# Patient Record
Sex: MALE | Race: BLACK OR AFRICAN AMERICAN
[De-identification: names, ages, dates, MRNs, and addresses within clinical notes are randomized per-mention and may not be internally consistent; named-entity substitution may affect disease eponyms.]

---

## 2017-01-13 NOTE — ER DOCUMENT REPORT
ED General Pain





- General


Chief Complaint: Leg Pain


Stated Complaint: RIGHT LEG PAIN


Time seen by provider: 18:10


Mode of Arrival: Ambulatory


Information source: Patient


TRAVEL OUTSIDE OF THE [place] IN LAST 30 DAYS: No





- HPI


Patient complains to provider of: sickle cell pain, right leg pain


Onset: Yesterday


Onset/Duration: Gradual, Persistent


Quality of pain: Achy


Severity: Moderate


Pain Level: 4


Context: Chronic problem


Exacerbated by: Denies


Relieved by: Denies


Similar symptoms previously: Yes


Recently seen / treated by doctor: Yes


Notes: 


Patient is a 21-year-old male with a history of sickle cell disease who 

presents to emergency room complaining of right leg pain that's been going on 

since yesterday and consistent with his previous sickle cell crisis, patient 

denies any injury or trauma, no cough, cold or congestion, no nausea, vomiting 

or diarrhea, no fever or chills, states yesterday he had some low back pain 

associated with his sickle cell disease as well but that has since resolved, 

states he attempted to see his hematologist today but she was not in office so 

he came to the emergency room instead





- Related Data


Allergies/Adverse Reactions: 


 





Coconut * [Coconut] Allergy (Mild, Verified 01/13/17 17:25)


 


transpore tape Allergy (Mild, Uncoded 01/13/17 17:25)


 Hives











Past Medical History





- General


Information source: Patient





- Social History


Smoking Status: Never Smoker


Chew tobacco use (# tins/day): No


Frequency of alcohol use: Occasional


Drug Abuse: None


Family History: Arthritis, DM, Hypertension, Other - Sickle cell trait both 

parents and asthma


Patient has suicidal ideation: No


Patient has homicidal ideation: No


Pulmonary Medical History: Reports: Hx Asthma, Hx Pneumonia


Renal/ Medical History: Denies: Hx Peritoneal Dialysis


Past Surgical History: Reports: Hx Abdominal Surgery - Gallstones removal.  

Denies: Hx Cholecystectomy.  Comment Only: Hx Orthopedic Surgery - Fluid 

drained from right foot





- Immunizations


Immunizations up to date: Yes


Hx Diphtheria, Pertussis, Tetanus Vaccination: No


Hx Pneumococcal Vaccination: 05/16/13





Review of Systems





- Review of Systems


Constitutional: No symptoms reported


EENT: No symptoms reported


Cardiovascular: No symptoms reported


Respiratory: No symptoms reported


Gastrointestinal: No symptoms reported


Genitourinary: No symptoms reported


Male Genitourinary: No symptoms reported


Musculoskeletal: See HPI


Skin: No symptoms reported


Hematologic/Lymphatic: See HPI


Neurological/Psychological: No symptoms reported


-: Yes All other systems reviewed and negative





Physical Exam





- Vital signs


Vitals: 


 











Temp Pulse Resp BP Pulse Ox


 


 98.0 F   99   17   135/72 H  99 


 


 01/13/17 17:21  01/13/17 17:21  01/13/17 17:21  01/13/17 17:21  01/13/17 17:21











Interpretation: Normal





- General


General appearance: Appears well, Alert





- HEENT


Head: Normocephalic, Atraumatic


Eyes: Normal


Pupils: PERRL





- Respiratory


Respiratory status: No respiratory distress


Chest status: Nontender


Breath sounds: Normal


Chest palpation: Normal





- Cardiovascular


Rhythm: Regular


Heart sounds: Normal auscultation


Murmur: No





- Abdominal


Inspection: Normal


Distension: No distension


Bowel sounds: Normal


Tenderness: Nontender


Organomegaly: No organomegaly





- Back


Back: Normal, Nontender





- Extremities


General upper extremity: Normal inspection, Nontender, Normal color, Normal ROM

, Normal temperature


General lower extremity: Normal inspection, Nontender, Normal color, Normal ROM

, Normal temperature, Normal weight bearing.  No: Shira's sign





- Neurological


Neuro grossly intact: Yes


Cognition: Normal


Orientation: AAOx4


Dhaval Coma Scale Eye Opening: Spontaneous


Dhaval Coma Scale Verbal: Oriented


Adona Coma Scale Motor: Obeys Commands


Dhaval Coma Scale Total: 15


Speech: Normal


Motor strength normal: LUE, RUE, LLE, RLE


Sensory: Normal





- Psychological


Associated symptoms: Normal affect, Normal mood





- Skin


Skin Temperature: Warm


Skin Moisture: Dry


Skin Color: Normal





Course





- Re-evaluation


Re-evalutation: 





01/13/17 21:53


Patient resting comfortably, reports feeling much better and ready to go home, 

labs were discussed with him at bedside, patient will be discharged with pain 

medication and information to follow up with his hematologist, advised to 

return if symptoms worsen, patient acknowledges understanding and agreement 

with this plan





- Vital Signs


Vital signs: 


 











Temp Pulse Resp BP Pulse Ox


 


 98.0 F   99   17   135/72 H  99 


 


 01/13/17 17:21  01/13/17 17:21  01/13/17 17:21  01/13/17 17:21  01/13/17 17:21














- Laboratory


Result Diagrams: 


 01/13/17 20:55





 01/13/17 18:35


Laboratory results interpreted by me: 


 











  01/13/17 01/13/17 01/13/17





  17:40 18:35 20:55


 


RBC    3.07 L


 


Hgb    12.2 L


 


Hct    36.4 L


 


MCV    119 H


 


MCH    39.8 H


 


RDW    23.8 H


 


Retic Count (auto)    5.15 H


 


Absolute Retic    0.158 H


 


Total Bilirubin   1.8 H 


 


Alkaline Phosphatase   144 H 


 


Urine Blood  SMALL H  














Discharge





- Discharge


Clinical Impression: 


 Sickle cell crisis





Condition: Stable


Disposition: HOME, SELF-CARE


Instructions:  Sickle Cell Crisis ([facility])


Additional Instructions: 


Drink plenty of fluids.  Follow up with your hematologist in one to 2 days.  

Return to the emergency room immediately if symptoms worsen or any additional 

concerns.


Prescriptions: 


Oxycodone HCl/Acetaminophen [Percocet  Mg Tablet] 1 each PO Q6 #30 tablet

## 2017-01-13 NOTE — ER DOCUMENT REPORT
ED Medical Screen (RME)





- General


Stated Complaint: RIGHT LEG PAIN


Mode of Arrival: Ambulatory


Information source: Patient


Notes: 


Patient presents to the emergency department with right leg pain started 2 days 

ago. Patient has history of sickle cell. Reports crisis started 2 days ago with 

nausea.





I greeted and performed a rapid initial assessment of this patient. 

Comprehensive ED assessment and evaluation of the patient, analysis of test 

results and completion of the medical decision making process will be conducted 

by additional ED providers.


TRAVEL OUTSIDE OF THE [place] IN LAST 30 DAYS: No





- Related Data


Allergies/Adverse Reactions: 


 





Coconut * [Coconut] Allergy (Mild, Verified 01/13/17 17:25)


 


transpore tape Allergy (Mild, Uncoded 01/13/17 17:25)


 Hives











Past Medical History


Pulmonary Medical History: Reports: Hx Asthma, Hx Pneumonia


Past Surgical History: Reports: Hx Abdominal Surgery - Gallstones removal.  

Denies: Hx Cholecystectomy.  Comment Only: Hx Orthopedic Surgery - Fluid 

drained from right foot





- Immunizations


Immunizations up to date: Yes


Hx Diphtheria, Pertussis, Tetanus Vaccination: No





Physical Exam





- Vital signs


Vitals: 





 











Temp Pulse Resp BP Pulse Ox


 


 98.0 F   99   17   135/72 H  99 


 


 01/13/17 17:21  01/13/17 17:21  01/13/17 17:21  01/13/17 17:21  01/13/17 17:21














Course





- Vital Signs


Vital signs: 





 











Temp Pulse Resp BP Pulse Ox


 


 98.0 F   99   17   135/72 H  99 


 


 01/13/17 17:21  01/13/17 17:21  01/13/17 17:21  01/13/17 17:21  01/13/17 17:21

## 2017-03-01 NOTE — ER DOCUMENT REPORT
ED Medical Screen (RME)





- General


Stated Complaint: CHEST AND BACK PAIN


Notes: 


22 yo AA male c/o intermittant anterior chest pains and low back pain x 2 days.

  + pain with deep inspiration.  + cough.  + shortness of breath.  no 

radiculopathy, no paresthesias, no bowel/bladder change, no fever.  


+ hx/o sickle cell


TRAVEL OUTSIDE OF THE [place] IN LAST 30 DAYS: No





- Related Data


Allergies/Adverse Reactions: 


 





Coconut * [Coconut] Allergy (Mild, Verified 01/13/17 17:25)


 


transpore tape Allergy (Mild, Uncoded 01/13/17 17:25)


 Hives











Past Medical History


Pulmonary Medical History: Reports: Hx Asthma, Hx Pneumonia


Renal/ Medical History: Denies: Hx Peritoneal Dialysis


Past Surgical History: Reports: Hx Abdominal Surgery - Gallstones removal, Hx 

Vascular Surgery - Port placement.  Denies: Hx Cholecystectomy.  Comment Only: 

Hx Orthopedic Surgery - Fluid drained from right foot





- Immunizations


Immunizations up to date: Yes


Hx Diphtheria, Pertussis, Tetanus Vaccination: No





Physical Exam





- Vital signs


Vitals: 





 











Temp Pulse Resp BP Pulse Ox


 


 98.4 F   99   18   134/80 H  96 


 


 03/01/17 11:52  03/01/17 11:52  03/01/17 11:52  03/01/17 11:52  03/01/17 11:52














Course





- Vital Signs


Vital signs: 





 











Temp Pulse Resp BP Pulse Ox


 


 98.4 F   99   18   134/80 H  96 


 


 03/01/17 11:52  03/01/17 11:52  03/01/17 11:52  03/01/17 11:52  03/01/17 11:52

## 2017-03-01 NOTE — ER DOCUMENT REPORT
ED General Pain





- General


Chief Complaint: Chest Pain


Stated Complaint: CHEST AND BACK PAIN


Notes: 


Patient is a 21-year-old male complaining of chest and significant for sickle 

cell anemia.  Patient states that 3 days ago he had back pain that is now 

affecting his chest, he states that this is consistent with previous flareups 

of sickle cell crisis.  Chest pain described as a constant sharp stabbing pain 

that he has had for the past 2 days that has not gotten worse in severity and 

is reproducible to palpation.  Patient states that he takes 30 mg oxycodone 

every 4 hours PRN prescribed by his hematologist Dr. Canas.  States that he's 

been taking his medication as prescribed but still has pain.  Denies any 

shortness of breath, productive cough, fever, chills, extremity pain.





Past medical history also significant for history of asthma


Past surgical history significant for PowerPort placement in the right upper 

extremity


TRAVEL OUTSIDE OF THE [place] IN LAST 30 DAYS: No





- Related Data


Allergies/Adverse Reactions: 


 





Coconut * [Coconut] Allergy (Mild, Verified 03/01/17 12:12)


 


transpore tape Allergy (Mild, Uncoded 03/01/17 12:12)


 Hives











Past Medical History





- Social History


Smoking Status: Never Smoker


Chew tobacco use (# tins/day): No


Frequency of alcohol use: None


Drug Abuse: None


Family History: Arthritis, DM, Hypertension, Other - Sickle cell trait both 

parents and asthma


Patient has suicidal ideation: No


Patient has homicidal ideation: No


Pulmonary Medical History: Reports: Hx Asthma, Hx Pneumonia


Renal/ Medical History: Denies: Hx Peritoneal Dialysis


Past Surgical History: Reports: Hx Abdominal Surgery - Gallstones removal, Hx 

Vascular Surgery - Port placement.  Denies: Hx Cholecystectomy.  Comment Only: 

Hx Orthopedic Surgery - Fluid drained from right foot





- Immunizations


Immunizations up to date: Yes


Hx Diphtheria, Pertussis, Tetanus Vaccination: No


Hx Pneumococcal Vaccination: 05/16/13





Review of Systems





- Review of Systems


Constitutional: No symptoms reported


EENT: No symptoms reported


Cardiovascular: See HPI


Respiratory: No symptoms reported


Gastrointestinal: No symptoms reported





Physical Exam





- Vital signs


Vitals: 


 











Temp Pulse Resp BP Pulse Ox


 


 98.4 F   99   18   134/80 H  96 


 


 03/01/17 11:52  03/01/17 11:52  03/01/17 11:52  03/01/17 11:52  03/01/17 11:52














- Notes


Notes: 


PHYSICAL EXAM


GENERAL: Alert, interacts well. Able to sit up and cooperate with exam


HEAD: Normocephalic, atraumatic.


EYES: Pupils equal, round, and reactive to light. Extraocular movements intact.


ENT: Oral mucosa moist, tongue midline. 


NECK: Full range of motion. Supple. Trachea midline.


LUNGS: Clear to auscultation bilaterally, no wheezes, rales, or rhonchi. No 

respiratory distress.


HEART: Regular rate and rhythm. No murmurs, gallops, or rubs. Chest tender to 

palpation


ABDOMEN: Soft,  nondistended, nontender. No guarding, rebound, or rigidity.. 

Bowel sounds present in all 4 quadrants.


BACK: (-) CVA tenderness but thoracic and lumbar paraspinous muscles are tender 


EXTREMITIES: Moves all 4 extremities spontaneously. No edema, radial and 

dorsalis pedis pulses 2/4 bilaterally. No cyanosis. 


NEUROLOGICAL: Alert and oriented x3. Normal speech.


PSYCH: Normal affect, normal mood.


SKIN: Warm, dry, normal turgor. No rashes or lesions noted.





Course





- Re-evaluation


Re-evalutation: 





03/01/17 17:37


Presentation is most consistent with an uncomplicated sickle cell pain crisis.  

Patient has no evidence of an aplastic crisis on labs.  Chest x-ray and vitals 

are not consistent with acute chest syndrome.  Vitals have remained within 

normal limits here in the emergency department.  Patient's pain has been able 

to be controlled using IV analgesia.  The patient is agreeable to discharge 

home at this time.  I recommended that they follow closely with their primary 

hematologist.  Return precautions have been reviewed and discussed and patient 

has verbalized indications to return to the emergency department.





- Vital Signs


Vital signs: 


 











Temp Pulse Resp BP Pulse Ox


 


 98.4 F   99   18   134/80 H  96 


 


 03/01/17 11:52  03/01/17 11:52  03/01/17 11:52  03/01/17 11:52  03/01/17 11:52














- Laboratory


Result Diagrams: 


 03/01/17 12:20





 03/01/17 12:20


Laboratory results interpreted by me: 


 











  03/01/17 03/01/17 03/01/17





  12:20 12:20 12:20


 


RBC  3.29 L  


 


Hgb  12.6 L  


 


Hct  37.6 L  


 


MCV  114 H  


 


MCH  38.3 H  


 


RDW  19.7 H  


 


Retic Count (auto)  8.67 H  


 


Absolute Retic  0.286 H  


 


Sodium   145.8 H 


 


Total Bilirubin   1.9 H 


 


Alkaline Phosphatase   143 H 


 


Total Protein   9.6 H 


 


Albumin   5.1 H 


 


Urine Urobilinogen    2.0 H














- Diagnostic Test


Radiology reviewed: Image reviewed, Reports reviewed





Discharge





- Discharge


Clinical Impression: 


 Sickle cell crisis





Condition: Good


Disposition: HOME, SELF-CARE


Instructions:  Intravenous (IV) Fluids ([facility])


Additional Instructions: 


Sickle Cell Crisis





     You have "sickle cell crisis."  Sickle cell disease is caused by abnormal 

hemoglobin.  This hemoglobin can deform red blood cells into a sickle shape.  

These abnormal blood cells can block blood vessels. This causes the pain of 

sickle cell crisis.


     Sickle cell crisis can occur any time.  But attacks are more likely with 

acute infection, dehydration, or altitude change.  A crisis usually causes pain 

in the legs, back, abdomen, and chest.  Sometimes the pain may ease and return 

later.


     The usual treatment is oxygen, pain medication, IV fluids, and treatment 

of infection.  Attacks may take a couple of days to resolve.


     Return if the pain becomes more severe, or if there are new symptoms.





Referrals: 


ERIC AUGUSTIN MD [ACTIVE STAFF] - Follow up in 1 week

## 2017-03-31 NOTE — ER DOCUMENT REPORT
ED General





- General


Chief Complaint: Sickle Cell Crisis


Stated Complaint: POSSIBLE SICKLE CELL CRISIS


Notes: 


Patient is a 21-year-old male presents with complaint of fever, cough, and 

sickle cell pain.  He says his have cough and fever for 3 days.  MAXIMUM 

TEMPERATURE at home was 102.1.  He has had acute chest syndrome in the past.  

This feels different.  He still has a spleen.  He stills has a gallbladder.  He 

does have sickle cell disease.  He is followed by Dr. Ricardo.  His primary 

care doctor is Dr. Funez.  No vomiting.  No diarrhea.  No abdominal pain.  

No chest pain.  Patient says he has pain in his back and legs which is very 

typical of his sickle cell pain.


TRAVEL OUTSIDE OF THE [place] IN LAST 30 DAYS: No





- Related Data


Allergies/Adverse Reactions: 


 





Coconut * [Coconut] Allergy (Mild, Verified 03/31/17 20:55)


 


transpore tape Allergy (Mild, Uncoded 03/01/17 12:12)


 Hives











Past Medical History





- Social History


Smoking Status: Never Smoker


Chew tobacco use (# tins/day): No


Frequency of alcohol use: Rare


Drug Abuse: None


Family History: Arthritis, DM, Hypertension, Other - Sickle cell trait both 

parents and asthma


Pulmonary Medical History: Reports: Hx Asthma, Hx Pneumonia


Renal/ Medical History: Denies: Hx Peritoneal Dialysis


Past Surgical History: Reports: Hx Abdominal Surgery - Gallstones removal, Hx 

Vascular Surgery - Port placement.  Denies: Hx Cholecystectomy.  Comment Only: 

Hx Orthopedic Surgery - Fluid drained from right foot





- Immunizations


Immunizations up to date: Yes


Hx Diphtheria, Pertussis, Tetanus Vaccination: No


Hx Pneumococcal Vaccination: 05/16/13





Review of Systems





- Review of Systems


Notes: 


My Normal Review Basic





REVIEW OF SYSTEMS:


CONSTITUTIONAL : Fevers


EENT:   Denies eye, ear, throat, or mouth pain or symptoms.  Denies nasal or 

sinus congestion.


CARDIOVASCULAR:  Denies chest pain.


RESPIRATORY: Cough


GASTROINTESTINAL:  Denies abdominal pain.  Denies nausea, vomiting, or 

diarrhea.  Denies constipation.  Last BM: 


GENITOURINARY:  Denies difficulty urinating, painful urination, burning, 

frequency, or blood in urine.


MUSCULOSKELETAL:  Denies neck or back pain or joint pain or swelling.


SKIN:   Denies rash or skin lesions.


HEMATOLOGIC : History of sickle cell


NEUROLOGICAL:  Denies altered mental status or loss of consciousness.  Denies 

headache.  Denies weakness or paralysis or loss of use of either side.  Denies 

problems with gait or speech.  Denies sensory or motor loss.


ALL OTHER SYSTEMS REVIEWED AND NEGATIVE.





Physical Exam





- Vital signs


Vitals: 


 











Resp Pulse Ox


 


 19   95 


 


 03/31/17 22:34  03/31/17 22:34














- Notes


Notes: 


General Appearance: Well nourished, alert, cooperative, no acute distress, no 

obvious discomfort.  Well-appearing


Vitals: reviewed, See vital signs table.


Head: no swelling or tenderness to the head


Eyes: PERRL, EOMI, Conjuctiva clear


Mouth: No decreasd moisture


Neck: Supple, no neck tenderness, No thyromegaly


Lungs: No wheezing, No rales, No rhonci, No accessory muscle use, good air 

exchange bilaterally.


Heart: Tachycardic rate, Regular rythm, No murmur, no rub


Abdomen: Normal BS, soft, No rigidity, No abdominal tenderness, No guarding, no 

rebound, no abdominal masses, no organomegaly


Extremities: strength 5/5 in all extremities, good pulses in all extremities, 

no swelling or tenderness in the extremities, no edema.


Skin: warm, dry, appropriate color, no rash


Neuro: speech clear, oriented x 3, normal affect, responds appropriately to 

questions.





Course





- Vital Signs


Vital signs: 


 











Temp Pulse Resp BP Pulse Ox


 


       19   123/80   100 


 


       04/01/17 03:46  04/01/17 03:46  04/01/17 03:46














- Laboratory


Result Diagrams: 


 03/31/17 22:40





 03/31/17 22:40


Laboratory results interpreted by me: 


 











  03/31/17 03/31/17 04/01/17





  22:40 22:40 00:20


 


RBC  2.88 L  


 


Hgb  11.2 L  


 


Hct  31.4 L  


 


MCV  109 H  


 


MCH  38.7 H  


 


RDW  21.6 H  


 


Retic Count (auto)  12.04 H  


 


Absolute Retic  0.347 H  


 


Total Bilirubin   2.8 H 


 


AST   104 H 


 


Total Protein   8.5 H 


 


Urine Blood    SMALL H














- Transfer of Care


Notes: 





04/01/17 04:09


I did try begins since the patient to stay in hospital being that she is still 

tachycardic says he tries to move around, he's had fevers, and he does have 

sickle cell disease.  His oxygen level is also somewhat low at 92-93% on room 

air.  Patient does not want stay in hospital.  He says he is feeling improved 

and wants to go home.  He did agree to allow me to speak with his otologist, 

Dr. Gill.  I did discuss the case with her.  She says that he does not was in 

the hospital he must recalls liquids, place him on antibiotic, and have him 

follow-up in her office on Monday.  I did explain to the patient he is 

agreeable to it.  I strongly encouraged return to ER medially feels she is 

feeling worse, difficulty breathing, any chest pain, fevers, or feels unwell.  

Patient agrees with plan will be discharged home.





Dictation of this chart was performed using voice recognition software; 

therefore, there may be some unintended grammatical errors.





Discharge





- Discharge


Clinical Impression: 


 Sickle cell crisis, Cough





Fever


Qualifiers:


 Fever type: unspecified Qualified Code(s): R50.9 - Fever, unspecified





Condition: Stable


Disposition: HOME, SELF-CARE


Additional Instructions: 


As discussed with you I think it would be better for you to be admitted to the 

hospital being that your heart rate is still a little elevated and you have 

been having fevers as these are signs that you could potentially become more 

ill over the next 24 to 48 hours. We respect your decision to go home. Please 

have a very low threshold to return to the ER if you start having difficulty 

breathing, worsening pain, recurrent fevers, or feel that you are worsening in 

any way. I did speak with Dr. Cleaning who recommends that you drink lots of 

liquids, take the antibiotics, and see her in her office on Monday.


Prescriptions: 


Levofloxacin [Levaquin 750 mg Tablet] 750 mg PO DAILY #7 tablet

## 2017-03-31 NOTE — ER DOCUMENT REPORT
ED Medical Screen (RME)





- General


Stated Complaint: POSSIBLE SICKLE CELL CRISIS


Notes: 


Patient complains of back pain and leg pain for 3 days.  Same symptoms whenever 

he begins to have sickle cell pain.  Patient states he had a fever today, and 

does have cough cold symptoms that started yesterday.  Patient denies nausea, 

vomiting or diarrhea.





I have greeted and performed a rapid initial assessment of this patient.  A 

comprehensive ED assessment and evaluation of the patient, analysis of test 

results and completion of the medical decision making process will be conducted 

by additional ED providers.


TRAVEL OUTSIDE OF THE [place] IN LAST 30 DAYS: No





- Related Data


Allergies/Adverse Reactions: 


 





Coconut * [Coconut] Allergy (Mild, Verified 03/31/17 20:55)


 


transpore tape Allergy (Mild, Uncoded 03/01/17 12:12)


 Hives











Past Medical History


Pulmonary Medical History: Reports: Hx Asthma, Hx Pneumonia


Renal/ Medical History: Denies: Hx Peritoneal Dialysis


Past Surgical History: Reports: Hx Abdominal Surgery - Gallstones removal, Hx 

Vascular Surgery - Port placement.  Denies: Hx Cholecystectomy.  Comment Only: 

Hx Orthopedic Surgery - Fluid drained from right foot





- Immunizations


Immunizations up to date: Yes


Hx Diphtheria, Pertussis, Tetanus Vaccination: No

## 2017-04-01 NOTE — ER DOCUMENT REPORT
ED General





- General


Chief Complaint: Sickle Cell Crisis


Stated Complaint: SICKLE CELL CRISIS


Notes: 


Patient is a 21-year-old male presents with complaints of left knee pain and 

difficulty breathing.  He was seen here last night by me and I encouraged him 

to stay in hospital the patient was feeling improved and wanted to leave.  

Patient says that he was at work today.  He was checked his O2 saturation was 

around 92%.  EKGs have some pain in his left knee.  He says that his fever has 

improved and his temp is on been around 99.  He has been taking the 

antibiotics.  He has no other complaints at this time.


TRAVEL OUTSIDE OF THE [place] IN LAST 30 DAYS: No





- Related Data


Allergies/Adverse Reactions: 


 





Coconut * [Coconut] Allergy (Mild, Verified 04/01/17 21:02)


 


transpore tape Allergy (Mild, Uncoded 04/01/17 21:02)


 Hives











Past Medical History





- Social History


Smoking Status: Never Smoker


Frequency of alcohol use: None


Drug Abuse: None


Family History: Arthritis, DM, Hypertension, Other - Sickle cell trait both 

parents and asthma


Pulmonary Medical History: Reports: Hx Asthma, Hx Pneumonia


Renal/ Medical History: Denies: Hx Peritoneal Dialysis


Past Surgical History: Reports: Hx Abdominal Surgery - Gallstones removal, Hx 

Vascular Surgery - Port placement.  Denies: Hx Cholecystectomy.  Comment Only: 

Hx Orthopedic Surgery - Fluid drained from right foot





- Immunizations


Immunizations up to date: Yes


Hx Diphtheria, Pertussis, Tetanus Vaccination: No


Hx Pneumococcal Vaccination: 05/16/13





Review of Systems





- Review of Systems


Notes: 


My Normal Review Basic





REVIEW OF SYSTEMS:


CONSTITUTIONAL : Recent fever


EENT:   Denies eye, ear, throat, or mouth pain or symptoms.  Denies nasal or 

sinus congestion.


CARDIOVASCULAR:  Denies chest pain.


RESPIRATORY: Some cough.  Mild shortness of breath


GASTROINTESTINAL:  Denies abdominal pain.  Denies nausea, vomiting, or 

diarrhea.  Denies constipation.  Last BM: 


MUSCULOSKELETAL: Some left knee pain


SKIN:   Denies rash or skin lesions.


HEMATOLOGIC : Sickle cell disease


NEUROLOGICAL:  Denies altered mental status or loss of consciousness.  Denies 

headache.  Denies weakness or paralysis or loss of use of either side.  Denies 

problems with gait or speech.  Denies sensory or motor loss.


ALL OTHER SYSTEMS REVIEWED AND NEGATIVE.





Physical Exam





- Vital signs


Vitals: 


 











Temp Pulse Resp BP Pulse Ox


 


 99.1 F   102 H  18   131/78 H  94 


 


 04/01/17 21:02  04/01/17 21:02  04/01/17 21:02  04/01/17 21:02  04/01/17 21:02














- Notes


Notes: 


General Appearance: Well nourished, alert, cooperative, no acute distress, mild 

obvious discomfort.


Vitals: reviewed, See vital signs table.


Head: no swelling or tenderness to the head


Eyes: PERRL, EOMI, Conjuctiva clear


Mouth: No decreasd moisture


Neck: Supple, no neck tenderness, No thyromegaly


Lungs: No wheezing, No rales, No rhonci, No accessory muscle use, good air 

exchange bilaterally.


Heart: Normal rate, Regular rythm, No murmur, no rub


Abdomen: Normal BS, soft, No rigidity, No abdominal tenderness, No guarding, no 

rebound, no abdominal masses, no organomegaly


Extremities: strength 5/5 in all extremities, good pulses in all extremities, 

there may be very slight swelling just superior to the patella.  This no 

redness or warmth to the left knee.  Some mild pain to palpation just superior 

to the left patella., no edema.


Skin: warm, dry, appropriate color, no rash


Neuro: speech clear, oriented x 3, normal affect, responds appropriately to 

questions.





Course





- Vital Signs


Vital signs: 


 











Temp Pulse Resp BP Pulse Ox


 


 99.1 F   102 H  16   121/73   92 


 


 04/01/17 21:02  04/01/17 21:02  04/02/17 04:00  04/02/17 03:01  04/02/17 04:00














- Laboratory


Result Diagrams: 


 04/02/17 00:17





 04/02/17 00:17


Laboratory results interpreted by me: 


 











  04/02/17 04/02/17





  00:17 00:17


 


RBC  2.66 L 


 


Hgb  10.3 L 


 


Hct  29.2 L 


 


MCV  110 H 


 


MCH  38.6 H 


 


RDW  22.5 H 


 


Monocytes %  15.6 H 


 


Retic Count (auto)  10.60 H 


 


Absolute Retic  0.282 H 


 


Total Bilirubin   2.7 H


 


AST   61 H














- EKG Interpretation by Me


Additional EKG results interpreted by me: 





04/01/17 23:30


EKG is reviewed and interpreted by me.  EKG shows normal sinus rhythm with rate 

of 84 bpm.  No ST segment elevation or depression.  No ischemic T wave 

inversions.  NH interval, QRS duration, congestive intervals are within normal 

range.  Old EKG for comparison is from 08/29/2016.





- Transfer of Care


Notes: 





04/02/17 06:03


Patient's does have some improvement in his pain with medication given here.  

We have placed him on oxygen which is helped significantly.  Due to his oxygen 

demand and continued pain if felt it is appropriate to admit him for sickle 

cell crisis.  I did give him a dose of Levaquin here.  I did prescribe Levaquin 

to him yesterday being that he did have some cough and congestion and fevers.  

Chest x-ray continues to not show any signs of acute chest syndrome.  His lung 

fields are clear.  He has an associate chest pain.  I did speak with Dr. Gonzalez, physician covering for Dr. Funez, who agrees to accept the 

patient for admission.





Dictation of this chart was performed using voice recognition software; 

therefore, there may be some unintended grammatical errors.





Discharge





- Discharge


Clinical Impression: 


 Sickle cell crisis





Disposition: ADMITTED AS OBSERVATION


Admitting Provider: Armin


Unit Admitted: Telemetry

## 2017-04-02 NOTE — PDOC H&P
History of Present Illness


Admission Date/PCP: 


  04/02/17 04:06





  DIYA PHILIPPE





History of Present Illness: 


CARMEN BEGMU is a 21 year old male with history of sickle cell disease SS, he 

came to the emergency room because of bone pains, he stated that he normally 

would have about 6-9 bone pain crisis in a year.  There is no fever or chills 

to suggest infection








Past Medical History


Pulmonary Medical History: Reports: Asthma, Pneumonia


Hematology: Reports: Anemia, Sickle Cell Disease, Bleeding Tendencies





Past Surgical History


Past Surgical History: Reports: Vascular Surgery - Port placement


   Comment Only: Orthopedic Surgery - Fluid drained from right foot





Social History


Smoking Status: Never Smoker


Frequency of Alcohol Use: None


Hx Recreational Drug Use: No


Drugs: None


Hx Prescription Drug Abuse: No





Family History


Family History: Arthritis, DM, Hypertension, Other - Sickle cell trait both 

parents and asthma


Parental Family History Reviewed: Yes


Children Family History Reviewed: Yes


Sibling(s) Family History Reviewed.: Yes





Medication/Allergy


Home Medications: 








Albuterol Sulfate [Proair HFA Inhalation Aerosol 8.5 gm MDI] 2 puff IH Q4HP PRN 

04/02/17 


Fentanyl [Duragesic 100 Mcg/Hr Transdermal Patch] 1 patch TOP Q3D 04/02/17 


Folic Acid [Folvite 1 mg Tablet] 1 mg PO DAILY 04/02/17 


Hydroxyurea [Hydrea 500 mg Capsule] 1,000 mg PO DAILY 04/02/17 


Ibuprofen [Motrin 800 mg Tablet] 800 mg PO Q6HP PRN 04/02/17 


Oxycodone HCl 30 mg PO Q4HP PRN 04/02/17 


Oxycodone HCl [Oxy-Ir 5 mg Tablet] 5 mg PO Q4H 04/02/17 








Allergies/Adverse Reactions: 


 





Coconut * [Coconut] Allergy (Mild, Verified 04/01/17 21:02)


 


transpore tape Allergy (Mild, Uncoded 04/01/17 21:02)


 Hives











Review of Systems


Constitutional: ABSENT: chills, fever(s), headache(s), weight gain, weight loss


Eyes: ABSENT: visual disturbances


Ears: ABSENT: hearing changes


Cardiovascular: ABSENT: chest pain, dyspnea on exertion, edema, orthropnea, 

palpitations


Respiratory: ABSENT: cough, hemoptysis


Gastrointestinal: ABSENT: abdominal pain, constipation, diarrhea, hematemesis, 

hematochezia, nausea, vomiting


Genitourinary: ABSENT: dysuria, hematuria


Musculoskeletal: PRESENT: back pain


Integumentary: ABSENT: rash, wounds


Neurological: ABSENT: abnormal gait, abnormal speech, confusion, dizziness, 

focal weakness, syncope


Psychiatric: ABSENT: anxiety, depression, homidical ideation, suicidal ideation


Endocrine: ABSENT: cold intolerance, heat intolerance, menstrual abnormalities, 

polydipsia, polyuria


Hematologic/Lymphatic: ABSENT: easy bleeding, easy bruising, lymphadenopathy





Physical Exam


Vital Signs: 


 











Temp Pulse Resp BP Pulse Ox


 


 97.8 F   89   18   115/71   97 


 


 04/02/17 16:00  04/02/17 16:00  04/02/17 16:00  04/02/17 16:00  04/02/17 16:00








 Intake & Output











 04/01/17 04/02/17 04/03/17





 06:59 06:59 06:59


 


Intake Total   557


 


Balance   557


 


Weight  61.8 kg 











General appearance: PRESENT: no acute distress, well-developed, well-nourished


Head exam: PRESENT: atraumatic, normocephalic


Eye exam: PRESENT: conjunctiva pink, EOMI, PERRLA


Ear exam: PRESENT: normal external ear exam


Mouth exam: PRESENT: moist, tongue midline


Neck exam: PRESENT: full ROM


Respiratory exam: PRESENT: clear to auscultation brenda


Cardiovascular exam: PRESENT: RRR, +S1, +S2


Pulses: PRESENT: normal dorsalis pedis pul, +2 pedal pulses bilateral


Vascular exam: PRESENT: normal capillary refill


GI/Abdominal exam: PRESENT: normal bowel sounds, soft


Rectal exam: PRESENT: deferred


Neurological exam: PRESENT: alert, awake, oriented to person, oriented to place

, oriented to time, oriented to situation, CN II-XII grossly intact


Psychiatric exam: PRESENT: appropriate affect, normal mood


Skin exam: PRESENT: dry, intact, warm





Results


Laboratory Results: 


 





 04/02/17 08:01 





 04/02/17 08:01 





 











  04/02/17 04/02/17





  08:01 08:01


 


WBC  6.1 


 


RBC  2.70 L 


 


Hgb  10.5 L 


 


Hct  29.2 L 


 


MCV  108 H 


 


MCH  38.8 H 


 


MCHC  35.9 


 


RDW  22.9 H 


 


Plt Count  187 


 


Creatinine   0.66


 


Est GFR ( Amer)   > 60


 


Est GFR (Non-Af Amer)   > 60











Impressions: 


 





Chest X-Ray  04/01/17 22:01


IMPRESSION:  NO ACUTE RADIOGRAPHIC FINDING IN THE CHEST.


 














Assessment & Plan





- Diagnosis


(1) Sickle cell disease with crisis


Is this a current diagnosis for this admission?: YesPlan: 


Patient admitted to the hospital for treatment of bone pain crisis

## 2017-04-03 NOTE — PDOC PROGRESS REPORT
Subjective


Progress Note for:: 04/03/17


Subjective:: 


Patient continue to express pain in his left knee joint. Remain on IV Dilaudid 

therapy for sickle cell disease pain crisis management. He denied any chest 

pain. remain on supplemental oxygen via nasal canula for associated hypoxemia 

upon presentation. No nausea or vomiting. No abdominal pain.





Physical Exam


Vital Signs: 


 











Temp Pulse Resp BP Pulse Ox


 


 97.8 F   83   16   123/66   97 


 


 04/03/17 15:05  04/03/17 15:05  04/03/17 15:05  04/03/17 15:05  04/03/17 15:05








 Intake & Output











 04/02/17 04/03/17 04/04/17





 06:59 06:59 06:59


 


Intake Total  2557 240


 


Balance  2557 240


 


Weight 61.8 kg  











General appearance: PRESENT: no acute distress, mild distress - pain distress 

localized to left knee joint


Head exam: PRESENT: atraumatic, normocephalic


Eye exam: PRESENT: conjunctiva pink, EOMI, PERRLA.  ABSENT: scleral icterus


Neck exam: PRESENT: full ROM.  ABSENT: carotid bruit, JVD, lymphadenopathy, 

thyromegaly


Respiratory exam: PRESENT: clear to auscultation brenda


Cardiovascular exam: PRESENT: RRR.  ABSENT: diastolic murmur, rubs, systolic 

murmur


Vascular exam: PRESENT: normal capillary refill


GI/Abdominal exam: PRESENT: normal bowel sounds, soft.  ABSENT: distended, 

guarding, mass, organolmegaly, rebound, tenderness


Extremities exam: PRESENT: full ROM


Musculoskeletal exam: PRESENT: tenderness - left knee joint with some evidence 

of small effusion


Neurological exam: PRESENT: alert, awake, oriented to person, oriented to place

, oriented to time, oriented to situation, CN II-XII grossly intact.  ABSENT: 

motor sensory deficit


Psychiatric exam: PRESENT: appropriate affect, normal mood.  ABSENT: homicidal 

ideation, suicidal ideation


Skin exam: PRESENT: dry, intact, warm.  ABSENT: cyanosis, rash





Results


Laboratory Results: 


 





 04/02/17 08:01 





 04/02/17 08:01 








Impressions: 


 





Chest X-Ray  04/01/17 22:01


IMPRESSION:  NO ACUTE RADIOGRAPHIC FINDING IN THE CHEST.


 














Assessment & Plan





- Diagnosis


(1) Knee pain, left


Qualifiers: 


     Chronicity: acute        Qualified Code(s): M25.562 - Pain in left knee  


Is this a current diagnosis for this admission?: YesPlan: 


I will request for orthopedic evaluation in view of his continued pain, 

effusion and history of sickle cell disease. Continue current pain management 

regimen.








(2) Sickle cell disease with crisis


Is this a current diagnosis for this admission?: YesPlan: 





See attending physician orders. Maintain on supplemental oxygen at 2L/min via 

nasal canula to keep oxygen saturation at or above 94 %.











- Time


Time Spent with patient: 25-34 minutes


Medications reviewed and adjusted accordingly: Yes


Anticipated discharge: Home


Within: Other





- Inpatient Certification


Based on my medical assessment, after consideration of the patient's 

comorbidities, presenting symptoms, or acuity I expect that the services needed 

warrant INPATIENT care.: Yes


I certify that my determination is in accordance with my understanding of 

Medicare's requirements for reasonable and necessary INPATIENT services [42 CFR 

412.3e].: Yes


Medical Necessity: Need Close Monitoring Due to Risk of Patient Decompensation, 

Need For IV Fluids, Need for Pain Control, Risk of Complication if Not Cared 

For in Hospital


Post Hospital Care: D/C Planner Documentation





- Plan Summary


Plan Summary: 


See attending physician orders.

## 2017-04-03 NOTE — PHYSICIAN ADVISORY NOTE
Physician Advisor ProgressNote


.: 


[facility] I have reviewed the medical record 

for this patient.  





Physician Advisor Statement: 


Possible documentation opportunities if attending agrees:


1.  "chronic opioid dependence due to sickle cell dz"  [Duragesic ]


2.  ? - "possible [place]"  [lowest sats seem to be around 4AM....]


3.  "hypoxemia as low as 88% on RA 4/2/17, & 93% on 2L 4/3/17, due to ____"   [P

/F ratios 257 & 243 respectively]


        [?any increased work of breathing to support consideration of "Ac Resp 

Failure" dx?]


    - Does attending want to order O2 for this?  (Nursing has been giving it, 

but without an order, payers will say it's not needed per attending.)


4.  "HbSS disease w/crisis"  [always have to specify if HbSS, HbSC, sickle-thal

, ... now]





As always, if concerned about any unstable VS or abnormal labs,  please comment 

on them & note what doing about them, &


  please document each day the potential clinical problems you are concerned 

could occur if pt not kept in hospital for tx at this time.








Discussion:  


22yo male w/ chronic co-morbidities including sickle cell dz w/frequent crises 6

-9x/yr, asthma, chronic opioid dependence on Duragesic 100mcg q3d


 - presented 4/1 PM to ED w/bone pain Lt knee, difficulty breathing. 


(+) , RR16, 121/73, 94% RA but then 92% RA at 04:00, WBC 7.3, Hgb 10.3 w/

high MCV & RDW & retic ct, TB 2.7, AST 61


Attending ordered IVF @100, Dilaudid 1mg q4h prn, prn albuterol.





Status:  


Pt with persistent tachycardia through this AM so far, with recurrent hypoxemia 

far out of usual range for a young person with no chronic lung dz, continuing 

to need IV Dilaudid 6x in 24hrs & again at 09:41 since then.  Not sufficiently 

stable for outpt care or d/c.  


Tx in inpatient hospital setting medically reasonable & necessary to protect pt'

s health, safety, & medical condition.  Appropriate for change to Inpt status.








Thanks for your help with documentation accuracy/specificity improvement!





Jolene Larsen MD


Count includes the [facility] Physician Advisor, Fellow of [facility]

## 2017-04-04 NOTE — PDOC CONSULTATION
History of Present Illness


Admission Date/PCP: 


  04/03/17 17:20





  DIYA PHILIPPE





Patient complains of: Left knee pain


History of Present Illness: 


CARMEN BEGUM is a 21 year old male with history of sickle cell disease SS, he 

came to the emergency room because of bone pain.  Patient has history of 

chronic sickle cell with crises a proximally 9 times per year.  He states the 

most recent crisis started 48 hours ago has somewhat improved since his 

admission yesterday.  His major complaint today for me is left knee pain.  He 

states the left knee was causing discomfort prior to his most recent crisis.  

Has difficulty ambulating secondary to the pain.  Pain is improved with heat.  

Denies numbness or tingling.  Denies fever chills or sweats.  Patient denies 

specific injury.





Past Medical History


Pulmonary Medical History: Reports: Asthma, Pneumonia


Psychiatric Medical History: 


   Denies: Depression


Hematology: Reports: Anemia, Sickle Cell Disease, Bleeding Tendencies





Past Surgical History


Past Surgical History: Reports: Vascular Surgery - Port placement


   Denies: Cholecystectomy


   Comment Only: Orthopedic Surgery - Fluid drained from right foot





Social History


Smoking Status: Never Smoker


Frequency of Alcohol Use: None


Hx Recreational Drug Use: No


Drugs: None


Hx Prescription Drug Abuse: No





Family History


Family History: Arthritis, DM, Hypertension, Other - Sickle cell trait both 

parents and asthma


Parental Family History Reviewed: Yes


Children Family History Reviewed: No


Sibling(s) Family History Reviewed.: No





Medication/Allergy


Home Medications: 








Albuterol Sulfate [Proair HFA Inhalation Aerosol 8.5 gm MDI] 2 puff IH Q4HP PRN 

04/02/17 


Fentanyl [Duragesic 100 Mcg/Hr Transdermal Patch] 1 patch TOP Q3D 04/02/17 


Folic Acid [Folvite 1 mg Tablet] 1 mg PO DAILY 04/02/17 


Hydroxyurea [Hydrea 500 mg Capsule] 1,000 mg PO DAILY 04/02/17 


Ibuprofen [Motrin 800 mg Tablet] 800 mg PO Q6HP PRN 04/02/17 


Oxycodone HCl 30 mg PO Q4HP PRN 04/02/17 


Oxycodone HCl [Oxy-Ir 5 mg Tablet] 5 mg PO Q4H 04/02/17 








Allergies/Adverse Reactions: 


 





Coconut * [Coconut] Allergy (Mild, Verified 04/01/17 21:02)


 


transpore tape Allergy (Mild, Uncoded 04/01/17 21:02)


 Hives











Review of Systems


Constitutional: ABSENT: chills, fever(s), headache(s), weight gain, weight loss


Eyes: ABSENT: visual disturbances


Ears: ABSENT: hearing changes


Cardiovascular: ABSENT: chest pain, dyspnea on exertion, edema, orthropnea, 

palpitations


Respiratory: ABSENT: cough, hemoptysis


Gastrointestinal: ABSENT: abdominal pain, constipation, diarrhea, hematemesis, 

hematochezia, nausea, vomiting


Genitourinary: ABSENT: dysuria, hematuria


Musculoskeletal: PRESENT: as per HPI


Integumentary: ABSENT: rash, wounds


Neurological: ABSENT: abnormal gait, abnormal speech, confusion, dizziness, 

focal weakness, syncope


Psychiatric: ABSENT: anxiety, depression, homidical ideation, suicidal ideation


Endocrine: ABSENT: cold intolerance, heat intolerance, menstrual abnormalities, 

polydipsia, polyuria


Hematologic/Lymphatic: ABSENT: easy bleeding, easy bruising, lymphadenopathy





Physical Exam


Vital Signs: 


 











Temp Pulse Resp BP Pulse Ox


 


 99.1 F   100   16   129/65 H  98 


 


 04/04/17 15:51  04/04/17 15:51  04/04/17 15:51  04/04/17 15:51  04/04/17 15:51








 Intake & Output











 04/03/17 04/04/17 04/05/17





 06:59 06:59 06:59


 


Intake Total  1960 980


 


Balance  1960 980














Results


Laboratory Results: 


 





 04/04/17 05:50 





 04/04/17 05:50 





 











  04/04/17 04/04/17





  05:50 05:50


 


WBC  6.4 


 


RBC  2.76 L 


 


Hgb  10.5 L 


 


Hct  30.3 L 


 


MCV  110 H 


 


MCH  37.9 H 


 


MCHC  34.6 


 


RDW  20.9 H 


 


Plt Count  185 


 


Seg Neutrophils %  Not Reportable 


 


Lymphocytes %  Not Reportable 


 


Monocytes %  Not Reportable 


 


Eosinophils %  Not Reportable 


 


Basophils %  Not Reportable 


 


Absolute Neutrophils  Not Reportable 


 


Absolute Lymphocytes  Not Reportable 


 


Absolute Monocytes  Not Reportable 


 


Absolute Eosinophils  Not Reportable 


 


Absolute Basophils  Not Reportable 


 


Sodium   143.8


 


Potassium   4.5


 


Chloride   107


 


Carbon Dioxide   27


 


Anion Gap   10


 


BUN   10


 


Creatinine   0.57


 


Est GFR ( Amer)   > 60


 


Est GFR (Non-Af Amer)   > 60


 


Glucose   94


 


Calcium   9.4


 


Total Bilirubin   2.1 H


 


AST   43


 


ALT   28


 


Alkaline Phosphatase   98


 


Total Protein   7.2


 


Albumin   4.1











Impressions: 


 





Chest X-Ray  04/01/17 22:01


IMPRESSION:  NO ACUTE RADIOGRAPHIC FINDING IN THE CHEST.


 














Assessment & Plan





- Diagnosis


(1) Knee pain, left


Qualifiers: 


     Chronicity: acute        Qualified Code(s): M25.562 - Pain in left knee  


Is this a current diagnosis for this admission?: YesPlan: 


On examination patient has tenderness along the superior and inferior pole of 

the patella which may indicate underlying osteochondrosis or may be indicative 

of patient's acute episode of sickle cell crisis.  He does see improvement with 

heat thus we will order him a K pad.  I will also obtain an x-ray of his left 

knee for further evaluation.  Patient fail to see improvement of his symptoms 

we will consider MRI.








(2) Sickle cell disease with crisis


Is this a current diagnosis for this admission?: Yes

## 2017-04-04 NOTE — PDOC PROGRESS REPORT
Subjective


Progress Note for:: 04/04/17


Subjective:: 


Patient reported persistent of generalized pain, particularly involving knee 

joints, left > right. He was evaluated by orthopod earlier today with 

recommendation for K-pad and initial left knee X ray evaluation. Pain is fairly 

controlled on current regimen. No nausea or vomiting. No chest pain or 

difficulty with breathing. No reported fever or chills.





Physical Exam


Vital Signs: 


 











Temp Pulse Resp BP Pulse Ox


 


 97.8 F   72   16   129/72 H  100 


 


 04/03/17 23:35  04/04/17 07:00  04/03/17 23:35  04/03/17 23:35  04/03/17 23:35








 Intake & Output











 04/03/17 04/04/17 04/05/17





 06:59 06:59 06:59


 


Intake Total  1960 


 


Balance  1960 











Physical Exam: 


General appearance: PRESENT: no acute distress, mild distress - pain distress 

localized to left knee joint


Head exam: PRESENT: atraumatic, normocephalic


Eye exam: PRESENT: conjunctiva pink, EOMI, PERRLA.  ABSENT: scleral icterus


Neck exam: PRESENT: full ROM.  ABSENT: carotid bruit, JVD, lymphadenopathy, 

thyromegaly


Respiratory exam: PRESENT: clear to auscultation brenda


Cardiovascular exam: PRESENT: RRR.  ABSENT: diastolic murmur, rubs, systolic 

murmur


Vascular exam: PRESENT: normal capillary refill


GI/Abdominal exam: PRESENT: normal bowel sounds, soft.  ABSENT: distended, 

guarding, mass, organomegaly, rebound, tenderness


Extremities exam: PRESENT: full ROM


Musculoskeletal exam: PRESENT: tenderness - left knee joint with some evidence 

of small effusion


Neurological exam: PRESENT: alert, awake, oriented to person, oriented to place

, oriented to time, oriented to situation, CN II-XII grossly intact.  ABSENT: 

motor sensory deficit


Psychiatric exam: PRESENT: appropriate affect, normal mood.  ABSENT: homicidal 

ideation, suicidal ideation


Skin exam: PRESENT: dry, intact, warm.  ABSENT: cyanosis, rash








Results


Laboratory Results: 


 





 04/04/17 05:50 





 04/04/17 05:50 





 











  04/04/17 04/04/17





  05:50 05:50


 


WBC  6.4 


 


RBC  2.76 L 


 


Hgb  10.5 L 


 


Hct  30.3 L 


 


MCV  110 H 


 


MCH  37.9 H 


 


MCHC  34.6 


 


RDW  20.9 H 


 


Plt Count  185 


 


Seg Neutrophils %  Not Reportable 


 


Lymphocytes %  Not Reportable 


 


Monocytes %  Not Reportable 


 


Eosinophils %  Not Reportable 


 


Basophils %  Not Reportable 


 


Absolute Neutrophils  Not Reportable 


 


Absolute Lymphocytes  Not Reportable 


 


Absolute Monocytes  Not Reportable 


 


Absolute Eosinophils  Not Reportable 


 


Absolute Basophils  Not Reportable 


 


Sodium   143.8


 


Potassium   4.5


 


Chloride   107


 


Carbon Dioxide   27


 


Anion Gap   10


 


BUN   10


 


Creatinine   0.57


 


Est GFR ( Amer)   > 60


 


Est GFR (Non-Af Amer)   > 60


 


Glucose   94


 


Calcium   9.4


 


Total Bilirubin   2.1 H


 


AST   43


 


ALT   28


 


Alkaline Phosphatase   98


 


Total Protein   7.2


 


Albumin   4.1











Impressions: 


 





Chest X-Ray  04/01/17 22:01


IMPRESSION:  NO ACUTE RADIOGRAPHIC FINDING IN THE CHEST.


 














Assessment & Plan





- Diagnosis


(1) Knee pain, left


Qualifiers: 


     Chronicity: acute        Qualified Code(s): M25.562 - Pain in left knee  


Is this a current diagnosis for this admission?: YesPlan: 





 See attending physician orders. Continue current pain management regimen.








(2) Sickle cell disease with crisis


Is this a current diagnosis for this admission?: YesPlan: 








See attending physician orders.











- Time


Time Spent with patient: 25-34 minutes


Medications reviewed and adjusted accordingly: Yes


Anticipated discharge: Home


Within: Other





- Inpatient Certification


Medical Necessity: Need For IV Fluids, Need For Continuous Telemetry Monitoring

, Need for Pain Control, Risk of Complication if Not Cared For in Hospital


Post Hospital Care: D/C Planner Documentation





- Plan Summary


Plan Summary: 


Continue all current medication management and K-pad usage. Follow up on left 

knee X ray findings.

## 2017-04-05 NOTE — PDOC PROGRESS REPORT
Subjective


Progress Note for:: 04/05/17


Subjective:: 


Patient reported pain is fairly controlled on current regimen and some benfit 

with use of K-pad thermal therapy. X ray left knee suggested stable 

osteonecrosis. No fever or chills. No chest pain or difficulty with breathing. 

No abdominal pain, nausea or vomiting. Appetite and p.o intake satisfactory. No 

significant constipation.   





Physical Exam


Vital Signs: 


 











Temp Pulse Resp BP Pulse Ox


 


 98.4 F   103 H  16   116/58 L  97 


 


 04/04/17 23:18  04/05/17 02:00  04/04/17 23:18  04/04/17 23:18  04/05/17 01:24








 Intake & Output











 04/04/17 04/05/17 04/06/17





 06:59 06:59 06:59


 


Intake Total 1960 3380 


 


Balance 1960 3380 











Physical Exam: 


General appearance: PRESENT: no acute distress, mild distress - pain distress 

localized to left knee joint


Head exam: PRESENT: atraumatic, normocephalic


Eye exam: PRESENT: conjunctiva pink, EOMI, PERRLA.  ABSENT: scleral icterus


Neck exam: PRESENT: full ROM.  ABSENT: carotid bruit, JVD, lymphadenopathy, 

thyromegaly


Respiratory exam: PRESENT: clear to auscultation brenda


Cardiovascular exam: PRESENT: RRR.  ABSENT: diastolic murmur, rubs, systolic 

murmur


Vascular exam: PRESENT: normal capillary refill


GI/Abdominal exam: PRESENT: normal bowel sounds, soft.  ABSENT: distended, 

guarding, mass, organomegaly, rebound, tenderness


Extremities exam: PRESENT: full ROM


Musculoskeletal exam: PRESENT: tenderness - left knee joint with some evidence 

of small effusion


Neurological exam: PRESENT: alert, awake, oriented to person, oriented to place

, oriented to time, oriented to situation, CN II-XII grossly intact.  ABSENT: 

motor sensory deficit


Psychiatric exam: PRESENT: appropriate affect, normal mood.  ABSENT: homicidal 

ideation, suicidal ideation


Skin exam: PRESENT: dry, intact, warm.  ABSENT: cyanosis, rash





Results


Laboratory Results: 


 





 04/04/17 05:50 





 04/04/17 05:50 





 











  04/04/17





  05:50


 


WBC  6.4


 


RBC  2.76 L


 


Hgb  10.5 L


 


Hct  30.3 L


 


MCV  110 H


 


MCH  37.9 H


 


MCHC  34.6


 


RDW  20.9 H


 


Plt Count  185











Impressions: 


 





Chest X-Ray  04/01/17 22:01


IMPRESSION:  NO ACUTE RADIOGRAPHIC FINDING IN THE CHEST.


 








Knee X-Ray  04/04/17 00:00


IMPRESSION:  STABLE APPEARANCE OF OSTEONECROSIS WITHIN THE MEDIAL FEMORAL 

CONDYLE. NO RADIOGRAPHIC EVIDENCE OF ACUTE INJURY.


 














Assessment & Plan





- Diagnosis


(1) Knee pain, left


Qualifiers: 


     Chronicity: acute        Qualified Code(s): M25.562 - Pain in left knee  


Is this a current diagnosis for this admission?: YesPlan: 





 See attending physician orders. Continue current pain management regimen.








(2) Sickle cell disease with crisis


Is this a current diagnosis for this admission?: YesPlan: 


See attending physician orders. Continue current pain management regimen.











- Time


Time Spent with patient: 25-34 minutes


Medications reviewed and adjusted accordingly: Yes


Anticipated discharge: Home


Within: Other





- Inpatient Certification


Based on my medical assessment, after consideration of the patient's 

comorbidities, presenting symptoms, or acuity I expect that the services needed 

warrant INPATIENT care.: Yes


I certify that my determination is in accordance with my understanding of 

Medicare's requirements for reasonable and necessary INPATIENT services [42 CFR 

412.3e].: Yes


Medical Necessity: Need Close Monitoring Due to Risk of Patient Decompensation, 

Need For IV Fluids, Need For Continuous Telemetry Monitoring, Need for Pain 

Control, Risk of Complication if Not Cared For in Hospital


Post Hospital Care: D/C Planner Documentation





- Plan Summary


Plan Summary: 


See attending physician orders. Continue current pain management regimen.

## 2017-04-06 NOTE — PDOC PROGRESS REPORT
Subjective


Progress Note for:: 04/06/17


Subjective:: 


Patient reported improvement in his pain management but experience loss of 

balance with attempt to ambulate. No fever or chills. No chest pain or 

difficulty with breathing. No abdominal pain, nausea or vomiting. Appetite and 

p.o intake satisfactory. No significant constipation.   





Physical Exam


Vital Signs: 


 











Temp Pulse Resp BP Pulse Ox


 


 97.9 F   72   17   118/55 L  97 


 


 04/06/17 04:11  04/06/17 07:00  04/06/17 04:11  04/06/17 04:11  04/06/17 04:11








 Intake & Output











 04/05/17 04/06/17 04/07/17





 06:59 06:59 06:59


 


Intake Total 3380 3700 


 


Balance 3380 3700 











Physical Exam: 


General appearance: PRESENT: no acute distress, mild distress - pain distress 

localized to left knee joint


Head exam: PRESENT: atraumatic, normocephalic


Eye exam: PRESENT: conjunctiva pink, EOMI, PERRLA.  ABSENT: scleral icterus


Respiratory exam: PRESENT: clear to auscultation brenda


Cardiovascular exam: PRESENT: RRR.  ABSENT: diastolic murmur, rubs, systolic 

murmur


Vascular exam: PRESENT: normal capillary refill


GI/Abdominal exam: PRESENT: normal bowel sounds, soft.  ABSENT: distended, 

guarding, mass, organomegaly, rebound, tenderness


Extremities exam: PRESENT: full ROM


Musculoskeletal exam: PRESENT: tenderness - left knee joint with some evidence 

of small effusion


Neurological exam: PRESENT: alert, awake, oriented to person, oriented to place

, oriented to time, oriented to situation, CN II-XII grossly intact.  ABSENT: 

motor sensory deficit


Psychiatric exam: PRESENT: appropriate affect, normal mood.  ABSENT: homicidal 

ideation, suicidal ideation


Skin exam: PRESENT: dry, intact, warm.  ABSENT: cyanosis, rash





Results


Laboratory Results: 


 





 04/04/17 05:50 





 04/04/17 05:50 








Impressions: 


 





Chest X-Ray  04/01/17 22:01


IMPRESSION:  NO ACUTE RADIOGRAPHIC FINDING IN THE CHEST.


 








Knee X-Ray  04/04/17 00:00


IMPRESSION:  STABLE APPEARANCE OF OSTEONECROSIS WITHIN THE MEDIAL FEMORAL 

CONDYLE. NO RADIOGRAPHIC EVIDENCE OF ACUTE INJURY.


 














Assessment & Plan





- Diagnosis


(1) Knee pain, left


Qualifiers: 


     Chronicity: acute        Qualified Code(s): M25.562 - Pain in left knee  


Is this a current diagnosis for this admission?: YesPlan: 








 See attending physician orders. Continue current pain management regimen. I 

will request PT intervention.








(2) Sickle cell disease with crisis


Is this a current diagnosis for this admission?: YesPlan: 


See attending physician orders. Continue current pain management regimen.











- Time


Time Spent with patient: 25-34 minutes


Medications reviewed and adjusted accordingly: Yes


Anticipated discharge: Home


Within: within 24 hours





- Inpatient Certification


Based on my medical assessment, after consideration of the patient's 

comorbidities, presenting symptoms, or acuity I expect that the services needed 

warrant INPATIENT care.: Yes


I certify that my determination is in accordance with my understanding of 

Medicare's requirements for reasonable and necessary INPATIENT services [42 CFR 

412.3e].: Yes


Medical Necessity: Need Close Monitoring Due to Risk of Patient Decompensation, 

Need For IV Fluids, Need for Pain Control, Risk of Complication if Not Cared 

For in Hospital


Post Hospital Care: D/C Planner Documentation





- Plan Summary


Plan Summary: 


See attending physician orders.

## 2017-04-07 NOTE — PDOC PROGRESS REPORT
Subjective


Progress Note for:: 04/07/17


Subjective:: 


Patient seen and evaluated this morning.  States pain is left knee is 

improving.  Notices improvement.  Denies fever chills or sweats.  Has been 

undergoing physical therapy.





Physical Exam


Vital Signs: 


 











Temp Pulse Resp BP Pulse Ox


 


 98.2 F   92   15   114/47 L  94 


 


 04/07/17 04:38  04/07/17 04:38  04/07/17 04:38  04/07/17 04:38  04/07/17 04:38








 Intake & Output











 04/06/17 04/07/17 04/08/17





 06:59 06:59 06:59


 


Intake Total 3700 5194 


 


Balance 3700 5194 











Musculoskeletal exam: PRESENT: other - Left knee: No evidence of effusion.  

Tenderness along the medial and lateral joint line.  Knee range of motion 0/110

.  No crepitation.  No instability.





Results


Laboratory Results: 


 





 04/04/17 05:50 





 04/04/17 05:50 








Impressions: 


 





Chest X-Ray  04/01/17 22:01


IMPRESSION:  NO ACUTE RADIOGRAPHIC FINDING IN THE CHEST.


 








Knee X-Ray  04/04/17 00:00


IMPRESSION:  STABLE APPEARANCE OF OSTEONECROSIS WITHIN THE MEDIAL FEMORAL 

CONDYLE. NO RADIOGRAPHIC EVIDENCE OF ACUTE INJURY.


 














Assessment & Plan





- Diagnosis


(1) Knee pain, left


Qualifiers: 


     Chronicity: acute        Qualified Code(s): M25.562 - Pain in left knee  


Is this a current diagnosis for this admission?: Yes





(2) Sickle cell disease with crisis


Is this a current diagnosis for this admission?: Yes





(3) Knee osteonecrosis, left


Is this a current diagnosis for this admission?: YesPlan: 


I have reviewed patient's radiographs demonstrate knee osteonecrosis secondary 

to patient's sickle cell disease.  At this point patient will continue physical 

therapy with protected weightbearing.  I have recommended he follow up with Dr. Berkowitz in the upcoming week for further evaluation and possible treatment 

options.

## 2017-04-11 NOTE — PDOC DISCHARGE SUMMARY
General





- Admit/Disc Date/PCP


Admission Date/Primary Care Provider: 


  04/03/17 17:20





  DIYA PHILIPPE





Discharge Date: 04/07/17





- Discharge Diagnosis


(1) Knee pain, left


Is this a current diagnosis for this admission?: Yes





(2) Sickle cell disease with crisis


Is this a current diagnosis for this admission?: Yes








- Additional Information


Home Medications: 








Albuterol Sulfate [Proair HFA Inhalation Aerosol 8.5 gm MDI] 2 puff IH Q4HP PRN 

04/02/17 


Fentanyl [Duragesic 100 Mcg/Hr Transdermal Patch] 1 patch TOP Q3D 04/02/17 


Folic Acid [Folvite 1 mg Tablet] 1 mg PO DAILY 04/02/17 


Hydroxyurea [Hydrea 500 mg Capsule] 1,000 mg PO DAILY 04/02/17 


Ibuprofen [Motrin 800 mg Tablet] 800 mg PO Q6HP PRN 04/02/17 


Oxycodone HCl 30 mg PO Q4HP PRN 04/02/17 


Folic Acid [Folvite 1 mg Tablet] 1 mg PO DAILY #0 tablet 04/07/17 


Hydroxyurea [Hydrea 500 mg Capsule] 1,000 mg PO DAILY #0 capsule 04/07/17 


Oxycodone HCl [Oxy-Ir 5 mg Tablet] 15 mg PO Q4HP PRN #0 tablet 04/07/17 











History of Present Illness


History of Present Illness: 


CARMEN BEGUM is a 21 year old male with history of sickle cell disease SS, he 

came to the emergency room because of bone pains, he stated that he normally 

would have about 6-9 bone pain crisis in a year.  There is no fever or chills 

to suggest infection








Hospital Course


Hospital Course: 


Patient pain was adequately controlled on IV hydromorphone and eventually 

changed over to his home pore admission regimen. He was seen in consultation by 

Dr Rivera, orthopedic surgeon, due to persistent left knee joint pain and 

associated effusion. His X ray revealed stable medial aspect osteonecrosis 

lesion related to his sickle cell disease process. He will be discharge home 

with outpatient rehabilitation eferral and follow up with orthopedic service as 

instructed. he will follow with me in office as instructed upon discharge.





Physical Exam


Vital Signs: 


 











Temp Pulse Resp BP Pulse Ox


 


 98.0 F   98   18   136/85 H  95 


 


 04/07/17 11:50  04/07/17 11:50  04/07/17 11:50  04/07/17 11:50  04/07/17 11:50








 Intake & Output











 04/06/17 04/07/17 04/08/17





 06:59 06:59 06:59


 


Intake Total 3700 5194 


 


Balance 3700 5194 











Physical Exam: 


General appearance: PRESENT: no acute distress, mild distress - pain distress 

localized to left knee joint


Head exam: PRESENT: atraumatic, normocephalic


Eye exam: PRESENT: conjunctiva pink, EOMI, PERRLA.  ABSENT: scleral icterus


Respiratory exam: PRESENT: clear to auscultation brenda


Cardiovascular exam: PRESENT: RRR.  ABSENT: diastolic murmur, rubs, systolic 

murmur


Vascular exam: PRESENT: normal capillary refill


GI/Abdominal exam: PRESENT: normal bowel sounds, soft.  ABSENT: distended, 

guarding, mass, organomegaly, rebound, tenderness


Extremities exam: PRESENT: full ROM


Musculoskeletal exam: PRESENT: tenderness - left knee joint with some evidence 

of minimal effusion


Neurological exam: PRESENT: alert, awake, oriented to person, oriented to place

, oriented to time, oriented to situation, CN II-XII grossly intact.  ABSENT: 

motor sensory deficit


Psychiatric exam: PRESENT: appropriate affect, normal mood.  ABSENT: homicidal 

ideation, suicidal ideation


Skin exam: PRESENT: dry, intact, warm.  ABSENT: cyanosis, rash








Results


Laboratory Results: 


 





 04/04/17 05:50 





 04/04/17 05:50 








Impressions: 


 





Chest X-Ray  04/01/17 22:01


IMPRESSION:  NO ACUTE RADIOGRAPHIC FINDING IN THE CHEST.


 








Knee X-Ray  04/04/17 00:00


IMPRESSION:  STABLE APPEARANCE OF OSTEONECROSIS WITHIN THE MEDIAL FEMORAL 

CONDYLE. NO RADIOGRAPHIC EVIDENCE OF ACUTE INJURY.


 














Qualifiers


**PATEINT BEING DISCHARGED WITH ANY OF THE FOLLOWING DIAGNOSIS?: No





Plan


Discharge Plan: 


D/C home today with outpatient physical therapy service and referral to Dr Berkowitz

, orthopedic surgeon. Follow up with me in office as instructed upon discharge.

## 2017-04-16 NOTE — ER DOCUMENT REPORT
ED General Pain





- General


Chief Complaint: Sickle Cell Crisis


Stated Complaint: SICKLE CELL CRISIS


Mode of Arrival: Ambulatory


Information source: Patient


TRAVEL OUTSIDE OF THE [place] IN LAST 30 DAYS: No





- HPI


Patient complains to provider of: chest pain


Notes: 


Patient arrives with complaints of chest pain.  He states the pain has been 

present for the last several days.  The patient has a history of sickle cell 

disease.  He was recently admitted to the hospital for sickle cell disease with 

pain crisis in the left knee.  States that the knee is feeling much better.  

The chest pain started approximately 3-4 days ago.  No shortness of breath.  No 

fever.  No nausea vomiting diarrhea.  No injury.  Patient has a history of 

sickle cell disease and is typically on a fentanyl patch as well as 15 mg 

oxycodone.  His pain medication at home does not seem to be improving his 

symptoms.





- Related Data


Allergies/Adverse Reactions: 


 





Coconut * [Coconut] Allergy (Mild, Verified 04/01/17 21:02)


 


transpore tape Allergy (Mild, Uncoded 04/01/17 21:02)


 Hives











Past Medical History





- Social History


Smoking Status: Never Smoker


Frequency of alcohol use: Occasional


Drug Abuse: None


Family History: Arthritis, DM, Hypertension, Other - Sickle cell trait both 

parents and asthma


Patient has suicidal ideation: No


Patient has homicidal ideation: No


Pulmonary Medical History: Reports: Hx Asthma, Hx Pneumonia


Renal/ Medical History: Denies: Hx Peritoneal Dialysis


Psychiatric Medical History: 


   Denies: Hx Depression


Past Surgical History: Reports: Hx Abdominal Surgery - Gallstones removal, Hx 

Vascular Surgery - Port placement.  Denies: Hx Cholecystectomy.  Comment Only: 

Hx Orthopedic Surgery - Fluid drained from right foot





- Immunizations


Immunizations up to date: Yes


Hx Diphtheria, Pertussis, Tetanus Vaccination: Yes


Hx Pneumococcal Vaccination: 05/16/13





Review of Systems





- Review of Systems


-: Yes All other systems reviewed and negative





Physical Exam





- Vital signs


Vitals: 


 











Temp Pulse Resp BP Pulse Ox


 


 98.9 F   91   20   148/88 H  95 


 


 04/15/17 22:34  04/15/17 22:34  04/15/17 22:34  04/15/17 22:34  04/15/17 22:34














- Notes


Notes: 


GENERAL: alert, cooperative, nontoxic, no distress.


HEAD: normocephalic, atraumatic


EYES: conjunctiva pink without discharge, no external redness or swelling.


EARS: no external swelling, no external redness


NOSE: atraumatic, no external swelling


MOUTH/THROAT: mucous membranes moist and pink, posterior pharynx without 

erythema, swelling, exudate. No trismus or drooling.


NECK: soft, supple, full range of motion, no meningismus.


CHEST: no distress, lungs clear and equal throughout.  No wheezing, rales, 

rhonchi.


CARDIAC: regular rate and rhythm, normal capillary refill, normal pulses.  No 

peripheral edema noted.  Systolic murmur noted.


ABDOMEN: Soft, nontender.


BACK: full range of motion, no CVA tenderness.


EXTREMITIES: full range of motion of all extremities.  No redness, no swelling.


NEURO: alert and oriented 3, no focal deficits, full range of motion of all 

extremities.


PYSCH: appropriate mood, affect.  Patient is cooperative.


SKIN: pink, warm, dry, no rash.





Course





- Re-evaluation


Re-evalutation: 


04/16/17 02:59


Patient resting comfortably at this time.  He states that he is feeling 

somewhat better but is requesting another dose of pain medication.  Chest x-ray 

shows no acute findings.  His chemistries are unremarkable and EKG is negative.

  Currently I am awaiting his hematology results.  I will order him another 

dose of pain medication if his hematology results are unremarkable the patient 

can be discharged home.





04/16/17 03:40


Patient is nontoxic.  Stable vitals.  Patient has no signs of obvious acute 

chest syndrome at this time.  His vitals are stable.  He is feeling better at 

this time.  EKG and chest x-ray are unremarkable.  His lab work is all stable 

in comparison to prior labs.  Patient is feeling better at this time.  He can 

be discharged home with his normal pain medication.  Follow up with his 

hematologist at the next available appointment.  Follow-up sooner for worsening 

pain, difficulty breathing, high fever, or any further concerns.





The patient is noted to have elevated blood pressure during today's emergency 

department visit.  The patient was informed of this finding.  The patient was 

instructed that this may be related to pre-hypertension and requires further 

evaluation with a primary care provider.  The patient has no hypertensive 

symptoms at this time.





The patient's emergency department workup and current diagnosis were explained 

to the patient and or family.  Follow-up instructions were provided.  

Medications if prescribed were discussed. Instructions for when to return to 

the emergency department including specific  worrisome symptoms were discussed 

with the patient and/or family.





- Vital Signs


Vital signs: 


 











Temp Pulse Resp BP Pulse Ox


 


 98.9 F   91   20   148/88 H  95 


 


 04/15/17 22:34  04/15/17 22:34  04/15/17 22:34  04/15/17 22:34  04/15/17 22:34














- Laboratory


Result Diagrams: 


 04/16/17 01:45





 04/16/17 01:45


Laboratory results interpreted by me: 


 











  04/16/17 04/16/17





  01:45 01:45


 


RBC  2.45 L 


 


Hgb  9.7 L 


 


Hct  27.6 L 


 


MCV  113 H 


 


MCH  39.5 H 


 


RDW  25.7 H 


 


Retic Count (auto)  12.64 H 


 


Absolute Retic  0.310 H 


 


Sodium   145.1 H


 


Total Bilirubin   2.4 H


 


Direct Bilirubin   0.5 H














- EKG Interpretation by Me


EKG shows normal: Sinus rhythm, Axis, Intervals, QRS Complexes, ST-T Waves


Rate: Normal


When compared to previous EKG there are: No significant change





Discharge





- Discharge


Clinical Impression: 


Chest pain


Qualifiers:


 Chest pain type: unspecified Qualified Code(s): R07.9 - Chest pain, unspecified





Sickle cell disease


Qualifiers:


 Sickle-cell associated disorders: without crisis Qualified Code(s): D57.1 - 

Sickle-cell disease without crisis





Condition: Stable


Disposition: HOME, SELF-CARE


Instructions:  Sickle Cell Crisis ([facility])


Additional Instructions: 


Continue taking her normal pain medication.  Follow up with her hematologist at 

the next available appointment.  Follow-up sooner for increased pain, fever, 

difficulty breathing, or any further concerns.





Your blood pressure was elevated during today's visit.  Have this rechecked 

with your doctor.


Forms:  Elevated Blood Pressure

## 2017-06-01 NOTE — ER DOCUMENT REPORT
ED General Pain





- General


Chief Complaint: Sickle Cell Crisis


Stated Complaint: BACK PAIN


Time Seen by Provider: 06/01/17 16:23


Mode of Arrival: Ambulatory


Information source: Patient


TRAVEL OUTSIDE OF THE [place] IN LAST 30 DAYS: No





- HPI


Patient complains to provider of: Back pain 


Onset: Other - 3 days


Onset/Duration: Gradual, Persistent


Quality of pain: Achy


Severity: Moderate


Pain Level: 4


Context: Chronic problem, New onset


Genotype: SC


Exacerbated by: Denies


Relieved by: Denies


Similar symptoms previously: Yes


Recently seen / treated by doctor: Yes


Notes: 


Patient is a 21-year-old male who presents to the emergency room complaining of 

mid to low back pain that has been going on for the past 3 days, he reports a 

nonproductive cough that presented this morning, denies any fever, pain is 

typical of his usual sickle cell crisis, he takes oxycodone at home which is 

not helping today, he saw his hematologist approximately 1 week ago, and spoke 

with both his hematologist and his primary care provider who recommended he 

come to the emergency room today for evaluation





- Related Data


Allergies/Adverse Reactions: 


 





Coconut * [Coconut] Allergy (Mild, Verified 06/01/17 15:00)


 


transpore tape Allergy (Mild, Uncoded 06/01/17 15:00)


 Hives








Home Medications: 


 Current Home Medications





Oxycodone HCl [Oxy-Ir 5 mg Tablet] 1 tab PO Q4HP PRN 06/01/17 [History]











Past Medical History





- General


Information source: Patient





- Social History


Smoking Status: Never Smoker


Chew tobacco use (# tins/day): No


Frequency of alcohol use: Rare


Drug Abuse: None


Family History: Arthritis, DM, Hypertension, Other - Sickle cell trait both 

parents and asthma


Patient has suicidal ideation: No


Patient has homicidal ideation: No


Pulmonary Medical History: Reports: Hx Asthma, Hx Pneumonia


Renal/ Medical History: Denies: Hx Peritoneal Dialysis


Psychiatric Medical History: 


   Denies: Hx Depression


Past Surgical History: Reports: Hx Abdominal Surgery - Gallstones removal, Hx 

Vascular Surgery - Port placement.  Denies: Hx Cholecystectomy.  Comment Only: 

Hx Orthopedic Surgery - Fluid drained from right foot





- Immunizations


Immunizations up to date: Yes


Hx Diphtheria, Pertussis, Tetanus Vaccination: Yes


Hx Pneumococcal Vaccination: 05/16/13





Review of Systems





- Review of Systems


Constitutional: No symptoms reported


EENT: No symptoms reported


Cardiovascular: No symptoms reported


Respiratory: Cough


Gastrointestinal: No symptoms reported


Genitourinary: No symptoms reported


Male Genitourinary: No symptoms reported


Musculoskeletal: Back pain


Skin: No symptoms reported


Hematologic/Lymphatic: No symptoms reported


Neurological/Psychological: No symptoms reported


-: Yes All other systems reviewed and negative





Physical Exam





- Vital signs


Interpretation: Normal





- General


General appearance: Appears well, Alert





- HEENT


Head: Normocephalic, Atraumatic


Eyes: Normal


Pupils: PERRL





- Respiratory


Respiratory status: No respiratory distress


Chest status: Nontender


Breath sounds: Normal


Chest palpation: Normal





- Cardiovascular


Rhythm: Regular


Heart sounds: Normal auscultation


Murmur: No





- Abdominal


Inspection: Normal


Distension: No distension


Bowel sounds: Normal


Tenderness: Nontender


Organomegaly: No organomegaly





- Back


Back: Normal, Nontender





- Extremities


General upper extremity: Normal inspection, Nontender, Normal color, Normal ROM

, Normal temperature


General lower extremity: Normal inspection, Nontender, Normal color, Normal ROM

, Normal temperature, Normal weight bearing.  No: Shira's sign





- Neurological


Neuro grossly intact: Yes


Cognition: Normal


Orientation: AAOx4


[place] Coma Scale Eye Opening: Spontaneous


[place] Coma Scale Verbal: Oriented


[place] Coma Scale Motor: Obeys Commands


[place] Coma Scale Total: 15


Speech: Normal


Motor strength normal: LUE, RUE, LLE, RLE


Sensory: Normal





- Psychological


Associated symptoms: Normal affect, Normal mood





- Skin


Skin Temperature: Warm


Skin Moisture: Dry


Skin Color: Normal





Course





- Re-evaluation


Re-evalutation: 





06/01/17 22:29


Patient continues to have pain related to sickle cell crisis, was discussed 

with primary care provider who agrees to admit for further evaluation and 

treatment





- Laboratory


Result Diagrams: 


 06/01/17 17:30





 06/01/17 17:30


Laboratory results interpreted by me: 


 











  06/01/17 06/01/17 06/01/17





  16:55 17:30 17:30


 


RBC   2.89 L 


 


Hgb   11.7 L 


 


Hct   34.8 L 


 


MCV   121 H 


 


MCH   40.6 H 


 


RDW   20.6 H 


 


Retic Count (auto)   5.91 H 


 


Absolute Retic   0.171 H 


 


Sodium    145.7 H


 


Calcium    10.3 H


 


Total Bilirubin    2.0 H


 


AST    61 H


 


Alkaline Phosphatase    149 H


 


Total Protein    10.2 H


 


Albumin    5.2 H


 


Urine Protein  30 H  


 


Urine Blood  SMALL H  














- Diagnostic Test


Radiology reviewed: Image reviewed, Reports reviewed





Discharge





- Discharge


Clinical Impression: 


 Hb-SS disease with crisis





Condition: Stable


Disposition: ADMITTED AS INPATIENT


Admitting Provider: Armin


Unit Admitted: Medical Floor

## 2017-06-01 NOTE — RADIOLOGY REPORT (SQ)
EXAM DESCRIPTION:  CHEST PA/LAT



COMPLETED DATE/TIME:  6/1/2017 5:15 pm



REASON FOR STUDY:  back pain



COMPARISON:  4/16/2017



EXAM PARAMETERS:  NUMBER OF VIEWS: two views

TECHNIQUE: Digital Frontal and Lateral radiographic views of the chest acquired.

RADIATION DOSE: NA

LIMITATIONS: none



FINDINGS:  LUNGS AND PLEURA: No opacities, masses or pneumothorax. No pleural effusion.

MEDIASTINUM AND HILAR STRUCTURES: No masses or contour abnormalities.

HEART AND VASCULAR STRUCTURES: Heart normal size.  No evidence for failure.

BONES: No acute findings.

HARDWARE: A catheter remains in place on the right.

OTHER: No other significant finding.



IMPRESSION:  NO SIGNIFICANT RADIOGRAPHIC FINDING IN THE CHEST.



TECHNICAL DOCUMENTATION:  JOB ID:  [id number]

 2011 [employer]- All Rights Reserved

## 2017-06-02 NOTE — PDOC H&P
History of Present Illness


Admission Date/PCP: 


  06/01/17 23:45





  DIYA JOSE MARTIN





Patient complains of: Back pain


History of Present Illness: 


CARMEN BEGUM is a 21 year old male, known to my practice who presented to ED 

with 3 days history of worsening mid to lower back pain. Patient denied any 

recent fall, trauma or instrumentation. He has history of sickle cell diseased 

with frequent pain and hemolytic crises. He denied any fever or chills. 

Admitted to new onset nonproductive coughing on the day of his presentation. 

Patient reported minimal benefit from his current home medication for pain that 

include Fentanyl patch, Ibuprofen and Oxycodone. Despite administration of 

several doses of IV Morphine patient continue to express significant pain 

necessitating his admission as case of SCD with pain crisis. His initioal 

laboratory assessment revealed fairly stable hemoglobin level but indices 

suggestive of hemolytic process.





Past Medical History


Pulmonary Medical History: Reports: Asthma, Pneumonia


Psychiatric Medical History: 


   Denies: Depression


Hematology: Reports: Anemia, Sickle Cell Disease, Bleeding Tendencies





Past Surgical History


Past Surgical History: Reports: Vascular Surgery - Port placement


   Denies: Cholecystectomy


   Comment Only: Orthopedic Surgery - Fluid drained from right foot





Social History


Smoking Status: Never Smoker


Frequency of Alcohol Use: None


Hx Recreational Drug Use: No


Drugs: None


Hx Prescription Drug Abuse: No





- Advance Directive


Resuscitation Status: Full Code





Family History


Family History: Arthritis, DM, Hypertension, Other - Sickle cell trait both 

parents and asthma


Parental Family History Reviewed: Yes


Children Family History Reviewed: Yes


Sibling(s) Family History Reviewed.: Yes





Medication/Allergy


Home Medications: 








Albuterol Sulfate [Proair HFA] 2 puff IH Q4HP PRN 06/02/17 


Fentanyl [Duragesic 100 Mcg/Hr Transdermal Patch] 1 patch TD Q3D 06/02/17 


Fluticasone/Salmeterol [Advair -21 mcg Inhaler] 1 puff IH DAILY 06/02/17 


Folic Acid [Folvite 1 mg Tablet] 1 mg PO DAILY 06/02/17 


Hydroxyurea [Hydrea 500 mg Capsule] 1,000 mg PO SUTUTHSA 06/02/17 


Hydroxyurea [Hydrea 500 mg Capsule] 1,500 mg PO MOWEFR 06/02/17 


Ibuprofen [Motrin 800 mg Tablet] 800 mg PO Q6HP PRN 06/02/17 


Naproxen 500 mg PO Q12 06/02/17 


Ondansetron [Zofran Odt 4 mg Tablet] 4 mg PO Q6HP PRN 06/02/17 


Oxycodone HCl [Oxy-Ir 5 mg Tablet] 5 mg PO Q4 06/02/17 








Allergies/Adverse Reactions: 


 





Coconut * [Coconut] Allergy (Mild, Verified 06/01/17 15:00)


 


transpore tape Allergy (Mild, Uncoded 06/01/17 15:00)


 Hives











Review of Systems


Constitutional: ABSENT: chills, fever(s), headache(s), weight gain, weight loss


Eyes: ABSENT: visual disturbances


Ears: ABSENT: hearing changes


Nose, Mouth, and Throat: ABSENT: as per HPI, headache(s), mouth pain, sore 

throat, vertigo, other


Cardiovascular: ABSENT: chest pain, dyspnea on exertion, edema, orthropnea, 

palpitations


Respiratory: ABSENT: cough, hemoptysis


Gastrointestinal: ABSENT: abdominal pain, constipation, diarrhea, hematemesis, 

hematochezia, nausea, vomiting


Genitourinary: ABSENT: dysuria, hematuria


Musculoskeletal: PRESENT: back pain


Integumentary: ABSENT: rash, wounds


Neurological: ABSENT: abnormal gait, abnormal speech, confusion, dizziness, 

focal weakness, syncope


Psychiatric: ABSENT: anxiety, depression, homidical ideation, suicidal ideation


Hematologic/Lymphatic: ABSENT: easy bleeding, easy bruising, lymphadenopathy


Allergic/Immunologic: ABSENT: as per HPI, seasonal rhinorrhea, other





Physical Exam


Vital Signs: 


 











Temp Pulse Resp BP Pulse Ox


 


 98.0 F   91   15   115/75   95 


 


 06/02/17 15:22  06/02/17 15:22  06/02/17 15:22  06/02/17 15:22  06/02/17 15:22








 Intake & Output











 06/01/17 06/02/17 06/03/17





 06:59 06:59 06:59


 


Intake Total  720 300


 


Output Total  700 


 


Balance  20 300











General appearance: PRESENT: severe distress - from pain


Head exam: PRESENT: atraumatic, normocephalic


Eye exam: PRESENT: conjunctiva pink, EOMI, PERRLA.  ABSENT: scleral icterus


Ear exam: PRESENT: normal external ear exam


Mouth exam: PRESENT: moist, tongue midline


Teeth exam: ABSENT: dental caries, dental tenderness, edentulous, poor dentation

, other


Throat exam: ABSENT: post pharyngeal erythema, tonsillar erythema, tonsillar 

exudate, tonsillogmegaly, other


Neck exam: PRESENT: full ROM.  ABSENT: carotid bruit, JVD, lymphadenopathy, 

thyromegaly


Respiratory exam: PRESENT: clear to auscultation brenda


Cardiovascular exam: PRESENT: RRR.  ABSENT: diastolic murmur, rubs, systolic 

murmur


Pulses: PRESENT: normal dorsalis pedis pul, +2 pedal pulses bilateral


Vascular exam: PRESENT: normal capillary refill


GI/Abdominal exam: PRESENT: normal bowel sounds, soft.  ABSENT: distended, 

guarding, mass, organolmegaly, rebound, tenderness


Rectal exam: PRESENT: deferred


Extremities exam: PRESENT: tenderness - mid asndf lumbosacral vertebral region 

tenderness to palpation.  ABSENT: pedal edema


Musculoskeletal exam: PRESENT: tenderness - mid and lumbosacra;l vertebral 

spine regions tenderness to palpation and motion


Neurological exam: PRESENT: alert, awake, oriented to person, oriented to place

, oriented to time, oriented to situation, CN II-XII grossly intact.  ABSENT: 

motor sensory deficit


Psychiatric exam: PRESENT: appropriate affect, normal mood.  ABSENT: homicidal 

ideation, suicidal ideation


Skin exam: PRESENT: dry, intact, warm.  ABSENT: cyanosis, rash





Results


Impressions: 


 





Chest X-Ray  06/01/17 16:24


IMPRESSION:  NO SIGNIFICANT RADIOGRAPHIC FINDING IN THE CHEST.


 














Assessment & Plan





- Diagnosis


(1) Sickle cell disease with crisis


Is this a current diagnosis for this admission?: YesPlan: 


See admitting physician orders.








(2) Asthma


Qualifiers: 


     Asthma severity: mild intermittent     Asthma complication type: 

uncomplicated        Qualified Code(s): J45.20 - Mild intermittent asthma, 

uncomplicated  


Is this a current diagnosis for this admission?: YesPlan: 


See admitting physician orders











- Time


Time Spent: 50 to 70 Minutes


Medications reviewed and adjusted accordingly: Yes


Anticipated discharge: Home


Within: Other





- Inpatient Certification


Medical Necessity: Failure to Improve With Outpatient Therapy, Need Close 

Monitoring Due to Risk of Patient Decompensation, Need For IV Fluids, Need for 

Pain Control, Risk of Complication if Not Cared For in Hospital


Post Hospital Care: D/C Planner Documentation





- Plan Summary


Plan Summary: 


See admitting physician orders

## 2017-06-03 NOTE — PDOC PROGRESS REPORT
Subjective


Progress Note for:: 06/03/17


Subjective:: 


Patient reported some improvement in his pain level. He will attempt walking on 

the floor today. He denied any chest pain, difficulty with breathing, fever or 

chills.





Physical Exam


Vital Signs: 


 











Temp Pulse Resp BP Pulse Ox


 


 98.7 F   101 H  16   127/73 H  95 


 


 06/03/17 07:31  06/03/17 07:31  06/03/17 07:31  06/03/17 07:31  06/03/17 07:31








 Intake & Output











 06/02/17 06/03/17 06/04/17





 06:59 06:59 06:59


 


Intake Total 720 4301 480


 


Output Total 700 2775 


 


Balance 20 1526 480


 


Weight  66.1 kg 











Physical Exam: 


General appearance: PRESENT: severe distress - from pain


Head exam: PRESENT: atraumatic, normocephalic


Eye exam: PRESENT: conjunctiva pink, EOMI, PERRLA.  ABSENT: scleral icterus


Mouth exam: PRESENT: moist, tongue midline


Respiratory exam: PRESENT: clear to auscultation brenda


Cardiovascular exam: PRESENT: RRR.  ABSENT: diastolic murmur, rubs, systolic 

murmur


GI/Abdominal exam: PRESENT: normal bowel sounds, soft.  ABSENT: distended, 

guarding, mass, organomegaly, rebound, tendernes


Extremities exam: PRESENT: moderated expressed tenderness - mid and lumbosacral 

vertebral region tenderness to palpation.  ABSENT: pedal edema


Musculoskeletal exam: PRESENT: tenderness - mid and lumbosacra;l vertebral 

spine regions tenderness to palpation and motion


Neurological exam: PRESENT: alert, awake, oriented to person, oriented to place

, oriented to time, oriented to situation, CN II-XII grossly intact.  ABSENT: 

motor sensory deficit


Psychiatric exam: PRESENT: appropriate affect, normal mood.  ABSENT: homicidal 

ideation, suicidal ideation


Skin exam: PRESENT: dry, intact, warm.  ABSENT: cyanosis, rash





Results


Laboratory Results: 


 





 06/03/17 06:40 





 06/03/17 06:40 





 











  06/03/17 06/03/17





  06:40 06:40


 


WBC  4.2 


 


RBC  2.25 L 


 


Hgb  9.4 L D 


 


Hct  27.3 L 


 


MCV  121 H 


 


MCH  41.8 H 


 


MCHC  34.4 


 


RDW  20.2 H 


 


Plt Count  181 


 


Seg Neutrophils %  Not Reportable 


 


Lymphocytes %  Not Reportable 


 


Monocytes %  Not Reportable 


 


Eosinophils %  Not Reportable 


 


Basophils %  Not Reportable 


 


Absolute Neutrophils  Not Reportable 


 


Absolute Lymphocytes  Not Reportable 


 


Absolute Monocytes  Not Reportable 


 


Absolute Eosinophils  Not Reportable 


 


Absolute Basophils  Not Reportable 


 


Sodium   142.7


 


Potassium   4.0


 


Chloride   105


 


Carbon Dioxide   28


 


Anion Gap   10


 


BUN   9


 


Creatinine   0.69


 


Est GFR ( Amer)   > 60


 


Est GFR (Non-Af Amer)   > 60


 


Glucose   91


 


Calcium   8.8


 


Total Bilirubin   1.4 H


 


AST   42


 


ALT   27


 


Alkaline Phosphatase   107


 


Total Protein   7.0


 


Albumin   3.8











Impressions: 


 





Chest X-Ray  06/01/17 16:24


IMPRESSION:  NO SIGNIFICANT RADIOGRAPHIC FINDING IN THE CHEST.


 














Assessment & Plan





- Diagnosis


(1) Sickle cell disease with crisis


Is this a current diagnosis for this admission?: YesPlan: 





See attending physician orders.








(2) Asthma


Qualifiers: 


     Asthma severity: mild intermittent     Asthma complication type: 

uncomplicated        Qualified Code(s): J45.20 - Mild intermittent asthma, 

uncomplicated  


Is this a current diagnosis for this admission?: YesPlan: 





See attending physician orders








(3) Sickle cell anemia


Qualifiers: 


     Sickle-cell associated disorders: with unspecified crisis        Qualified 

Code(s): D57.00 - Hb-SS disease with crisis, unspecified; D57.0 - Hb-SS disease 

with crisis  


Is this a current diagnosis for this admission?: YesPlan: 


Continue to monitor CBC indices and transfuse PRBC if indicated with hemoglobin 

less than 7gm/dL.











- Time


Time Spent with patient: 25-34 minutes


Medications reviewed and adjusted accordingly: Yes


Anticipated discharge: Home


Within: Other





- Inpatient Certification


Post Hospital Care: D/C Planner Documentation





- Plan Summary


Plan Summary: 


Continue current medication management. Monitor CBC indices.

## 2017-06-04 NOTE — PDOC PROGRESS REPORT
Subjective


Progress Note for:: 06/04/17


Subjective:: 


Patient reported episodes of nasal bleeding since last clinical evaluation. 

Improving pain control. He denied any chest pain, difficulty with breathing, 

fever or chills. He did ambulate on the floor since last evaluation.





Physical Exam


Vital Signs: 


 











Temp Pulse Resp BP Pulse Ox


 


 98.6 F   93   18   130/74 H  96 


 


 06/04/17 07:52  06/04/17 07:52  06/04/17 07:52  06/04/17 07:52  06/04/17 07:52








 Intake & Output











 06/03/17 06/04/17 06/05/17





 06:59 06:59 06:59


 


Intake Total 4301 4536 


 


Output Total 2775 450 


 


Balance 1526 4086 


 


Weight 66.1 kg 65.2 kg 











Physical Exam: 


General appearance: PRESENT: severe distress - from pain


Head exam: PRESENT: atraumatic, normocephalic


Eye exam: PRESENT: conjunctiva pink, EOMI, PERRLA.  ABSENT: scleral icterus


Mouth exam: PRESENT: moist, tongue midline


Respiratory exam: PRESENT: clear to auscultation brenda


Cardiovascular exam: PRESENT: RRR.  ABSENT: diastolic murmur, rubs, systolic 

murmur


GI/Abdominal exam: PRESENT: normal bowel sounds, soft.  ABSENT: distended, 

guarding, mass, organomegaly, rebound, tenderness


Extremities exam: PRESENT: mild expressed tenderness - mid and lumbosacral 

vertebral region tenderness to palpation.  ABSENT: pedal edema


Musculoskeletal exam: PRESENT: tenderness - mid and lumbosacral vertebral spine 

regions tenderness to palpation and motion


Neurological exam: PRESENT: alert, awake, oriented to person, oriented to place

, oriented to time, oriented to situation, CN II-XII grossly intact.  ABSENT: 

motor sensory deficit


Psychiatric exam: PRESENT: appropriate affect, normal mood.  ABSENT: homicidal 

ideation, suicidal ideation


Skin exam: PRESENT: dry, intact, warm.  ABSENT: cyanosis, rash








Results


Laboratory Results: 


 





 06/03/17 06:40 





 06/03/17 06:40 








Impressions: 


 





Chest X-Ray  06/01/17 16:24


IMPRESSION:  NO SIGNIFICANT RADIOGRAPHIC FINDING IN THE CHEST.


 














Assessment & Plan





- Diagnosis


(1) Sickle cell disease with crisis


Is this a current diagnosis for this admission?: Yes





(2) Asthma


Qualifiers: 


     Asthma severity: mild intermittent     Asthma complication type: 

uncomplicated        Qualified Code(s): J45.20 - Mild intermittent asthma, 

uncomplicated  


Is this a current diagnosis for this admission?: Yes





(3) Sickle cell anemia


Qualifiers: 


     Sickle-cell associated disorders: with unspecified crisis        Qualified 

Code(s): D57.00 - Hb-SS disease with crisis, unspecified; D57.0 - Hb-SS disease 

with crisis  


Is this a current diagnosis for this admission?: Yes





(4) Anterior epistaxis


Is this a current diagnosis for this admission?: NoPlan: 


No active bleeding presently. Patient did agree to use of saline mist to 

maintain nasal moisture.











- Time


Time Spent with patient: 25-34 minutes


Medications reviewed and adjusted accordingly: Yes


Anticipated discharge: Home


Within: Other





- Inpatient Certification


Based on my medical assessment, after consideration of the patient's 

comorbidities, presenting symptoms, or acuity I expect that the services needed 

warrant INPATIENT care.: Yes


I certify that my determination is in accordance with my understanding of 

Medicare's requirements for reasonable and necessary INPATIENT services [42 CFR 

412.3e].: Yes


Medical Necessity: Failure to Improve With Outpatient Therapy, Need Close 

Monitoring Due to Risk of Patient Decompensation, Need For IV Fluids, Need for 

Pain Control, Risk of Complication if Not Cared For in Hospital


Post Hospital Care: D/C Planner Documentation





- Plan Summary


Plan Summary: 


See attending physician orders.

## 2017-06-05 NOTE — PDOC DISCHARGE SUMMARY
General





- Admit/Disc Date/PCP


Admission Date/Primary Care Provider: 


  06/01/17 23:45





  DIYAVIKI PHILIPPE





Discharge Date: 06/05/17





- Discharge Diagnosis


(1) Sickle cell disease with crisis


Is this a current diagnosis for this admission?: Yes





(2) Asthma


Is this a current diagnosis for this admission?: Yes





(3) Sickle cell anemia


Is this a current diagnosis for this admission?: Yes





(4) Anterior epistaxis


Is this a current diagnosis for this admission?: No








- Additional Information


Resuscitation Status: Full Code


Discharge Diet: Regular


Discharge Activity: Activity As Tolerated


Home Medications: 








Albuterol Sulfate [Proair HFA] 2 puff IH Q4HP PRN 06/02/17 


Fentanyl [Duragesic 100 Mcg/Hr Transdermal Patch] 1 patch TD Q3D 06/02/17 


Fluticasone/Salmeterol [Advair -21 mcg Inhaler] 1 puff IH DAILY 06/02/17 


Folic Acid [Folvite 1 mg Tablet] 1 mg PO DAILY 06/02/17 


Hydroxyurea [Hydrea 500 mg Capsule] 1,000 mg PO SUTUTHSA 06/02/17 


Hydroxyurea [Hydrea 500 mg Capsule] 1,500 mg PO MOWEFR 06/02/17 


Ibuprofen [Motrin 800 mg Tablet] 800 mg PO Q6HP PRN 06/02/17 


Ondansetron [Zofran Odt 4 mg Tablet] 4 mg PO Q6HP PRN 06/02/17 


Oxycodone HCl [Oxy-Ir 5 mg Tablet] 5 mg PO Q4 #20 tablet 06/05/17 


Sodium Chloride [Ocean Nasal Spray 44 ml Bottle] 1 spray NASL ACHS #1 bottle 06/ 05/17 











History of Present Illness


History of Present Illness: 


CARMEN BEGUM is a 21 year old male, known to my practice who presented to ED 

with 3 days history of worsening mid to lower back pain. Patient denied any 

recent fall, trauma or instrumentation. He has history of sickle cell diseased 

with frequent pain and hemolytic crises. He denied any fever or chills. 

Admitted to new onset nonproductive coughing on the day of his presentation. 

Patient reported minimal benefit from his current home medication for pain that 

include Fentanyl patch, Ibuprofen and Oxycodone. Despite administration of 

several doses of IV Morphine patient continue to express significant pain 

necessitating his admission as case of SCD with pain crisis. His initioal 

laboratory assessment revealed fairly stable hemoglobin level but indices 

suggestive of hemolytic process.





Hospital Course


Hospital Course: 


Patient was admitted for SCK in pain crisis and managed with IV Morphine along 

with all other readmission pain management medication except the Naproxen and 

Oxy IR. Patient remain adequately hydrated with IV fluid infusion. The drop in 

his hemoglobin is probably a combination of hemodilution and low grade 

associated hemolytic process in view of his elevated total bilirubin. His mid 

and lower back pain did improve and patient have been ambulatory on the floor. 

He is agreeable with discharge home today and follow up with his hematologist/

pain management team. He will follow up with me in the office for post 

hospitalization care for continue management of his asthma. He will follow up 

in the office as instructed upon discharge.





Physical Exam


Vital Signs: 


 











Temp Pulse Resp BP Pulse Ox


 


 98.3 F   70   16   115/71   97 


 


 06/05/17 07:18  06/05/17 07:18  06/05/17 07:18  06/05/17 07:18  06/05/17 07:18








 Intake & Output











 06/04/17 06/05/17 06/06/17





 06:59 06:59 06:59


 


Intake Total 4536 4121 


 


Output Total 450 2800 


 


Balance 4086 1321 


 


Weight 65.2 kg 65.9 kg 











Physical Exam: 


General appearance: PRESENT: severe distress - from pain


Head exam: PRESENT: atraumatic, normocephalic


Eye exam: PRESENT: conjunctiva pink, EOMI, PERRLA.  ABSENT: scleral icterus


Mouth exam: PRESENT: moist, tongue midline


Respiratory exam: PRESENT: clear to auscultation brenda


Cardiovascular exam: PRESENT: RRR.  ABSENT: diastolic murmur, rubs, systolic 

murmur


GI/Abdominal exam: PRESENT: normal bowel sounds, soft.  ABSENT: distended, 

guarding, mass, organomegaly, rebound, tenderness


Extremities exam: PRESENT: Resolved mid and lumbosacral vertebral region 

tenderness to palpation.  ABSENT: pedal edema


Musculoskeletal exam: PRESENT: tenderness - mid and lumbosacral vertebral spine 

regions tenderness to palpation and motion


Neurological exam: PRESENT: alert, awake, oriented to person, oriented to place

, oriented to time, oriented to situation, CN II-XII grossly intact.  ABSENT: 

motor sensory deficit


Psychiatric exam: PRESENT: appropriate affect, normal mood.  ABSENT: homicidal 

ideation, suicidal ideation


Skin exam: PRESENT: dry, intact, warm.  ABSENT: cyanosis, rash











Results


Laboratory Results: 


 





 06/03/17 06:40 





 06/03/17 06:40 








Impressions: 


 





Chest X-Ray  06/01/17 16:24


IMPRESSION:  NO SIGNIFICANT RADIOGRAPHIC FINDING IN THE CHEST.


 














Qualifiers


**PATEINT BEING DISCHARGED WITH ANY OF THE FOLLOWING DIAGNOSIS?: No





Plan


Discharge Plan: 


D/C home today. Emphasized follow up with hematologist and pain management 

team. I will dispense 20 tablets of Oxy IR to use q4 hours prn until seen by 

the hematologist/pain management team. Follow up in the office for post 

discharge care and Asthma management as instructed upon discharge.

## 2017-07-26 NOTE — PDOC H&P
History of Present Illness


Admission Date/PCP: 


  07/26/17 19:14





  DIYA PHILIPPE





Patient complains of: Back pain, Fever


History of Present Illness: 


CARMEN BEGUM is a 21 year old male known to my practice who presented to the 

ED with complaints including progressively worse back pain for couple of days, 

episode of fever with temperature of 100.3F today. Patient reported that his 

back pain has been ongoing for about 5 days and he has been in contact with his 

hematologist Dr Cleaning for management of his sickle cell disease with frequent 

hemolytic and pain crises. Due to failure of outpatient management of his pain 

he was instructed to present to the Ed for further evaluation. Patient reported 

associated nausea but no vomiting. No chest pain or difficulty with breathing. 

He denied any headache or dizziness. No dysuria or hematuria. His initial 

evaluation n the ED was remarkable for indices suggestive of hemolytic and pain 

pain crises and abnormal urinalysis suggestive of possible ongoing infection.. 





Past Medical History


Pulmonary Medical History: Reports: Asthma, Pneumonia


Psychiatric Medical History: 


   Denies: Depression


Hematology: Reports: Anemia, Sickle Cell Disease, Bleeding Tendencies





Past Surgical History


Past Surgical History: Reports: Orthopedic Surgery - Fluid drained from right 

foot, Vascular Surgery - Port placement


   Denies: Cholecystectomy





Social History


Smoking Status: Never Smoker


Frequency of Alcohol Use: None


Hx Recreational Drug Use: No


Drugs: None


Hx Prescription Drug Abuse: No





Family History


Family History: Arthritis, DM, Hypertension, Other - Sickle cell trait both 

parents and asthma


Parental Family History Reviewed: Yes


Children Family History Reviewed: Yes


Sibling(s) Family History Reviewed.: Yes





Medication/Allergy


Home Medications: 








Albuterol Sulfate [Proair HFA] 2 puff IH Q4HP PRN 06/02/17 


Fentanyl [Duragesic 100 Mcg/Hr Transdermal Patch] 1 patch TD Q3D 06/02/17 


Fluticasone/Salmeterol [Advair -21 mcg Inhaler] 1 puff IH DAILY 06/02/17 


Folic Acid [Folvite 1 mg Tablet] 1 mg PO DAILY 06/02/17 


Hydroxyurea [Hydrea 500 mg Capsule] 1,000 mg PO SUTUTHSA 06/02/17 


Hydroxyurea [Hydrea 500 mg Capsule] 1,500 mg PO MOWEFR 06/02/17 


Ibuprofen [Motrin 800 mg Tablet] 800 mg PO Q6HP PRN 06/02/17 


Ondansetron [Zofran Odt 4 mg Tablet] 4 mg PO Q6HP PRN 06/02/17 


Oxycodone HCl [Oxy-Ir 5 mg Tablet] 15 mg PO Q4 07/26/17 








Allergies/Adverse Reactions: 


 





Coconut * [Coconut] Allergy (Mild, Verified 07/26/17 12:23)


 


transpore tape Allergy (Mild, Uncoded 07/26/17 12:23)


 Hives











Review of Systems


Constitutional: PRESENT: fever(s).  ABSENT: as per HPI, anorexia, chills, 

fatigue, headache(s), night sweats, weakness, weight gain, weight loss, other


Eyes: ABSENT: visual disturbances


Ears: ABSENT: hearing changes


Nose, Mouth, and Throat: ABSENT: as per HPI, headache(s), mouth pain, sore 

throat, vertigo, other


Cardiovascular: ABSENT: chest pain, dyspnea on exertion, edema, orthropnea, 

palpitations


Respiratory: ABSENT: cough, hemoptysis


Gastrointestinal: PRESENT: nausea.  ABSENT: as per HPI, abdominal pain, bloating

, coffee ground emesis, constipation, diarrhea, dysphagia, heartburn, 

hematemesis, hematochezia, melena, vomiting, other


Genitourinary: ABSENT: dysuria, hematuria


Musculoskeletal: PRESENT: back pain.  ABSENT: as per HPI, deformity, joint 

swelling, muscle weakness, other


Integumentary: ABSENT: rash, wounds


Neurological: ABSENT: abnormal gait, abnormal speech, confusion, dizziness, 

focal weakness, syncope


Psychiatric: ABSENT: anxiety, depression, homidical ideation, suicidal ideation


Endocrine: ABSENT: cold intolerance, heat intolerance, menstrual abnormalities, 

polydipsia, polyuria


Hematologic/Lymphatic: ABSENT: easy bleeding, easy bruising, lymphadenopathy


Allergic/Immunologic: ABSENT: seasonal rhinorrhea





Physical Exam


Vital Signs: 


 











Temp Pulse Resp BP Pulse Ox


 


 98.2 F   87   16   122/74   94 


 


 07/26/17 20:13  07/26/17 20:13  07/26/17 20:13  07/26/17 20:13  07/26/17 20:13








 Intake & Output











 07/25/17 07/26/17 07/27/17





 06:59 06:59 06:59


 


Weight   62.1 kg











General appearance: PRESENT: cooperative, severe distress - from back pain


Head exam: PRESENT: atraumatic, normocephalic


Eye exam: PRESENT: conjunctiva pink, EOMI, PERRLA.  ABSENT: scleral icterus


Ear exam: PRESENT: normal external ear exam


Mouth exam: PRESENT: moist, tongue midline


Teeth exam: ABSENT: dental caries, dental tenderness, edentulous, poor dentation

, other


Throat exam: ABSENT: post pharyngeal erythema, tonsillar erythema, tonsillar 

exudate, tonsillogmegaly, other


Neck exam: PRESENT: full ROM.  ABSENT: carotid bruit, JVD, lymphadenopathy, 

thyromegaly


Respiratory exam: PRESENT: clear to auscultation brenda


Cardiovascular exam: PRESENT: RRR.  ABSENT: diastolic murmur, rubs, systolic 

murmur


Pulses: PRESENT: normal dorsalis pedis pul, +2 pedal pulses bilateral


Vascular exam: PRESENT: normal capillary refill


GI/Abdominal exam: PRESENT: normal bowel sounds, soft.  ABSENT: distended, 

guarding, mass, organolmegaly, rebound, tenderness


Rectal exam: PRESENT: deferred


Gentrourinary exam: ABSENT: indwelling catheter


Extremities exam: ABSENT: pedal edema


Musculoskeletal exam: PRESENT: normal inspection


Neurological exam: PRESENT: alert, awake, oriented to person, oriented to place

, oriented to time, oriented to situation, CN II-XII grossly intact.  ABSENT: 

motor sensory deficit


Psychiatric exam: PRESENT: appropriate affect, normal mood.  ABSENT: homicidal 

ideation, suicidal ideation


Skin exam: PRESENT: dry, intact, warm.  ABSENT: cyanosis, rash





Results


Laboratory Results: 


I reviewed his lab results on [facility] and these formed significant part of my 

medical decision making.





Assessment & Plan





- Diagnosis


(1) Sickle cell disease with crisis


Is this a current diagnosis for this admission?: YesPlan: 


See admitting physician orders.








(2) UTI (urinary tract infection)


Qualifiers: 


     Urinary tract infection type: site unspecified     Hematuria presence: 

without hematuria     Qualified Code(s): N39.0 - Urinary tract infection, site 

not specified  


Is this a current diagnosis for this admission?: YesPlan: 


See admitting physician orders.








(3) Asthma


Qualifiers: 


     Asthma severity: mild intermittent     Asthma complication type: 

uncomplicated        Qualified Code(s): J45.20 - Mild intermittent asthma, 

uncomplicated  


Is this a current diagnosis for this admission?: YesPlan: 


See admitting physician orders.











- Time


Time Spent: 50 to 70 Minutes


Medications reviewed and adjusted accordingly: Yes


Anticipated discharge: Home


Within: Other





- Inpatient Certification


Based on my medical assessment, after consideration of the patient's 

comorbidities, presenting symptoms, or acuity I expect that the services needed 

warrant INPATIENT care.: Yes


I certify that my determination is in accordance with my understanding of 

Medicare's requirements for reasonable and necessary INPATIENT services [42 CFR 

412.3e].: Yes


Medical Necessity: Need Close Monitoring Due to Risk of Patient Decompensation, 

Need For IV Fluids, Need For Continuous Telemetry Monitoring, Need for Pain 

Control, Need for IV Antibiotics, Risk of Complication if Not Cared For in 

Hospital


Post Hospital Care: D/C Planner Documentation





- Plan Summary


Plan Summary: 


See admitting physician orders.

## 2017-07-26 NOTE — ER DOCUMENT REPORT
ED General Pain





- General


Chief Complaint: Sickle Cell Crisis


Stated Complaint: PAIN IN LOWER BACK


Time Seen by Provider: 07/26/17 12:53


Mode of Arrival: Ambulatory


Information source: Patient


TRAVEL OUTSIDE OF THE [place] IN LAST 30 DAYS: No





- HPI


Patient complains to provider of: Sickle cell pain, low back pain


Onset: Other - 5 days


Onset/Duration: Gradual, Persistent


Quality of pain: Achy


Severity: Moderate


Pain Level: 4


Exacerbated by: Denies


Relieved by: Denies


Similar symptoms previously: Yes


Recently seen / treated by doctor: Yes


Notes: 


Patient is a 21-year-old male with a history of sickle cell disease, who 

presents to the emergency room complaining of 5 day history of low back pain 

which is typical of this painful crises in the past, he reports a temperature 

of 100.3 yesterday evening, denies cough, cold or congestion, he reports some 

nausea but no vomiting or diarrhea, no injury or trauma, he saw his 

hematologist 5 days ago, he is currently taking OxyContin 15 mg as needed for 

pain, however this has not touched his pain and was advised by his hematologist 

to come to the emergency room for further evaluation and treatment





- Related Data


Allergies/Adverse Reactions: 


 





Coconut * [Coconut] Allergy (Mild, Verified 07/26/17 12:23)


 


transpore tape Allergy (Mild, Uncoded 07/26/17 12:23)


 Hives








Home Medications: 


 Current Home Medications





Oxycodone HCl [Oxy-Ir 5 mg Tablet] 15 mg PO Q4 07/26/17 [History]











Past Medical History





- General


Information source: Patient





- Social History


Smoking Status: Never Smoker


Frequency of alcohol use: Rare


Drug Abuse: None


Family History: Arthritis, DM, Hypertension, Other - Sickle cell trait both 

parents and asthma


Pulmonary Medical History: Reports: Hx Asthma, Hx Pneumonia


Renal/ Medical History: Denies: Hx Peritoneal Dialysis


Psychiatric Medical History: 


   Denies: Hx Depression


Past Surgical History: Reports: Hx Abdominal Surgery - Gallstones removal, Hx 

Orthopedic Surgery - Fluid drained from right foot, Hx Vascular Surgery - Port 

placement.  Denies: Hx Cholecystectomy





- Immunizations


Immunizations up to date: Yes


Hx Diphtheria, Pertussis, Tetanus Vaccination: Yes


Hx Pneumococcal Vaccination: 05/16/13





Review of Systems





- Review of Systems


Constitutional: No symptoms reported


EENT: No symptoms reported


Cardiovascular: No symptoms reported


Respiratory: No symptoms reported


Gastrointestinal: No symptoms reported


Genitourinary: No symptoms reported


Male Genitourinary: No symptoms reported


Musculoskeletal: Back pain


Skin: No symptoms reported


Hematologic/Lymphatic: No symptoms reported


Neurological/Psychological: No symptoms reported


-: Yes All other systems reviewed and negative





Physical Exam





- Vital signs


Vitals: 


 











Temp Pulse Resp BP Pulse Ox


 


 98.1 F   98   18   116/79   95 


 


 07/26/17 12:24  07/26/17 12:24  07/26/17 12:24  07/26/17 12:24  07/26/17 12:24











Interpretation: Normal





- General


General appearance: Appears well, Alert





- HEENT


Head: Normocephalic, Atraumatic


Eyes: Normal


Pupils: PERRL





- Respiratory


Respiratory status: No respiratory distress


Chest status: Nontender


Breath sounds: Normal


Chest palpation: Normal





- Cardiovascular


Rhythm: Regular


Heart sounds: Normal auscultation


Murmur: No





- Abdominal


Inspection: Normal


Distension: No distension


Bowel sounds: Normal


Tenderness: Nontender


Organomegaly: No organomegaly





- Back


Back: Normal, Nontender





- Extremities


General upper extremity: Normal inspection, Nontender, Normal color, Normal ROM

, Normal temperature


General lower extremity: Normal inspection, Nontender, Normal color, Normal ROM

, Normal temperature, Normal weight bearing.  No: Shira's sign





- Neurological


Neuro grossly intact: Yes


Cognition: Normal


Orientation: AAOx4


Dhaval Coma Scale Eye Opening: Spontaneous


Dhaval Coma Scale Verbal: Oriented


Dhaval Coma Scale Motor: Obeys Commands


Burns Coma Scale Total: 15


Speech: Normal


Motor strength normal: LUE, RUE, LLE, RLE


Sensory: Normal





- Psychological


Associated symptoms: Normal affect, Normal mood





- Skin


Skin Temperature: Warm


Skin Moisture: Dry


Skin Color: Normal





Course





- Re-evaluation


Re-evalutation: 





07/26/17 17:52


Patient continues to have low back pain despite 3 rounds of pain medications 

and IV fluids, he is noted to have a slight urinary tract infection which he 

was given antibiotics for, since he continues to have pain with evidence of 

sickle cell crisis patient was discussed with his primary care provider who 

agrees to admit for further evaluation and treatment





- Vital Signs


Vital signs: 


 











Temp Pulse Resp BP Pulse Ox


 


 98.1 F   98   18   116/79   95 


 


 07/26/17 12:24  07/26/17 12:24  07/26/17 12:24  07/26/17 12:24  07/26/17 12:24














- Laboratory


Result Diagrams: 


 07/26/17 14:25





 07/26/17 14:25


Laboratory results interpreted by me: 


 











  07/26/17 07/26/17 07/26/17





  14:25 14:25 14:35


 


RBC  2.60 L  


 


Hgb  10.4 L  


 


Hct  29.4 L  


 


MCV  113 H  


 


MCH  39.9 H  


 


RDW  22.8 H  


 


Seg Neuts % (Manual)  82 H  


 


Lymphocytes % (Manual)  11 L  


 


Retic Count (auto)  7.00 H  


 


Absolute Retic  0.182 H  


 


Total Bilirubin   3.9 H 


 


Total Protein   9.0 H 


 


Urine Protein    100 H


 


Urine Blood    SMALL H


 


Urine Urobilinogen    4.0 H


 


Ur Leukocyte Esterase    MODERATE H














Discharge





- Discharge


Clinical Impression: 


 Hb-SS disease with crisis





UTI (urinary tract infection)


Qualifiers:


 Urinary tract infection type: site unspecified Hematuria presence: without 

hematuria Qualified Code(s): N39.0 - Urinary tract infection, site not specified





Condition: Fair


Disposition: ADMITTED AS INPATIENT


Admitting Provider: Armin


Unit Admitted: Telemetry


Referrals: 


DIYA PHILIPPE MD [Primary Care Provider] - Follow up as needed

## 2017-07-26 NOTE — ER DOCUMENT REPORT
ED Medical Screen (RME)





- General


Chief Complaint: Sickle Cell Crisis


Stated Complaint: PAIN IN LOWER BACK


Time Seen by Provider: 07/26/17 12:53


Notes: 


Patient presents with complaints of sickle cell pain.  Patient states that the 

pain is mainly in his lower back and this is usually where he gets the pain.  

He denies any chest pain or shortness of breath.  No fevers.


TRAVEL OUTSIDE OF THE [place] IN LAST 30 DAYS: No





- Related Data


Allergies/Adverse Reactions: 


 





Coconut * [Coconut] Allergy (Mild, Verified 07/26/17 12:23)


 


transpore tape Allergy (Mild, Uncoded 07/26/17 12:23)


 Hives








Home Medications: 


 Current Home Medications





Oxycodone HCl [Oxy-Ir 5 mg Tablet] 15 mg PO Q4 07/26/17 [History]











Past Medical History





- Social History


Frequency of alcohol use: Rare


Drug Abuse: None


Pulmonary Medical History: Reports: Hx Asthma, Hx Pneumonia


Renal/ Medical History: Denies: Hx Peritoneal Dialysis


Psychiatric Medical History: 


   Denies: Hx Depression


Past Surgical History: Reports: Hx Abdominal Surgery - Gallstones removal, Hx 

Orthopedic Surgery - Fluid drained from right foot, Hx Vascular Surgery - Port 

placement.  Denies: Hx Cholecystectomy





- Immunizations


Immunizations up to date: Yes


Hx Diphtheria, Pertussis, Tetanus Vaccination: Yes





Physical Exam





- Vital signs


Vitals: 





 











Temp Pulse Resp BP Pulse Ox


 


 98.1 F   98   18   116/79   95 


 


 07/26/17 12:24  07/26/17 12:24  07/26/17 12:24  07/26/17 12:24  07/26/17 12:24














Course





- Vital Signs


Vital signs: 





 











Temp Pulse Resp BP Pulse Ox


 


 98.1 F   98   18   116/79   95 


 


 07/26/17 12:24  07/26/17 12:24  07/26/17 12:24  07/26/17 12:24  07/26/17 12:24

## 2017-07-27 NOTE — PDOC PROGRESS REPORT
Subjective


Progress Note for:: 07/27/17


Subjective:: 


Continue back pain. Remain on IV fluid and IV Dilaudid for pain management. No 

fever, chills, nausea, or vomiting. No abdominal pain. Awaiting urine culture 

findings. Remain on IV Rocephin coverage.





Physical Exam


Vital Signs: 


 











Temp Pulse Resp BP Pulse Ox


 


 98.1 F   89   14   125/72   97 


 


 07/27/17 15:00  07/27/17 15:00  07/27/17 15:00  07/27/17 15:00  07/27/17 15:00








 Intake & Output











 07/26/17 07/27/17 07/28/17





 06:59 06:59 06:59


 


Intake Total  3150 2773


 


Output Total  2000 3500


 


Balance  1150 -727


 


Weight  62 kg 











General appearance: PRESENT: no acute distress, well-developed, well-nourished


Head exam: PRESENT: atraumatic, normocephalic


Respiratory exam: PRESENT: clear to auscultation brenda


Cardiovascular exam: PRESENT: RRR.  ABSENT: diastolic murmur, rubs, systolic 

murmur


GI/Abdominal exam: PRESENT: normal bowel sounds, soft.  ABSENT: distended, 

guarding, mass, organolmegaly, rebound, tenderness


Extremities exam: ABSENT: pedal edema


Musculoskeletal exam: PRESENT: normal inspection, tenderness - low back pain 

with paraspinal muscle spasm to palpation


Neurological exam: PRESENT: alert, awake, oriented to person, oriented to place

, oriented to time, oriented to situation, CN II-XII grossly intact.  ABSENT: 

motor sensory deficit


Psychiatric exam: PRESENT: appropriate affect, normal mood.  ABSENT: homicidal 

ideation, suicidal ideation





Results


Laboratory Results: 


 





 07/27/17 06:00 





 07/27/17 06:00 





 











  07/27/17 07/27/17





  06:00 06:00


 


WBC  5.1 


 


RBC  2.06 L 


 


Hgb  8.4 L 


 


Hct  23.6 L 


 


MCV  115 H 


 


MCH  40.7 H 


 


MCHC  35.5 


 


RDW  22.3 H 


 


Plt Count  137 L 


 


Seg Neutrophils %  Not Reportable 


 


Lymphocytes %  Not Reportable 


 


Monocytes %  Not Reportable 


 


Eosinophils %  Not Reportable 


 


Basophils %  Not Reportable 


 


Absolute Neutrophils  Not Reportable 


 


Absolute Lymphocytes  Not Reportable 


 


Absolute Monocytes  Not Reportable 


 


Absolute Eosinophils  Not Reportable 


 


Absolute Basophils  Not Reportable 


 


Sodium   143.5


 


Potassium   4.4


 


Chloride   107


 


Carbon Dioxide   28


 


Anion Gap   9


 


BUN   8


 


Creatinine   0.68


 


Est GFR ( Amer)   > 60


 


Est GFR (Non-Af Amer)   > 60


 


Glucose   106


 


Calcium   8.1 L


 


Total Bilirubin   2.9 H


 


AST   47


 


ALT   31


 


Alkaline Phosphatase   88


 


Total Protein   6.9


 


Albumin   3.5














Assessment & Plan





- Diagnosis


(1) Sickle cell disease with crisis


Is this a current diagnosis for this admission?: YesPlan: 


Continue current IV hydration and pain management.








(2) UTI (urinary tract infection)


Qualifiers: 


     Urinary tract infection type: site unspecified     Hematuria presence: 

without hematuria     Qualified Code(s): N39.0 - Urinary tract infection, site 

not specified  


Is this a current diagnosis for this admission?: YesPlan: 


Maintain on IV Rocephin coverage. Follow up on urine culture findings.








(3) Asthma


Qualifiers: 


     Asthma severity: mild intermittent     Asthma complication type: 

uncomplicated        Qualified Code(s): J45.20 - Mild intermittent asthma, 

uncomplicated  


Is this a current diagnosis for this admission?: YesPlan: 


Continue current bronchodilator management on prn basis.








(4) Sickle cell anemia


Qualifiers: 


     Sickle-cell associated disorders: with unspecified crisis        Qualified 

Code(s): D57.00 - Hb-SS disease with crisis, unspecified; D57.0 - Hb-SS disease 

with crisis  


Is this a current diagnosis for this admission?: YesPlan: 


See attending physician orders.











- Time


Time Spent with patient: 25-34 minutes


Medications reviewed and adjusted accordingly: Yes


Anticipated discharge: Other


Within: Other





- Inpatient Certification


Based on my medical assessment, after consideration of the patient's 

comorbidities, presenting symptoms, or acuity I expect that the services needed 

warrant INPATIENT care.: Yes


I certify that my determination is in accordance with my understanding of 

Medicare's requirements for reasonable and necessary INPATIENT services [42 CFR 

412.3e].: Yes


Medical Necessity: Need Close Monitoring Due to Risk of Patient Decompensation, 

Need For IV Fluids, Need For Continuous Telemetry Monitoring, Need for IV 

Antibiotics, Risk of Complication if Not Cared For in Hospital


Post Hospital Care: D/C Planner Documentation





- Plan Summary


Plan Summary: 


See attending physician orders.

## 2017-07-29 NOTE — PDOC DISCHARGE SUMMARY
General





- Admit/Disc Date/PCP


Admission Date/Primary Care Provider: 


  07/26/17 17:04





  DIYA PHILIPPE





Discharge Date: 07/29/17





- Discharge Diagnosis


(1) Sickle cell disease with crisis


Is this a current diagnosis for this admission?: Yes





(2) UTI (urinary tract infection)


Is this a current diagnosis for this admission?: Yes





(3) Asthma


Is this a current diagnosis for this admission?: Yes





(4) Sickle cell anemia


Is this a current diagnosis for this admission?: Yes








- Additional Information


Discharge Diet: Regular


Discharge Activity: Activity As Tolerated


Home Medications: 








Albuterol Sulfate [Proair HFA] 2 puff IH Q4HP PRN 06/02/17 


Fentanyl [Duragesic 100 Mcg/Hr Transdermal Patch] 1 patch TD Q3D 06/02/17 


Fluticasone/Salmeterol [Advair -21 mcg Inhaler] 1 puff IH DAILY 06/02/17 


Folic Acid [Folvite 1 mg Tablet] 1 mg PO DAILY 06/02/17 


Hydroxyurea [Hydrea 500 mg Capsule] 1,000 mg PO SUTUTHSA 06/02/17 


Hydroxyurea [Hydrea 500 mg Capsule] 1,500 mg PO MOWEFR 06/02/17 


Ibuprofen [Motrin 800 mg Tablet] 800 mg PO Q6HP PRN 06/02/17 


Ondansetron [Zofran Odt 4 mg Tablet] 4 mg PO Q6HP PRN 06/02/17 


Oxycodone HCl [Oxy-Ir 5 mg Tablet] 15 mg PO Q4 07/26/17 











History of Present Illness


History of Present Illness: 


CARMEN BEGUM is a 21 year old male known to my practice who presented to the 

ED with complaints including progressively worse back pain for couple of days, 

episode of fever with temperature of 100.3F today. Patient reported that his 

back pain has been ongoing for about 5 days and he has been in contact with his 

hematologist Dr Cleaning for management of his sickle cell disease with frequent 

hemolytic and pain crises. Due to failure of outpatient management of his pain 

he was instructed to present to the Ed for further evaluation. Patient reported 

associated nausea but no vomiting. No chest pain or difficulty with breathing. 

He denied any headache or dizziness. No dysuria or hematuria. His initial 

evaluation n the ED was remarkable for indices suggestive of hemolytic and pain 

pain crises and abnormal urinalysis suggestive of possible ongoing infection.. 





Hospital Course


Hospital Course: 


Patient reported to IV fluid hydration and pain management. He was managed 

empirically for possible UTI with IV Rocephin coverage. His urine culture was 

no growth x 2 days. He had total 3 days of IV Rocephin therapy. Patient have 

been on his preadmission pain medication management over last 24 hours and pain 

have been satisfactorily controlled. He will be discharge home today and follow 

up in the office as instructed upon discharge and instructed to dolly Dr. Cleaning 

office for follow up appointment with his hematologist. 





Physical Exam


Vital Signs: 


 











Temp Pulse Resp BP Pulse Ox


 


 98.1 F   102 H  16   122/92 H  99 


 


 07/29/17 07:26  07/29/17 07:26  07/29/17 07:26  07/29/17 07:26  07/29/17 07:26








 Intake & Output











 07/28/17 07/29/17 07/30/17





 06:59 06:59 06:59


 


Intake Total 6148 5975 


 


Output Total 6500 4020 


 


Balance -352 1955 


 


Weight 62 kg 62.3 kg 











Physical Exam: 


General appearance: PRESENT: no acute distress, well-developed, well-nourished


Head exam: PRESENT: atraumatic, normocephalic


Respiratory exam: PRESENT: clear to auscultation brenda


Cardiovascular exam: PRESENT: RRR.  ABSENT: diastolic murmur, rubs, systolic 

murmur


GI/Abdominal exam: PRESENT: normal bowel sounds, soft.  ABSENT: distended, 

guarding, mass, organomegaly, rebound, tenderness


Extremities exam: ABSENT: pedal edema


Musculoskeletal exam: PRESENT: normal inspection, tenderness - improved low 

back pain 


Neurological exam: PRESENT: alert, awake, oriented to person, oriented to place

, oriented to time, oriented to situation, CN II-XII grossly intact.  ABSENT: 

motor sensory deficit


Psychiatric exam: PRESENT: appropriate affect, normal mood.  ABSENT: homicidal 

ideation, suicidal ideation





Results


Laboratory Results: 


 





 07/29/17 04:54 





 07/27/17 06:00 





 











  07/29/17





  04:54


 


WBC  5.4


 


RBC  2.10 L


 


Hgb  8.9 L


 


Hct  24.8 L


 


MCV  118 H


 


MCH  42.3 H


 


MCHC  35.8


 


RDW  22.8 H


 


Plt Count  177


 


Seg Neutrophils %  Not Reportable


 


Lymphocytes %  Not Reportable


 


Monocytes %  Not Reportable


 


Eosinophils %  Not Reportable


 


Basophils %  Not Reportable


 


Absolute Neutrophils  Not Reportable


 


Absolute Lymphocytes  Not Reportable


 


Absolute Monocytes  Not Reportable


 


Absolute Eosinophils  Not Reportable


 


Absolute Basophils  Not Reportable














Qualifiers


**PATEINT BEING DISCHARGED WITH ANY OF THE FOLLOWING DIAGNOSIS?: No





Plan


Discharge Plan: 


D/C home today. Follow with post hospitalization care as instructed upon 

discharge.

## 2017-07-29 NOTE — PDOC PROGRESS REPORT
Subjective


Progress Note for:: 07/28/17


Subjective:: 


Continue back pain. Remain on IV fluid and IV Dilaudid for pain management. No 

fever, chills, nausea, or vomiting. No abdominal pain. Awaiting urine culture 

findings. Remain on IV Rocephin coverage.





Physical Exam


Vital Signs: 


 











Temp Pulse Resp BP Pulse Ox


 


 98.1 F   102 H  16   122/92 H  99 


 


 07/29/17 07:26  07/29/17 07:26  07/29/17 07:26  07/29/17 07:26  07/29/17 07:26








 Intake & Output











 07/28/17 07/29/17 07/30/17





 06:59 06:59 06:59


 


Intake Total 6148 5975 


 


Output Total 6500 4020 


 


Balance -352 1955 


 


Weight 62 kg 62.3 kg 











Physical Exam: 


General appearance: PRESENT: no acute distress, well-developed, well-nourished


Head exam: PRESENT: atraumatic, normocephalic


Respiratory exam: PRESENT: clear to auscultation brenda


Cardiovascular exam: PRESENT: RRR.  ABSENT: diastolic murmur, rubs, systolic 

murmur


GI/Abdominal exam: PRESENT: normal bowel sounds, soft.  ABSENT: distended, 

guarding, mass, organomegaly, rebound, tenderness


Extremities exam: ABSENT: pedal edema


Musculoskeletal exam: PRESENT: normal inspection, tenderness - improving low 

back pain with paraspinal muscle spasm to palpation


Neurological exam: PRESENT: alert, awake, oriented to person, oriented to place

, oriented to time, oriented to situation, CN II-XII grossly intact.  ABSENT: 

motor sensory deficit


Psychiatric exam: PRESENT: appropriate affect, normal mood.  ABSENT: homicidal 

ideation, suicidal ideation





Results


Laboratory Results: 


 





 07/29/17 04:54 





 07/27/17 06:00 





 











  07/29/17





  04:54


 


WBC  5.4


 


RBC  2.10 L


 


Hgb  8.9 L


 


Hct  24.8 L


 


MCV  118 H


 


MCH  42.3 H


 


MCHC  35.8


 


RDW  22.8 H


 


Plt Count  177


 


Seg Neutrophils %  Not Reportable


 


Lymphocytes %  Not Reportable


 


Monocytes %  Not Reportable


 


Eosinophils %  Not Reportable


 


Basophils %  Not Reportable


 


Absolute Neutrophils  Not Reportable


 


Absolute Lymphocytes  Not Reportable


 


Absolute Monocytes  Not Reportable


 


Absolute Eosinophils  Not Reportable


 


Absolute Basophils  Not Reportable














Assessment & Plan





- Diagnosis


(1) Sickle cell disease with crisis


Is this a current diagnosis for this admission?: Yes





(2) UTI (urinary tract infection)


Qualifiers: 


     Urinary tract infection type: site unspecified     Hematuria presence: 

without hematuria     Qualified Code(s): N39.0 - Urinary tract infection, site 

not specified  


Is this a current diagnosis for this admission?: Yes





(3) Asthma


Qualifiers: 


     Asthma severity: mild intermittent     Asthma complication type: 

uncomplicated        Qualified Code(s): J45.20 - Mild intermittent asthma, 

uncomplicated  


Is this a current diagnosis for this admission?: Yes





(4) Sickle cell anemia


Qualifiers: 


     Sickle-cell associated disorders: with unspecified crisis        Qualified 

Code(s): D57.00 - Hb-SS disease with crisis, unspecified; D57.0 - Hb-SS disease 

with crisis  


Is this a current diagnosis for this admission?: Yes








- Time


Time Spent with patient: 25-34 minutes


Medications reviewed and adjusted accordingly: Yes


Anticipated discharge: Home


Within: Other





- Inpatient Certification


Based on my medical assessment, after consideration of the patient's 

comorbidities, presenting symptoms, or acuity I expect that the services needed 

warrant INPATIENT care.: Yes


I certify that my determination is in accordance with my understanding of 

Medicare's requirements for reasonable and necessary INPATIENT services [42 CFR 

412.3e].: Yes


Medical Necessity: Need Close Monitoring Due to Risk of Patient Decompensation, 

Need For IV Fluids, Need for Pain Control, Risk of Complication if Not Cared 

For in Hospital


Post Hospital Care: D/C Planner Documentation





- Plan Summary


Plan Summary: 


D/C IV Dilaudid usage. I will restart on his home pain medication regimen. 

Maintain on IV Rocephin coverage. Follow up on urine culture findings.

## 2017-08-02 NOTE — RADIOLOGY REPORT (SQ)
EXAM DESCRIPTION:  CHEST PA/LAT



COMPLETED DATE/TIME:  8/2/2017 10:25 pm



REASON FOR STUDY:  chest pain



COMPARISON:  6/1/2017



EXAM PARAMETERS:  NUMBER OF VIEWS: two views

TECHNIQUE: Digital Frontal and Lateral radiographic views of the chest acquired.

RADIATION DOSE: NA

LIMITATIONS: none



FINDINGS:  LUNGS AND PLEURA: No opacities, masses or pneumothorax. No pleural effusion.

MEDIASTINUM AND HILAR STRUCTURES: No masses or contour abnormalities.

HEART AND VASCULAR STRUCTURES: Heart normal size.  No evidence for failure.

BONES: Bony findings of sickle cell.

HARDWARE: Venous access catheter at the cavoatrial junction.

OTHER: No other significant finding.



IMPRESSION:  No acute pulmonary disease.



TECHNICAL DOCUMENTATION:  JOB ID:  [id number]

 2011 [employer]- All Rights Reserved

## 2017-08-02 NOTE — ER DOCUMENT REPORT
ED General





- General


Chief Complaint: Sickle Cell Crisis


Stated Complaint: BACK AND KNEE PAIN


Time Seen by Provider: 08/02/17 21:46


Notes: 


Patient is a 21-year-old male who comes emergency department for chief 

complaint of sickle cell crisis.  He states that for the past 2 days he has had 

worsening pains in his lower back and now also in his knees and some in his 

chest.  He denies shortness of breath.  He follows with hematology Dr. Cleaning. 

He is taking oxycodone 15 mg TID with minimal relief. He denies fever. 





TRAVEL OUTSIDE OF THE [place] IN LAST 30 DAYS: No





- Related Data


Allergies/Adverse Reactions: 


 





Coconut * [Coconut] Allergy (Mild, Verified 07/26/17 12:23)


 


transpore tape Allergy (Mild, Uncoded 07/26/17 12:23)


 Hives











Past Medical History





- General


Information source: Patient





- Social History


Smoking Status: Never Smoker


Frequency of alcohol use: None


Drug Abuse: None


Lives with: Family


Family History: Arthritis, DM, Hypertension, Other - Sickle cell trait both 

parents and asthma


Patient has suicidal ideation: No


Patient has homicidal ideation: No


Pulmonary Medical History: Reports: Hx Asthma, Hx Pneumonia


Renal/ Medical History: Denies: Hx Peritoneal Dialysis


Psychiatric Medical History: 


   Denies: Hx Depression


Past Surgical History: Reports: Hx Abdominal Surgery - Gallstones removal, Hx 

Orthopedic Surgery - Fluid drained from right foot, Hx Vascular Surgery - Port 

placement.  Denies: Hx Cholecystectomy





- Immunizations


Immunizations up to date: Yes


Hx Diphtheria, Pertussis, Tetanus Vaccination: Yes


Hx Pneumococcal Vaccination: 05/16/13





Review of Systems





- Review of Systems


Constitutional: See HPI


EENT: No symptoms reported


Cardiovascular: No symptoms reported


Respiratory: No symptoms reported


Gastrointestinal: No symptoms reported


Genitourinary: No symptoms reported


Male Genitourinary: No symptoms reported


Musculoskeletal: See HPI


Skin: No symptoms reported


Hematologic/Lymphatic: No symptoms reported


Neurological/Psychological: No symptoms reported





Physical Exam





- Vital signs


Vitals: 


 











Temp Pulse Resp BP Pulse Ox


 


 99.2 F   123 H  20   141/89 H  98 


 


 08/02/17 21:01  08/02/17 21:01  08/02/17 21:01  08/02/17 21:01  08/02/17 21:01











Interpretation: Normal





- General


General appearance: Appears well, Alert





- HEENT


Head: Normocephalic, Atraumatic


Eyes: Normal


Conjunctiva: Normal


Extraocular movements intact: Yes


Eyelashes: Normal


Pupils: PERRL


Nasal: Normal


Mouth/Lips: Normal


Mucous membranes: Normal


Pharynx: Normal


Neck: Normal





- Respiratory


Respiratory status: No respiratory distress


Chest status: Nontender


Breath sounds: Normal.  No: Decreased air movement, Wheezing


Chest palpation: Normal





- Cardiovascular


Rhythm: Regular, Tachycardia


Heart sounds: Normal auscultation, S1 appreciated, S2 appreciated


Murmur: No





- Abdominal


Inspection: Normal


Distension: No distension


Bowel sounds: Normal


Tenderness: Nontender


Organomegaly: No organomegaly





- Back


Back: Normal, Nontender.  No: Tender, CVA tenderness





- Extremities


General upper extremity: Normal inspection, Nontender, Normal color, Normal ROM

, Normal temperature


General lower extremity: Normal inspection, Nontender, Normal color, Normal ROM

, Normal temperature, Normal weight bearing.  No: Shira's sign





- Neurological


Neuro grossly intact: Yes


Cognition: Normal


Orientation: AAOx4


[place] Coma Scale Eye Opening: Spontaneous


[place] Coma Scale Verbal: Oriented


Dhav[place] Coma Scale Motor: Obeys Commands


Dhaval Coma Scale Total: 15


Speech: Normal


Cranial nerves: Normal


Cerebellar coordination: Normal


Motor strength normal: LUE, RUE, LLE, RLE


Additional motor exam normals: Equal 


Sensory: Normal





- Psychological


Associated symptoms: Normal affect, Normal mood





- Skin


Skin Temperature: Warm


Skin Moisture: Dry


Skin Color: Normal





Course





- Re-evaluation


Re-evalutation: 


Chest x-ray shows no concerning abnormalities.  No leukocytosis, hemoglobin is 

not significantly low, however reticulocyte count and bilirubin do indicate 

cycling.  No fever, no hypotension, no signs of distress on examination.





08/02/17 23:49


Patient's tachycardia has improved to about 108, he still has some discomfort 

after medications, will reevaluate.





08/03/17


Patient still complaining of pain, does not want to go home. He looks 

clinically significantly improved. Tachycardia resolved. Discussed with Dr. Calderon. 





08/03/17


Called Dr. Philippe, pending call back.





08/03/17 02:30 


Spoke with Dr. Philippe, patient to be admitted to telemetry.








- Vital Signs


Vital signs: 


 











Temp Pulse Resp BP Pulse Ox


 


 99.2 F   123 H  20   141/89 H  98 


 


 08/02/17 21:01  08/02/17 21:01  08/02/17 21:01  08/02/17 21:01  08/02/17 21:01














- Laboratory


Result Diagrams: 


 08/02/17 22:10





 08/02/17 22:10


Laboratory results interpreted by me: 


 











  08/02/17 08/02/17 08/03/17





  22:10 22:10 00:50


 


WBC  3.9 L  


 


RBC  2.64 L  


 


Hgb  11.1 L  


 


Hct  31.6 L  


 


MCV  120 H  


 


MCH  42.2 H  


 


RDW  26.8 H  


 


Retic Count (auto)  9.77 H  


 


Absolute Retic  0.258 H  


 


Sodium   145.9 H 


 


Total Bilirubin   3.1 H 


 


Direct Bilirubin   0.6 H 


 


AST   73 H 


 


Alkaline Phosphatase   172 H 


 


Total Protein   10.2 H 


 


Albumin   5.2 H 


 


Urine Ketones    TRACE H


 


Urine Urobilinogen    2.0 H


 


Ur Leukocyte Esterase    SMALL H


 


Urine Ascorbic Acid    20 H














Discharge





- Discharge


Clinical Impression: 


 Hb-SS disease with crisis





Sickle cell anemia


Qualifiers:


 Sickle-cell associated disorders: with unspecified crisis Qualified Code(s): 

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





Admitting Provider: Hospitalist


Unit Admitted: Telemetry


Referrals: 


DIYA PHILIPPE MD [Primary Care Provider] - Follow up as needed

## 2017-08-03 NOTE — PDOC H&P
History of Present Illness


Admission Date/PCP: 


  08/03/17 05:09





  DIYA PHILIPPE





Patient complains of: Worsening back pain


History of Present Illness: 


CARMEN BEGUM is a 21 year old male known to my practice and recently 

discharged from this facility after presenting with similar symptoms. Patient 

return to the ED with complain of worsening back pain over last 2 days. He dose 

have history of sickle cell disease with frequent crisis. He claimed compliance 

with his medication and follow up with Dr Hermila huerta hematologist. He denied 

any untoward stress, fever, chills, dysuria, or difficulty with breathing. 

Patient reported spreading involvement of his knee and chest with ongoing pain. 

He claimed minimal relief  of his pain on his current home pain medication 

management regimen that include Fentanyl patch 100 mcg / hour q 72 hours, 

Oxycodone 15 mg p.o tid and Ibuprofen 800 mg p.o tid. Despite initial 

management in the ED with IV Dilaudid several doses patient reported persistent 

of pain necessitating admission to the hospital for further evaluation and 

management.





Past Medical History


Pulmonary Medical History: Reports: Asthma, Pneumonia


Psychiatric Medical History: 


   Denies: Depression


Hematology: Reports: Anemia, Sickle Cell Disease, Bleeding Tendencies





Past Surgical History


Past Surgical History: Reports: Orthopedic Surgery - Fluid drained from right 

foot, Vascular Surgery - Port placement


   Denies: Cholecystectomy





Social History


Lives with: Family


Smoking Status: Never Smoker


Frequency of Alcohol Use: None


Hx Recreational Drug Use: No


Drugs: None


Hx Prescription Drug Abuse: No





- Advance Directive


Resuscitation Status: Full Code





Family History


Family History: Arthritis, DM, Hypertension, Other - Sickle cell trait both 

parents and asthma


Parental Family History Reviewed: Yes


Children Family History Reviewed: Yes


Sibling(s) Family History Reviewed.: Yes





Medication/Allergy


Home Medications: 








Albuterol Sulfate [Proair HFA] 2 puff IH Q4HP PRN 06/02/17 


Fentanyl [Duragesic 100 Mcg/Hr Transdermal Patch] 1 patch TD Q3D 06/02/17 


Folic Acid [Folvite 1 mg Tablet] 1 mg PO DAILY 06/02/17 


Hydroxyurea [Hydrea 500 mg Capsule] 1,000 mg PO SUTUTHSA 06/02/17 


Hydroxyurea [Hydrea 500 mg Capsule] 1,500 mg PO MOWEFR 06/02/17 


Ibuprofen [Motrin 800 mg Tablet] 800 mg PO Q6HP PRN 06/02/17 


Ondansetron [Zofran Odt 4 mg Tablet] 4 mg PO Q6HP PRN 06/02/17 


Oxycodone HCl [Oxy-Ir 5 mg Tablet] 15 mg PO Q4 PRN 07/26/17 


Naproxen 500 mg PO Q12 08/03/17 








Allergies/Adverse Reactions: 


 





Coconut * [Coconut] Allergy (Mild, Verified 07/26/17 12:23)


 


transpore tape Allergy (Mild, Uncoded 07/26/17 12:23)


 Hives











Review of Systems


Constitutional: ABSENT: chills, fever(s), headache(s), weight gain, weight loss


Eyes: ABSENT: visual disturbances


Ears: ABSENT: hearing changes


Nose, Mouth, and Throat: ABSENT: as per HPI, headache(s), mouth pain, sore 

throat, vertigo, other


Cardiovascular: PRESENT: chest pain - described as achyness.  ABSENT: as per HPI

, dyspnea on exertion, edema, orthropnea, palpitations, other


Respiratory: ABSENT: cough, hemoptysis


Gastrointestinal: ABSENT: abdominal pain, constipation, diarrhea, hematemesis, 

hematochezia, nausea, vomiting


Genitourinary: ABSENT: dysuria, hematuria


Musculoskeletal: PRESENT: back pain - related to his ongoing pain crisis


Integumentary: ABSENT: rash, wounds


Neurological: ABSENT: abnormal gait, abnormal speech, confusion, dizziness, 

focal weakness, syncope


Psychiatric: ABSENT: anxiety, depression, homidical ideation, suicidal ideation


Endocrine: ABSENT: cold intolerance, heat intolerance, menstrual abnormalities, 

polydipsia, polyuria


Hematologic/Lymphatic: ABSENT: easy bleeding, easy bruising, lymphadenopathy





Physical Exam


Vital Signs: 


 











Temp Pulse Resp BP Pulse Ox


 


 98.3 F   79   14   108/58 L  99 


 


 08/03/17 07:06  08/03/17 07:37  08/03/17 07:06  08/03/17 07:06  08/03/17 07:06








 Intake & Output











 08/02/17 08/03/17 08/04/17





 06:59 06:59 06:59


 


Intake Total  349 


 


Balance  349 











General appearance: PRESENT: cooperative, severe distress - from pain


Head exam: PRESENT: atraumatic, normocephalic


Eye exam: PRESENT: EOMI, PERRLA, scleral icterus - slightly


Ear exam: PRESENT: normal external ear exam


Mouth exam: PRESENT: moist, tongue midline


Throat exam: ABSENT: post pharyngeal erythema, tonsillar erythema, tonsillar 

exudate, tonsillogmegaly, other


Neck exam: PRESENT: full ROM.  ABSENT: carotid bruit, JVD, lymphadenopathy, 

thyromegaly


Respiratory exam: PRESENT: clear to auscultation brenda


Cardiovascular exam: PRESENT: RRR.  ABSENT: diastolic murmur, rubs, systolic 

murmur


Vascular exam: PRESENT: normal capillary refill.  ABSENT: pallor


GI/Abdominal exam: PRESENT: normal bowel sounds, soft.  ABSENT: distended, 

guarding, mass, organolmegaly, rebound, tenderness


Rectal exam: PRESENT: deferred


Extremities exam: ABSENT: pedal edema, tenderness


Musculoskeletal exam: PRESENT: normal inspection.  ABSENT: tenderness


Neurological exam: PRESENT: alert, awake, oriented to person, oriented to place

, oriented to time, oriented to situation, CN II-XII grossly intact.  ABSENT: 

motor sensory deficit


Psychiatric exam: PRESENT: appropriate affect, normal mood.  ABSENT: homicidal 

ideation, suicidal ideation


Skin exam: PRESENT: dry, intact, warm.  ABSENT: cyanosis, rash





Results


Impressions: 


 





Chest X-Ray  08/02/17 21:53


IMPRESSION:  No acute pulmonary disease.


 














Assessment & Plan





- Diagnosis


(1) Sickle cell disease with crisis


Is this a current diagnosis for this admission?: YesPlan: 


See admitting attending physician orders.








(2) Abnormal urine finding


Is this a current diagnosis for this admission?: YesPlan: 


See admitting attending physician orders.








(3) Asthma


Qualifiers: 


     Asthma severity: mild intermittent     Asthma complication type: 

uncomplicated        Qualified Code(s): J45.20 - Mild intermittent asthma, 

uncomplicated  


Is this a current diagnosis for this admission?: YesPlan: 


See admitting attending physician orders.











- Time


Time Spent: 50 to 70 Minutes


Medications reviewed and adjusted accordingly: Yes


Anticipated discharge: Home





- Inpatient Certification


Medical Necessity: Need Close Monitoring Due to Risk of Patient Decompensation, 

Need For IV Fluids, Need For Continuous Telemetry Monitoring, Need for Pain 

Control, Risk of Complication if Not Cared For in Hospital


Post Hospital Care: D/C Planner Documentation





- Plan Summary


Plan Summary: 


See admitting attending physician orders.

## 2017-08-04 NOTE — PDOC PROGRESS REPORT
Subjective


Progress Note for:: 08/04/17


Subjective:: 


Patient reported continued back pain. Remain on IV Hydromorphone and oral 

Ibuprofen for pain management. He is on IV fluid hydration. He denied any chest 

pain or difficulty with breathing. No fever or chills. No nausea or vomiting. 

Attempts at walking on the floor limited due to back pain.





Physical Exam


Vital Signs: 


 











Temp Pulse Resp BP Pulse Ox


 


 98.2 F   80   16   123/70   96 


 


 08/04/17 11:47  08/04/17 11:47  08/04/17 11:47  08/04/17 11:47  08/04/17 11:47








 Intake & Output











 08/03/17 08/04/17 08/05/17





 06:59 06:59 06:59


 


Intake Total 349 4546 700


 


Output Total  4825 1000


 


Balance 349 -279 -300


 


Weight  65.4 kg 











General appearance: PRESENT: no acute distress, mild distress - related to back 

pain


Head exam: PRESENT: atraumatic, normocephalic


Eye exam: PRESENT: conjunctiva pink, EOMI, PERRLA.  ABSENT: scleral icterus


Mouth exam: PRESENT: moist


Respiratory exam: PRESENT: clear to auscultation brenda


Cardiovascular exam: PRESENT: RRR.  ABSENT: diastolic murmur, rubs, systolic 

murmur


GI/Abdominal exam: PRESENT: normal bowel sounds, soft.  ABSENT: distended, 

guarding, mass, organolmegaly, rebound, tenderness


Extremities exam: PRESENT: tenderness - mid back region


Musculoskeletal exam: PRESENT: normal inspection


Neurological exam: PRESENT: alert, awake, oriented to person, oriented to place

, oriented to time, oriented to situation, CN II-XII grossly intact.  ABSENT: 

motor sensory deficit


Skin exam: PRESENT: dry, intact, warm.  ABSENT: cyanosis, rash





Results


Laboratory Results: 


 





 08/04/17 04:10 





 











  08/04/17





  04:10


 


WBC  5.6


 


RBC  2.15 L


 


Hgb  9.1 L


 


Hct  25.6 L


 


MCV  119 H


 


MCH  42.1 H


 


MCHC  35.4


 


RDW  24.9 H


 


Plt Count  167











Impressions: 


 





Chest X-Ray  08/02/17 21:53


IMPRESSION:  No acute pulmonary disease.


 














Assessment & Plan





- Diagnosis


(1) Sickle cell disease with crisis


Is this a current diagnosis for this admission?: Yes





(2) Abnormal urine finding


Is this a current diagnosis for this admission?: Yes





(3) Asthma


Qualifiers: 


     Asthma severity: mild intermittent     Asthma complication type: 

uncomplicated        Qualified Code(s): J45.20 - Mild intermittent asthma, 

uncomplicated  


Is this a current diagnosis for this admission?: Yes








- Time


Time Spent with patient: 25-34 minutes


Medications reviewed and adjusted accordingly: Yes


Anticipated discharge: Other


Within: Other





- Inpatient Certification


Based on my medical assessment, after consideration of the patient's 

comorbidities, presenting symptoms, or acuity I expect that the services needed 

warrant INPATIENT care.: Yes


I certify that my determination is in accordance with my understanding of 

Medicare's requirements for reasonable and necessary INPATIENT services [42 CFR 

412.3e].: Yes


Medical Necessity: Need For IV Fluids, Need For Continuous Telemetry Monitoring

, Need for Pain Control, Risk of Complication if Not Cared For in Hospital


Post Hospital Care: D/C Planner Documentation





- Plan Summary


Plan Summary: 


Continue current pain management schedule. Monitor his CBC indices in view of 

hemoglobin down to 9.1 gm/dL. Consideration include hemodilution versus ongoing 

hemolytic crisis.

## 2017-08-06 NOTE — PDOC PROGRESS REPORT
Subjective


Progress Note for:: 08/06/17


Subjective:: 


Patient was admitted because of bone pain crisis





Physical Exam


Vital Signs: 


 











Temp Pulse Resp BP Pulse Ox


 


 98.4 F   81   14   127/67 H  97 


 


 08/06/17 15:47  08/06/17 15:47  08/06/17 15:47  08/06/17 15:47  08/06/17 15:47








 Intake & Output











 08/05/17 08/06/17 08/07/17





 06:59 06:59 06:59


 


Intake Total 4440 3977 1080


 


Output Total 3550 1300 500


 


Balance 890 2677 580


 


Weight  67.2 kg 











General appearance: PRESENT: no acute distress, well-developed, well-nourished


Head exam: PRESENT: atraumatic, normocephalic


Eye exam: PRESENT: conjunctiva pink, EOMI, PERRLA.  ABSENT: scleral icterus


Ear exam: PRESENT: normal external ear exam


Mouth exam: PRESENT: moist, tongue midline


Neck exam: PRESENT: full ROM.  ABSENT: carotid bruit, JVD, lymphadenopathy, 

thyromegaly


Cardiovascular exam: PRESENT: RRR.  ABSENT: diastolic murmur, rubs, systolic 

murmur


Pulses: PRESENT: normal dorsalis pedis pul, +2 pedal pulses bilateral


Vascular exam: PRESENT: normal capillary refill


GI/Abdominal exam: PRESENT: normal bowel sounds, soft.  ABSENT: distended, 

guarding, mass, organolmegaly, rebound, tenderness


Rectal exam: PRESENT: deferred


Neurological exam: PRESENT: alert, awake, oriented to person, oriented to place

, oriented to time, oriented to situation, CN II-XII grossly intact.  ABSENT: 

motor sensory deficit


Psychiatric exam: PRESENT: appropriate affect, normal mood.  ABSENT: homicidal 

ideation, suicidal ideation


Skin exam: PRESENT: dry, intact, warm.  ABSENT: cyanosis, rash





Results


Laboratory Results: 


 





 08/05/17 06:30 





 08/05/17 06:30 








Impressions: 


 





Chest X-Ray  08/02/17 21:53


IMPRESSION:  No acute pulmonary disease.


 














Assessment & Plan





- Diagnosis


(1) Abnormal urine finding


Is this a current diagnosis for this admission?: Yes





(2) Knee osteonecrosis, left


Is this a current diagnosis for this admission?: Yes





(3) Knee pain, left


Qualifiers: 


     Chronicity: acute        Qualified Code(s): M25.562 - Pain in left knee  





(4) Sickle cell anemia


Qualifiers: 


     Sickle-cell associated disorders: with unspecified crisis        Qualified 

Code(s): D57.00 - Hb-SS disease with crisis, unspecified; D57.0 - Hb-SS disease 

with crisis  





(5) Sickle cell crisis


Is this a current diagnosis for this admission?: Yes





(6) Sickle cell disease with crisis


Is this a current diagnosis for this admission?: Yes





(7) Asthma


Qualifiers: 


     Asthma severity: mild intermittent     Asthma complication type: 

uncomplicated        Qualified Code(s): J45.20 - Mild intermittent asthma, 

uncomplicated  


Is this a current diagnosis for this admission?: Yes

## 2017-08-07 NOTE — PDOC PROGRESS REPORT
Subjective


Progress Note for:: 08/07/17


Subjective:: 


Patient continue to complain about mid back pain. Nursing staff reported 

minimal to no physical movement by the patient on the pretest of back and joint 

pain. Has been on schedule call for IV hydromorphone. Remain on oral Ibuprofen 

for pain management. He is on IV fluid hydration. He denied any chest pain or 

difficulty with breathing. No fever or chills. No nausea or vomiting.





Physical Exam


Vital Signs: 


 











Temp Pulse Resp BP Pulse Ox


 


 99.1 F   93   18   109/63   98 


 


 08/07/17 15:31  08/07/17 15:31  08/07/17 15:31  08/07/17 15:31  08/07/17 15:31








 Intake & Output











 08/06/17 08/07/17 08/08/17





 06:59 06:59 06:59


 


Intake Total 3977 4394 3054


 


Output Total 1300 2100 1550


 


Balance 2677 2294 1504


 


Weight 67.2 kg 68.5 kg 











Physical Exam: 


General appearance: PRESENT: no acute distress, mild distress - related to 

reported back pain


Head exam: PRESENT: atraumatic, normocephalic


Eye exam: PRESENT: conjunctiva pink, EOMI, PERRLA.  ABSENT: scleral icterus


Mouth exam: PRESENT: moist


Respiratory exam: PRESENT: clear to auscultation brenda


Cardiovascular exam: PRESENT: RRR.  ABSENT: diastolic murmur, rubs, systolic 

murmur


GI/Abdominal exam: PRESENT: normal bowel sounds, soft.  ABSENT: distended, 

guarding, mass, organomegaly, rebound, tenderness


Extremities exam: PRESENT: tenderness - mid back region


Musculoskeletal exam: PRESENT: normal inspection


Neurological exam: PRESENT: alert, awake, oriented to person, oriented to place

, oriented to time, oriented to situation, CN II-XII grossly intact.  ABSENT: 

motor sensory deficit


Skin exam: PRESENT: dry, intact, warm.  ABSENT: cyanosis, rash





Results


Laboratory Results: 


 





 08/07/17 09:40 





 08/07/17 09:40 





 











  08/07/17 08/07/17





  09:40 09:40


 


WBC  4.4 


 


RBC  2.34 L 


 


Hgb  10.0 L 


 


Hct  29.2 L 


 


MCV  125 H 


 


MCH  42.6 H 


 


MCHC  34.1 


 


RDW  25.7 H 


 


Plt Count  192 


 


Seg Neutrophils %  Not Reportable 


 


Lymphocytes %  Not Reportable 


 


Monocytes %  Not Reportable 


 


Eosinophils %  Not Reportable 


 


Basophils %  Not Reportable 


 


Absolute Neutrophils  Not Reportable 


 


Absolute Lymphocytes  Not Reportable 


 


Absolute Monocytes  Not Reportable 


 


Absolute Eosinophils  Not Reportable 


 


Absolute Basophils  Not Reportable 


 


Sodium   142.5


 


Potassium   4.2


 


Chloride   104


 


Carbon Dioxide   29


 


Anion Gap   10


 


BUN   9


 


Creatinine   0.66


 


Est GFR ( Amer)   > 60


 


Est GFR (Non-Af Amer)   > 60


 


Glucose   87


 


Calcium   9.0


 


Total Bilirubin   2.6 H


 


AST   48


 


ALT   25


 


Alkaline Phosphatase   86


 


Total Protein   7.3


 


Albumin   4.0











Impressions: 


 





Chest X-Ray  08/02/17 21:53


IMPRESSION:  No acute pulmonary disease.


 














Assessment & Plan





- Diagnosis


(1) Sickle cell disease with crisis


Is this a current diagnosis for this admission?: Yes





(2) Abnormal urine finding


Is this a current diagnosis for this admission?: Yes





(3) Asthma


Qualifiers: 


     Asthma severity: mild intermittent     Asthma complication type: 

uncomplicated        Qualified Code(s): J45.20 - Mild intermittent asthma, 

uncomplicated  


Is this a current diagnosis for this admission?: Yes








- Time


Time Spent with patient: 25-34 minutes


Medications reviewed and adjusted accordingly: Yes


Anticipated discharge: Home


Within: Other





- Inpatient Certification


Based on my medical assessment, after consideration of the patient's 

comorbidities, presenting symptoms, or acuity I expect that the services needed 

warrant INPATIENT care.: Yes


I certify that my determination is in accordance with my understanding of 

Medicare's requirements for reasonable and necessary INPATIENT services [42 CFR 

412.3e].: Yes


Medical Necessity: Need Close Monitoring Due to Risk of Patient Decompensation, 

Need For IV Fluids, Need For Continuous Telemetry Monitoring, Need for Pain 

Control, Risk of Complication if Not Cared For in Hospital


Post Hospital Care: D/C Planner Documentation





- Plan Summary


Plan Summary: 


Continue current medication management. Emphasize efforts at walking on the 

floor. Discussed possible discharge in next 24-48 hours.

## 2017-08-08 NOTE — PDOC PROGRESS REPORT
Subjective


Progress Note for:: 08/08/17


Subjective:: 


Patient reported some improvement in his mid back and joint pain. He remain on 

IV hydromorphone and oral Ibuprofen for pain management. He is on IV fluid 

hydration. He denied any chest pain or difficulty with breathing. No fever or 

chills. No nausea or vomiting.





Physical Exam


Vital Signs: 


 











Temp Pulse Resp BP Pulse Ox


 


 98.6 F   83   14   119/58 L  97 


 


 08/08/17 03:26  08/08/17 07:00  08/08/17 03:26  08/08/17 03:26  08/08/17 03:26








 Intake & Output











 08/07/17 08/08/17 08/09/17





 06:59 06:59 06:59


 


Intake Total 4394 5069 


 


Output Total 2100 2625 


 


Balance 2294 2444 


 


Weight 68.5 kg  











Physical Exam: 


General appearance: PRESENT: no acute distress, mild distress - related to 

reported back pain


Head exam: PRESENT: atraumatic, normocephalic


Eye exam: PRESENT: conjunctiva pink, EOMI, PERRLA.  ABSENT: scleral icterus


Mouth exam: PRESENT: moist


Respiratory exam: PRESENT: clear to auscultation brenda


Cardiovascular exam: PRESENT: RRR.  ABSENT: diastolic murmur, rubs, systolic 

murmur


GI/Abdominal exam: PRESENT: normal bowel sounds, soft.  ABSENT: distended, 

guarding, mass, organomegaly, rebound, tenderness


Extremities exam: PRESENT: tenderness - mid back region


Musculoskeletal exam: PRESENT: normal inspection


Neurological exam: PRESENT: alert, awake, oriented to person, oriented to place

, oriented to time, oriented to situation, CN II-XII grossly intact.  ABSENT: 

motor sensory deficit


Skin exam: PRESENT: dry, intact, warm.  ABSENT: cyanosis, rash





Results


Laboratory Results: 


 





 08/07/17 09:40 





 08/07/17 09:40 





 











  08/07/17 08/07/17





  09:40 09:40


 


WBC  4.4 


 


RBC  2.34 L 


 


Hgb  10.0 L 


 


Hct  29.2 L 


 


MCV  125 H 


 


MCH  42.6 H 


 


MCHC  34.1 


 


RDW  25.7 H 


 


Plt Count  192 


 


Seg Neutrophils %  Not Reportable 


 


Lymphocytes %  Not Reportable 


 


Monocytes %  Not Reportable 


 


Eosinophils %  Not Reportable 


 


Basophils %  Not Reportable 


 


Absolute Neutrophils  Not Reportable 


 


Absolute Lymphocytes  Not Reportable 


 


Absolute Monocytes  Not Reportable 


 


Absolute Eosinophils  Not Reportable 


 


Absolute Basophils  Not Reportable 


 


Sodium   142.5


 


Potassium   4.2


 


Chloride   104


 


Carbon Dioxide   29


 


Anion Gap   10


 


BUN   9


 


Creatinine   0.66


 


Est GFR ( Amer)   > 60


 


Est GFR (Non-Af Amer)   > 60


 


Glucose   87


 


Calcium   9.0


 


Total Bilirubin   2.6 H


 


AST   48


 


ALT   25


 


Alkaline Phosphatase   86


 


Total Protein   7.3


 


Albumin   4.0











Impressions: 


 





Chest X-Ray  08/02/17 21:53


IMPRESSION:  No acute pulmonary disease.


 














Assessment & Plan





- Diagnosis


(1) Sickle cell disease with crisis


Is this a current diagnosis for this admission?: Yes





(2) Abnormal urine finding


Is this a current diagnosis for this admission?: Yes





(3) Asthma


Qualifiers: 


     Asthma severity: mild intermittent     Asthma complication type: 

uncomplicated        Qualified Code(s): J45.20 - Mild intermittent asthma, 

uncomplicated  


Is this a current diagnosis for this admission?: Yes








- Time


Time Spent with patient: 25-34 minutes


Medications reviewed and adjusted accordingly: Yes


Anticipated discharge: Home


Within: within 24 hours





- Inpatient Certification


Based on my medical assessment, after consideration of the patient's 

comorbidities, presenting symptoms, or acuity I expect that the services needed 

warrant INPATIENT care.: Yes


I certify that my determination is in accordance with my understanding of 

Medicare's requirements for reasonable and necessary INPATIENT services [42 CFR 

412.3e].: Yes


Medical Necessity: Need Close Monitoring Due to Risk of Patient Decompensation, 

Need For IV Fluids, Need For Continuous Telemetry Monitoring, Need for Pain 

Control, Risk of Complication if Not Cared For in Hospital


Post Hospital Care: D/C Planner Documentation





- Plan Summary


Plan Summary: 


I will discontinue IV Dilaudid. Patient will be started on his home pain 

medication management. Encouraged to start ambulating on the floor. Continue IV 

hydration. Maintain on all other current medication management.

## 2017-08-09 NOTE — PDOC PROGRESS REPORT
Subjective


Progress Note for:: 08/09/17


Subjective:: 


Patient reported improvement in his mid back and joint pain. Ambulate fairly 

satisfactorily so far today. He remain on Oxycodone, Fentanyl patch and 

Ibuprofen for pain management. He is on IV fluid hydration. He denied any chest 

pain or difficulty with breathing. No fever or chills. No nausea or vomiting.





Physical Exam


Vital Signs: 


 











Temp Pulse Resp BP Pulse Ox


 


 98.1 F   95   16   123/103 H  91 L


 


 08/09/17 11:43  08/09/17 15:59  08/09/17 15:59  08/09/17 15:59  08/09/17 11:43








 Intake & Output











 08/08/17 08/09/17 08/10/17





 06:59 06:59 06:59


 


Intake Total 5069 3905 1180


 


Output Total 2625 2360 3050


 


Balance 2444 1545 -1870











Physical Exam: 


General appearance: PRESENT: no acute distress, mild distress - related to 

reported back pain


Head exam: PRESENT: atraumatic, normocephalic


Eye exam: PRESENT: conjunctiva pink, EOMI, PERRLA.  ABSENT: scleral icterus


Mouth exam: PRESENT: moist


Respiratory exam: PRESENT: clear to auscultation brenda


Cardiovascular exam: PRESENT: RRR.  ABSENT: diastolic murmur, rubs, systolic 

murmur


GI/Abdominal exam: PRESENT: normal bowel sounds, soft.  ABSENT: distended, 

guarding, mass, organomegaly, rebound, tenderness


Extremities exam: PRESENT: tenderness - mid back region


Musculoskeletal exam: PRESENT: normal inspection


Neurological exam: PRESENT: alert, awake, oriented to person, oriented to place

, oriented to time, oriented to situation, CN II-XII grossly intact.  ABSENT: 

motor sensory deficit


Skin exam: PRESENT: dry, intact, warm.  ABSENT: cyanosis, rash





Results


Laboratory Results: 


 





 08/07/17 09:40 





 08/07/17 09:40 








Impressions: 


 





Chest X-Ray  08/02/17 21:53


IMPRESSION:  No acute pulmonary disease.


 














Assessment & Plan





- Diagnosis


(1) Sickle cell disease with crisis


Is this a current diagnosis for this admission?: Yes





(2) Abnormal urine finding


Is this a current diagnosis for this admission?: Yes





(3) Asthma


Qualifiers: 


     Asthma severity: mild intermittent     Asthma complication type: 

uncomplicated        Qualified Code(s): J45.20 - Mild intermittent asthma, 

uncomplicated  


Is this a current diagnosis for this admission?: Yes








- Time


Time Spent with patient: 25-34 minutes


Medications reviewed and adjusted accordingly: Yes


Anticipated discharge: Home


Within: within 24 hours





- Inpatient Certification


Based on my medical assessment, after consideration of the patient's 

comorbidities, presenting symptoms, or acuity I expect that the services needed 

warrant INPATIENT care.: Yes


I certify that my determination is in accordance with my understanding of 

Medicare's requirements for reasonable and necessary INPATIENT services [42 CFR 

412.3e].: Yes


Medical Necessity: Need Close Monitoring Due to Risk of Patient Decompensation, 

Need For IV Fluids, Need For Continuous Telemetry Monitoring, Need for Pain 

Control, Risk of Complication if Not Cared For in Hospital


Post Hospital Care: D/C Planner Documentation





- Plan Summary


Plan Summary: 


See attending physician orders. I encouraged continue ambulatory efforts. I 

discussed possible discharge in next 24 hours with patient and he is in 

agreement.

## 2017-08-10 NOTE — PDOC DISCHARGE SUMMARY
General





- Admit/Disc Date/PCP


Admission Date/Primary Care Provider: 


  08/03/17 05:09





  DIYA PHILIPPE





Discharge Date: 08/10/17





- Discharge Diagnosis


(1) Sickle cell disease with crisis


Is this a current diagnosis for this admission?: Yes





(2) Abnormal urine finding


Is this a current diagnosis for this admission?: Yes





(3) Asthma


Is this a current diagnosis for this admission?: Yes








- Additional Information


Resuscitation Status: Full Code


Discharge Diet: Regular


Discharge Activity: Activity As Tolerated


Home Medications: 








Albuterol Sulfate [Proair HFA] 2 puff IH Q4HP PRN 06/02/17 


Fentanyl [Duragesic 100 Mcg/Hr Transdermal Patch] 1 patch TD Q3D 06/02/17 


Folic Acid [Folvite 1 mg Tablet] 1 mg PO DAILY 06/02/17 


Hydroxyurea [Hydrea 500 mg Capsule] 1,000 mg PO SUTUTHSA 06/02/17 


Hydroxyurea [Hydrea 500 mg Capsule] 1,500 mg PO MOWEFR 06/02/17 


Ibuprofen [Motrin 800 mg Tablet] 800 mg PO Q6HP PRN 06/02/17 


Ondansetron [Zofran Odt 4 mg Tablet] 4 mg PO Q6HP PRN 06/02/17 


Oxycodone HCl [Oxy-Ir 5 mg Tablet] 15 mg PO Q4 PRN 07/26/17 











History of Present Illness


History of Present Illness: 


CARMEN BEGUM is a 21 year old male known to my practice and recently 

discharged from this facility after presenting with similar symptoms. Patient 

return to the ED with complain of worsening back pain over last 2 days. He dose 

have history of sickle cell disease with frequent crisis. He claimed compliance 

with his medication and follow up with Dr Hermila huerta hematologist. He denied 

any untoward stress, fever, chills, dysuria, or difficulty with breathing. 

Patient reported spreading involvement of his knee and chest with ongoing pain. 

He claimed minimal relief  of his pain on his current home pain medication 

management regimen that include Fentanyl patch 100 mcg / hour q 72 hours, 

Oxycodone 15 mg p.o tid and Ibuprofen 800 mg p.o tid. Despite initial 

management in the ED with IV Dilaudid several doses patient reported persistent 

of pain necessitating admission to the hospital for further evaluation and 

management.





Hospital Course


Hospital Course: 


Patient responded well to IV fluid hydration and IV Hydromorphone for pain 

management. He was eventually taken off IV Hydromorphone, encouraged ambulation 

on the floor and restarted on his pre admission pain medication management. His 

hemoglobin remain acceptable range. He has remain on all of his preadmission 

mediation over last 48 hours with good pain control, absence of fever and 

satisfactory breathing effort. He will be discharge home today with instruction 

to follow up with Dr Cleaning for his outpatient pain medication prescription 

and follow up. he will follow up with me in the office as instructed upon 

discharge.





Physical Exam


Vital Signs: 


 











Temp Pulse Resp BP Pulse Ox


 


 98.9 F   95   16   128/76 H  94 


 


 08/10/17 03:19  08/10/17 07:00  08/10/17 03:19  08/10/17 03:19  08/10/17 03:19








 Intake & Output











 08/09/17 08/10/17 08/11/17





 06:59 06:59 06:59


 


Intake Total 3905 1900 


 


Output Total 2360 3755 


 


Balance 1545 -1855 











Physical Exam: 


General appearance: PRESENT: no acute distress, mild distress - related to 

reported back pain


Head exam: PRESENT: atraumatic, normocephalic


Eye exam: PRESENT: conjunctiva pink, EOMI, PERRLA.  ABSENT: scleral icterus


Mouth exam: PRESENT: moist


Respiratory exam: PRESENT: clear to auscultation brenda


Cardiovascular exam: PRESENT: RRR.  ABSENT: diastolic murmur, rubs, systolic 

murmur


GI/Abdominal exam: PRESENT: normal bowel sounds, soft.  ABSENT: distended, 

guarding, mass, organomegaly, rebound, tenderness


Extremities exam: PRESENT: very minimal tenderness - mid back region


Musculoskeletal exam: PRESENT: normal inspection


Neurological exam: PRESENT: alert, awake, oriented to person, oriented to place

, oriented to time, oriented to situation, CN II-XII grossly intact.  ABSENT: 

motor sensory deficit


Skin exam: PRESENT: dry, intact, warm.  ABSENT: cyanosis, rash








Results


Laboratory Results: 


 





 08/07/17 09:40 





 08/07/17 09:40 








Impressions: 


 





Chest X-Ray  08/02/17 21:53


IMPRESSION:  No acute pulmonary disease.


 














Qualifiers


**PATEINT BEING DISCHARGED WITH ANY OF THE FOLLOWING DIAGNOSIS?: No





Plan


Discharge Plan: 


Discharge home today. Follow up with Dr Cleaning, his hematologist, for 

outpatient sickle cell disease and chronic pain management. Follow up with me 

in the office as instructed upon discharge.


Time Spent: Less than 30 Minutes - I did discuss his post discharge care plan 

with him at bedside.

## 2017-08-13 NOTE — ER DOCUMENT REPORT
ED General





- General


Mode of Arrival: Ambulatory


Information source: Patient


TRAVEL OUTSIDE OF THE [place] IN LAST 30 DAYS: No





- HPI


Onset: Other - 2 days


Associated symptoms: Other - see above





- General


Chief Complaint: Jaw Pain


Stated Complaint: JAW PAIN


Time Seen by Provider: 08/13/17 21:22


Notes: 


Patient is a 21 year old male who presents to the ED with complaints of a 

headache and left jaw pain and swelling glands on the left side of his neck 

with onset 2 days. Patient denies any dental pain or discharge or funny taste 

in his mouth.  Patient states he has difficulty chewing, he has pain with both 

opening and biting down on food.  Patient can open and close his mouth with no 

difficulty.  Patient denies photophobia, fever, pain in the back of his neck, 

sore throat, rhinorrhea, ear ache or difficulty hearing.  


 (EMRE PEREZ)





- Related Data


Allergies/Adverse Reactions: 


 





Coconut * [Coconut] Allergy (Mild, Verified 08/13/17 21:03)


 


transpore tape Allergy (Mild, Uncoded 08/13/17 21:03)


 Hives











Past Medical History





- General


Information source: Patient





- Social History


Smoking Status: Never Smoker


Chew tobacco use (# tins/day): No


Frequency of alcohol use: Occasional


Drug Abuse: None


Family History: Arthritis, DM, Hypertension, Other - Sickle cell trait both 

parents and asthma


Patient has suicidal ideation: No


Patient has homicidal ideation: No


Pulmonary Medical History: Reports: Hx Asthma, Hx Pneumonia


Renal/ Medical History: Denies: Hx Peritoneal Dialysis


Psychiatric Medical History: 


   Denies: Hx Depression


Past Surgical History: Reports: Hx Abdominal Surgery - Gallstones removal, Hx 

Orthopedic Surgery - Fluid drained from right foot, Hx Vascular Surgery - Port 

placement.  Denies: Hx Cholecystectomy





- Immunizations


Immunizations up to date: Yes


Hx Diphtheria, Pertussis, Tetanus Vaccination: Yes


Hx Pneumococcal Vaccination: 05/16/13





Review of Systems





- Review of Systems


Constitutional: See HPI.  denies: Fever


EENT: See HPI, Other - jaw pain.  denies: Ear pain, Throat pain, Dental problem


Cardiovascular: No symptoms reported


Respiratory: No symptoms reported


Gastrointestinal: No symptoms reported


Genitourinary: No symptoms reported


Male Genitourinary: No symptoms reported


Musculoskeletal: No symptoms reported


Skin: No symptoms reported


Hematologic/Lymphatic: No symptoms reported


Neurological/Psychological: See HPI, Headaches





Physical Exam





- Vital signs


Vitals: 


 











Temp Pulse Resp BP Pulse Ox


 


 99 F   104 H  14   148/77 H  96 


 


 08/13/17 21:01  08/13/17 21:01  08/13/17 21:01  08/13/17 21:01  08/13/17 21:01














- Notes


Notes: 


HEAD: Normocephalic, atraumatic.





EYES: Pupils equal, round, and reactive to light. Extraocular movements intact.





ENT: Oral mucosa moist, tongue midline. Nares patent, no nasal septal hematoma, 

TM's intact bilaterally. Left TMJ tenderness. No Dre's angina.





NECK: Full range of motion. Supple. Trachea midline. No meningismus. Left 

sternocleidomastoid muscle tenderness. Anterior and posterior cervical 

lymphadenopathy on the left side only. No masses palpated. 





LUNGS: No respiratory distress.





ABDOMEN: Non-distended. 





EXTREMITIES: Moves all 4 extremities spontaneously. No edema. No cyanosis.





NEUROLOGICAL: Alert and oriented x3. Normal speech.





PSYCH: Normal affect, normal mood.





SKIN: Warm, dry, normal turgor. No rashes or lesions noted.


 (EMRE PEREZ)





Course





- Re-evaluation


Re-evalutation: 





08/13/17 21:47


No evidence of peritonsillar abscess, dental abscess, Dre angina.  I am 

suspicious for early dental infection, given patient's immunocompromised as a 

sickle cell patient patient will be started on Penicillin VK and discharged home

, encouraged to follow-up with primary care physician and dentist.  As patient 

does have some left-sided muscle spasm patient will also be started on Robaxin 

and discharged home. (BIN NUNEZ)





- Vital Signs


Vital signs: 


 











Temp Pulse Resp BP Pulse Ox


 


 99 F   104 H  14   148/77 H  96 


 


 08/13/17 21:01  08/13/17 21:01  08/13/17 21:01  08/13/17 21:01  08/13/17 21:01














Discharge





- Discharge


Clinical Impression: 


 Left-sided face pain





Headache


Qualifiers:


 Headache type: unspecified Headache chronicity pattern: acute headache 

Intractability: not intractable Qualified Code(s): R51 - Headache





Condition: Stable


Disposition: HOME, SELF-CARE


Additional Instructions: 


I suspect dental infection is causing your pain however I cannot find the 

affected tooth.  I prescribed Penicillin VK to help treat this.  If your pain 

worsens, you develop fevers or you develop any new or concerning symptoms 

please return immediately.  I have also prescribed Robaxin for the muscle spasm 

I can feel on the left side of your neck.  This will help with the headache.


Prescriptions: 


Methocarbamol [Robaxin 750 mg Tablet] 750 mg PO ASDIR PRN #40 tablet


 PRN Reason: 


Penicillin V Potassium [Penicillin Vk 250 mg Tablet] 250 mg PO Q6 #28 tablet


Scribe Attestation: 





08/13/17 23:00


I personally performed the services described in the documentation, reviewed 

and edited the documentation which was dictated to the scribe in my presence, 

and it accurately records my words and actions. (BIN NUNEZ)





Stef Documentation





- Scribe


Written by Stef:: stef Montenegro, 08/13/2017, 2247


acting as scribe for :: Gabby

## 2017-08-19 NOTE — ER DOCUMENT REPORT
ED Medical Screen (RME)





- General


Chief Complaint: Sickle Cell Crisis


Stated Complaint: SICKLE CELL CRISIS


Time Seen by Provider: 08/19/17 19:22


Mode of Arrival: Ambulatory


Information source: Patient


TRAVEL OUTSIDE OF THE [place] IN LAST 30 DAYS: No





- HPI


Patient complains to provider of: LOW BACK PAIN


Onset: Other - 3 DAYS


Onset/Duration: Gradual


Quality of pain: Achy, Dull


Severity: Moderate


Associated Symptoms: Chills, Nausea, Sweating.  denies: Cough (productive), 

Cough (nonproductive), Shortness of breath


Exacerbated by: Denies


Relieved by: Denies


Similar symptoms previously: Yes - W/ SS CRISIS


Recently seen / treated by doctor: Yes - 2 WKS AGO, SAME





- Related Data


Smoking: Non-smoker


Frequency of alcohol use: None


Drug Abuse: None


Allergies/Adverse Reactions: 


 





Coconut * [Coconut] Allergy (Mild, Verified 08/19/17 19:18)


 


transpore tape Allergy (Mild, Uncoded 08/19/17 19:18)


 Hives











Past Medical History





- General


Information source: Patient





- Social History


Cigarette use (# per day): No


Chew tobacco use (# tins/day): No


Frequency of alcohol use: Rare


Drug Abuse: None


Lives with: Spouse/Significant other


Family history: None





- Past Medical History


Cardiac Medical History: Reports: None


Pulmonary Medical History: Reports: Hx Asthma, Hx Pneumonia


Neurological Medical History: Reports: None


Endocrine Medical History: Reports: None


Renal/ Medical History: Reports: None.  Denies: Hx Peritoneal Dialysis


Malignancy Medical History: Reports None


GI Medical History: Reports: None


Musculoskeltal Medical History: Reports None


Psychiatric Medical History: Reports: None


   Denies: Hx Depression


Past Surgical History: Reports: Hx Abdominal Surgery - Gallstones removal, Hx 

Orthopedic Surgery - Fluid drained from right foot, Hx Vascular Surgery - Port 

placement.  Denies: Hx Cholecystectomy





- Immunizations


Immunizations up to date: Yes


Hx Diphtheria, Pertussis, Tetanus Vaccination: Yes





Review of Systems





- Review of Systems


Constitutional: See HPI


EENT: No symptoms reported


Cardiovascular: No symptoms reported


Respiratory: No symptoms reported


Gastrointestinal: No symptoms reported


Genitourinary: No symptoms reported


Musculoskeletal: See HPI


Skin: No symptoms reported


Neurological/Psychological: No symptoms reported





Physical Exam





- Vital signs


Vitals: 


 











Temp Pulse Resp BP Pulse Ox


 


 99.1 F   117 H  20   133/88 H  98 


 


 08/19/17 18:37  08/19/17 18:37  08/19/17 18:37  08/19/17 18:37  08/19/17 18:37











Interpretation: Tachycardic.  No: Hypotensive, Tachypneic, Febrile





- General


General appearance: Appears well, Alert


In distress: None





- HEENT


Head: Normocephalic


Eyes: Normal.  No: Pale conjunctiva


Conjunctiva: Normal


Ears: Normal


Nasal: Normal


Mouth/Lips: Normal


Mucous membranes: Normal


Pharynx: Normal


Neck: Normal





- Respiratory


Respiratory status: No respiratory distress


Breath sounds: Normal





- Cardiovascular


Rhythm: Regular


Heart sounds: Normal auscultation


Murmur: No





- Abdominal


Inspection: Normal


Distension: No distension





- Back


Back: Normal





- Extremities


General upper extremity: Normal inspection


General lower extremity: Normal inspection





- Neurological


Neuro grossly intact: Yes


Cognition: Normal


Orientation: AAOx4





- Psychological


Associated symptoms: Normal affect, Normal mood





- Skin


Skin Temperature: Warm


Skin Moisture: Dry


Skin Color: Normal


Skin Turgor: Elastic





Course





- Vital Signs


Vital signs: 


 











Temp Pulse Resp BP Pulse Ox


 


 99.1 F   117 H  20   133/88 H  98 


 


 08/19/17 18:37  08/19/17 18:37  08/19/17 18:37  08/19/17 18:37  08/19/17 18:37

## 2017-08-19 NOTE — ER DOCUMENT REPORT
HPI





- HPI


Pain Level: 4


Notes: 





Patient is a 21-year-old male with a history of sickle cell who presents the ED 

complaining of a re-exacerbation of sickle cell crisis with low back pain 3 

days and nausea/vomiting 2 today.  Patient states that he was seen a couple 

weeks ago and had to get admitted at that time.  Patient states that his 

symptoms are the same as they were 2 weeks ago.  Patient states that upon 

discharge his pain had gone away, but started developing again over the last 3 

days.  Patient has had a decreased appetite today.  He denies any drug 

allergies.  Other past medical history significant for asthma.  Denies any 

smoking or illicit drug use.  His hematologist is Dr. Augustin and his PCM is 

Dr. Philippe.  Denies any current headache, fever, neck pain/stiffness, patient 

is now.  URI, sore throat, chest pain, palpitations, syncope, cough, shortness 

of breath, wheeze, dyspnea, abdominal pain, diarrhea, urinary retention, dysuria

, hematuria, loss of control of bowel or bladder, numbness/tingling, saddle 

anesthesia, muscle paralysis/weakness, or rash.





- ROS


Notes: 





REVIEW OF SYSTEMS:


CONSTITUTIONAL :  Denies fever,  chills, or sweats.  Denies recent illness.


EENT:   Denies eye, ear, throat, or mouth pain or symptoms.  Denies nasal or 

sinus congestion or discharge.  Denies throat, tongue, or mouth swelling or 

difficulty swallowing.


CARDIOVASCULAR:  Denies chest pain.  Denies palpitations or racing or irregular 

heart beat.  Denies ankle edema.


RESPIRATORY:  Denies cough, cold, or chest congestion.  Denies shortness of 

breath, difficulty breathing, or wheezing.


GASTROINTESTINAL:  see hpi


GENITOURINARY:  Denies difficulty urinating, painful urination, burning, 

frequency, blood in urine, or discharge.


MUSCULOSKELETAL:  see hpi


SKIN:   Denies rash, lesions or sores.


NEUROLOGICAL:  Denies confusion or altered mental status.  Denies passing out 

or loss of consciousness.  Denies dizziness or lightheadedness.  Denies 

headache.  Denies weakness or paralysis or loss of use of either side.  Denies 

problems with gait or speech.  Denies sensory loss, numbness, or tingling.  





ALL OTHER SYSTEMS REVIEWED AND NEGATIVE.





Dictation was performed using Dragon voice recognition software





- REPRODUCTIVE


Reproductive: DENIES: Pregnant:





- DERM


Skin Color: Normal





Past Medical History





- General


Information source: Patient





- Social History


Smoking Status: Never Smoker


Cigarette use (# per day): No


Chew tobacco use (# tins/day): No


Frequency of alcohol use: Rare


Drug Abuse: None


Lives with: Spouse/Significant other


Family History: Arthritis, DM, Hypertension, Other - Sickle cell trait both 

parents and asthma





- Past Medical History


Cardiac Medical History: Reports: None


Pulmonary Medical History: Reports: Hx Asthma, Hx Pneumonia


Neurological Medical History: Reports: None


Endocrine Medical History: Reports: None


Renal/ Medical History: Reports: None.  Denies: Hx Peritoneal Dialysis


Malignancy Medical History: Reports None


GI Medical History: Reports: None


Musculoskeltal Medical History: Reports None


Psychiatric Medical History: Reports: None


   Denies: Hx Depression


Past Surgical History: Reports: Hx Abdominal Surgery - Gallstones removal, Hx 

Orthopedic Surgery - Fluid drained from right foot, Hx Vascular Surgery - Port 

placement.  Denies: Hx Cholecystectomy





- Immunizations


Immunizations up to date: Yes


Hx Diphtheria, Pertussis, Tetanus Vaccination: Yes


Hx Pneumococcal Vaccination: 05/16/13





Vertical Provider Document





- CONSTITUTIONAL


Agree With Documented VS: Yes


Notes: 





PHYSICAL EXAMINATION:





GENERAL: Well-appearing, well-nourished and in no acute distress.





HEAD: Atraumatic, normocephalic.





EYES: Pupils equal round and reactive to light, extraocular movements intact, 

sclera anicteric, conjunctiva are normal.





ENT: Nares patent and without discharge.  oropharynx clear without exudates.  

No tonsilar hypertrophy or erythema.  Moist mucous membranes.  No sinus 

tenderness.





NECK: Normal range of motion, supple without lymphadenopathy.  No rigidity/

meningismus.





LUNGS: Breath sounds clear to auscultation bilaterally and equal.  No wheezes 

rales or rhonchi.





HEART: Regular rate and rhythm without murmurs, rubs, gallops.





ABDOMEN: Soft, nontender, nondistended abdomen.  No guarding, no rebound.  No 

masses appreciated.  Normal bowel sounds present.  No CVA tenderness 

bilaterally.  No pulsatile mass.





Musculoskeletal: LE's b/l: FROM to passive/active. Strength 5+/5. SLR neg.  Back

:  FROM to passive/active.  Strength 5+/5. Mild tenderness L-paraspinal. no 

vertebral pt tenderness.





Extremities:  No cyanosis, clubbing, or edema b/l.  Peripheral pulses 2+.  

Capillary refill less than 3 seconds.





NEUROLOGICAL: Cranial nerves grossly intact.  Normal speech.  Normal sensory, 

motor exams 





PSYCH: Normal mood, normal affect.





SKIN: Warm, Dry, normal turgor, no rashes or lesions noted.





- INFECTION CONTROL


TRAVEL OUTSIDE OF THE [place] IN LAST 30 DAYS: No





- RESPIRATORY


O2 Sat by Pulse Oximetry: 98





Course





- Re-evaluation


Re-evalutation: 





08/19/17 23:11


Patient is an afebrile, well-hydrated, 21-year-old male who presents the ED 

with sickle cell crisis.  Vitals are stable.  PE otherwise unremarkable at this 

time.  CBC showed stable hemoglobin and a retic count of 7.2H.  CBC showed bili 

of 2.1.  UA unremarkable aside from bili in urine.  Pt is sickling currently, 

but is not as severe as 2 weeks ago when he was admitted.  Pt states that 

overall he feels much better and would like to be discharged.  Pt was given 

fluids, 2mg hydromorphone x3, zofran, and 50mg benadryl.  Pt is tolerating PO 

fluids.  I will send him home with a Rx for zofran to take if needed.  

Conservative measures for symptoms otherwise.  Recheck with Dr. Augustin in 2-3 

days as well as his PCM Dr. Philippe.  Return to the ED with any worsening/

concerning symptoms otherwise as reviewed discharge.  Patient is in agreement.











- Vital Signs


Vital signs: 


 











Temp Pulse Resp BP Pulse Ox


 


 99.1 F   117 H  20   133/88 H  98 


 


 08/19/17 18:37  08/19/17 18:37  08/19/17 18:37  08/19/17 18:37  08/19/17 18:37














- Laboratory


Result Diagrams: 


 08/19/17 20:20





 08/19/17 20:20


Laboratory results interpreted by me: 


 











  08/19/17





  19:48


 


Urine Bilirubin  MODERATE H


 


Ur Leukocyte Esterase  TRACE H














Discharge





- Discharge


Clinical Impression: 


 Sickle cell crisis





Condition: Stable


Disposition: HOME, SELF-CARE


Instructions:  Sickle Cell Crisis ([facility])


Additional Instructions: 


Maintain adequate fluid intake


tylenol/ibuprofen as needed


Monitor symptoms closely


Recheck with your PCM in 2-3 days


Recheck with your Hematologist in 2-3 days


Return to the ED with any worsening symptoms and/or development of fever, 

headache, changes in speech/mentation/behavior/vision, chest pain, palpitations

, syncope, shortness of breath, trouble breathing, abdominal pain, n/v/d, blood 

in stool/urine, loss of control of bowel/bladder, urinary retention, muscle 

weakness/paralysis, numbness/tingling, or other worsening symptoms that are 

concerning to you.


Prescriptions: 


Ondansetron [Zofran Odt 4 mg Tablet] 1 - 2 tab PO Q4H PRN #15 tab.rapdis


 PRN Reason: For Nausea/Vomiting


Forms:  Elevated Blood Pressure


Referrals: 


ERIC AUGUSTIN MD [ACTIVE STAFF] - 08/21/17


DIYA PHILIPPE MD [ACTIVE STAFF] - 08/28/17

## 2017-08-21 NOTE — ER DOCUMENT REPORT
ED General





- General


Mode of Arrival: Wheelchair


Information source: Patient


TRAVEL OUTSIDE OF THE [place] IN LAST 30 DAYS: No





- HPI


Onset: Other - Refer to notes


Similar symptoms previously: Yes


Recently seen / treated by doctor: Yes





<LEANN HUTCHISON - Last Filed: 08/21/17 18:30>





<PEDROANETAMENG - Last Filed: 08/21/17 19:33>





- General


Chief Complaint: Sickle Cell Crisis


Stated Complaint: BODY PAIN/CHEST PAIN


Time Seen by Provider: 08/21/17 11:20


Notes: 





Patient is a 21-year-old male presenting to the emergency department for sickle 

cell symptoms.  Patient states that  she started feeling symptoms 2 days ago 

when he was seen in the emergency department.  Patient received 6 mg of 

Dilaudid IV at this time and was discharged.  Patient returns today for sickle 

cell pain and some bloody stool.  Patient states that he was trying to manage 

his pain at home with his 15 mg of oxycodone.  Patient states he started 

getting chest pain, nausea, and bloody stool so he wanted to be evaluated in 

the emergency department.  Patient states the blood was in his stool, not when 

he wiped and he was not straining. Patient denies any shortness of breath or 

cough. Patient  is being followed by Dr. Cleaning.  Patient states he has seen 

Dr. Cleaning after he was seen in the emergency department on 8/13/2017.  Today'

s the patient's third visit in 9 days.  Patient also has history of asthma.  

Patient has a history of blood transfusions but has not had one recently. (

LEANN HUTCHISON)





- Related Data


Allergies/Adverse Reactions: 


 





Coconut * [Coconut] Allergy (Mild, Verified 08/19/17 19:18)


 


transpore tape Allergy (Mild, Uncoded 08/19/17 19:18)


 Hives











Past Medical History





- General


Information source: Patient





- Social History


Smoking Status: Never Smoker


Cigarette use (# per day): No


Chew tobacco use (# tins/day): No


Smoking Education Provided: No


Frequency of alcohol use: None


Drug Abuse: None


Family History: Arthritis, DM, Hypertension, Other - Sickle cell trait both 

parents and asthma


Pulmonary Medical History: Reports: Hx Asthma, Hx Pneumonia


Past Surgical History: Reports: Hx Abdominal Surgery - Gallstones removal, Hx 

Orthopedic Surgery - Fluid drained from right foot, Hx Vascular Surgery - Port 

placement





- Immunizations


Immunizations up to date: Yes


Hx Diphtheria, Pertussis, Tetanus Vaccination: Yes


Hx Pneumococcal Vaccination: 05/16/13





<LEANN HUTCHISON - Last Filed: 08/21/17 18:30>





Review of Systems





- Review of Systems


Constitutional: No symptoms reported


EENT: No symptoms reported


Cardiovascular: See HPI, Chest pain


Respiratory: No symptoms reported.  denies: Cough


Gastrointestinal: See HPI, Nausea, Rectal bleeding


Genitourinary: No symptoms reported


Male Genitourinary: No symptoms reported


Musculoskeletal: No symptoms reported


Skin: No symptoms reported


Hematologic/Lymphatic: See HPI


Neurological/Psychological: No symptoms reported


-: Yes All other systems reviewed and negative





<FOSTERLEANN - Last Filed: 08/21/17 18:30>





Physical Exam





- Vital signs


Interpretation: Normal





<LEANN HUTCHISON - Last Filed: 08/21/17 18:30>





<MENG VASQUEZ - Last Filed: 08/21/17 19:33>





- Vital signs


Vitals: 


 











Temp Pulse BP Pulse Ox


 


 98.3 F   103 H  130/86 H  96 


 


 08/21/17 10:41  08/21/17 10:41  08/21/17 10:41  08/21/17 10:41














- Notes


Notes: 





GENERAL: Alert, interacts well. No acute distress.


HEAD: Normocephalic, atraumatic.


EYES: Appear normal. Pupils equal, round, and reactive to light.


ENT: Moist mucus membranes, tongue midline. 


NECK: Full range of motion. Supple. Trachea midline.


LUNGS: Clear to auscultation bilaterally, no wheezes, rales, or rhonchi. No 

respiratory distress.


HEART: Regular rate and rhythm. No murmurs, gallops, or rubs.


ABDOMEN: Soft, non-tender. Non-distended. Normal bowel sounds.


RECTAL: Normal rectal tone. No gross blood. Brown stool.


EXTREMITIES: Moves all 4 extremities spontaneously. Normal strength. No edema.


NEUROLOGICAL: Alert and oriented x3. Normal speech. No focal neurological 

deficits. GSC 15.


PSYCH: Normal affect, normal mood.


SKIN: Warm, dry, normal turgor. No rashes or lesions noted.


 (LEANN HUTCHISON)





Course





- Laboratory


Result Diagrams: 


 08/21/17 12:45





 08/21/17 12:45





- Consults


  ** Dr. Cleaning


Time consulted: 13:32





<LEANN HUTCHISON - Last Filed: 08/21/17 18:30>





- Laboratory


Result Diagrams: 


 08/21/17 12:45





 08/21/17 12:45





<MENG VASQUEZ - Last Filed: 08/21/17 19:33>





- Re-evaluation


Re-evalutation: 





08/21/17 13:50


Patient with history of sickle cell disorder and sickle cell crisis previously 

recent ED visits pain not controlled discharged home 2 days ago.  Has not seen 

Dr. Cleaning in 2 weeks who is his hematologist.  Patient states he developed a 

little pain in his chest today while pain in his knee and had some blood in his 

stool.  On examination he is well-appearing nontoxic in no acute distress no 

reproducible chest wall tenderness lungs are clear is not hypoxic no shortness 

of breath tachypnea or tachycardia.  EKG is stable chest x-ray is nonacute.  

Abdomen is soft no acute abdominal findings.  Joints are not red hot or swollen 

he is afebrile not tachycardic or signs of sepsis.  He is complaining of no 

vomiting or urinary symptoms.  Mental status is normal.  Reticulocyte count 

compared to 2 days ago significantly improved as well as normal and stable 

hemoglobin hematocrit.  I talked to Dr. Cleaning and she states that she wants 

him to come directly to the office to change his pain medication so he can be 

managed better as an outpatient rather than multiple incremental doses of IV 

Dilaudid in the emergency department.  He verbalizes an understanding of the 

states he is going to go directly over the right now.  I discussed with him 

reasons for ED return sooner (MENG VASQUEZ)





- Vital Signs


Vital signs: 


 











Temp Pulse Resp BP Pulse Ox


 


 98.3 F   103 H  18   116/75   100 


 


 08/21/17 10:41  08/21/17 10:41  08/21/17 13:14  08/21/17 12:00  08/21/17 13:14














- Laboratory


Laboratory results interpreted by me: 


 











  08/21/17 08/21/17





  12:45 12:55


 


WBC  3.4 L 


 


RBC  2.87 L 


 


Hgb  12.0 L 


 


Hct  35.1 L 


 


MCV  122 H 


 


MCH  41.8 H 


 


RDW  21.3 H 


 


Retic Count (auto)  5.35 H 


 


Absolute Retic  0.154 H 


 


Urine Ketones   TRACE H


 


Urine Blood   SMALL H


 


Ur Leukocyte Esterase   TRACE H














- Consults


  ** Dr. Cleaning


Reason for consultation: 





08/21/17 13:32


Consult with Dr. Cleaning about patient's labs and imaging. Dr. Cleaning would 

like to see the patient in her office today.


 (LEANN HUTCHISON)





Discharge





<LEANN HUTCHISON - Last Filed: 08/21/17 18:30>





<MENG VASQUEZ - Last Filed: 08/21/17 19:33>





- Discharge


Clinical Impression: 


Sickle cell disease


Qualifiers:


 Sickle-cell associated disorders: without crisis Qualified Code(s): D57.1 - 

Sickle-cell disease without crisis





Condition: Stable


Disposition: HOME, SELF-CARE


Additional Instructions: 


Sickle Cell disease


      This hemoglobin can deform red blood cells into a sickle shape.  These 

abnormal blood cells can block blood vessels. This causes the pain of sickle 

cell crisis.


     Sickle cell crisis can occur any time.  But attacks are more likely with 

acute infection, dehydration, or altitude change.  A crisis usually causes pain 

in the legs, back, abdomen, and chest.  Sometimes the pain may ease and return 

later.


     The usual treatment is oxygen, pain medication, IV fluids, and treatment 

of infection.  Attacks may take a couple of days to resolve.


     Return if the pain becomes more severe, or if there are new symptoms.





Referrals: 


DIYA PHILIPPE MD [Primary Care Provider] -  (I spoke with Dr. Cleaning and 

she wants you to come to the office right now.)


Scribe Attestation: 





08/21/17 13:48


I personally performed the services described in the documentation reviewed the 

documentation recorded by my scribe in my presence and it accurately and 

completely records my words and actions (MENG VASQUEZ)





Scribe Documentation





- Scribe


Written by Malcom:: Malcom Holley, 08/21/2017 12:03


acting as scribe for :: Pedro





<LEANN HUTCHISON - Last Filed: 08/21/17 18:30>

## 2017-08-21 NOTE — RADIOLOGY REPORT ([facility])
EXAM DESCRIPTION:  CHEST SINGLE VIEW



COMPLETED DATE/TIME:  8/21/2017 11:22 am



REASON FOR STUDY:  chest pain



COMPARISON:  8/2/2017



EXAM PARAMETERS:  NUMBER OF VIEWS: One view.

TECHNIQUE: Single frontal radiographic view of the chest acquired.

RADIATION DOSE: NA

LIMITATIONS: None.



FINDINGS:  LUNGS AND PLEURA: No opacities, masses or pneumothorax. No pleural effusion.

MEDIASTINUM AND HILAR STRUCTURES: No masses.  Contour normal.

HEART AND VASCULAR STRUCTURES: Heart normal in size.  Normal vasculature.

BONES: The previously described bony findings consistent with sickle cell are again identified.

HARDWARE: On the current study the PICC line is identified with its tip extending superiorly presumab
ly within the internal jugular vein.

OTHER: No other significant finding.



IMPRESSION:  No acute consolidations or pleural effusions.  On the current study the PICC line is soraya
ntified with its tip extending superiorly presumably within the internal jugular vein.  Other finding
s as noted above



TECHNICAL DOCUMENTATION:  JOB ID:  [id number]

## 2017-09-11 NOTE — ER DOCUMENT REPORT
ED Medical Screen (RME)





- General


Chief Complaint: Sickle Cell Crisis


Stated Complaint: CHEST,KNEE,BACK PAIN


Time Seen by Provider: 09/11/17 15:54


Mode of Arrival: Ambulatory


Information source: Patient


Notes: 





21-year-old male presents to ED for complaint of chest knee and back pain.  He 

states this is his normal sickle cell pain.  States he has no other symptoms.  

He denies smoking or drinking.











I have greeted and performed a rapid initial assessment of this patient.  A 

comprehensive ED assessment and evaluation of the patient, analysis of test 

results and completion of medical decision making process will be conducted by 

an additional ED providers.


TRAVEL OUTSIDE OF THE [place] IN LAST 30 DAYS: No





- Related Data


Allergies/Adverse Reactions: 


 





Coconut * [Coconut] Allergy (Mild, Verified 09/11/17 15:45)


 


transpore tape Allergy (Mild, Uncoded 09/11/17 15:45)


 Hives











Past Medical History





- Social History


Family history: None


Pulmonary Medical History: Reports: Hx Asthma, Hx Pneumonia


Renal/ Medical History: Denies: Hx Peritoneal Dialysis


Psychiatric Medical History: 


   Denies: Hx Depression


Past Surgical History: Reports: Hx Abdominal Surgery - Gallstones removal, Hx 

Orthopedic Surgery - Fluid drained from right foot, Hx Vascular Surgery - Port 

placement.  Denies: Hx Cholecystectomy





- Immunizations


Immunizations up to date: Yes


Hx Diphtheria, Pertussis, Tetanus Vaccination: Yes





Physical Exam





- Vital signs


Vitals: 





 











Temp Pulse Resp BP Pulse Ox


 


 98.5 F   88   18   134/68 H  96 


 


 09/11/17 15:43  09/11/17 15:43  09/11/17 15:43  09/11/17 15:43  09/11/17 15:43














Course





- Vital Signs


Vital signs: 





 











Temp Pulse Resp BP Pulse Ox


 


 98.5 F   88   18   134/68 H  96 


 


 09/11/17 15:43  09/11/17 15:43  09/11/17 15:43  09/11/17 15:43  09/11/17 15:43

## 2017-09-11 NOTE — ER DOCUMENT REPORT
ED General





- General


Mode of Arrival: Ambulatory


Information source: Patient


TRAVEL OUTSIDE OF THE [place] IN LAST 30 DAYS: No





<WADE CALDERON - Last Filed: 09/11/17 23:59>





<DANIE LOWRY - Last Filed: 09/12/17 00:03>





- General


Chief Complaint: Sickle Cell Crisis


Stated Complaint: CHEST,KNEE,BACK PAIN


Time Seen by Provider: 09/11/17 15:54


Notes: 





Patient is a 21-year-old female who presents to the emergency department today 

with complaints of lower back pain, right knee pain, and chest pain.  Patient 

has a history of sickle cell disease and is frequently seen in this emergency 

department for sickle cell flareups.  Patient denies any fevers, cough, or 

other symptoms.  Patient states his symptoms today are consistent with a normal 

sickle cell flare. (WADE CALDERON)





- Related Data


Allergies/Adverse Reactions: 


 





Coconut * [Coconut] Allergy (Mild, Verified 09/11/17 23:53)


 


transpore tape Allergy (Mild, Uncoded 09/11/17 15:45)


 Hives








Home Medications: 


 Current Home Medications





Hydroxyurea [Hydrea 500 Mg Capsule] 2 cap PO DAILY 09/11/17 [History]











Past Medical History





- General


Information source: Patient





- Social History


Smoking Status: Never Smoker


Cigarette use (# per day): No


Chew tobacco use (# tins/day): No


Frequency of alcohol use: None


Drug Abuse: None


Lives with: Family


Family History: Reviewed & Not Pertinent, Arthritis, DM, Hypertension, Other - 

Sickle cell trait both parents and asthma


Pulmonary Medical History: Reports: Hx Asthma, Hx Pneumonia


Past Surgical History: Reports: Hx Abdominal Surgery - Gallstones removal, Hx 

Orthopedic Surgery - Fluid drained from right foot, Hx Vascular Surgery - Port 

placement





- Immunizations


Immunizations up to date: Yes


Hx Diphtheria, Pertussis, Tetanus Vaccination: Yes


Hx Pneumococcal Vaccination: 05/16/13





<WADE CALDERON - Last Filed: 09/11/17 23:59>





<DANIE LOWRY - Last Filed: 09/12/17 00:03>





- Medical History


Notes: 





Sickle cell (WADE CALDERON)





Review of Systems





- Review of Systems


Constitutional: denies: Fever


EENT: No symptoms reported


Cardiovascular: No symptoms reported


Respiratory: No symptoms reported


Gastrointestinal: No symptoms reported


Genitourinary: No symptoms reported


Male Genitourinary: No symptoms reported


Musculoskeletal: See HPI, Other - Lower back pain, right knee pain, chest pain


Skin: No symptoms reported


Hematologic/Lymphatic: No symptoms reported


Neurological/Psychological: No symptoms reported


-: Yes All other systems reviewed and negative





<WADE CALDERON - Last Filed: 09/11/17 23:59>





Physical Exam





<WADE CALDERON - Last Filed: 09/11/17 23:59>





<DANIE LOWRY - Last Filed: 09/12/17 00:03>





- Vital signs


Vitals: 


 











Temp Pulse Resp BP Pulse Ox


 


 98.5 F   88   18   134/68 H  96 


 


 09/11/17 15:43  09/11/17 15:43  09/11/17 15:43  09/11/17 15:43  09/11/17 15:43














- Notes


Notes: 





Physical Exam:


 


General: Alert, appears uncomfortable. 


 


HEENT: Normocephalic. Atraumatic. PERRL. Extraocular movements intact. 

Oropharynx clear.


 


Neck: Supple. Non-tender.


 


Respiratory: No respiratory distress. Clear and equal breath sounds bilaterally.


 


Cardiovascular: Tachycardic. 


 


Abdominal: Normal Inspection. Non-tender. No distension. Normal Bowel Sounds. 


 


Back: Non-tender. No deformity or step off.


 


Extremities: Moves all four extremities.


Upper extremities: Normal inspection. Normal ROM.  


Lower extremities: Normal inspection. No edema. Normal ROM.


 


Neurological: Normal cognition. AAOx4. Normal speech.  


 


Psychological: Normal affect. Normal Mood. 


 


Skin: Warm. Dry. Normal color. (WADE CALDERON)





Course





- Laboratory


Result Diagrams: 


 09/11/17 17:25





 09/11/17 17:25





<WADE CALDERON - Last Filed: 09/11/17 23:59>





- Laboratory


Result Diagrams: 


 09/11/17 17:25





 09/11/17 17:25





<DANIE LOWRY - Last Filed: 09/12/17 00:03>





- Re-evaluation


Re-evalutation: 








Patient with persistent pain despite Dilaudid fluids, and oxygen.  No evidence 

for infection.  Discussed with Dr. Funez who will admit patient for pain 

control.  Stable at time of admission.  Patient agrees with plan.


 (DANIE LOWRY)





- Vital Signs


Vital signs: 


 











Temp Pulse Resp BP Pulse Ox


 


 98.3 F   88   16   119/71   100 


 


 09/11/17 20:45  09/11/17 15:43  09/11/17 23:01  09/11/17 23:01  09/11/17 23:01














- Laboratory


Laboratory results interpreted by me: 


 











  09/11/17 09/11/17





  17:25 17:25


 


RBC  2.80 L 


 


Hgb  11.5 L 


 


Hct  34.2 L 


 


MCV  122 H 


 


MCH  41.0 H 


 


RDW  21.8 H 


 


Retic Count (auto)  4.08 H 


 


Total Bilirubin   2.1 H


 


Direct Bilirubin   0.5 H


 


Total Protein   8.6 H














Discharge





<WADE CALDERON - Last Filed: 09/11/17 23:59>





- Discharge


Admitting Provider: Armin


Unit Admitted: Medical Floor





<DANIE LOWRY - Last Filed: 09/12/17 00:03>





- Discharge


Clinical Impression: 


 Sickle cell crisis





Sickle cell anemia


Qualifiers:


 Sickle-cell associated disorders: with unspecified crisis Qualified Code(s): 

D57.00 - Hb-SS disease with crisis, unspecified; D57.0 - Hb-SS disease with 

crisis





Condition: Stable


Disposition: HOME, SELF-CARE


Scribe Attestation: 





09/12/17 00:03


I personally performed the services described in the documentation, reviewed 

and edited the documentation which was dictated to the scribe in my presence, 

and it accurately records my words and actions. (DANIE LOWRY)





Scribe Documentation





- Scribe


Written by Malcom:: Malcom Justice, 9/11/2017 [id number]


acting as scribe for :: Mike





<WADE CALDERON - Last Filed: 09/11/17 23:59>

## 2017-09-11 NOTE — RADIOLOGY REPORT (SQ)
EXAM DESCRIPTION:  CHEST PA/LAT



COMPLETED DATE/TIME:  9/11/2017 4:57 pm



REASON FOR STUDY:  cp



COMPARISON:  8/2/2017



EXAM PARAMETERS:  NUMBER OF VIEWS: two views

TECHNIQUE: Digital Frontal and Lateral radiographic views of the chest acquired.

RADIATION DOSE: NA

LIMITATIONS: none



FINDINGS:  LUNGS AND PLEURA: No opacities, masses or pneumothorax. No pleural effusion.  Central line
 remains in place.

MEDIASTINUM AND HILAR STRUCTURES: No masses or contour abnormalities.

HEART AND VASCULAR STRUCTURES: Heart normal size.  No evidence for failure.

BONES: Unchanged from prior exam.

HARDWARE: None in the chest.

OTHER: No other significant finding.



IMPRESSION:  NO SIGNIFICANT RADIOGRAPHIC FINDING IN THE CHEST.



TECHNICAL DOCUMENTATION:  JOB ID:  [id number]

 2011 [employer]- All Rights Reserved

## 2017-09-12 NOTE — PDOC H&P
History of Present Illness


Admission Date/PCP: 


  09/12/17 02:11





  DIYA PHILIPPE MD





Patient complains of: Knee and back pain


History of Present Illness: 


CARMEN BEGUM is a 21 year old male known to my practice with history of 

Sickle Cell Disease frequent flare ups and admission to the hospital for 

hydration and pain control. He presented to the ED with 2 days history of 

worsening lower back and knee joints aches and pain that resulted in his coming 

to the ED. Patient is on opiate pain management regimen at home that he claimed 

was ineffective in controlling his pain. Patient reported development of knee 

joint swelling, particularly left knee. He denied any associated fall, trauma 

or injury prior to presentation. He denied any fever, chills, headache or 

dizziness. His morbidities include SCD and Asthma.





Past Medical History


Pulmonary Medical History: Reports: Asthma, Pneumonia


Psychiatric Medical History: 


   Denies: Depression


Hematology: Reports: Anemia, Sickle Cell Disease, Bleeding Tendencies





Past Surgical History


Past Surgical History: Reports: Orthopedic Surgery - Fluid drained from right 

foot, Vascular Surgery - Port placement


   Denies: Cholecystectomy





Social History


Lives with: Family


Smoking Status: Never Smoker


Frequency of Alcohol Use: None


Hx Recreational Drug Use: No


Drugs: None


Hx Prescription Drug Abuse: No





- Advance Directive


Resuscitation Status: Full Code





Family History


Family History: Reviewed & Not Pertinent, Arthritis, DM, Hypertension, Other - 

Sickle cell trait both parents and asthma


Parental Family History Reviewed: Yes


Children Family History Reviewed: Yes


Sibling(s) Family History Reviewed.: Yes





Medication/Allergy


Home Medications: 








Albuterol Sulfate [Proair HFA Inhalation Aerosol 8.5 gm MDI] 2 puff IH Q4HP PRN 

09/12/17 


Fentanyl [Duragesic 100 Mcg/Hr Transdermal Patch] 1 patch TOP Q3D 09/12/17 


Folic Acid [Folvite 1 mg Tablet] 1 mg PO DAILY 09/12/17 


Hydroxyurea [Hydrea 500 mg Capsule] 1,000 mg PO DAILY 09/12/17 


Oxycodone HCl [Oxy-Ir 5 mg Tablet] 5 mg PO Q4HP PRN 09/12/17 








Allergies/Adverse Reactions: 


 





Coconut * [Coconut] Allergy (Mild, Verified 09/11/17 23:53)


 


transpore tape Allergy (Mild, Uncoded 09/11/17 15:45)


 Hives











Review of Systems


Constitutional: ABSENT: chills, fever(s), headache(s), weight gain, weight loss


Eyes: ABSENT: visual disturbances


Ears: ABSENT: hearing changes


Nose, Mouth, and Throat: ABSENT: as per HPI, headache(s), mouth pain, sore 

throat, vertigo, other


Cardiovascular: ABSENT: chest pain, dyspnea on exertion, edema, orthropnea, 

palpitations


Gastrointestinal: ABSENT: abdominal pain, constipation, diarrhea, hematemesis, 

hematochezia, nausea, vomiting


Genitourinary: ABSENT: dysuria, hematuria


Musculoskeletal: PRESENT: back pain, joint swelling - knee joints


Integumentary: ABSENT: rash, wounds


Neurological: ABSENT: abnormal gait, abnormal speech, confusion, dizziness, 

focal weakness, syncope


Psychiatric: ABSENT: anxiety, depression, homidical ideation, suicidal ideation


Endocrine: ABSENT: cold intolerance, heat intolerance, polydipsia, polyuria


Hematologic/Lymphatic: ABSENT: easy bleeding, easy bruising, lymphadenopathy


Allergic/Immunologic: ABSENT: seasonal rhinorrhea





Physical Exam


Vital Signs: 


 











Temp Pulse Resp BP Pulse Ox


 


 98.4 F   81   13   118/69   100 


 


 09/12/17 13:00  09/12/17 13:00  09/12/17 13:00  09/12/17 13:00  09/12/17 13:00








 Intake & Output











 09/11/17 09/12/17 09/13/17





 06:59 06:59 06:59


 


Intake Total  1030 


 


Balance  1030 











General appearance: PRESENT: mild distress - due to pain


Head exam: PRESENT: atraumatic, normocephalic


Eye exam: PRESENT: conjunctiva pink, EOMI, PERRLA.  ABSENT: scleral icterus


Ear exam: PRESENT: normal external ear exam


Mouth exam: PRESENT: moist


Throat exam: ABSENT: post pharyngeal erythema, tonsillar erythema, tonsillar 

exudate, tonsillogmegaly, other


Neck exam: PRESENT: full ROM.  ABSENT: carotid bruit, JVD, lymphadenopathy, 

thyromegaly


Respiratory exam: PRESENT: clear to auscultation brenda


Cardiovascular exam: PRESENT: RRR.  ABSENT: diastolic murmur, rubs, systolic 

murmur


Vascular exam: PRESENT: normal capillary refill.  ABSENT: pallor


GI/Abdominal exam: PRESENT: normal bowel sounds, soft.  ABSENT: distended, 

guarding, mass, organolmegaly, rebound, tenderness


Rectal exam: PRESENT: deferred


Extremities exam: PRESENT: joint swelling - minimal left knee joint, tenderness 

- multiple sites includfing lower back and bilateral knee joints


Musculoskeletal exam: PRESENT: tenderness - lower back and both knee joints


Neurological exam: PRESENT: alert, awake, oriented to person, oriented to place

, oriented to time, oriented to situation, CN II-XII grossly intact.  ABSENT: 

motor sensory deficit


Psychiatric exam: PRESENT: appropriate affect, normal mood.  ABSENT: homicidal 

ideation, suicidal ideation


Skin exam: PRESENT: dry, intact, warm.  ABSENT: cyanosis, rash





Results


Impressions: 


 





Chest X-Ray  09/11/17 15:56


IMPRESSION:  NO SIGNIFICANT RADIOGRAPHIC FINDING IN THE CHEST.


 














Assessment & Plan





- Diagnosis


(1) Sickle cell disease with crisis


Is this a current diagnosis for this admission?: Yes   


Plan: 


See admitting physician orders.








(2) Swelling of both knees


Is this a current diagnosis for this admission?: Yes   


Plan: 


See admitting physician orders.








(3) Asthma


Qualifiers: 


   Asthma severity: mild intermittent   Asthma complication type: uncomplicated

   Qualified Code(s): J45.20 - Mild intermittent asthma, uncomplicated   


Is this a current diagnosis for this admission?: Yes   


Plan: 


See admitting physician orders.








- Time


Time Spent: 50 to 70 Minutes


Medications reviewed and adjusted accordingly: Yes


Anticipated discharge: Home


Within: Other





- Inpatient Certification


Based on my medical assessment, after consideration of the patient's 

comorbidities, presenting symptoms, or acuity I expect that the services needed 

warrant INPATIENT care.: Yes


I certify that my determination is in accordance with my understanding of 

Medicare's requirements for reasonable and necessary INPATIENT services [42 CFR 

412.3e].: Yes


Medical Necessity: Need Close Monitoring Due to Risk of Patient Decompensation, 

Need For IV Fluids, Need for Pain Control, Risk of Complication if Not Cared 

For in Hospital


Post Hospital Care: D/C Planner Documentation





- Plan Summary


Plan Summary: 





See admitting physician orders.

## 2017-09-12 NOTE — RADIOLOGY REPORT (SQ)
EXAM DESCRIPTION:  KNEE BILATERAL 1-2 VIEWS



COMPLETED DATE/TIME:  9/12/2017 6:52 pm



REASON FOR STUDY:  Knee swelling lf > rt; Hx lf knee osteonecrosis



COMPARISON:  4/4/2017



NUMBER OF VIEWS:  Two views.



TECHNIQUE:  AP and lateral radiographic images acquired of the right and left knee.



LIMITATIONS:  None.



FINDINGS:  MINERALIZATION: There is diffuse sclerosis bilaterally.

BONES: No acute fracture or dislocation. No worrisome bone lesions. No significant osteophytes.

JOINT: No effusion.  No chondrocalcinosis.

OTHER: No other significant finding.



IMPRESSION:  Diffuse sclerosis involving the distal right and left femurs as well as the proximal tib
ias.  Findings are stable from 4/4/2017 a consistent with a history of sickle-cell.



TECHNICAL DOCUMENTATION:  JOB ID:  [id number]

 2011 [employer]- All Rights Reserved

## 2017-09-13 NOTE — PDOC PROGRESS REPORT
Subjective


Progress Note for:: 09/13/17


Subjective:: 





Patient continue to reported multiple joints aches and pain with intermittent 

relief from IV Dilaudid administration. He denied any chest pain or difficulty 

with breathing. No nausea or vomiting. No reported fever or chills.





Physical Exam


Vital Signs: 


 











Temp Pulse Resp BP Pulse Ox


 


 98.9 F   87   16   112/68   94 


 


 09/13/17 16:23  09/13/17 16:23  09/13/17 16:23  09/13/17 16:23  09/13/17 16:23








 Intake & Output











 09/12/17 09/13/17 09/14/17





 06:59 06:59 06:59


 


Intake Total  1685 1206


 


Output Total  1700 


 


Balance  -15 1206











General appearance: PRESENT: mild distress - from his ongoing SCD pain crisis


Head exam: PRESENT: atraumatic, normocephalic


Eye exam: PRESENT: conjunctiva pink.  ABSENT: scleral icterus


Mouth exam: PRESENT: moist


Respiratory exam: PRESENT: clear to auscultation brenda


Cardiovascular exam: PRESENT: RRR.  ABSENT: diastolic murmur, rubs, systolic 

murmur


Vascular exam: PRESENT: normal capillary refill.  ABSENT: pallor


GI/Abdominal exam: PRESENT: normal bowel sounds, soft.  ABSENT: distended, 

guarding, mass, organolmegaly, rebound, tenderness


Extremities exam: PRESENT: joint swelling - minimal around knee joints.  ABSENT

: pedal edema


Musculoskeletal exam: PRESENT: full ROM


Neurological exam: PRESENT: alert, awake, oriented to person, oriented to place

, oriented to time, oriented to situation, CN II-XII grossly intact.  ABSENT: 

motor sensory deficit


Psychiatric exam: PRESENT: appropriate affect, normal mood.  ABSENT: homicidal 

ideation, suicidal ideation


Skin exam: PRESENT: dry, intact, warm.  ABSENT: cyanosis, rash





Results


Laboratory Results: 


 





 09/13/17 06:45 





 09/13/17 06:45 





 











  09/13/17 09/13/17





  06:45 06:45


 


WBC  5.2 


 


RBC  2.35 L 


 


Hgb  10.3 L 


 


Hct  28.9 L 


 


MCV  123 H 


 


MCH  43.9 H 


 


MCHC  35.7 


 


RDW  21.0 H 


 


Plt Count  179 


 


Seg Neutrophils %  53.2 


 


Lymphocytes %  35.4 


 


Monocytes %  4.7 


 


Eosinophils %  5.4 


 


Basophils %  1.3 


 


Absolute Neutrophils  2.8 


 


Absolute Lymphocytes  1.8 


 


Absolute Monocytes  0.2 


 


Absolute Eosinophils  0.3 


 


Absolute Basophils  0.1 


 


Sodium   141.1


 


Potassium   3.9


 


Chloride   105


 


Carbon Dioxide   28


 


Anion Gap   8


 


BUN   9


 


Creatinine   0.63


 


Est GFR ( Amer)   > 60


 


Est GFR (Non-Af Amer)   > 60


 


Glucose   114 H


 


Calcium   8.8


 


Total Bilirubin   1.9 H


 


AST   41


 


ALT   29


 


Alkaline Phosphatase   91


 


Total Protein   6.9


 


Albumin   3.9











Impressions: 


 





Chest X-Ray  09/11/17 15:56


IMPRESSION:  NO SIGNIFICANT RADIOGRAPHIC FINDING IN THE CHEST.


 








Knee X-Ray  09/12/17 00:00


IMPRESSION:  Diffuse sclerosis involving the distal right and left femurs as 

well as the proximal tibias.  Findings are stable from 4/4/2017 a consistent 

with a history of sickle-cell.


 














Assessment & Plan





- Diagnosis


(1) Sickle cell disease with crisis


Is this a current diagnosis for this admission?: Yes   





(2) Swelling of both knees


Is this a current diagnosis for this admission?: Yes   


Plan: 


X ray did revealed sclerotic changes at distal femur and proximal tibia region 

compatible with SCD with bone infarction involvement. I will request for 

physical therapy input for ambulatory effort.








(3) Asthma


Qualifiers: 


   Asthma severity: mild intermittent   Asthma complication type: uncomplicated

   Qualified Code(s): J45.20 - Mild intermittent asthma, uncomplicated   


Is this a current diagnosis for this admission?: Yes   





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


Is this a current diagnosis for this admission?: Yes   


Plan: 


See attending physician orders.








- Time


Time Spent with patient: 25-34 minutes


Medications reviewed and adjusted accordingly: Yes


Anticipated discharge: Home


Within: Other





- Inpatient Certification


Based on my medical assessment, after consideration of the patient's 

comorbidities, presenting symptoms, or acuity I expect that the services needed 

warrant INPATIENT care.: Yes


I certify that my determination is in accordance with my understanding of 

Medicare's requirements for reasonable and necessary INPATIENT services [42 CFR 

412.3e].: Yes


Medical Necessity: Need Close Monitoring Due to Risk of Patient Decompensation, 

Need For IV Fluids, Need for Pain Control, Risk of Complication if Not Cared 

For in Hospital


Post Hospital Care: D/C Planner Documentation





- Plan Summary


Plan Summary: 





See attending physician orders.

## 2017-09-13 NOTE — PHYSICIAN ADVISORY NOTE
Physician Advisor ProgressNote


.: 


Pursuant to the  plan for [facility], I have reviewed the medical record 

for this patient.  





Physician Advisor Statement: 


Very nice documentation of underlying type of asthma!








Please document:


1.  Underlying type Sickle cell dz - HbSS?  HbSC?  Sickle-thal?  ...  








Status:


Appropriately changed to Inpt status - pt requiring frequent prn IV opioid tx 

for adequate pain control, cannot be safely done outpt.  Recurrent tachycardia 

despite frequent Dilaudid....





Thanks!


CK

## 2017-09-14 NOTE — PDOC PROGRESS REPORT
Subjective


Progress Note for:: 09/14/17


Subjective:: 


Patient continue to experience multiple joints aches and pain. No chest pain or 

difficulty with breathing. No nausea or vomiting. No reported fever or chills.





Physical Exam


Vital Signs: 


 











Temp Pulse Resp BP Pulse Ox


 


 98.4 F   76   16   114/65   94 


 


 09/13/17 23:58  09/13/17 23:58  09/13/17 23:58  09/13/17 23:58  09/14/17 02:01








 Intake & Output











 09/13/17 09/14/17 09/15/17





 06:59 06:59 06:59


 


Intake Total 1685 3306 


 


Output Total 1700  


 


Balance -15 3306 











Physical Exam: 


General appearance: PRESENT: mild distress - from his ongoing SCD pain crisis


Head exam: PRESENT: atraumatic, normocephalic


Eye exam: PRESENT: conjunctiva pink.  ABSENT: scleral icterus


Mouth exam: PRESENT: moist


Respiratory exam: PRESENT: clear to auscultation brenda


Cardiovascular exam: PRESENT: RRR.  ABSENT: diastolic murmur, rubs, systolic 

murmur


Vascular exam: PRESENT: normal capillary refill.  ABSENT: pallor


GI/Abdominal exam: PRESENT: normal bowel sounds, soft.  ABSENT: distended, 

guarding, mass, organomegaly, rebound, tenderness


Extremities exam: PRESENT: joint swelling - minimal around knee joints.  ABSENT

: pedal edema


Musculoskeletal exam: PRESENT: full ROM


Neurological exam: PRESENT: alert, awake, oriented to person, oriented to place

, oriented to time, oriented to situation, CN II-XII grossly intact.  ABSENT: 

motor sensory deficit


Psychiatric exam: PRESENT: appropriate affect, normal mood.  ABSENT: homicidal 

ideation, suicidal ideation


Skin exam: PRESENT: dry, intact, warm.  ABSENT: cyanosis, rash





Results


Laboratory Results: 


 





 09/13/17 06:45 





 09/13/17 06:45 








Impressions: 


 





Chest X-Ray  09/11/17 15:56


IMPRESSION:  NO SIGNIFICANT RADIOGRAPHIC FINDING IN THE CHEST.


 








Knee X-Ray  09/12/17 00:00


IMPRESSION:  Diffuse sclerosis involving the distal right and left femurs as 

well as the proximal tibias.  Findings are stable from 4/4/2017 a consistent 

with a history of sickle-cell.


 














Assessment & Plan





- Diagnosis


(1) Sickle cell disease with crisis


Is this a current diagnosis for this admission?: Yes   





(2) Swelling of both knees


Is this a current diagnosis for this admission?: Yes   





(3) Asthma


Qualifiers: 


   Asthma severity: mild intermittent   Asthma complication type: uncomplicated

   Qualified Code(s): J45.20 - Mild intermittent asthma, uncomplicated   


Is this a current diagnosis for this admission?: Yes   





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


Is this a current diagnosis for this admission?: Yes   





- Time


Time Spent with patient: 25-34 minutes


Medications reviewed and adjusted accordingly: Yes


Anticipated discharge: Home





- Inpatient Certification


Based on my medical assessment, after consideration of the patient's 

comorbidities, presenting symptoms, or acuity I expect that the services needed 

warrant INPATIENT care.: Yes


I certify that my determination is in accordance with my understanding of 

Medicare's requirements for reasonable and necessary INPATIENT services [42 CFR 

412.3e].: Yes


Medical Necessity: Need Close Monitoring Due to Risk of Patient Decompensation, 

Need For IV Fluids, Need for Pain Control, Risk of Complication if Not Cared 

For in Hospital


Post Hospital Care: D/C Planner Documentation





- Plan Summary


Plan Summary: 





Maintain on current pain management regimen. Encouraged participation in 

physical therapy to initial ambulatory efforts. Monitor for any development of 

fever.

## 2017-09-15 NOTE — PDOC PROGRESS REPORT
Subjective


Progress Note for:: 09/15/17


Subjective:: 


Patient continue to experience multiple joints aches and pain. Participated in 

PT session yesterday but no attempt to ambulate on the floor so far today. 

Patient demanding for IV Dilaudid on schedule 4hrs  interval as per nursing 

report. No chest pain or difficulty with breathing. He denied constipation, 

nausea or vomiting. No reported fever or chills.





Physical Exam


Vital Signs: 


 











Temp Pulse Resp BP Pulse Ox


 


 98.6 F   100   14   132/84 H  96 


 


 09/14/17 23:56  09/14/17 23:56  09/14/17 23:56  09/14/17 23:56  09/14/17 23:56








 Intake & Output











 09/14/17 09/15/17 09/16/17





 06:59 06:59 06:59


 


Intake Total 3306 7027 


 


Output Total  3050 


 


Balance 3306 3977 











Physical Exam: 


General appearance: PRESENT: mild distress - from his ongoing SCD pain crisis


Head exam: PRESENT: atraumatic, normocephalic


Eye exam: PRESENT: conjunctiva pink.  ABSENT: scleral icterus


Mouth exam: PRESENT: moist


Respiratory exam: PRESENT: clear to auscultation brenda


Cardiovascular exam: PRESENT: RRR.  ABSENT: diastolic murmur, rubs, systolic 

murmur


Vascular exam: PRESENT: normal capillary refill.  ABSENT: pallor


GI/Abdominal exam: PRESENT: normal bowel sounds, soft.  ABSENT: distended, 

guarding, mass, organomegaly, rebound, tenderness


Extremities exam: PRESENT: joint swelling - minimal around knee joints.  ABSENT

: pedal edema


Musculoskeletal exam: PRESENT: full ROM


Neurological exam: PRESENT: alert, awake, oriented to person, oriented to place

, oriented to time, oriented to situation, CN II-XII grossly intact.  ABSENT: 

motor sensory deficit


Psychiatric exam: PRESENT: appropriate affect, normal mood.  ABSENT: homicidal 

ideation, suicidal ideation


Skin exam: PRESENT: dry, intact, warm.  ABSENT: cyanosis, rash








Results


Laboratory Results: 


 





 09/13/17 06:45 





 09/13/17 06:45 








Impressions: 


 





Chest X-Ray  09/11/17 15:56


IMPRESSION:  NO SIGNIFICANT RADIOGRAPHIC FINDING IN THE CHEST.


 








Knee X-Ray  09/12/17 00:00


IMPRESSION:  Diffuse sclerosis involving the distal right and left femurs as 

well as the proximal tibias.  Findings are stable from 4/4/2017 a consistent 

with a history of sickle-cell.


 














Assessment & Plan





- Diagnosis


(1) Sickle cell disease with crisis


Is this a current diagnosis for this admission?: Yes   





(2) Swelling of both knees


Is this a current diagnosis for this admission?: Yes   





(3) Asthma


Qualifiers: 


   Asthma severity: mild intermittent   Asthma complication type: uncomplicated

   Qualified Code(s): J45.20 - Mild intermittent asthma, uncomplicated   


Is this a current diagnosis for this admission?: Yes   





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


Is this a current diagnosis for this admission?: Yes   





- Time


Time Spent with patient: 25-34 minutes


Medications reviewed and adjusted accordingly: Yes


Anticipated discharge: Home


Within: Other





- Inpatient Certification


Based on my medical assessment, after consideration of the patient's 

comorbidities, presenting symptoms, or acuity I expect that the services needed 

warrant INPATIENT care.: Yes


I certify that my determination is in accordance with my understanding of 

Medicare's requirements for reasonable and necessary INPATIENT services [42 CFR 

412.3e].: Yes


Medical Necessity: Need Close Monitoring Due to Risk of Patient Decompensation, 

Need For IV Fluids, Need for Pain Control, Risk of Complication if Not Cared 

For in Hospital


Post Hospital Care: D/C Planner Documentation





- Plan Summary


Plan Summary: 





Continue current IV hydration and pain medication management for his SS SCD 

crisis. I did encourage ambulation  on the floor since walker s at his bedside. 

Monitor CBC and SMA12 on o9/18/2017 unless otherwise indicated.

## 2017-09-16 NOTE — PDOC PROGRESS REPORT
Subjective


Progress Note for:: 09/16/17


Subjective:: 


Patient continue to experience multiple joints aches and pain. No chest pain or 

difficulty with breathing. He denied constipation, nausea or vomiting. No 

reported fever or chills.





Physical Exam


Vital Signs: 


 











Temp Pulse Resp BP Pulse Ox


 


 98.6 F   104 H  16   127/71 H  95 


 


 09/16/17 00:00  09/16/17 00:00  09/16/17 00:00  09/16/17 00:00  09/15/17 16:46








 Intake & Output











 09/15/17 09/16/17 09/17/17





 06:59 06:59 06:59


 


Intake Total 7027 2767 


 


Output Total 3050 2400 


 


Balance 3977 367 











Physical Exam: 


General appearance: PRESENT: mild distress - from his ongoing SCD pain crisis


Head exam: PRESENT: atraumatic, normocephalic


Eye exam: PRESENT: conjunctiva pink.  ABSENT: scleral icterus


Mouth exam: PRESENT: moist


Respiratory exam: PRESENT: clear to auscultation brenda


Cardiovascular exam: PRESENT: RRR.  ABSENT: diastolic murmur, rubs, systolic 

murmur


Vascular exam: PRESENT: normal capillary refill.  ABSENT: pallor


GI/Abdominal exam: PRESENT: normal bowel sounds, soft.  ABSENT: distended, 

guarding, mass, organomegaly, rebound, tenderness


Extremities exam: PRESENT: joint swelling - minimal around knee joints.  ABSENT

: pedal edema


Musculoskeletal exam: PRESENT: full ROM


Neurological exam: PRESENT: alert, awake, oriented to person, oriented to place

, oriented to time, oriented to situation, CN II-XII grossly intact.  ABSENT: 

motor sensory deficit


Psychiatric exam: PRESENT: appropriate affect, normal mood.  ABSENT: homicidal 

ideation, suicidal ideation


Skin exam: PRESENT: dry, intact, warm.  ABSENT: cyanosis, rash








Results


Laboratory Results: 


 





 09/13/17 06:45 





 09/13/17 06:45 








Impressions: 


 





Chest X-Ray  09/11/17 15:56


IMPRESSION:  NO SIGNIFICANT RADIOGRAPHIC FINDING IN THE CHEST.


 








Knee X-Ray  09/12/17 00:00


IMPRESSION:  Diffuse sclerosis involving the distal right and left femurs as 

well as the proximal tibias.  Findings are stable from 4/4/2017 a consistent 

with a history of sickle-cell.


 














Assessment & Plan





- Diagnosis


(1) Sickle cell disease with crisis


Is this a current diagnosis for this admission?: Yes   





(2) Swelling of both knees


Is this a current diagnosis for this admission?: Yes   





(3) Asthma


Qualifiers: 


   Asthma severity: mild intermittent   Asthma complication type: uncomplicated

   Qualified Code(s): J45.20 - Mild intermittent asthma, uncomplicated   


Is this a current diagnosis for this admission?: Yes   





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


Is this a current diagnosis for this admission?: Yes   





- Time


Time Spent with patient: 25-34 minutes


Medications reviewed and adjusted accordingly: Yes


Anticipated discharge: Home


Within: Other





- Inpatient Certification


Based on my medical assessment, after consideration of the patient's 

comorbidities, presenting symptoms, or acuity I expect that the services needed 

warrant INPATIENT care.: Yes


I certify that my determination is in accordance with my understanding of 

Medicare's requirements for reasonable and necessary INPATIENT services [42 CFR 

412.3e].: Yes


Medical Necessity: Need Close Monitoring Due to Risk of Patient Decompensation, 

Need For IV Fluids, Need for Pain Control, Risk of Complication if Not Cared 

For in Hospital


Post Hospital Care: D/C Planner Documentation





- Plan Summary


Plan Summary: 


See attending physician orders.

## 2017-09-17 NOTE — PDOC PROGRESS REPORT
Subjective


Progress Note for:: 09/17/17


Subjective:: 


Patient continue to experience multiple joints aches and pain. He reported been 

able to ambulate only with assistance of walker. No chest pain or difficulty 

with breathing. He denied constipation, nausea or vomiting. No reported fever 

or chills.





Physical Exam


Vital Signs: 


 











Temp Pulse Resp BP Pulse Ox


 


 98.4 F   92   20   126/75 H  96 


 


 09/17/17 00:00  09/17/17 00:00  09/17/17 00:00  09/17/17 00:00  09/16/17 23:49








 Intake & Output











 09/16/17 09/17/17 09/18/17





 06:59 06:59 06:59


 


Intake Total 2767 3639 


 


Output Total 2400 2650 


 


Balance 367 989 











Physical Exam: 


General appearance: PRESENT: mild distress - from his ongoing SCD pain crisis


Head exam: PRESENT: atraumatic, normocephalic


Eye exam: PRESENT: conjunctiva pink.  ABSENT: scleral icterus


Mouth exam: PRESENT: moist


Respiratory exam: PRESENT: clear to auscultation brenda


Cardiovascular exam: PRESENT: RRR.  ABSENT: diastolic murmur, rubs, systolic 

murmur


Vascular exam: PRESENT: normal capillary refill.  ABSENT: pallor


GI/Abdominal exam: PRESENT: normal bowel sounds, soft.  ABSENT: distended, 

guarding, mass, organomegaly, rebound, tenderness


Extremities exam: PRESENT: joint swelling - minimal around knee joints.  ABSENT

: pedal edema


Musculoskeletal exam: PRESENT: full ROM


Neurological exam: PRESENT: alert, awake, oriented to person, oriented to place

, oriented to time, oriented to situation, CN II-XII grossly intact.  ABSENT: 

motor sensory deficit


Psychiatric exam: PRESENT: appropriate affect, normal mood.  ABSENT: homicidal 

ideation, suicidal ideation


Skin exam: PRESENT: dry, intact, warm.  ABSENT: cyanosis, rash





Results


Laboratory Results: 


 





 09/13/17 06:45 





 09/13/17 06:45 








Impressions: 


 





Chest X-Ray  09/11/17 15:56


IMPRESSION:  NO SIGNIFICANT RADIOGRAPHIC FINDING IN THE CHEST.


 








Knee X-Ray  09/12/17 00:00


IMPRESSION:  Diffuse sclerosis involving the distal right and left femurs as 

well as the proximal tibias.  Findings are stable from 4/4/2017 a consistent 

with a history of sickle-cell.


 














Assessment & Plan





- Diagnosis


(1) Sickle cell disease with crisis


Is this a current diagnosis for this admission?: Yes   





(2) Swelling of both knees


Is this a current diagnosis for this admission?: Yes   





(3) Asthma


Qualifiers: 


   Asthma severity: mild intermittent   Asthma complication type: uncomplicated

   Qualified Code(s): J45.20 - Mild intermittent asthma, uncomplicated   


Is this a current diagnosis for this admission?: Yes   





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


Is this a current diagnosis for this admission?: Yes   





- Time


Time Spent with patient: 25-34 minutes


Medications reviewed and adjusted accordingly: Yes


Anticipated discharge: Home


Within: Other





- Inpatient Certification


Based on my medical assessment, after consideration of the patient's 

comorbidities, presenting symptoms, or acuity I expect that the services needed 

warrant INPATIENT care.: Yes


I certify that my determination is in accordance with my understanding of 

Medicare's requirements for reasonable and necessary INPATIENT services [42 CFR 

412.3e].: Yes


Medical Necessity: Need Close Monitoring Due to Risk of Patient Decompensation, 

Need For IV Fluids, Need for Pain Control, Risk of Complication if Not Cared 

For in Hospital


Post Hospital Care: D/C Planner Documentation





- Plan Summary


Plan Summary: 


See attending physician orders.

## 2017-09-18 NOTE — PDOC PROGRESS REPORT
Subjective


Progress Note for:: 09/18/17


Subjective:: 


Patient's pain is fairly contol on his current medication management. He 

ambulate on the floor with walker assistance. No chest pain or difficulty with 

breathing. He denied constipation, nausea or vomiting. No reported fever or 

chills.





Physical Exam


Vital Signs: 


 











Temp Pulse Resp BP Pulse Ox


 


 98.3 F   102 H  18   118/65   95 


 


 09/18/17 11:16  09/18/17 11:16  09/18/17 11:16  09/18/17 11:16  09/18/17 11:16








 Intake & Output











 09/17/17 09/18/17 09/19/17





 06:59 06:59 06:59


 


Intake Total 3639 6683 


 


Output Total 2650 3500 


 


Balance 989 3183 


 


Weight  68.4 kg 











Physical Exam: 


General appearance: PRESENT: mild distress - from his ongoing SCD pain crisis


Head exam: PRESENT: atraumatic, normocephalic


Eye exam: PRESENT: conjunctiva pink.  ABSENT: scleral icterus


Mouth exam: PRESENT: moist


Respiratory exam: PRESENT: clear to auscultation brenda


Cardiovascular exam: PRESENT: RRR.  ABSENT: diastolic murmur, rubs, systolic 

murmur


Vascular exam: PRESENT: normal capillary refill.  ABSENT: pallor


GI/Abdominal exam: PRESENT: normal bowel sounds, soft.  ABSENT: distended, 

guarding, mass, organomegaly, rebound, tenderness


Extremities exam: PRESENT: joint swelling - minimal around knee joints.  ABSENT

: pedal edema


Musculoskeletal exam: PRESENT: full ROM


Neurological exam: PRESENT: alert, awake, oriented to person, oriented to place

, oriented to time, oriented to situation, CN II-XII grossly intact.  ABSENT: 

motor sensory deficit


Psychiatric exam: PRESENT: appropriate affect, normal mood.  ABSENT: homicidal 

ideation, suicidal ideation


Skin exam: PRESENT: dry, intact, warm.  ABSENT: cyanosis, rash





Results


Laboratory Results: 


 





 09/13/17 06:45 





 09/13/17 06:45 








Impressions: 


 





Chest X-Ray  09/11/17 15:56


IMPRESSION:  NO SIGNIFICANT RADIOGRAPHIC FINDING IN THE CHEST.


 








Knee X-Ray  09/12/17 00:00


IMPRESSION:  Diffuse sclerosis involving the distal right and left femurs as 

well as the proximal tibias.  Findings are stable from 4/4/2017 a consistent 

with a history of sickle-cell.


 














Assessment & Plan





- Diagnosis


(1) Sickle cell disease with crisis


Is this a current diagnosis for this admission?: Yes   





(2) Swelling of both knees


Is this a current diagnosis for this admission?: Yes   





(3) Asthma


Qualifiers: 


   Asthma severity: mild intermittent   Asthma complication type: uncomplicated

   Qualified Code(s): J45.20 - Mild intermittent asthma, uncomplicated   


Is this a current diagnosis for this admission?: Yes   





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


Is this a current diagnosis for this admission?: Yes   





- Time


Time Spent with patient: 25-34 minutes


Medications reviewed and adjusted accordingly: Yes


Anticipated discharge: Home


Within: within 24 hours





- Inpatient Certification


Based on my medical assessment, after consideration of the patient's 

comorbidities, presenting symptoms, or acuity I expect that the services needed 

warrant INPATIENT care.: Yes


I certify that my determination is in accordance with my understanding of 

Medicare's requirements for reasonable and necessary INPATIENT services [42 CFR 

412.3e].: Yes


Medical Necessity: Need Close Monitoring Due to Risk of Patient Decompensation, 

Need For IV Fluids, Need for Pain Control, Risk of Complication if Not Cared 

For in Hospital


Post Hospital Care: D/C Planner Documentation





- Plan Summary


Plan Summary: 


Start on home preadmission medication. Possible d/c home tomorrow. Patient is 

aware of plan and in agreement.

## 2017-09-19 NOTE — PDOC DISCHARGE SUMMARY
General





- Admit/Disc Date/PCP


Admission Date/Primary Care Provider: 


  09/12/17 17:37





  DIYA PHILIPPE MD





Discharge Date: 09/19/17





- Discharge Diagnosis


(1) Sickle cell disease with crisis


Is this a current diagnosis for this admission?: Yes   





(2) Swelling of both knees


Is this a current diagnosis for this admission?: Yes   





(3) Asthma


Is this a current diagnosis for this admission?: Yes   





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


Is this a current diagnosis for this admission?: Yes   





- Additional Information


Resuscitation Status: Full Code


Discharge Diet: As Tolerated


Discharge Activity: Activity As Tolerated, Slowly Increase Activity


Home Medications: 








Albuterol Sulfate [Proair HFA Inhalation Aerosol 8.5 gm MDI] 2 puff IH Q4HP PRN 

09/12/17 


Fentanyl [Duragesic 100 Mcg/Hr Transdermal Patch] 1 patch TOP Q3D 09/12/17 


Folic Acid [Folvite 1 mg Tablet] 1 mg PO DAILY 09/12/17 


Hydroxyurea [Hydrea 500 mg Capsule] 1,000 mg PO DAILY 09/12/17 


Oxycodone HCl [Oxy-Ir 5 mg Tablet] 5 mg PO Q4HP PRN 09/12/17 











History of Present Illness


History of Present Illness: 


CARMEN BEGUM is a 21 year old male known to my practice with history of 

Sickle Cell Disease frequent flare ups and admission to the hospital for 

hydration and pain control. He presented to the ED with 2 days history of 

worsening lower back and knee joints aches and pain that resulted in his coming 

to the ED. Patient is on opiate pain management regimen at home that he claimed 

was ineffective in controlling his pain. Patient reported development of knee 

joint swelling, particularly left knee. He denied any associated fall, trauma 

or injury prior to presentation. He denied any fever, chills, headache or 

dizziness. His morbidities include SCD and Asthma.





Hospital Course


Hospital Course: 


Patient did respond to IV fluid support and IV Dilaudid therapy. His knee X-Ray 

did revealed sclerotic changes in the distal femor and proximal tibia region of 

his knee joints compatible with history of sickle cell disease. Patient was 

eventually able to ambulate with walker assistance. He was restarted on his 

preadmission fentanyl patch with satisfactory pain control. He will be 

discharge home on his preadmission medications with understanding to follow up 

with Dr Cleaning, his hematologist, for his pain medication management. He will 

follow up with me in the office as instructed upon discharge.





Physical Exam


Vital Signs: 


 











Temp Pulse Resp BP Pulse Ox


 


 98.3 F   99   16   126/75 H  94 


 


 09/19/17 17:24  09/19/17 17:24  09/19/17 17:24  09/19/17 17:24  09/19/17 17:24








 Intake & Output











 09/18/17 09/19/17 09/20/17





 06:59 06:59 06:59


 


Intake Total 6683 5323 830


 


Output Total 3500 3550 1300


 


Balance 3183 1773 -470


 


Weight 68.4 kg 68.3 kg 











Physical Exam: 


General appearance: PRESENT: mild distress - from his ongoing SCD pain crisis


Head exam: PRESENT: atraumatic, normocephalic


Eye exam: PRESENT: conjunctiva pink.  ABSENT: scleral icterus


Mouth exam: PRESENT: moist


Respiratory exam: PRESENT: clear to auscultation brenda


Cardiovascular exam: PRESENT: RRR.  ABSENT: diastolic murmur, rubs, systolic 

murmur


Vascular exam: PRESENT: normal capillary refill.  ABSENT: pallor


GI/Abdominal exam: PRESENT: normal bowel sounds, soft.  ABSENT: distended, 

guarding, mass, organomegaly, rebound, tenderness


Extremities exam: PRESENT: Resolved minimal knee joint swelling - minimal.  

ABSENT: pedal edema


Musculoskeletal exam: PRESENT: full ROM


Neurological exam: PRESENT: alert, awake, oriented to person, oriented to place

, oriented to time, oriented to situation, CN II-XII grossly intact.  ABSENT: 

motor sensory deficit


Psychiatric exam: PRESENT: appropriate affect, normal mood.  ABSENT: homicidal 

ideation, suicidal ideation


Skin exam: PRESENT: dry, intact, warm.  ABSENT: cyanosis, rash





Results


Laboratory Results: 


 





 09/13/17 06:45 





 09/13/17 06:45 








Impressions: 


 





Chest X-Ray  09/11/17 15:56


IMPRESSION:  NO SIGNIFICANT RADIOGRAPHIC FINDING IN THE CHEST.


 








Knee X-Ray  09/12/17 00:00


IMPRESSION:  Diffuse sclerosis involving the distal right and left femurs as 

well as the proximal tibias.  Findings are stable from 4/4/2017 a consistent 

with a history of sickle-cell.


 














Qualifiers


**PATEINT BEING DISCHARGED WITH ANY OF THE FOLLOWING DIAGNOSIS?: No





Plan


Discharge Plan: 


Patient will be discharge home today. He will follow up with Dr. Cleaning and me 

in the office as instructed upon discharge.

## 2017-10-01 NOTE — ER DOCUMENT REPORT
ED General Pain





- General


Chief Complaint: Sickle Cell Crisis


Stated Complaint: SICKLE CELL, LEFT KNEE, BACK PAIN


Time Seen by Provider: 10/01/17 18:46


Mode of Arrival: Ambulatory


Notes: 





Patient is complaining of pain in his lower back, mostly to the right side, and 

pain in his left knee for the past 3 days.  Patient has sickle cell anemia and 

has frequent pain crises.  He says that these locations of pain are typical for 

his pain crises.  He has no unusual activity.  No injuries.  No fevers.  

Patient is on oxycodone 15 mg at home, but not helping.  Also has a fentanyl 

patch 100 mcg prescribed that he is supposed to wear, but he is out of the 

patches and will not have another one for couple of days.  Patient's been 

nauseated but not vomiting.  No diarrhea.  No UTI symptoms.  No fevers..


TRAVEL OUTSIDE OF THE [place] IN LAST 30 DAYS: No





- Related Data


Allergies/Adverse Reactions: 


 





Coconut * [Coconut] Allergy (Mild, Verified 10/01/17 18:44)


 


transpore tape Allergy (Mild, Uncoded 09/11/17 15:45)


 Hives











Past Medical History





- General


Information source: Patient





- Social History


Smoking Status: Never Smoker


Chew tobacco use (# tins/day): No


Frequency of alcohol use: Rare


Drug Abuse: None


Family History: Reviewed & Not Pertinent, Arthritis, DM, Hypertension, Other - 

Sickle cell trait both parents and asthma


Patient has suicidal ideation: No


Patient has homicidal ideation: No


Pulmonary Medical History: Reports: Hx Asthma, Hx Pneumonia


Past Surgical History: Reports: Hx Abdominal Surgery - Gallstones removal, Hx 

Cholecystectomy, Hx Orthopedic Surgery - Fluid drained from right foot, Hx 

Vascular Surgery - Port placement





- Immunizations


Immunizations up to date: Yes


Hx Diphtheria, Pertussis, Tetanus Vaccination: Yes


Hx Pneumococcal Vaccination: 05/16/13





Review of Systems





- Review of Systems


Notes: 





REVIEW OF SYSTEMS:


CONSTITUTIONAL :  Denies fever.  


EENT:   Denies eye, ear, nose or mouth or throat pain or other symptoms.


CARDIOVASCULAR:  Denies chest pain.


RESPIRATORY:  Denies cough, chest congestion, or shortness of breath.


GASTROINTESTINAL:  Denies abdominal pain or vomiting, or diarrhea.  Has nausea.


GENITOURINARY:  Denies difficulty or painful urinating, urinary frequency, 

blood in urine.


MUSCULOSKELETAL: See HPI..


SKIN:   Denies rash or skin lesions.


NEUROLOGICAL:  Denies LOC or altered mental status.  Denies headache.  Denies 

sensory loss or motor deficits.





ALL OTHER SYSTEMS REVIEWED AND NEGATIVE.





Physical Exam





- Vital signs


Vitals: 


 











Temp Pulse Resp BP Pulse Ox


 


 99.1 F   119 H  18   145/85 H  94 


 


 10/01/17 18:40  10/01/17 18:40  10/01/17 18:40  10/01/17 18:40  10/01/17 18:40











Interpretation: Normal, Tachycardic





- Notes


Notes: 





PHYSICAL EXAMINATION:





GENERAL: Well-appearing, in no acute distress.  Afebrile.  Tachycardia, but 

other vital signs are all normal.





HEAD: Atraumatic, normocephalic.





ENT: oropharynx clear without exudates.  Moist mucous membranes.





NECK: Normal range of motion, supple.





LUNGS: Breath sounds clear and equal bilaterally.





HEART: Regular rate and rhythm without murmurs.





ABDOMEN: Soft, nontender.  No guarding or rebound.





BACK: Tender to percussion in the paralumbar muscles, more so on the right side

, but no tenderness throughout entire remaining back.





EXTREMITIES: Normal range of motion without pain.  Left knee is not swollen.  

Can be moved passively without significant pain.





NEUROLOGICAL:  Normal speech, normal gait.  Normal sensory, motor, and reflex 

exams.  Awake, alert, and oriented x3.  Cranial nerves normal.





PSYCH: Normal mood, normal affect.





SKIN: Warm, dry, no rashes.





Course





- Re-evaluation


Re-evalutation: 





10/01/17 21:06


Resting more pain medications.  Dilaudid 2 mg IV being given.





10/01/17 22:57


Patient requested another dose of Dilaudid and I agreed to give him 1 more 

milligram for a total of 5 mg and he agrees that he will be ready to go home 

then.  Also complaining of continuing nausea so giving some additional Zofran 

IV.





Patient's labs show a calcium of 7.4.  He says he supposed to see Dr. Philippe 

Wednesday to look into that lab result.  I advised him to be sure to get Dr. Philippe to write him for more fentanyl patches and other outpatient pain 

medications.





- Vital Signs


Vital signs: 


 











Temp Pulse Resp BP Pulse Ox


 


 99.1 F   119 H  18   145/85 H  94 


 


 10/01/17 18:40  10/01/17 18:40  10/01/17 18:40  10/01/17 18:40  10/01/17 18:40














- Laboratory


Result Diagrams: 


 10/01/17 20:17





 10/01/17 20:17


Laboratory results interpreted by me: 


 











  10/01/17 10/01/17 10/01/17





  20:17 20:17 21:04


 


WBC  3.6 L  


 


RBC  2.36 L  


 


Hgb  9.8 L  


 


Hct  27.4 L  


 


MCV  116 H D  


 


MCH  41.7 H  


 


RDW  21.7 H  


 


Seg Neuts % (Manual)  28 L  


 


Band Neutrophils %  1 L  


 


Lymphocytes % (Manual)  53 H  


 


Abs Neuts (Manual)  1.0 L  


 


Retic Count (auto)  9.04 H  


 


Absolute Retic  0.213 H  


 


Chloride   109 H 


 


Glucose   74 L 


 


Calcium   7.7 L 


 


Total Bilirubin   1.9 H 


 


Direct Bilirubin   0.5 H 


 


AST   66 H 


 


Urine Urobilinogen    2.0 H











10/01/17 22:59


Reticulocyte count 9.04.





Discharge





- Discharge


Clinical Impression: 


 Sickle cell pain crisis





Condition: Stable


Disposition: HOME, SELF-CARE


Additional Instructions: 


Sickle Cell Crisis





     You have "sickle cell crisis."  Sickle cell disease is caused by abnormal 

hemoglobin.  This hemoglobin can deform red blood cells into a sickle shape.  

These abnormal blood cells can block blood vessels. This causes the pain of 

sickle cell crisis.


     Sickle cell crisis can occur any time.  But attacks are more likely with 

acute infection, dehydration, or altitude change.  A crisis usually causes pain 

in the legs, back, abdomen, and chest.  Sometimes the pain may ease and return 

later.


     The usual treatment is oxygen, pain medication, IV fluids, and treatment 

of infection.  Attacks may take a couple of days to resolve.


     Return if the pain becomes more severe, or if there are new symptoms.





LOW BACK PAIN:


     Three out of every four people will have an episode of disabling back pain 

during their lifetime.  Most commonly the pain is due to straining of the 

muscles and ligaments in the low back.


     Usual treatment includes: 


(1) Rest on a firm surface.  Avoid lying on your stomach.  


(2) Ice pack the painful area.  After a few days, gentle heat may be used 

intermittently to relax the area, or ice packs can be continued.  


(3) Medication may be needed -- muscle relaxers and antiinflammatory medicines 

are commonly used.  


(4) As the back improves, exercises are prescribed to strengthen the back and 

abdominal muscles.


     Your doctor will advise you on the proper care for your back at each stage 

in your recovery.  You may be better in a few days -- or healing may take 

several weeks.


     If new symptoms of a "herniated disc" (radiation of pain, numbness, or 

tingling down the back of the leg or weakness in the leg) occur, you should be 

re-examined.  Further testing may be necessary.








PAIN MEDICATION INJECTION:


     You have received an injection of a pain medication.  You should 

experience significant pain relief within 45 minutes.  If this medication is a 

narcotic, it will impair your judgement, slow your reaction time and make you 

sleepy (as well as relieve your pain).  Narcotics also can cause nausea.


     You should not drive, work with machinery, or perform any task requiring 

mental alertness until all effects of the medication are gone -- six to eight 

hours.  Do not take any alcohol, or sedatives, and do not take any other 

medication without checking with your physician.





Antinausea Medication





     You have been given a medication to suppress nausea and vomiting. This 

type of medication can be given as a shot, pill, or suppository. It will 

usually last for many hours.  Pills and shots usually last six to eight hours, 

suppositories last about 12 hours.  For the typical illness, only one or two 

doses of the medication may be necessary.


     Mild lightheadedness may occur.  This type of medicine can cause 

drowsiness.  Do not drive or operate dangerous machinery while under its 

influence.  Do not mix with alcohol.


     See your doctor at once if you have muscle spasms or tightness, or 

uncontrollable motions (particularly of the neck, mouth, or jaw). Persistent 

vomiting or severe lightheadedness should also be evaluated by the physician.








FOLLOW-UP CARE:


If you have been referred to a physician for follow-up care, call the physician

s office for an appointment as you were instructed or within the next two days.

  If you experience worsening or a significant change in your symptoms, notify 

the physician immediately or return to the Emergency Department at any time for 

re-evaluation.





Follow-up with Dr. Philippe Wednesday, as scheduled.  He can write 2 

prescriptions for more fentanyl patches and other outpatient pain medications.  

Return if your symptoms worsen or you begin to run a fever, etc.


Referrals: 


DIYA PHILIPPE MD [Primary Care Provider] - Follow up as needed

## 2017-10-01 NOTE — ER DOCUMENT REPORT
ED Medical Screen (RME)





- General


Chief Complaint: Sickle Cell Crisis


Stated Complaint: SICKLE CELL, LEFT KNEE, BACK PAIN


Time Seen by Provider: 10/01/17 18:46


Mode of Arrival: Ambulatory


Information source: Patient


TRAVEL OUTSIDE OF THE [place] IN LAST 30 DAYS: No





- HPI


Patient complains to provider of: Sickle cell crisis


Notes: 





10/01/17 18:48


Patient is a 21-year-old male with a history of sickle cell who presents to the 

emergency room today complaining of knee and back pain typical of his painful 

crises, Percocet is not helping the pain





- Related Data


Allergies/Adverse Reactions: 


 





Coconut * [Coconut] Allergy (Mild, Verified 10/01/17 18:44)


 


transpore tape Allergy (Mild, Uncoded 09/11/17 15:45)


 Hives











Past Medical History





- Social History


Family history: None


Pulmonary Medical History: Reports: Hx Asthma, Hx Pneumonia


Renal/ Medical History: Denies: Hx Peritoneal Dialysis


Psychiatric Medical History: 


   Denies: Hx Depression


Past Surgical History: Reports: Hx Abdominal Surgery - Gallstones removal, Hx 

Orthopedic Surgery - Fluid drained from right foot, Hx Vascular Surgery - Port 

placement.  Denies: Hx Cholecystectomy





- Immunizations


Immunizations up to date: Yes


Hx Diphtheria, Pertussis, Tetanus Vaccination: Yes





Physical Exam





- Vital signs


Vitals: 





 











Temp Pulse Resp BP Pulse Ox


 


 99.1 F   119 H  18   145/85 H  94 


 


 10/01/17 18:40  10/01/17 18:40  10/01/17 18:40  10/01/17 18:40  10/01/17 18:40














Course





- Vital Signs


Vital signs: 





 











Temp Pulse Resp BP Pulse Ox


 


 99.1 F   119 H  18   145/85 H  94 


 


 10/01/17 18:40  10/01/17 18:40  10/01/17 18:40  10/01/17 18:40  10/01/17 18:40

## 2017-10-08 NOTE — ER DOCUMENT REPORT
ED General Pain





- General


Chief Complaint: Sickle Cell Crisis


Stated Complaint: SICKLE CELL CRISIS


Time Seen by Provider: 10/08/17 00:42


Notes: 





Patient is a 21-year-old male with past medical history of hemoglobin SS who 

presents with an acute sickle cell crisis.  He describes a severe, constant 

pain to his low back and left knee.  He states that this is been ongoing for 2 

days.  He has been using a fentanyl patch and oxycodone at home without any 

significant improvement of his symptoms.  Nothing worsens the pain.  He states 

this feels identical to prior sickle cell crises.  He denies any shortness of 

breath or chest pain.  He does have a history of acute chest syndrome and 

states that he does not feel that he is having that today.  He has not seen his 

primary doctor or hematologist regarding today's concerns.


TRAVEL OUTSIDE OF THE [place] IN LAST 30 DAYS: No





- Related Data


Allergies/Adverse Reactions: 


 





Coconut * [Coconut] Allergy (Mild, Verified 10/07/17 23:20)


 


transpore tape Allergy (Mild, Uncoded 10/07/17 23:20)


 Hives











Past Medical History





- General


Information source: Patient





- Social History


Smoking Status: Never Smoker


Frequency of alcohol use: None


Drug Abuse: None


Lives with: Spouse/Significant other


Family History: Reviewed & Not Pertinent, Arthritis, DM, Hypertension, Other - 

Sickle cell trait both parents and asthma


Pulmonary Medical History: Reports: Hx Asthma, Hx Pneumonia


Renal/ Medical History: Denies: Hx Peritoneal Dialysis


Psychiatric Medical History: 


   Denies: Hx Depression


Past Surgical History: Reports: Hx Abdominal Surgery - Gallstones removal, Hx 

Cholecystectomy, Hx Orthopedic Surgery - Fluid drained from right foot, Hx 

Vascular Surgery - Port placement





- Immunizations


Immunizations up to date: Yes


Hx Diphtheria, Pertussis, Tetanus Vaccination: Yes


Hx Pneumococcal Vaccination: 05/16/13





Review of Systems





- Review of Systems


Notes: 





Constitutional: Negative for fever.


HENT: Negative for sore throat.


Eyes: Negative for visual changes.


Cardiovascular: Negative for chest pain.


Respiratory: Negative for shortness of breath.


Gastrointestinal: Negative for abdominal pain, vomiting or diarrhea.


Genitourinary: Negative for dysuria.


Musculoskeletal: Positive for back pain and left knee pain


Skin: Negative for rash.


Neurological: Negative for headaches, weakness or numbness.





10 point ROS negative except as marked above and in HPI.





Physical Exam





- Vital signs


Vitals: 


 











Temp Pulse Resp BP Pulse Ox


 


 98.2 F   120 H  17   140/95 H  93 


 


 10/07/17 23:20  10/07/17 23:20  10/07/17 23:20  10/07/17 23:20  10/07/17 23:20











Interpretation: Tachycardic


Notes: 





PHYSICAL EXAMINATION:





GENERAL: Well-appearing, well-nourished and in no acute distress.





HEAD: Atraumatic, normocephalic.





EYES: Pupils equal round and reactive to light, extraocular movements intact, 

sclera anicteric, conjunctiva are normal.





ENT: nares patent, oropharynx clear without exudates.  Moist mucous membranes.





NECK: Normal range of motion, supple without lymphadenopathy





LUNGS: Breath sounds clear to auscultation bilaterally and equal.  No wheezes 

rales or rhonchi.





HEART: Regular rate and rhythm without murmurs





ABDOMEN: Soft, nontender, normoactive bowel sounds.  No guarding, no rebound.  

No masses appreciated.





EXTREMITIES: Normal range of motion, no pitting or edema.  No cyanosis.





NEUROLOGICAL: No focal neurological deficits. Moves all extremities 

spontaneously and on command.





PSYCH: Normal mood, normal affect.





SKIN: Warm, Dry, normal turgor, no rashes or lesions noted.





Course





- Re-evaluation


Re-evalutation: 





10/08/17 03:15


Presentation is most consistent with an uncomplicated sickle cell pain crisis.  

Patient has no evidence of an aplastic crisis on labs.  History and vitals are 

not consistent with acute chest syndrome.  Vitals have remained within normal 

limits here in the emergency department.  Patient's pain has been able to be 

controlled using IV analgesia.  The patient is agreeable to discharge home at 

this time.  I recommended that they follow closely with their primary 

hematologist.  Return precautions have been reviewed and discussed and patient 

has verbalized indications to return to the emergency department.











- Vital Signs


Vital signs: 


 











Temp Pulse Resp BP Pulse Ox


 


 98.2 F   120 H  17   140/95 H  93 


 


 10/07/17 23:20  10/07/17 23:20  10/07/17 23:20  10/07/17 23:20  10/07/17 23:20














- Laboratory


Result Diagrams: 


 10/08/17 01:30





 10/08/17 01:30


Laboratory results interpreted by me: 


 











  10/08/17





  01:30


 


RBC  2.69 L


 


Hgb  10.5 L


 


Hct  30.5 L


 


MCV  113 H


 


MCH  38.8 H


 


RDW  22.6 H


 


Seg Neutrophils %  36.3 L


 


Monocytes %  18.7 H


 


Retic Count (auto)  9.49 H


 


Absolute Retic  0.256 H














Discharge





- Discharge


Clinical Impression: 


 Sickle cell crisis





Sickle cell anemia


Qualifiers:


 Sickle-cell associated disorders: with unspecified crisis Qualified Code(s): 

D57.00 - Hb-SS disease with crisis, unspecified; D57.0 - Hb-SS disease with 

crisis





Condition: Good


Disposition: HOME, SELF-CARE


Additional Instructions: 


You were seen today for sickle cell pain crisis.  Please follow-up with your 

hematologist.  Returning to the ED if you have worsening pain, fever greater 

than 100.4, shortness of breath, persistent vomiting, or any other symptoms 

that are concerning to you.


Referrals: 


DIYA PHILIPPE MD [Primary Care Provider] - Follow up as needed

## 2017-10-09 NOTE — ER DOCUMENT REPORT
ED Medical Screen (RME)





- General


Chief Complaint: Sickle Cell Crisis


Stated Complaint: POSSIBLE PAIN CRISIS


Time Seen by Provider: 10/09/17 23:30


Mode of Arrival: Ambulatory


Information source: Patient


Notes: 





21-year-old male presents to ED for complaint of left knee pain low back pain 

and chest pain.  He states he has a history of sickle cell anemia.  He states 

he takes oxycodone 15 mg every 4 hours as needed, last dose at 1845-he is on a 

fentanyl patch every 3 days last patch was applied 2 days ago.  States if the 

pain continues for 3 days he usually comes into the emergency room to get 

treated.











I have greeted and performed a rapid initial assessment of this patient.  A 

comprehensive ED assessment and evaluation of the patient, analysis of test 

results and completion of medical decision making process will be conducted by 

an additional ED providers.


TRAVEL OUTSIDE OF THE [place] IN LAST 30 DAYS: No





- Related Data


Allergies/Adverse Reactions: 


 





Coconut * [Coconut] Allergy (Mild, Verified 10/09/17 22:04)


 


transpore tape Allergy (Mild, Uncoded 10/09/17 22:04)


 Hives











Past Medical History





- Social History


Family history: None


Pulmonary Medical History: Reports: Hx Asthma, Hx Pneumonia


Renal/ Medical History: Denies: Hx Peritoneal Dialysis


Psychiatric Medical History: 


   Denies: Hx Depression


Past Surgical History: Reports: Hx Abdominal Surgery - Gallstones removal, Hx 

Cholecystectomy, Hx Orthopedic Surgery - Fluid drained from right foot, Hx 

Vascular Surgery - Port placement





- Immunizations


Immunizations up to date: Yes


Hx Diphtheria, Pertussis, Tetanus Vaccination: Yes





Physical Exam





- Vital signs


Vitals: 





 











Temp Pulse Resp BP Pulse Ox


 


 98.9 F   114 H  20   146/87 H  95 


 


 10/09/17 22:07  10/09/17 22:07  10/09/17 22:07  10/09/17 22:07  10/09/17 22:07














Course





- Vital Signs


Vital signs: 





 











Temp Pulse Resp BP Pulse Ox


 


 98.9 F   114 H  20   146/87 H  95 


 


 10/09/17 22:07  10/09/17 22:07  10/09/17 22:07  10/09/17 22:07  10/09/17 22:07














- Laboratory


Laboratory results interpreted by me: 





 











  10/09/17





  22:10


 


Urine Protein  100 H


 


Urine Blood  SMALL H


 


Urine Urobilinogen  4.0 H


 


Ur Leukocyte Esterase  LARGE H

## 2017-10-10 NOTE — ER DOCUMENT REPORT
ED General





- General


Chief Complaint: Sickle Cell Crisis


Stated Complaint: POSSIBLE PAIN CRISIS


Time Seen by Provider: 10/09/17 23:30


Mode of Arrival: Ambulatory


Information source: Patient


Notes: 





21 yr old male presents with complaints of pain. Pt notes he is having chest 

pain, back pain, left knee pain of 1 week duration. pt denies any fevers or 

chills. pt notes no burning on urination.denies any nausea or vomiting. 


TRAVEL OUTSIDE OF THE [place] IN LAST 30 DAYS: No





- HPI


Onset: Last week


Onset/Duration: Persistent


Quality of pain: Achy


Severity: Mild


Pain Level: 1


Associated symptoms: Body/muscle aches, Chest pain


Exacerbated by: Denies


Relieved by: Denies


Similar symptoms previously: Yes


Recently seen / treated by doctor: Yes





- Related Data


Allergies/Adverse Reactions: 


 





Coconut * [Coconut] Allergy (Mild, Verified 10/09/17 22:04)


 


transpore tape Allergy (Mild, Uncoded 10/09/17 22:04)


 Hives











Past Medical History





- General


Information source: Patient





- Social History


Smoking Status: Never Smoker


Cigarette use (# per day): No


Chew tobacco use (# tins/day): No


Smoking Education Provided: No


Family History: Reviewed & Not Pertinent, Arthritis, DM, Hypertension, Other - 

Sickle cell trait both parents and asthma


Patient has suicidal ideation: No


Patient has homicidal ideation: No


Pulmonary Medical History: Reports: Hx Asthma, Hx Pneumonia


Renal/ Medical History: Denies: Hx Peritoneal Dialysis


Psychiatric Medical History: 


   Denies: Hx Depression


Past Surgical History: Reports: Hx Abdominal Surgery - Gallstones removal, Hx 

Cholecystectomy, Hx Orthopedic Surgery - Fluid drained from right foot, Hx 

Vascular Surgery - Port placement





- Immunizations


Immunizations up to date: Yes


Hx Diphtheria, Pertussis, Tetanus Vaccination: Yes


Hx Pneumococcal Vaccination: 05/16/13





Review of Systems





- Review of Systems


Notes: 





REVIEW OF SYSTEMS:


CONSTITUTIONAL :  Denies fever,  chills, or sweats.  Denies recent illness.


EENT:   Denies eye, ear, throat, or mouth pain or symptoms.  Denies nasal or 

sinus congestion or discharge.  Denies throat, tongue, or mouth swelling or 

difficulty swallowing.


CARDIOVASCULAR:  admits to chest pain 


RESPIRATORY:  Denies cough, cold, or chest congestion.  Denies shortness of 

breath, difficulty breathing, or wheezing.


GASTROINTESTINAL:  Denies abdominal pain or distention.  Denies nausea, vomiting

, or diarrhea.  Denies blood in vomitus, stools, or per rectum.  Denies black, 

tarry stools.  Denies constipation.  


GENITOURINARY:  Denies difficulty urinating, painful urination, burning, 

frequency, blood in urine, or discharge.


MUSCULOSKELETAL:  admits to knee pain 


SKIN:   Denies rash, lesions or sores.


HEMATOLOGIC :   Denies easy bruising or bleeding.


LYMPHATIC:  Denies swollen, enlarged glands.


NEUROLOGICAL:  Denies confusion or altered mental status.  Denies passing out 

or loss of consciousness.  Denies dizziness or lightheadedness.  Denies 

headache.  Denies weakness or paralysis or loss of use of either side.  Denies 

problems with gait or speech.  Denies sensory loss, numbness, or tingling.  

Denies seizures.


PSYCHIATRIC:  Denies anxiety or stress.  Denies depression, suicidal ideation, 

or homicidal ideation.





ALL OTHER SYSTEMS REVIEWED AND NEGATIVE.





Dictation was performed using Dragon voice recognition software 





PHYSICAL EXAMINATION:





GENERAL: Well-appearing, well-nourished and in no acute distress. playing on 

his phone in no distress





HEAD: Atraumatic, normocephalic.





EYES: Pupils equal round and reactive to light, extraocular movements intact, 

sclera anicteric, conjunctiva are normal.





ENT: Nares patent, oropharynx clear without exudates.  Moist mucous membranes.





NECK: Normal range of motion, supple without lymphadenopathy





LUNGS: Breath sounds clear to auscultation bilaterally and equal.  No wheezes 

rales or rhonchi.





HEART: Regular rate and rhythm without murmurs





ABDOMEN: Soft, nontender, nondistended abdomen.  No guarding, no rebound.  No 

masses appreciated.





Musculoskeletal: Normal range of motion, no pitting or edema.  No cyanosis.





NEUROLOGICAL: Cranial nerves grossly intact.  Normal speech, normal gait.  

Normal sensory, motor exams 





PSYCH: Normal mood, normal affect.





SKIN: Warm, Dry, normal turgor, no rashes or lesions noted.





Physical Exam





- Vital signs


Vitals: 


 











Temp Pulse Resp BP Pulse Ox


 


 98.9 F   114 H  20   146/87 H  95 


 


 10/09/17 22:07  10/09/17 22:07  10/09/17 22:07  10/09/17 22:07  10/09/17 22:07














Course





- Re-evaluation


Re-evalutation: 





10/10/17 06:09


Patient was immediately seen upon my arrival , lab work had already been 

performed, he appears to be having a pain crisis.  Patient does note he has 

been soaking in the tub and is noted to have urinary tract infection which is 

new.  He will be started on antibiotics.  He was given fluids pain control 

nausea control and Benadryl IV at his request.  I have very low suspicion for 

acute chest pain syndrome with this patient.  He will be started on Cipro for 

his urinary tract infection and must see his hematologist





pt demanded 2 mg iv dilaudid, benadryl iv , nausea control





Patient has fentanyl and oxycodone at home


10/10/17 08:12


I was notified by nursing the patient wishes to be admitted, I do not believe 

he meets criteria for admission, nonetheless I did speak with his primary care 

physician and offered admission.  Dr. Funez believes patient is stable for 

discharge and able to take his antibiotics as prescribed


10/10/17 09:51


Patient has been reevaluated multiple times, he has been in no distress, 

patient notes he has no pain medications at home which he did not admit to 

initially. pt isntructed ot talk to his pcp for pain control. 


10/10/17 09:52





10/10/17 15:01


After performing a Medical Screening Examination, I estimate there is LOW risk 

for RUPTURED ESOPHAGUS, PNEUMOTHORAX, PULMONARY EMBOLISM, ACUTE CORONARY 

SYNDROME, OR THORACIC AORTIC DISSECTION, thus I consider the discharge 

disposition reasonable.  I have reevaluated this patient multiple times and no 

significant life threatening changes are noted. The patient and I have 

discussed the diagnosis and risks, and we agree with discharging home with 

close follow-up. We also discussed returning to the Emergency Department 

immediately if new or worsening symptoms occur. We have discussed the symptoms 

which are most concerning (e.g., bloody sputum, worsening pain or shortness of 

breath) that necessitate immediate return.





- Vital Signs


Vital signs: 


 











Temp Pulse Resp BP Pulse Ox


 


 98.6 F   106 H  16   142/89 H  96 


 


 10/10/17 08:14  10/10/17 09:59  10/10/17 09:59  10/10/17 09:59  10/10/17 09:59














- Laboratory


Result Diagrams: 


 10/09/17 23:45





 10/09/17 23:45


Laboratory results interpreted by me: 


 











  10/09/17 10/09/17 10/09/17





  22:10 23:45 23:45


 


RBC   2.93 L 


 


Hgb   11.7 L 


 


Hct   33.2 L 


 


MCV   113 H 


 


MCH   39.9 H 


 


RDW   22.6 H 


 


Seg Neuts % (Manual)   32 L 


 


Lymphocytes % (Manual)   53 H 


 


Monocytes % (Manual)   14 H 


 


Retic Count (auto)   11.01 H 


 


Absolute Retic   0.323 H 


 


Sodium    145.7 H


 


Total Bilirubin    1.8 H


 


Direct Bilirubin    0.6 H


 


Alkaline Phosphatase    129 H


 


Total Protein    8.9 H


 


Urine Protein  100 H  


 


Urine Blood  SMALL H  


 


Urine Urobilinogen  4.0 H  


 


Ur Leukocyte Esterase  LARGE H  














Discharge





- Discharge


Clinical Impression: 


 Sickle cell crisis





UTI (urinary tract infection)


Qualifiers:


 Urinary tract infection type: acute cystitis Hematuria presence: without 

hematuria Qualified Code(s): N30.00 - Acute cystitis without hematuria





Condition: Stable


Disposition: HOME, SELF-CARE


Instructions:  Urinary Tract Infection ([facility])


Additional Instructions: 


You must follow-up with your hematologist today for reevaluation return 

immediately if there are any other concerns


Prescriptions: 


Ciprofloxacin HCl [Cipro 500 mg Tablet] 500 mg PO BID #20 tablet

## 2017-10-10 NOTE — RADIOLOGY REPORT (SQ)
EXAM DESCRIPTION:  CHEST PA/LAT



COMPLETED DATE/TIME:  10/10/2017 8:47 am



REASON FOR STUDY:  chest pain sickle cell



COMPARISON:  Chest films 9/11/2017, 8/21/2017, 8/2/2017



EXAM PARAMETERS:  NUMBER OF VIEWS: two views

TECHNIQUE: Digital Frontal and Lateral radiographic views of the chest acquired.

RADIATION DOSE: NA

LIMITATIONS: none



FINDINGS:    Since the prior films, the patient has a right upper arm permanent central line tip has 
flipped up into the right jugular vein.

LUNGS AND PLEURA: No opacities, masses or pneumothorax. No pleural effusion.

MEDIASTINUM AND HILAR STRUCTURES: No masses or contour abnormalities.

HEART AND VASCULAR STRUCTURES: Heart normal size.  No evidence for failure.

BONES: Extensive bony sclerosis of the bilateral humeral heads, ribs, and thoracic spine in a charact
eristic pattern for sickle cell.

HARDWARE: None in the chest.

OTHER: No other significant finding.



IMPRESSION:  No acute infiltrates.

Right-sided permanent central line tip has flipped from the SVC up into the right jugular



TECHNICAL DOCUMENTATION:  JOB ID:  [id number]

 [employer]- All Rights Reserved

## 2017-10-24 NOTE — ER DOCUMENT REPORT
ED General





- General


Chief Complaint: Knee Pain


Stated Complaint: KNEE AND BACK PAIN


Time Seen by Provider: 10/24/17 18:43


Notes: 


Patient is a 21-year-old male who comes emergency department for chief 

complaint of pain in his left knee and lower back, he states symptoms started 

earlier today, he has a history of sickle cell anemia, he states that he has 

felt some nausea but he denies vomiting, he denies abdominal pain, he denies 

shortness of breath or chest pain, denies cough, denies fever.  He states he is 

compliant with his medications, he is on oxycodone and has a fentanyl patch as 

well.  He continues to follow with local hematology.


TRAVEL OUTSIDE OF THE [place] IN LAST 30 DAYS: No





- Related Data


Allergies/Adverse Reactions: 


 





Coconut * [Coconut] Allergy (Mild, Verified 10/24/17 18:08)


 


transpore tape Allergy (Mild, Uncoded 10/09/17 22:04)


 Hives











Past Medical History





- General


Information source: Patient





- Social History


Smoking Status: Never Smoker


Chew tobacco use (# tins/day): No


Frequency of alcohol use: None


Drug Abuse: None


Lives with: Family


Family History: Reviewed & Not Pertinent, Arthritis, DM, Hypertension, Other - 

Sickle cell trait both parents and asthma


Pulmonary Medical History: Reports: Hx Asthma, Hx Pneumonia


Renal/ Medical History: Denies: Hx Peritoneal Dialysis


Psychiatric Medical History: 


   Denies: Hx Depression


Past Surgical History: Reports: Hx Abdominal Surgery - Gallstones removal, Hx 

Cholecystectomy, Hx Orthopedic Surgery - Fluid drained from right foot, Hx 

Vascular Surgery - Port placement





- Immunizations


Immunizations up to date: Yes


Hx Diphtheria, Pertussis, Tetanus Vaccination: Yes


Hx Pneumococcal Vaccination: 05/16/13





Review of Systems





- Review of Systems


Constitutional: No symptoms reported


EENT: No symptoms reported


Cardiovascular: See HPI


Respiratory: No symptoms reported


Gastrointestinal: No symptoms reported


Genitourinary: No symptoms reported


Male Genitourinary: No symptoms reported


Musculoskeletal: See HPI


Skin: No symptoms reported


Hematologic/Lymphatic: No symptoms reported


Neurological/Psychological: No symptoms reported





Physical Exam





- Vital signs


Vitals: 


 











Temp Pulse BP Pulse Ox


 


 99.3 F   107 H  141/88 H  93 


 


 10/24/17 18:07  10/24/17 18:07  10/24/17 18:07  10/24/17 18:07











Interpretation: Normal





- General


General appearance: Appears well, Alert


In distress: None





- HEENT


Head: Normocephalic, Atraumatic


Eyes: Normal


Pupils: PERRL





- Respiratory


Respiratory status: No respiratory distress


Chest status: Nontender


Breath sounds: Normal.  No: Decreased air movement, Wheezing


Chest palpation: Normal





- Cardiovascular


Rhythm: Regular, Tachycardia


Heart sounds: Normal auscultation, S1 appreciated, S2 appreciated


Murmur: No





- Abdominal


Inspection: Normal


Distension: No distension


Bowel sounds: Normal


Tenderness: Nontender.  No: Tender, Guarding


Organomegaly: No organomegaly





- Back


Back: Normal, Nontender.  No: Tender, CVA tenderness





- Extremities


General upper extremity: Normal inspection, Nontender, Normal ROM, Normal 

strength


General lower extremity: Normal inspection, Nontender, Normal ROM, Normal 

strength





- Neurological


Neuro grossly intact: Yes


Cognition: Normal


Orientation: AAOx4


Dhaval Coma Scale Eye Opening: Spontaneous


Chelsea Coma Scale Verbal: Oriented


Chelsea Coma Scale Motor: Obeys Commands


Dhaval Coma Scale Total: 15


Speech: Normal


Cranial nerves: Normal


Cerebellar coordination: Normal


Motor strength normal: LUE, RUE, LLE, RLE


Additional motor exam normals: Equal 


Sensory: Normal





- Psychological


Associated symptoms: Normal affect, Normal mood





- Skin


Skin Temperature: Warm


Skin Moisture: Dry


Skin Color: Normal





Course





- Re-evaluation


Re-evalutation: 


Patient borderline tachycardic on initial examination although he does not 

appear to be in any distress.  Unremarkable knee and back examination in the 

locations of pain.  No neurological deficits.  No fever.





CBC shows mild leukocytosis, macrocytic anemia at 9.7, reticulocytes slightly 

less elevated than a couple of days ago.  Bilirubin is only mildly elevated.  

No evidence of severe cycling.  After IV fluids tachycardia resolved.  Patient 

given 2 doses of pain medication.  I reevaluated patient at bedside.  Patient 

states he feels great.  He denies any current symptoms.  He states he is ready 

to leave.  He states he has medications at home that he is taking, he states he 

will follow-up with his hematologist.  He states he will return if he worsens 

including fever, difficulty breathing, vomiting.  Patient discharged for follow-

up with hematology.





- Vital Signs


Vital signs: 


 











Temp Pulse Resp BP Pulse Ox


 


 98.3 F   99   18   132/91 H  95 


 


 10/24/17 21:53  10/24/17 21:53  10/24/17 21:53  10/24/17 21:53  10/24/17 21:53














- Laboratory


Result Diagrams: 


 10/24/17 20:18





 10/24/17 19:45


Laboratory results interpreted by me: 


 











  10/24/17 10/24/17 10/24/17





  19:45 20:15 20:18


 


WBC    10.7 H


 


RBC    2.55 L


 


Hgb    9.7 L


 


Hct    27.3 L


 


MCV    107 H D


 


MCH    38.0 H


 


RDW    24.8 H


 


Band Neutrophils %    1 L


 


Retic Count (auto)   10.40 H 


 


Absolute Retic   0.268 H 


 


Sodium  145.9 H  


 


Total Bilirubin  2.1 H  


 


Direct Bilirubin  0.8 H  


 


Total Protein  8.4 H  


 


Urine Urobilinogen   


 


Ur Leukocyte Esterase   














  10/24/17





  21:33


 


WBC 


 


RBC 


 


Hgb 


 


Hct 


 


MCV 


 


MCH 


 


RDW 


 


Band Neutrophils % 


 


Retic Count (auto) 


 


Absolute Retic 


 


Sodium 


 


Total Bilirubin 


 


Direct Bilirubin 


 


Total Protein 


 


Urine Urobilinogen  4.0 H


 


Ur Leukocyte Esterase  TRACE H














Discharge





- Discharge


Clinical Impression: 


 Sickle cell crisis





Sickle cell anemia


Qualifiers:


 Sickle-cell associated disorders: with unspecified crisis Qualified Code(s): 

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





Knee pain, left


Qualifiers:


 Chronicity: acute Qualified Code(s): M25.562 - Pain in left knee





Condition: Stable


Disposition: HOME, SELF-CARE


Additional Instructions: 


Continue current medications, stay hydrated, rest.  Follow-up with your primary 

care provider.  Return to the emergency department if you worsen including fever

, vomiting, worsening pain, or any other concerning symptoms.

## 2017-10-24 NOTE — ER DOCUMENT REPORT
ED Medical Screen (RME)





- General


Chief Complaint: Knee Pain


Stated Complaint: KNEE AND BACK PAIN


Time Seen by Provider: 10/24/17 18:43


Notes: 





Patient complains of left knee and back pain.  Patient has sickle cell, last 

admission was 1 month ago.  He does state he has been seen several times this 

month already for the same.  Denies fever, does have some nausea without 

vomiting.





I have greeted and performed a rapid initial assessment of this patient.  A 

comprehensive ED assessment and evaluation of the patient, analysis of test 

results and completion of the medical decision making process will be conducted 

by additional ED providers.


TRAVEL OUTSIDE OF THE [place] IN LAST 30 DAYS: No





- Related Data


Allergies/Adverse Reactions: 


 





Coconut * [Coconut] Allergy (Mild, Verified 10/24/17 18:08)


 


transpore tape Allergy (Mild, Uncoded 10/09/17 22:04)


 Hives











Past Medical History





- Social History


Chew tobacco use (# tins/day): No


Frequency of alcohol use: None


Drug Abuse: None


Family history: None


Pulmonary Medical History: Reports: Hx Asthma, Hx Pneumonia


Renal/ Medical History: Denies: Hx Peritoneal Dialysis


Psychiatric Medical History: 


   Denies: Hx Depression


Past Surgical History: Reports: Hx Abdominal Surgery - Gallstones removal, Hx 

Cholecystectomy, Hx Orthopedic Surgery - Fluid drained from right foot, Hx 

Vascular Surgery - Port placement





- Immunizations


Immunizations up to date: Yes


Hx Diphtheria, Pertussis, Tetanus Vaccination: Yes


History of Influenza Vaccine for 10/2017 - 3/2018 Season: Yes





Physical Exam





- Vital signs


Vitals: 





 











Temp Pulse BP Pulse Ox


 


 99.3 F   107 H  141/88 H  93 


 


 10/24/17 18:07  10/24/17 18:07  10/24/17 18:07  10/24/17 18:07














Course





- Vital Signs


Vital signs: 





 











Temp Pulse Resp BP Pulse Ox


 


 99.3 F   107 H     141/88 H  93 


 


 10/24/17 18:07  10/24/17 18:07     10/24/17 18:07  10/24/17 18:07

## 2017-11-30 NOTE — RADIOLOGY REPORT (SQ)
EXAM DESCRIPTION:  CT ABD/PELVIS NO ORAL OR IV



COMPLETED DATE/TIME:  11/30/2017 9:49 pm



REASON FOR STUDY:  eval obstruction



COMPARISON:  3/26/2016



TECHNIQUE:  CT scan of the abdomen and pelvis performed without intravenous or oral contrast. Images 
reviewed with lung, soft tissue, and bone windows. Reconstructed coronal and sagittal MPR images revi
ewed. All images stored on PACS.

All CT scanners at this facility use dose modulation, iterative reconstruction, and/or weight based d
osing when appropriate to reduce radiation dose to as low as reasonably achievable (ALARA).

[facility]: Dose Right  [facility]: CareDose    [facility]: Dose Right    [facility]: Teradose 4D    [facility]: [employer] Technologies



RADIATION DOSE:  mGy.



LIMITATIONS:  None.



FINDINGS:  LOWER CHEST: Chronic lower lung opacities stable from the previous study.

NON-CONTRASTED LIVER, SPLEEN, ADRENALS: Evaluation limited by lack of IV contrast. No identified sign
ificant masses.  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

PANCREAS: No masses. No peripancreatic inflammatory changes.

GALLBLADDER: Choose 2

RIGHT KIDNEY AND URETER: No suspicious masses. Assessment limited by lack of IV contrast.   No signif
icant calcifications.   No hydronephrosis or hydroureter.

LEFT KIDNEY AND URETER: No suspicious masses. Assessment limited by lack of IV contrast.   No signifi
cant calcifications.   No hydronephrosis or hydroureter.

AORTA AND RETROPERITONEUM: No aneurysm. No retroperitoneal masses or adenopathy.

BOWEL AND PERITONEAL CAVITY: No obvious masses or inflammatory changes. No free fluid.

APPENDIX: Normal.

PELVIS, BLADDER, AND ABDOMINAL WALL:No abnormal masses. No free fluid. Bladder normal.

BONES: Bone changes related to sickle cell disease.

OTHER: No other significant finding.



IMPRESSION:  No acute findings.

Chronic lung disease.

auto splenectomy.

Bone changes of sickle cell disease.



COMMENT:  Quality ID # [id number]: Final reports with documentation of one or more dose reduction techniques
 (e.g., Automated exposure control, adjustment of the mA and/or kV according to patient size, use of 
iterative reconstruction technique)



TECHNICAL DOCUMENTATION:  JOB ID:  [id number]

 [employer]- All Rights Reserved

## 2017-11-30 NOTE — ER DOCUMENT REPORT
ED Medical Screen (RME)





- General


Chief Complaint: Sickle Cell Crisis


Stated Complaint: CHEST PAIN,VOMITING


Time Seen by Provider: 11/30/17 19:14


Mode of Arrival: Ambulatory


Information source: Patient


TRAVEL OUTSIDE OF THE [place] IN LAST 30 DAYS: No





- HPI


Onset: Other - 2 DAYS


Onset/Duration: Gradual


Context: 





Hx SS DISEASE


Quality of pain: Achy, Throbbing


Severity: Moderate


Associated Symptoms: Nausea, Vomiting


Exacerbated by: Movement, Other - VOMITING


Relieved by: Denies


Similar symptoms previously: Yes


Recently seen / treated by doctor: No





- Related Data


Smoking: Non-smoker


Frequency of alcohol use: None


Drug Abuse: None


Allergies/Adverse Reactions: 


 





Coconut * [Coconut] Allergy (Mild, Verified 11/30/17 18:44)


 


transpore tape Allergy (Mild, Uncoded 10/09/17 22:04)


 Hives











Past Medical History





- General


Information source: Patient





- Social History


Cigarette use (# per day): No


Chew tobacco use (# tins/day): No


Frequency of alcohol use: None


Drug Abuse: None


Lives with: Family


Family history: None





- Past Medical History


Cardiac Medical History: Reports: None


Pulmonary Medical History: Reports: Hx Asthma, Hx Pneumonia


Neurological Medical History: Reports: None


Endocrine Medical History: Reports: None


Renal/ Medical History: Reports: None.  Denies: Hx Peritoneal Dialysis


Malignancy Medical History: Reports Other - SS DISEASE


Psychiatric Medical History: 


   Denies: Hx Depression


Past Surgical History: Reports: Hx Abdominal Surgery - Gallstones removal, Hx 

Cholecystectomy, Hx Orthopedic Surgery - Fluid drained from right foot, Hx 

Vascular Surgery - Port placement





- Immunizations


Immunizations up to date: Yes


Hx Diphtheria, Pertussis, Tetanus Vaccination: Yes


History of Influenza Vaccine for 10/2017 - 3/2018 Season: Yes





Review of Systems





- Review of Systems


Constitutional: denies: Chills, Fever


EENT: No symptoms reported


Cardiovascular: No symptoms reported.  denies: Chest pain


Respiratory: No symptoms reported.  denies: Short of breath


Gastrointestinal: See HPI, Abdominal pain


Genitourinary: No symptoms reported


Musculoskeletal: See HPI





Physical Exam





- Vital signs


Vitals: 





 











Temp Pulse Resp BP Pulse Ox


 


 98.7 F   105 H  19   132/91 H  92 


 


 11/30/17 18:51  11/30/17 18:51  11/30/17 18:51  11/30/17 18:51  11/30/17 18:51











Interpretation: Hypertensive, Tachycardic.  No: Tachypneic, Febrile





- General


General appearance: Anxious


In distress: Mild





- HEENT


Head: Normocephalic


Eyes: Normal.  No: Pale conjunctiva


Conjunctiva: Normal


Ears: Normal


Nasal: Normal


Mouth/Lips: Normal


Mucous membranes: Normal





- Respiratory


Respiratory status: No respiratory distress





- Cardiovascular


Rhythm: Regular, Tachycardia





- Neurological


Neuro grossly intact: Yes


Cognition: Normal


Orientation: AAOx4





- Psychological


Associated symptoms: Normal affect, Normal mood





Course





- Vital Signs


Vital signs: 





 











Temp Pulse Resp BP Pulse Ox


 


 98.7 F   105 H  19   132/91 H  92 


 


 11/30/17 18:51  11/30/17 18:51  11/30/17 18:51  11/30/17 18:51  11/30/17 18:51

## 2017-11-30 NOTE — RADIOLOGY REPORT (SQ)
EXAM DESCRIPTION:  CHEST SINGLE VIEW



COMPLETED DATE/TIME:  11/30/2017 8:12 pm



REASON FOR STUDY:  eval acute chest



COMPARISON:  10/10/2017



EXAM PARAMETERS:  NUMBER OF VIEWS: One view.

TECHNIQUE: Single frontal radiographic view of the chest acquired.

RADIATION DOSE: NA

LIMITATIONS: None.



FINDINGS:  LUNGS AND PLEURA: No opacities, masses or pneumothorax. No pleural effusion.

MEDIASTINUM AND HILAR STRUCTURES: No masses.  Contour normal.

HEART AND VASCULAR STRUCTURES: Heart normal in size.  Normal vasculature.

BONES: Stable chronic changes compatible with sickle cell disease.

HARDWARE: Patient's right-sided port with tip remaining presumably in the right internal jugular vein
.

OTHER: No other significant finding.



IMPRESSION:  NO ACUTE RADIOGRAPHIC FINDING IN THE CHEST.

STABLE MALPOSITION RIGHT-SIDED PORT WHICH SHOULD NOT BE USED UNTIL REPOSITIONED.



TECHNICAL DOCUMENTATION:  JOB ID:  [id number]

 2011 [employer]- All Rights Reserved

## 2017-11-30 NOTE — ER DOCUMENT REPORT
ED General





- General


Chief Complaint: Sickle Cell Crisis


Stated Complaint: CHEST PAIN,VOMITING


Time Seen by Provider: 11/30/17 19:14


Mode of Arrival: Ambulatory


Notes: 





Patient is a 22-year-old male with a past medical history of sickle cell (

hemoglobin SS), chronic opiate dependence, prior gallstone resections without a 

cholecystectomy who presents with 24 hours of low back pain, diffuse upper 

abdominal pain and bilious vomiting.  Patient describes the pain in his low 

back and upper abdomen as being a constant, cramping, irritating pain.  Nothing 

improves or worsens the pain.  He states that the pain in his low back is 

typical for sickle cell crisis but notes that he does not usually develop 

abdominal pain with his sickle cell crises nor does he typically vomit.  

Patient does show me a picture of his vomitus from earlier today and it is 

noted to be a julita, bright green.  He denies any history of similar episodes of 

vomiting in the past.  He has not seen his primary doctor regarding today's 

concerns.  He has not had any fever or altered mental status. 


TRAVEL OUTSIDE OF THE [place] IN LAST 30 DAYS: No





- Related Data


Allergies/Adverse Reactions: 


 





Coconut * [Coconut] Allergy (Mild, Verified 11/30/17 18:44)


 


transpore tape Allergy (Mild, Uncoded 10/09/17 22:04)


 Hives











Past Medical History





- General


Information source: Patient





- Social History


Smoking Status: Never Smoker


Cigarette use (# per day): No


Chew tobacco use (# tins/day): No


Frequency of alcohol use: None


Drug Abuse: None


Lives with: Family


Family History: Reviewed & Not Pertinent, Arthritis, DM, Hypertension, Other - 

Sickle cell trait both parents and asthma


Patient has suicidal ideation: No


Patient has homicidal ideation: No





- Past Medical History


Cardiac Medical History: Reports: None


Pulmonary Medical History: Reports: Hx Asthma, Hx Pneumonia


Neurological Medical History: Reports: None


Endocrine Medical History: Reports: None


Renal/ Medical History: Reports: None.  Denies: Hx Peritoneal Dialysis


Malignancy Medical History: Reports Other - SS DISEASE


Psychiatric Medical History: 


   Denies: Hx Depression


Past Surgical History: Reports: Hx Abdominal Surgery - Gallstones removal, Hx 

Cholecystectomy, Hx Orthopedic Surgery - Fluid drained from right foot, Hx 

Vascular Surgery - Port placement





- Immunizations


Immunizations up to date: Yes


Hx Diphtheria, Pertussis, Tetanus Vaccination: Yes


Hx Pneumococcal Vaccination: 05/16/13





Review of Systems





- Review of Systems


Notes: 





Constitutional: Negative for fever.


HENT: Negative for sore throat.


Eyes: Negative for visual changes.


Cardiovascular: Negative for chest pain.


Respiratory: Negative for shortness of breath.


Gastrointestinal: Positive for abdominal pain and bilious vomiting


Genitourinary: Negative for dysuria.


Musculoskeletal: Positive for back pain.


Skin: Negative for rash.


Neurological: Negative for headaches, weakness or numbness.





10 point ROS negative except as marked above and in HPI.





Physical Exam





- Vital signs


Vitals: 


 











Temp Pulse Resp BP Pulse Ox


 


 98.7 F   105 H  19   132/91 H  92 


 


 11/30/17 18:51  11/30/17 18:51  11/30/17 18:51  11/30/17 18:51  11/30/17 18:51











Interpretation: Tachycardic


Notes: 





PHYSICAL EXAMINATION:





GENERAL: Appears uncomfortable but in no acute distress





HEAD: Atraumatic, normocephalic.





EYES: Pupils equal round and reactive to light, extraocular movements intact, 

sclera anicteric, conjunctiva are normal.





ENT: nares patent, oropharynx clear without exudates.  Dry mucous membranes.





NECK: Normal range of motion, supple without lymphadenopathy





LUNGS: Breath sounds clear to auscultation bilaterally and equal.  No wheezes 

rales or rhonchi.





HEART: Regular tachycardia without murmurs





ABDOMEN: Soft, diffuse tenderness on palpation of the upper abdomen.  No 

rebound or guarding.





EXTREMITIES: Normal range of motion, no pitting or edema.  No cyanosis.





NEUROLOGICAL: No focal neurological deficits. Moves all extremities 

spontaneously and on command.





PSYCH: Normal mood, normal affect.





SKIN: Warm, Dry, or turgor, no rashes or lesions noted.





Course





- Re-evaluation


Re-evalutation: 





11/30/17 19:37


Patient presents with pain typical for sickle cell pain crisis although I am 

much more concerned about bilious vomiting.  Patient shows me a picture of his 

vomitus earlier today and is a Julita green color in the toilet worrisome for a 

small bowel inflammation or obstruction.  Patient has been unable to tolerate 

oral intake now for almost 24 hours.  He does have a history of prior gallstone 

resections but they did not take his gallbladder.  This also raises a concern 

for possible acute cholecystitis.  On abdominal examination patient does have 

focal epigastric and right upper quadrant abdominal tenderness but no rebound 

or guarding.  Patient is also quite dehydrated on examination and is notably 

tachycardic to 120s at time of my assessment.  Will proceed with CT the abdomen 

pelvis, IV fluids, pain control and reassess





- Vital Signs


Vital signs: 


 











Temp Pulse Resp BP Pulse Ox


 


 98.7 F   105 H  19   115/65   88 L


 


 11/30/17 18:51  11/30/17 18:51  11/30/17 18:51  11/30/17 20:45  11/30/17 22:15














- Laboratory


Result Diagrams: 


 11/30/17 19:55





 11/30/17 19:55


Laboratory results interpreted by me: 


 











  11/30/17 11/30/17 11/30/17





  19:20 19:45 19:55


 


WBC    13.4 H


 


RBC    2.65 L


 


Hgb    9.4 L


 


Hct    26.9 L


 


MCV    101 H


 


MCH    35.4 H


 


RDW    26.2 H


 


Band Neutrophils %    1 L


 


Metamyelocytes %    1 H


 


Abs Neuts (Manual)    9.5 H


 


Retic Count (auto)    14.89 H


 


Absolute Retic    0.395 H


 


Glucose   


 


POC Glucose   140 H 


 


Total Bilirubin   


 


Direct Bilirubin   


 


AST   


 


Alkaline Phosphatase   


 


Total Protein   


 


Albumin   


 


Urine Protein  100 H  


 


Urine Blood  MODERATE H  


 


Urine Urobilinogen  2.0 H  


 


Ur Leukocyte Esterase  MODERATE H  














  11/30/17





  19:55


 


WBC 


 


RBC 


 


Hgb 


 


Hct 


 


MCV 


 


MCH 


 


RDW 


 


Band Neutrophils % 


 


Metamyelocytes % 


 


Abs Neuts (Manual) 


 


Retic Count (auto) 


 


Absolute Retic 


 


Glucose  123 H


 


POC Glucose 


 


Total Bilirubin  4.2 H


 


Direct Bilirubin  1.1 H


 


AST  97 H


 


Alkaline Phosphatase  140 H


 


Total Protein  9.3 H


 


Albumin  5.1 H


 


Urine Protein 


 


Urine Blood 


 


Urine Urobilinogen 


 


Ur Leukocyte Esterase 














- Diagnostic Test


Radiology reviewed: Image reviewed, Reports reviewed


Radiology results interpreted by me: 





11/30/17 23:21


Chest x-ray: Pulmonary edema and cardiomegaly





Critical Care Note





- Critical Care Note


Total time excluding time spent on procedures (mins): 38


Comments: 





Critical care time spent obtaining history from patient or surrogate, 

discussions with consultants, development of treatment plan with patient or 

surrogate, evaluation of patient's response to treatment, examination of patient

, ordering and performing treatments and interventions, ordering and review of 

laboratory studies, re-evaluation of patient's condition, ordering and review 

of radiographic studies and review of old charts





Discharge





- Discharge


Clinical Impression: 


 Pyelonephritis, Sickle cell crisis





Bilious vomiting


Qualifiers:


 Nausea presence: with nausea Qualified Code(s): R11.14 - Bilious vomiting





Condition: Fair


Disposition: ADMITTED AS OBSERVATION


Admitting Provider: Armin


Unit Admitted: Telemetry


Referrals: 


DIYA PHILIPPE MD [Primary Care Provider] - Follow up as needed

## 2017-12-01 NOTE — PDOC H&P
History of Present Illness


Admission Date/PCP: 


  11/30/17 23:42





  DIYA JOSE MARTIN





Patient complains of: Nausea, vomiting, Abdominal pain


History of Present Illness: 


CARMEN BEGUM is a 22 year old male known to my practice who presented to the 

ED with complaints including nausea, bilious vomiting and upper abdominal pain 

that started couple of hours prior to his arrival. Patient denied consumption 

of any unusual food or drink. He described abdominal pain as cramping and 

preceding his vomiting. He denied similar episode in the past. He described 

associated lower back pain that he related to his sickle cell SS disease. He 

denied any significant difficulty with breathing, no hematuria but urine have 

been dark color. He denied any significant fever or chills. His initial ED 

evaluation was remarkable for tachycardia, witnessed bilious vomiting, acute 

pain, abnormal urinalysis suggestive of UTI and associated leukocytosis. He was 

subsequently advised admission for further evaluation and management. HJis 

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 

and hemolytic crises and Asthma.





Past Medical History


Cardiac Medical History: Reports: None


Pulmonary Medical History: Reports: Asthma, Pneumonia


Neurological Medical History: Reports: None


Endocrine Medical History: Reports: None


Renal/ Medical History: Reports: None


Malignancy Medical History: Reports: Other - SS DISEASE


Psychiatric Medical History: 


   Denies: Depression


Hematology: Reports: Anemia, Sickle Cell Disease, Bleeding Tendencies





Past Surgical History


Past Surgical History: Reports: Cholecystectomy, Orthopedic Surgery - Fluid 

drained from right foot, Vascular Surgery - Port placement





Social History


Lives with: Family


Smoking Status: Never Smoker


Frequency of Alcohol Use: None


Hx Recreational Drug Use: No


Drugs: None


Hx Prescription Drug Abuse: No





- Advance Directive


Resuscitation Status: Full Code





Family History


Family History: Reviewed & Not Pertinent, Arthritis, DM, Hypertension, Other - 

Sickle cell trait both parents and asthma


Parental Family History Reviewed: Yes


Children Family History Reviewed: Yes


Sibling(s) Family History Reviewed.: Yes





Medication/Allergy


Home Medications: 








Ergocalciferol (Vitamin D2) [Vitamin D2] 50,000 unit PO AYALA@1000 12/01/17 


Fentanyl [Duragesic 100 Mcg/Hr Transdermal Patch] 1 patch TD Q3D 12/01/17 


Folic Acid [Folvite 1 mg Tablet] 1 mg PO DAILY 12/01/17 


Hydroxyurea [Hydrea 500 Mg Capsule] 1,000 mg PO SUTUSA@1000 12/01/17 


Hydroxyurea [Hydrea 500 Mg Capsule] 1,500 mg PO MOWEFR@1000 12/01/17 


Oxycodone HCl [Oxy-Ir 5 mg Tablet] 5 mg PO Q4 12/01/17 








Allergies/Adverse Reactions: 


 





Coconut * [Coconut] Allergy (Mild, Verified 11/30/17 18:44)


 


transpore tape Allergy (Mild, Uncoded 10/09/17 22:04)


 Hives











Review of Systems


Constitutional: ABSENT: chills, fever(s), headache(s), weight gain, weight loss


Eyes: ABSENT: visual disturbances


Ears: ABSENT: hearing changes


Nose, Mouth, and Throat: ABSENT: as per HPI, headache(s), mouth pain, sore 

throat, vertigo, other


Cardiovascular: PRESENT: chest pain


Respiratory: PRESENT: as per HPI


Gastrointestinal: PRESENT: abdominal pain, nausea, vomiting


Genitourinary: ABSENT: dysuria, hematuria


Musculoskeletal: PRESENT: back pain


Integumentary: ABSENT: rash, wounds


Neurological: ABSENT: abnormal gait, abnormal speech, confusion, dizziness, 

focal weakness, syncope


Psychiatric: ABSENT: anxiety, depression, homidical ideation, suicidal ideation


Endocrine: ABSENT: cold intolerance, heat intolerance, polydipsia, polyuria


Hematologic/Lymphatic: ABSENT: easy bleeding, easy bruising, lymphadenopathy


Allergic/Immunologic: ABSENT: seasonal rhinorrhea





Physical Exam


Vital Signs: 


 











Temp Pulse Resp BP Pulse Ox


 


 98.7 F   105 H  8 L  118/75   100 


 


 11/30/17 18:51  11/30/17 18:51  12/01/17 07:16  12/01/17 07:16  12/01/17 07:16











General appearance: PRESENT: no acute distress - at the time of my assessment


Head exam: PRESENT: atraumatic, normocephalic


Eye exam: PRESENT: conjunctiva pink, EOMI, PERRLA.  ABSENT: scleral icterus


Ear exam: PRESENT: normal external ear exam


Mouth exam: PRESENT: moist, tongue midline


Neck exam: PRESENT: full ROM.  ABSENT: carotid bruit, JVD, lymphadenopathy, 

thyromegaly


Respiratory exam: PRESENT: clear to auscultation brenda


Cardiovascular exam: PRESENT: RRR.  ABSENT: diastolic murmur, rubs, systolic 

murmur


Pulses: PRESENT: normal dorsalis pedis pul, +2 pedal pulses bilateral


Vascular exam: PRESENT: normal capillary refill.  ABSENT: pallor


GI/Abdominal exam: PRESENT: normal bowel sounds, soft, tenderness - mild, LLQ 

tenderness to palpation.  ABSENT: distended, guarding, mass, organolmegaly, 

rebound


Rectal exam: PRESENT: deferred


Extremities exam: ABSENT: joint swelling, pedal edema


Musculoskeletal exam: PRESENT: normal inspection


Neurological exam: PRESENT: alert, awake, oriented to person, oriented to place

, oriented to time, oriented to situation, CN II-XII grossly intact.  ABSENT: 

motor sensory deficit


Psychiatric exam: PRESENT: appropriate affect, normal mood.  ABSENT: homicidal 

ideation, suicidal ideation


Skin exam: PRESENT: dry, intact, warm.  ABSENT: cyanosis, rash





Results


Laboratory Results: 





I reviewed his laboratory findings on [facility] and form a significant part of 

my medical decision making.


Impressions: 


 





Chest X-Ray  11/30/17 20:00


IMPRESSION:  NO ACUTE RADIOGRAPHIC FINDING IN THE CHEST.


STABLE MALPOSITION RIGHT-SIDED PORT WHICH SHOULD NOT BE USED UNTIL REPOSITIONED.


 








Abdomen/Pelvis CT  11/30/17 21:21


IMPRESSION:  No acute findings.


Chronic lung disease.


auto splenectomy.


Bone changes of sickle cell disease.


 














Assessment & Plan





- Diagnosis


(1) UTI (urinary tract infection)


Qualifiers: 


   Urinary tract infection type: acute cystitis   Hematuria presence: without 

hematuria   Qualified Code(s): N30.00 - Acute cystitis without hematuria   


Is this a current diagnosis for this admission?: Yes   


Plan: 


See admitting physician orders.








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


Is this a current diagnosis for this admission?: Yes   


Plan: 


See admitting physician orders.








(3) Asthma


Qualifiers: 


   Asthma severity: mild   Asthma complication type: uncomplicated 


Is this a current diagnosis for this admission?: Yes   


Plan: 


See admitting physician orders.








- Time


Time Spent: 50 to 70 Minutes


Medications reviewed and adjusted accordingly: Yes


Anticipated discharge: Home


Within: Other





- Inpatient Certification


Based on my medical assessment, after consideration of the patient's 

comorbidities, presenting symptoms, or acuity I expect that the services needed 

warrant INPATIENT care.: Yes


I certify that my determination is in accordance with my understanding of 

Medicare's requirements for reasonable and necessary INPATIENT services [42 CFR 

412.3e].: Yes


Medical Necessity: Need Close Monitoring Due to Risk of Patient Decompensation, 

Need For IV Fluids, Need For Continuous Telemetry Monitoring, Need for IV 

Antibiotics, Risk of Complication if Not Cared For in Hospital


Post Hospital Care: D/C Planner Documentation





- Plan Summary


Plan Summary: 


See admitting physician orders.

## 2017-12-02 NOTE — PDOC PROGRESS REPORT
Subjective


Progress Note for:: 12/02/17


Subjective:: 





Patient was admitted yesterday because of ,pyelonephritis history of sickle 

cell disease


Reason For Visit: 


ACUTE PYELONEPHRITIS,SICKLE CELL DISEASE CRISIS








Physical Exam


Vital Signs: 


 











Temp Pulse Resp BP Pulse Ox


 


 98.1 F   83   14   126/75 H  96 


 


 12/02/17 15:51  12/02/17 15:51  12/02/17 15:51  12/02/17 15:51  12/02/17 15:51








 Intake & Output











 12/01/17 12/02/17 12/03/17





 06:59 06:59 06:59


 


Intake Total  2672 1040


 


Output Total  1575 1200


 


Balance  1097 -160


 


Weight  60.7 kg 











General appearance: PRESENT: no acute distress


Head exam: PRESENT: atraumatic, normocephalic


Eye exam: PRESENT: conjunctiva pink, EOMI, PERRLA


Ear exam: PRESENT: normal external ear exam


Mouth exam: PRESENT: moist, tongue midline


Neck exam: PRESENT: full ROM


Respiratory exam: PRESENT: clear to auscultation brenda


Cardiovascular exam: PRESENT: RRR, +S1, +S2


Vascular exam: PRESENT: normal capillary refill


GI/Abdominal exam: PRESENT: normal bowel sounds, soft


Rectal exam: PRESENT: deferred


Neurological exam: PRESENT: alert.  ABSENT: motor sensory deficit


Psychiatric exam: PRESENT: appropriate affect, normal mood


Skin exam: PRESENT: dry, intact, warm.  ABSENT: cyanosis, rash





Results


Laboratory Results: 


 





 12/02/17 05:36 





 12/02/17 05:36 





 











  12/02/17 12/02/17





  05:36 05:36


 


WBC  7.9 


 


RBC  2.61 L 


 


Hgb  9.1 L 


 


Hct  26.7 L 


 


MCV  102 H 


 


MCH  34.8 H 


 


MCHC  34.0 


 


RDW  26.1 H 


 


Plt Count  190 


 


Seg Neutrophils %  Not Reportable 


 


Lymphocytes %  Not Reportable 


 


Monocytes %  Not Reportable 


 


Eosinophils %  Not Reportable 


 


Basophils %  Not Reportable 


 


Absolute Neutrophils  Not Reportable 


 


Absolute Lymphocytes  Not Reportable 


 


Absolute Monocytes  Not Reportable 


 


Absolute Eosinophils  Not Reportable 


 


Absolute Basophils  Not Reportable 


 


Sodium   144.2


 


Potassium   4.2


 


Chloride   104


 


Carbon Dioxide   30


 


Anion Gap   10


 


BUN   7


 


Creatinine   0.63


 


Est GFR ( Amer)   > 60


 


Est GFR (Non-Af Amer)   > 60


 


Glucose   85


 


Calcium   9.0


 


Total Bilirubin   2.3 H


 


AST   68 H


 


ALT   26


 


Alkaline Phosphatase   103


 


Total Protein   7.3


 


Albumin   4.0











Impressions: 


 





Chest X-Ray  11/30/17 20:00


IMPRESSION:  NO ACUTE RADIOGRAPHIC FINDING IN THE CHEST.


STABLE MALPOSITION RIGHT-SIDED PORT WHICH SHOULD NOT BE USED UNTIL REPOSITIONED.


 








Abdomen/Pelvis CT  11/30/17 21:21


IMPRESSION:  No acute findings.


Chronic lung disease.


auto splenectomy.


Bone changes of sickle cell disease.


 














Assessment & Plan





- Diagnosis


(1) Sickle cell anemia


Qualifiers: 


   Sickle-cell associated disorders: with unspecified crisis   Qualified Code(s)

: D57.00 - Hb-SS disease with crisis, unspecified; D57.0 - Hb-SS disease with 

crisis   








(3) Bilious vomiting


Qualifiers: 


   Nausea presence: with nausea   Qualified Code(s): R11.14 - Bilious vomiting 

  





- Plan Summary


Plan Summary: 





Continue present treatment with antibiotic and hydration

## 2017-12-03 NOTE — PDOC PROGRESS REPORT
Subjective


Progress Note for:: 12/03/17


Subjective:: 





He was seen by the bedside, a complaint of back pain, admitted because of 

pyelonephritis  and sickle cell disease


Reason For Visit: 


ACUTE PYELONEPHRITIS,SICKLE CELL DISEASE CRISIS








Physical Exam


Vital Signs: 


 











Temp Pulse Resp BP Pulse Ox


 


 98.5 F   71   16   124/72   97 


 


 12/03/17 11:27  12/03/17 11:27  12/03/17 11:27  12/03/17 11:27  12/03/17 11:27








 Intake & Output











 12/02/17 12/03/17 12/04/17





 06:59 06:59 06:59


 


Intake Total 2672 4699 


 


Output Total 1575 3435 


 


Balance 1097 1264 


 


Weight 60.7 kg 61.4 kg 











General appearance: PRESENT: no acute distress


Eye exam: ABSENT: scleral icterus


Ear exam: PRESENT: normal external ear exam


Mouth exam: PRESENT: moist, tongue midline


Neck exam: PRESENT: full ROM


Respiratory exam: PRESENT: clear to auscultation brenda


Cardiovascular exam: PRESENT: RRR, +S1, +S2


Vascular exam: PRESENT: normal capillary refill


GI/Abdominal exam: PRESENT: normal bowel sounds, soft


Rectal exam: PRESENT: deferred


Neurological exam: PRESENT: alert


Psychiatric exam: PRESENT: appropriate affect, normal mood


Skin exam: PRESENT: dry, intact, warm.  ABSENT: cyanosis, rash





Results


Laboratory Results: 


 





 12/02/17 05:36 





 12/02/17 05:36 








Impressions: 


 





Chest X-Ray  11/30/17 20:00


IMPRESSION:  NO ACUTE RADIOGRAPHIC FINDING IN THE CHEST.


STABLE MALPOSITION RIGHT-SIDED PORT WHICH SHOULD NOT BE USED UNTIL REPOSITIONED.


 








Abdomen/Pelvis CT  11/30/17 21:21


IMPRESSION:  No acute findings.


Chronic lung disease.


auto splenectomy.


Bone changes of sickle cell disease.


 














Assessment & Plan





- Diagnosis


(1) Sickle cell anemia


Qualifiers: 


   Sickle-cell associated disorders: with unspecified crisis   Qualified Code(s)

: D57.00 - Hb-SS disease with crisis, unspecified; D57.0 - Hb-SS disease with 

crisis   


Is this a current diagnosis for this admission?: Yes   





(2) Pyelonephritis


Is this a current diagnosis for this admission?: Yes   





(3) Bilious vomiting


Qualifiers: 


   Nausea presence: with nausea   Qualified Code(s): R11.14 - Bilious vomiting

## 2017-12-04 NOTE — PDOC PROGRESS REPORT
Subjective


Progress Note for:: 12/04/17


Subjective:: 





Patient reported LUQ and epigastric region abdominal pain with eating. There 

has been nausea but no vomiting. No flank pain. Currently on Prevacid 30 mg po 

daily fasting. No fever or chills. No difficulty with breathing. No chest pain. 

Remain on IV Rocephin coverage.


Reason For Visit: 


ACUTE PYELONEPHRITIS,SICKLE CELL DISEASE CRISIS








Physical Exam


Vital Signs: 


 











Temp Pulse Resp BP Pulse Ox


 


 98.5 F   70   18   114/62   99 


 


 12/04/17 15:12  12/04/17 15:12  12/04/17 15:12  12/04/17 15:12  12/04/17 15:12








 Intake & Output











 12/03/17 12/04/17 12/05/17





 06:59 06:59 06:59


 


Intake Total 4699 5332 556


 


Output Total 3435 3250 


 


Balance 1264 2082 556


 


Weight 61.4 kg 61.7 kg 











General appearance: PRESENT: mild distress - related to his SS sickle cell 

disease process. Requesting for K-PAD


Head exam: PRESENT: atraumatic, normocephalic


Eye exam: PRESENT: conjunctiva pink, EOMI, PERRLA.  ABSENT: scleral icterus


Mouth exam: PRESENT: moist


Respiratory exam: PRESENT: clear to auscultation brenda


Cardiovascular exam: PRESENT: RRR.  ABSENT: diastolic murmur, rubs, systolic 

murmur


Vascular exam: PRESENT: normal capillary refill.  ABSENT: pallor


GI/Abdominal exam: PRESENT: normal bowel sounds, tenderness - LUQ and 

epigastric region to deep palpation..  ABSENT: guarding, organolmegaly, rebound


Extremities exam: ABSENT: pedal edema


Musculoskeletal exam: PRESENT: normal inspection


Neurological exam: PRESENT: alert, awake, oriented to person, oriented to place

, oriented to time, oriented to situation, CN II-XII grossly intact.  ABSENT: 

motor sensory deficit


Psychiatric exam: PRESENT: appropriate affect, normal mood.  ABSENT: homicidal 

ideation, suicidal ideation


Skin exam: PRESENT: dry, intact, warm.  ABSENT: cyanosis, rash





Results


Laboratory Results: 


 





 12/02/17 05:36 





 12/02/17 05:36 








Impressions: 


 





Chest X-Ray  11/30/17 20:00


IMPRESSION:  NO ACUTE RADIOGRAPHIC FINDING IN THE CHEST.


STABLE MALPOSITION RIGHT-SIDED PORT WHICH SHOULD NOT BE USED UNTIL REPOSITIONED.


 








Abdomen/Pelvis CT  11/30/17 21:21


IMPRESSION:  No acute findings.


Chronic lung disease.


auto splenectomy.


Bone changes of sickle cell disease.


 














Assessment & Plan





- Diagnosis


(1) UTI (urinary tract infection)


Qualifiers: 


   Urinary tract infection type: acute cystitis   Hematuria presence: without 

hematuria   Qualified Code(s): N30.00 - Acute cystitis without hematuria   


Is this a current diagnosis for this admission?: Yes   


Plan: 


Mixed urogenital organism growth 10,000 -20,000 col/ml. Remain on IV Rocephin 

coverage for total 5 days. Monitor leukocytosis.








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


Is this a current diagnosis for this admission?: Yes   





(3) Asthma


Qualifiers: 


   Asthma severity: mild   Asthma complication type: uncomplicated 


Is this a current diagnosis for this admission?: Yes   





- Time


Time Spent with patient: 25-34 minutes


Medications reviewed and adjusted accordingly: Yes


Anticipated discharge: Home





- Inpatient Certification


Based on my medical assessment, after consideration of the patient's 

comorbidities, presenting symptoms, or acuity I expect that the services needed 

warrant INPATIENT care.: Yes


I certify that my determination is in accordance with my understanding of 

Medicare's requirements for reasonable and necessary INPATIENT services [42 CFR 

412.3e].: Yes


Medical Necessity: Need Close Monitoring Due to Risk of Patient Decompensation, 

Need For IV Fluids, Need For Continuous Telemetry Monitoring, Need for IV 

Antibiotics, Risk of Complication if Not Cared For in Hospital


Post Hospital Care: D/C Planner Documentation





- Plan Summary


Plan Summary: 


Maintain on Prevacid therapy. Start on Maalox 30 mL po qid prn for abdominal 

pain due to probably gastritis. Continue on all other current medication 

management.

## 2017-12-05 NOTE — PDOC PROGRESS REPORT
Subjective


Progress Note for:: 12/05/17


Subjective:: 


Patient reported persistent epigastric and LUQ pain with eating. There is 

associated nausea near vomiting. No chest pain or difficulty with breathing. No 

fever or chills. Remain on IV fluid support and Diluadid for pain management.


Reason For Visit: 


ACUTE PYELONEPHRITIS,SICKLE CELL DISEASE CRISIS








Physical Exam


Vital Signs: 


 











Temp Pulse Resp BP Pulse Ox


 


 98.6 F   85   16   128/66 H  99 


 


 12/05/17 16:39  12/05/17 16:39  12/05/17 16:39  12/05/17 16:39  12/05/17 16:39








 Intake & Output











 12/04/17 12/05/17 12/06/17





 06:59 06:59 06:59


 


Intake Total 5332 4216 960


 


Output Total 3250  900


 


Balance 2082 4216 60


 


Weight 61.7 kg  











Physical Exam: 


General appearance: PRESENT: mild distress - related to his SS sickle cell 

disease process. 


Head exam: PRESENT: atraumatic, normocephalic


Eye exam: PRESENT: conjunctiva pink, EOMI, PERRLA.  ABSENT: scleral icterus


Mouth exam: PRESENT: moist


Respiratory exam: PRESENT: clear to auscultation brenda


Cardiovascular exam: PRESENT: RRR.  ABSENT: diastolic murmur, rubs, systolic 

murmur


Vascular exam: PRESENT: normal capillary refill.  ABSENT: pallor


GI/Abdominal exam: PRESENT: normal bowel sounds, tenderness - LUQ and 

epigastric region to deep palpation. ABSENT: guarding, organomegaly, rebound


Extremities exam: ABSENT: pedal edema


Musculoskeletal exam: PRESENT: normal inspection


Neurological exam: PRESENT: alert, awake, oriented to person, oriented to place

, oriented to time, oriented to situation, CN II-XII grossly intact.  ABSENT: 

motor sensory deficit


Psychiatric exam: PRESENT: appropriate affect, normal mood.  ABSENT: homicidal 

ideation, suicidal ideation


Skin exam: PRESENT: dry, intact, warm.  ABSENT: cyanosis, rash








Results


Laboratory Results: 


 





 12/05/17 06:20 





 12/05/17 06:20 





 











  12/05/17 12/05/17





  06:20 06:20


 


WBC  8.3 


 


RBC  2.54 L 


 


Hgb  9.3 L 


 


Hct  26.3 L 


 


MCV  104 H 


 


MCH  36.7 H 


 


MCHC  35.4 


 


RDW  26.5 H 


 


Plt Count  212 


 


Seg Neutrophils %  Not Reportable 


 


Lymphocytes %  Not Reportable 


 


Monocytes %  Not Reportable 


 


Eosinophils %  Not Reportable 


 


Basophils %  Not Reportable 


 


Absolute Neutrophils  Not Reportable 


 


Absolute Lymphocytes  Not Reportable 


 


Absolute Monocytes  Not Reportable 


 


Absolute Eosinophils  Not Reportable 


 


Absolute Basophils  Not Reportable 


 


Sodium   145.3 H


 


Potassium   4.1


 


Chloride   106


 


Carbon Dioxide   28


 


Anion Gap   11


 


BUN   7


 


Creatinine   0.67


 


Est GFR ( Amer)   > 60


 


Est GFR (Non-Af Amer)   > 60


 


Glucose   116 H


 


Calcium   8.9


 


Total Bilirubin   3.2 H


 


AST   48


 


ALT   21


 


Alkaline Phosphatase   84


 


Total Protein   7.2


 


Albumin   4.0











Impressions: 


 





Chest X-Ray  11/30/17 20:00


IMPRESSION:  NO ACUTE RADIOGRAPHIC FINDING IN THE CHEST.


STABLE MALPOSITION RIGHT-SIDED PORT WHICH SHOULD NOT BE USED UNTIL REPOSITIONED.


 








Abdomen/Pelvis CT  11/30/17 21:21


IMPRESSION:  No acute findings.


Chronic lung disease.


auto splenectomy.


Bone changes of sickle cell disease.


 














Assessment & Plan





- Diagnosis


(1) UTI (urinary tract infection)


Qualifiers: 


   Urinary tract infection type: acute cystitis   Hematuria presence: without 

hematuria   Qualified Code(s): N30.00 - Acute cystitis without hematuria   


Is this a current diagnosis for this admission?: Yes   


Plan: 


Continue IV Rocephin coverage.








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


Is this a current diagnosis for this admission?: Yes   


Plan: 


Maintain on current medication management








(3) Asthma


Qualifiers: 


   Asthma severity: mild   Asthma complication type: uncomplicated 


Is this a current diagnosis for this admission?: Yes   





(4) Gastric stress ulcer


Is this a current diagnosis for this admission?: Yes   


Plan: 


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 I will start on Pepcid 20 

mg po qhs and maintain on Prevacid 30 mg po qam fasting. Encouraged use of 

Maalox for gastritis pain management with meal.








- Time


Time Spent with patient: 25-34 minutes


Medications reviewed and adjusted accordingly: Yes


Anticipated discharge: Home


Within: Other





- Inpatient Certification


Based on my medical assessment, after consideration of the patient's 

comorbidities, presenting symptoms, or acuity I expect that the services needed 

warrant INPATIENT care.: Yes


I certify that my determination is in accordance with my understanding of 

Medicare's requirements for reasonable and necessary INPATIENT services [42 CFR 

412.3e].: Yes


Medical Necessity: Need Close Monitoring Due to Risk of Patient Decompensation, 

Need For IV Fluids, Need For Continuous Telemetry Monitoring, Need for Pain 

Control, Need for IV Antibiotics


Post Hospital Care: D/C Planner Documentation





- Plan Summary


Plan Summary: 





See attending physician orders.

## 2017-12-06 NOTE — PDOC PROGRESS REPORT
Subjective


Progress Note for:: 12/06/17


Subjective:: 


Patient reported some improvement in his abdominal pain. No nausea or vomiting. 

No chest pain or difficulty with breathing. No fever or chills. No flank pain. 

Remain on IV Rocephin coverage.


Reason For Visit: 


ACUTE PYELONEPHRITIS,SICKLE CELL DISEASE CRISIS








Physical Exam


Vital Signs: 


 











Temp Pulse Resp BP Pulse Ox


 


 98.5 F   85   17   132/71 H  95 


 


 12/06/17 10:57  12/06/17 14:00  12/06/17 10:57  12/06/17 10:57  12/06/17 10:57








 Intake & Output











 12/05/17 12/06/17 12/07/17





 06:59 06:59 06:59


 


Intake Total 4216 6860 


 


Output Total  1775 


 


Balance 4216 5048 


 


Weight  62.3 kg 











Physical Exam: 


General appearance: PRESENT: mild distress - related to his SS sickle cell 

disease process. 


Head exam: PRESENT: atraumatic, normocephalic


Eye exam: PRESENT: conjunctiva pink, EOMI, PERRLA.  ABSENT: scleral icterus


Mouth exam: PRESENT: moist


Respiratory exam: PRESENT: clear to auscultation brenda


Cardiovascular exam: PRESENT: RRR.  ABSENT: diastolic murmur, rubs, systolic 

murmur


Vascular exam: PRESENT: normal capillary refill.  ABSENT: pallor


GI/Abdominal exam: PRESENT: normal bowel sounds, tenderness - minimal LUQ and 

epigastric region to deep palpation. ABSENT: guarding, organomegaly, rebound


Extremities exam: ABSENT: pedal edema


Musculoskeletal exam: PRESENT: normal inspection


Neurological exam: PRESENT: alert, awake, oriented to person, oriented to place

, oriented to time, oriented to situation, CN II-XII grossly intact.  ABSENT: 

motor sensory deficit


Psychiatric exam: PRESENT: appropriate affect, normal mood.  ABSENT: homicidal 

ideation, suicidal ideation


Skin exam: PRESENT: dry, intact, warm.  ABSENT: cyanosis, rash








Results


Laboratory Results: 


 





 12/05/17 06:20 





 12/05/17 06:20 








Impressions: 


 





Chest X-Ray  11/30/17 20:00


IMPRESSION:  NO ACUTE RADIOGRAPHIC FINDING IN THE CHEST.


STABLE MALPOSITION RIGHT-SIDED PORT WHICH SHOULD NOT BE USED UNTIL REPOSITIONED.


 








Abdomen/Pelvis CT  11/30/17 21:21


IMPRESSION:  No acute findings.


Chronic lung disease.


auto splenectomy.


Bone changes of sickle cell disease.


 














Assessment & Plan





- Diagnosis


(1) UTI (urinary tract infection)


Qualifiers: 


   Urinary tract infection type: acute cystitis   Hematuria presence: without 

hematuria   Qualified Code(s): N30.00 - Acute cystitis without hematuria   


Is this a current diagnosis for this admission?: Yes   





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


Is this a current diagnosis for this admission?: Yes   





(3) Asthma


Qualifiers: 


   Asthma severity: mild   Asthma complication type: uncomplicated 


Is this a current diagnosis for this admission?: Yes   





(4) Gastric stress ulcer


Is this a current diagnosis for this admission?: Yes   





- Time


Time Spent with patient: 25-34 minutes


Medications reviewed and adjusted accordingly: Yes


Anticipated discharge: Home


Within: Other





- Inpatient Certification


Based on my medical assessment, after consideration of the patient's 

comorbidities, presenting symptoms, or acuity I expect that the services needed 

warrant INPATIENT care.: Yes


I certify that my determination is in accordance with my understanding of 

Medicare's requirements for reasonable and necessary INPATIENT services [42 CFR 

412.3e].: Yes


Medical Necessity: Need Close Monitoring Due to Risk of Patient Decompensation, 

Need For IV Fluids, Need For Continuous Telemetry Monitoring, Need for IV 

Antibiotics, Risk of Complication if Not Cared For in Hospital


Post Hospital Care: D/C Planner Documentation





- Plan Summary


Plan Summary: 


Continue IV Rocephin coverage. Maintain on IV fluid support. Continue all other 

current medication management. I did discussed possible discharge home with 

patient.

## 2017-12-07 NOTE — PDOC DISCHARGE SUMMARY
General





- Admit/Disc Date/PCP


Admission Date/Primary Care Provider: 


  12/01/17 08:37





  DIYA PHILIPPE





Discharge Date: 12/07/17





- Discharge Diagnosis


(1) UTI (urinary tract infection)


Is this a current diagnosis for this admission?: Yes   





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


Is this a current diagnosis for this admission?: Yes   





(3) Asthma


Is this a current diagnosis for this admission?: Yes   





(4) Gastric stress ulcer


Is this a current diagnosis for this admission?: Yes   





- Additional Information


Resuscitation Status: Full Code


Discharge Diet: Regular


Discharge Activity: Activity As Tolerated


Home Medications: 








Ergocalciferol (Vitamin D2) [Vitamin D2] 50,000 unit PO AYALA@1000 12/01/17 


Fentanyl [Duragesic 100 Mcg/Hr Transdermal Patch] 1 patch TD Q3D 12/01/17 


Folic Acid [Folvite 1 mg Tablet] 1 mg PO DAILY 12/01/17 


Hydroxyurea [Hydrea 500 mg Capsule] 1,000 mg PO SUTUSA@1000 12/01/17 


Hydroxyurea [Hydrea 500 mg Capsule] 1,500 mg PO MOWEFR@1000 12/01/17 


Oxycodone HCl [Oxy-Ir 5 mg Tablet] 5 mg PO Q4 12/01/17 


Lansoprazole [Prevacid] 30 mg PO DAILY #30 capsule. 12/07/17 











History of Present Illness


History of Present Illness: 


CARMEN BEGUM is a 22 year old male known to my practice who presented to the 

ED with complaints including nausea, bilious vomiting and upper abdominal pain 

that started couple of hours prior to his arrival. Patient denied consumption 

of any unusual food or drink. He described abdominal pain as cramping and 

preceding his vomiting. He denied similar episode in the past. He described 

associated lower back pain that he related to his sickle cell SS disease. He 

denied any significant difficulty with breathing, no hematuria but urine have 

been dark color. He denied any significant fever or chills. His initial ED 

evaluation was remarkable for tachycardia, witnessed bilious vomiting, acute 

pain, abnormal urinalysis suggestive of UTI and associated leukocytosis. He was 

subsequently advised admission for further evaluation and management. His 

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 

and hemolytic crises and Asthma.





Hospital Course


Hospital Course: 


Patient was treated with IV Rocephin coverage. His urine culture grew mixed 

urogenital ricki 10,000-20,000 col./mL. His presenting symptoms did resolved. 

Stay was further complicated with development of abdominal pain related to 

probable stress gastric ulcer. This improved with Prevacid usage and additional 

Pepcid and prn administration of Maalox. He will be discharged home on Prevacid 

for total 8 weeks therapy. He will follow up in the office as instructed upon 

discharge.





Physical Exam


Vital Signs: 


 











Temp Pulse Resp BP Pulse Ox


 


 99.0 F   77   16   122/74   95 


 


 12/07/17 00:17  12/07/17 07:00  12/07/17 00:17  12/07/17 00:17  12/07/17 00:17








 Intake & Output











 12/06/17 12/07/17 12/08/17





 06:59 06:59 06:59


 


Intake Total 6860 5253 


 


Output Total 1775 2725 


 


Balance 5085 2528 


 


Weight 62.3 kg 63.1 kg 











Physical Exam: 


General appearance: PRESENT: No acute distress. 


Head exam: PRESENT: atraumatic, normocephalic


Eye exam: PRESENT: conjunctiva pink, EOMI, PERRLA.  ABSENT: scleral icterus


Mouth exam: PRESENT: moist


Respiratory exam: PRESENT: clear to auscultation brenda


Cardiovascular exam: PRESENT: RRR.  ABSENT: diastolic murmur, rubs, systolic 

murmur


Vascular exam: PRESENT: normal capillary refill.  ABSENT: pallor


GI/Abdominal exam: PRESENT: normal bowel sounds ABSENT: guarding, organomegaly, 

rebound


Extremities exam: ABSENT: pedal edema


Musculoskeletal exam: PRESENT: normal inspection


Neurological exam: PRESENT: alert, awake, oriented to person, oriented to place

, oriented to time, oriented to situation, CN II-XII grossly intact.  ABSENT: 

motor sensory deficit


Psychiatric exam: PRESENT: appropriate affect, normal mood.  ABSENT: homicidal 

ideation, suicidal ideation


Skin exam: PRESENT: dry, intact, warm.  ABSENT: cyanosis, rash








Results


Laboratory Results: 


 





 12/05/17 06:20 





 12/05/17 06:20 








Impressions: 


 





Chest X-Ray  11/30/17 20:00


IMPRESSION:  NO ACUTE RADIOGRAPHIC FINDING IN THE CHEST.


STABLE MALPOSITION RIGHT-SIDED PORT WHICH SHOULD NOT BE USED UNTIL REPOSITIONED.


 








Abdomen/Pelvis CT  11/30/17 21:21


IMPRESSION:  No acute findings.


Chronic lung disease.


auto splenectomy.


Bone changes of sickle cell disease.


 














Qualifiers


**PATEINT BEING DISCHARGED WITH ANY OF THE FOLLOWING DIAGNOSIS?: No





Plan


Discharge Plan: 





Discharge home today. Follow up in the office as instructed upon discharge.

## 2017-12-16 NOTE — ER DOCUMENT REPORT
ED General Pain





- General


Chief Complaint: Sickle Cell Crisis


Stated Complaint: CHEST PAIN


Time Seen by Provider: 12/16/17 22:08


Notes: 





22 years old male with a chronic history of sickle cell disease multiple visits 

to the ED presents today Complaining pain over the left leg and left arm.  No 

fever chills or other constitutional symptoms.


TRAVEL OUTSIDE OF THE [place] IN LAST 30 DAYS: No





- Related Data


Allergies/Adverse Reactions: 


 





Coconut * [Coconut] Allergy (Mild, Verified 11/30/17 18:44)


 


transpore tape Allergy (Mild, Uncoded 10/09/17 22:04)


 Hives











Past Medical History





- Social History


Smoking Status: Former Smoker


Family History: Reviewed & Not Pertinent, Arthritis, DM, Hypertension, Other - 

Sickle cell trait both parents and asthma


Pulmonary Medical History: Reports: Hx Asthma, Hx Pneumonia


Renal/ Medical History: Denies: Hx Peritoneal Dialysis


Psychiatric Medical History: 


   Denies: Hx Depression


Past Surgical History: Reports: Hx Abdominal Surgery - Gallstones removal, Hx 

Cholecystectomy, Hx Orthopedic Surgery - Fluid drained from right foot, Hx 

Vascular Surgery - Port placement





- Immunizations


Immunizations up to date: Yes


Hx Diphtheria, Pertussis, Tetanus Vaccination: Yes


Hx Pneumococcal Vaccination: 05/16/13





Review of Systems





- Review of Systems


Notes: 





REVIEW OF SYSTEMS:


CONSTITUTIONAL :  Denies fever,  chills, or sweats.  Denies recent illness.


EENT:   Denies eye, ear, throat, or mouth pain or symptoms.  Denies nasal or 

sinus congestion or discharge.  Denies throat, tongue, or mouth swelling or 

difficulty swallowing.


CARDIOVASCULAR:  Denies chest pain.  Denies palpitations or racing or irregular 

heart beat.  Denies ankle edema.


RESPIRATORY:  Denies cough, cold, or chest congestion.  Denies shortness of 

breath, difficulty breathing, or wheezing.


GASTROINTESTINAL:  Denies abdominal pain or distention.  Denies nausea, vomiting

, or diarrhea.  Denies blood in vomitus, stools, or per rectum.  Denies black, 

tarry stools.  Denies constipation.  


GENITOURINARY:  Denies difficulty urinating, painful urination, burning, 

frequency, blood in urine, or discharge.


MUSCULOSKELETAL:  Denies back or neck pain or stiffness.  Denies joint pain or 

swelling.


SKIN:   Denies rash, lesions or sores.


HEMATOLOGIC :   Denies easy bruising or bleeding.


LYMPHATIC:  Denies swollen, enlarged glands.


NEUROLOGICAL:  Denies confusion or altered mental status.  Denies passing out 

or loss of consciousness.  Denies dizziness or lightheadedness.  Denies 

headache.  Denies weakness or paralysis or loss of use of either side.  Denies 

problems with gait or speech.  Denies sensory loss, numbness, or tingling.  

Denies seizures.


PSYCHIATRIC:  Denies anxiety or stress.  Denies depression, suicidal ideation, 

or homicidal ideation.





ALL OTHER SYSTEMS REVIEWED AND NEGATIVE.





Dictation was performed using Dragon voice recognition software 





PHYSICAL EXAMINATION:





GENERAL: Well-appearing, well-nourished and in no acute distress.





HEAD: Atraumatic, normocephalic.





EYES: Pupils equal round and reactive to light, extraocular movements intact, 

sclera icteric, conjunctiva are normal.





ENT: Nares patent, oropharynx clear without exudates.  Moist mucous membranes.





NECK: Normal range of motion, supple without lymphadenopathy





LUNGS: Breath sounds clear to auscultation bilaterally and equal.  No wheezes 

rales or rhonchi.





HEART: Regular rate and rhythm without murmurs





ABDOMEN: Soft, nontender, nondistended abdomen.  No guarding, no rebound.  No 

masses appreciated.





Musculoskeletal: Normal range of motion, no pitting or edema.  No cyanosis.





NEUROLOGICAL: Cranial nerves grossly intact.  Normal speech, normal gait.  

Normal sensory, motor exams 





PSYCH: Normal mood, normal affect.





SKIN: Warm, Dry, normal turgor, no rashes or lesions noted.





Physical Exam





- Vital signs


Vitals: 


 











Temp Pulse Resp BP Pulse Ox


 


 99.1 F   117 H  22 H  134/80 H  91 L


 


 12/16/17 21:19  12/16/17 21:19  12/16/17 21:19  12/16/17 21:19  12/16/17 21:19














Course





- Re-evaluation


Re-evalutation: 





12/17/17 00:56


Given IV fluid and morphine





- Vital Signs


Vital signs: 


 











Temp Pulse Resp BP Pulse Ox


 


 99.1 F   110 H  14   134/78 H  93 


 


 12/16/17 21:19  12/17/17 00:41  12/17/17 00:41  12/17/17 00:41  12/17/17 00:41














- Laboratory


Result Diagrams: 


 12/16/17 22:35





 12/16/17 22:35


Laboratory results interpreted by me: 


 











  12/16/17 12/16/17





  22:35 22:35


 


RBC  2.20 L 


 


Hgb  8.0 L 


 


Hct  23.0 L 


 


MCV  105 H 


 


MCH  36.4 H 


 


RDW  30.0 H 


 


Retic Count (auto)  22.98 H 


 


Absolute Retic  0.505 H 


 


Total Bilirubin   3.7 H


 


Direct Bilirubin   0.8 H


 


AST   94 H














Discharge





- Discharge


Clinical Impression: 


 Sickle cell disease





Condition: Fair


Disposition: HOME, SELF-CARE


Instructions:  Sickle Cell Crisis ([facility])

## 2017-12-16 NOTE — EKG REPORT
SEVERITY:- BORDERLINE ECG -

SINUS TACHYCARDIA

BORDERLINE T ABNORMALITIES, INFERIOR LEADS

:

Confirmed by: Eyal Ryan 16-Dec-2017 23:00:42

## 2017-12-17 NOTE — ER DOCUMENT REPORT
ED General Pain





- General


Chief Complaint: Sickle Cell Crisis


Stated Complaint: CHEST PAIN; FEVER


Time Seen by Provider: 12/17/17 21:15


Notes: 





22 years old male with history of sickle cell disease was seen by me yesterday, 

today returned with fever chills increase pain over the right side of the 

chest.  Denies any coughing.  Denies difficulty in breathing.  But has been 

vomiting couple of times.  Denies any abdominal pain diarrhea dysuria 

frequency.  Having pain over generally over the joints


TRAVEL OUTSIDE OF THE [place] IN LAST 30 DAYS: No





- Related Data


Allergies/Adverse Reactions: 


 





Coconut * [Coconut] Allergy (Mild, Verified 12/17/17 20:56)


 


transpore tape Allergy (Mild, Uncoded 12/17/17 20:56)


 Hives











Past Medical History





- Social History


Smoking Status: Never Smoker


Family History: Reviewed & Not Pertinent, Arthritis, DM, Hypertension, Other - 

Sickle cell trait both parents and asthma


Pulmonary Medical History: Reports: Hx Asthma, Hx Pneumonia


Renal/ Medical History: Denies: Hx Peritoneal Dialysis


Psychiatric Medical History: 


   Denies: Hx Depression


Past Surgical History: Reports: Hx Abdominal Surgery - Gallstones removal, Hx 

Cholecystectomy, Hx Orthopedic Surgery - Fluid drained from right foot, Hx 

Vascular Surgery - Port placement





- Immunizations


Immunizations up to date: Yes


Hx Diphtheria, Pertussis, Tetanus Vaccination: Yes


Hx Pneumococcal Vaccination: 05/16/13





Review of Systems





- Review of Systems


Notes: 





REVIEW OF SYSTEMS:


CONSTITUTIONAL :  


EENT:   Denies eye, ear, throat, or mouth pain or symptoms.  Denies nasal or 

sinus congestion or discharge.  Denies throat, tongue, or mouth swelling or 

difficulty swallowing.


CARDIOVASCULAR:  Denies chest pain.  Denies palpitations or racing or irregular 

heart beat.  Denies ankle edema.


RESPIRATORY:  Denies cough, cold, or chest congestion.  Denies shortness of 

breath, difficulty breathing, or wheezing.


GASTROINTESTINAL:  Denies abdominal pain or distention.  Denies nausea, vomiting

, or diarrhea.  Denies blood in vomitus, stools, or per rectum.  Denies black, 

tarry stools.  Denies constipation.  


GENITOURINARY:  Denies difficulty urinating, painful urination, burning, 

frequency, blood in urine, or discharge.


MUSCULOSKELETAL:  Denies back or neck pain or stiffness.  Denies joint pain or 

swelling.


SKIN:   Denies rash, lesions or sores.


HEMATOLOGIC :   Denies easy bruising or bleeding.


LYMPHATIC:  Denies swollen, enlarged glands.


NEUROLOGICAL:  Denies confusion or altered mental status.  Denies passing out 

or loss of consciousness.  Denies dizziness or lightheadedness.  Denies 

headache.  Denies weakness or paralysis or loss of use of either side.  Denies 

problems with gait or speech.  Denies sensory loss, numbness, or tingling.  

Denies seizures.


PSYCHIATRIC:  Denies anxiety or stress.  Denies depression, suicidal ideation, 

or homicidal ideation.





ALL OTHER SYSTEMS REVIEWED AND NEGATIVE.





Dictation was performed using Dragon voice recognition software 





PHYSICAL EXAMINATION:





GENERAL: Temperature was 102.7.





HEAD: Atraumatic, normocephalic.





EYES: Pupils equal round and reactive to light, extraocular movements intact, 

sclera icteric, conjunctiva are normal.





ENT: Nares patent, oropharynx clear without exudates.  Moist mucous membranes.





NECK: Normal range of motion, supple without lymphadenopathy





LUNGS: Breath sounds clear to auscultation bilaterally and equal.  Right lower 

lobe crackles were heard





HEART: Regular rate and rhythm without murmurs





ABDOMEN: Soft, nontender, nondistended abdomen.  No guarding, no rebound.  No 

masses appreciated.





Musculoskeletal: Normal range of motion, no pitting or edema.  No cyanosis.





NEUROLOGICAL: Cranial nerves grossly intact.  Normal speech, normal gait.  

Normal sensory, motor exams 





PSYCH: Normal mood, normal affect.





SKIN: Warm, Dry, normal turgor, no rashes or lesions noted.





Physical Exam





- Vital signs


Vitals: 


 











Temp Pulse Resp BP Pulse Ox


 


 103.0 F H  139 H  22 H  137/105 H  90 L


 


 12/17/17 21:00  12/17/17 21:00  12/17/17 21:00  12/17/17 21:00  12/17/17 21:00














Course





- Re-evaluation


Re-evalutation: 





12/18/17 00:42


This case was discussed with Dr. Brady and currently being admitted.





- Vital Signs


Vital signs: 


 











Temp Pulse Resp BP Pulse Ox


 


 102.8 F H  139 H  23 H  137/105 H  95 


 


 12/17/17 21:25  12/17/17 21:00  12/18/17 00:00  12/17/17 21:00  12/18/17 00:00














- Laboratory


Result Diagrams: 


 12/17/17 21:46





 12/17/17 21:46


Laboratory results interpreted by me: 


 











  12/17/17 12/17/17 12/17/17





  21:15 21:46 21:46


 


WBC   11.1 H 


 


RBC   1.94 L 


 


Hgb   7.2 L 


 


Hct   20.1 L 


 


MCV   103 H 


 


MCH   36.9 H 


 


RDW   29.6 H 


 


Band Neutrophils %   1 L 


 


Retic Count (auto)   19.32 H 


 


Absolute Retic   0.375 H 


 


Sodium    136.4 L


 


Calcium    8.3 L


 


Total Bilirubin    4.3 H


 


Direct Bilirubin    0.8 H


 


AST    77 H


 


Urine Protein  100 H  


 


Urine Blood  SMALL H  


 


Urine Urobilinogen  4.0 H  


 


Ur Leukocyte Esterase  SMALL H  














- EKG Interpretation by Me


Rate: Tachycardia - Sinus tach at the rate of 134 bpm normal axis no acute ST 

elevation ST depression and T-wave inversion.  Diagnosis sinus tachycardia





Discharge





- Discharge


Clinical Impression: 


 Sickle cell crisis acute chest syndrome





Pneumonia


Qualifiers:


 Pneumonia type: due to unspecified organism Laterality: right Lung location: 

lower lobe of lung Qualified Code(s): J18.1 - Lobar pneumonia, unspecified 

organism





Fever


Qualifiers:


 Fever type: due to other condition Qualified Code(s): R50.81 - Fever 

presenting with conditions classified elsewhere





Condition: Fair


Disposition: ADMITTED AS INPATIENT


Admitting Provider: Brady


Unit Admitted: Telemetry

## 2017-12-17 NOTE — EKG REPORT
SEVERITY:- OTHERWISE NORMAL ECG -

SINUS TACHYCARDIA

MINIMAL ST DEPRESSION, ANTEROLATERAL LEADS

:

Confirmed by: Eyal Ryan 17-Dec-2017 22:48:18

## 2017-12-17 NOTE — RADIOLOGY REPORT (SQ)
EXAM DESCRIPTION:  CHEST PA/LAT



COMPLETED DATE/TIME:  12/17/2017 10:14 pm



REASON FOR STUDY:  Chest pain



COMPARISON:  10/10/2017



NUMBER OF VIEWS:  Two view.



TECHNIQUE:  Frontal and lateral radiographic views of the chest acquired.



LIMITATIONS:  None.



FINDINGS:  LUNGS AND PLEURA: Peribronchial cuffing, increased interstitial changes and small patchy a
irspace opacities are present in the lung bases.  No pleural effusion, or pneumothorax.

MEDIASTINUM AND HILAR STRUCTURES: Stable.

HEART AND VASCULAR STRUCTURES: Stable.

BONES: No acute findings.

HARDWARE: Right-sided central venous catheter tip remains projecting over the right internal jugular 
vein.

OTHER: No other significant finding.



IMPRESSION:  Peribronchial cuffing, increased interstitial changes and small patchy airspace opacitie
s are present in the lung bases.

Right-sided central venous catheter tip remains projecting over the right internal jugular vein.



TECHNICAL DOCUMENTATION:  JOB ID:  [id number]

[id number]

 2011 [employer]- All Rights Reserved

## 2017-12-18 NOTE — PDOC H&P
History of Present Illness


Admission Date/PCP: 


  12/18/17 00:49





  


DIYA PHILIPPE MD


Patient complains of: Fever, Chills, Right sided chest pain


History of Present Illness: 


CARMEN BEGUM is a 22 year old male known to my practice with history of 

frequent hospitalization for SS sickle cell disease crises. Patient was 

evaluated at the ED for worsening pain crisis a day prior to his admission and 

return on the day of admission with complain of fever, home temperature above 

102F, chills, cough with scanty sputum production and right sided chest pain. 

His evaluation in the ED revealed air space disease process involving both lung 

bases and peribronchial cuffing. He reported associated nausea and episodes of 

vomiting. He denied any significant abdominal pain, diarrhea, urinary frequency

, flank pain or dysuria. He was advised hospitalization for lobar pneumonia and 

SS sickle cell disease pain crisis. Morbidities in Asthma and SS hemoglobin 

Sickle Cell disease.





Past Medical History


Pulmonary Medical History: Reports: Asthma, Pneumonia


Psychiatric Medical History: 


   Denies: Depression


Hematology: Reports: Anemia, Sickle Cell Disease, Bleeding Tendencies





Past Surgical History


Past Surgical History: Reports: Cholecystectomy, Orthopedic Surgery - Fluid 

drained from right foot, Vascular Surgery - Port placement





Social History


Smoking Status: Never Smoker


Frequency of Alcohol Use: None


Hx Recreational Drug Use: No


Drugs: None


Hx Prescription Drug Abuse: No





Family History


Family History: Reviewed & Not Pertinent, Arthritis, DM, Hypertension, Other - 

Sickle cell trait both parents and asthma


Parental Family History Reviewed: Yes


Children Family History Reviewed: Yes


Sibling(s) Family History Reviewed.: Yes





Medication/Allergy


Home Medications: 








Fentanyl [Duragesic 100 Mcg/Hr Transdermal Patch] 1 patch TOP Q3D 12/18/17 


Folic Acid [Folvite 1 mg Tablet] 1 mg PO DAILY 12/18/17 


Hydroxyurea [Hydrea 500 mg Capsule] 1,000 mg PO SUTUTHSA@1000 12/18/17 


Hydroxyurea [Hydrea 500 mg Capsule] 1,500 mg PO MOWEFR@1000 12/18/17 


Ibuprofen [Motrin 800 mg Tablet] 800 mg PO Q6HP PRN 12/18/17 


Oxycodone HCl [Oxy-Ir 5 mg Tablet] 5 mg PO Q4 12/18/17 








Allergies/Adverse Reactions: 


 





Coconut * [Coconut] Allergy (Mild, Verified 12/17/17 20:56)


 


transpore tape Allergy (Mild, Uncoded 12/17/17 20:56)


 Hives











Review of Systems


Constitutional: PRESENT: chills, fever(s)


Eyes: ABSENT: visual disturbances


Ears: ABSENT: hearing changes


Nose, Mouth, and Throat: ABSENT: as per HPI, headache(s), mouth pain, sore 

throat, vertigo, other


Cardiovascular: PRESENT: chest pain - right sided


Respiratory: PRESENT: cough - with minimal sputum production, sputum


Gastrointestinal: PRESENT: nausea, vomiting


Genitourinary: ABSENT: difficulty urinating, dysuria, hematuria, nocturia


Musculoskeletal: PRESENT: back pain - and multiple joints related to his SS SCD 

pain crisis


Neurological: ABSENT: abnormal gait, abnormal speech, confusion, dizziness, 

focal weakness, syncope


Psychiatric: ABSENT: anxiety, depression, homidical ideation, suicidal ideation


Endocrine: ABSENT: cold intolerance, heat intolerance, polydipsia, polyuria


Hematologic/Lymphatic: ABSENT: easy bleeding, easy bruising, lymphadenopathy


Allergic/Immunologic: ABSENT: seasonal rhinorrhea





Physical Exam


Vital Signs: 


 











Temp Pulse Resp BP Pulse Ox


 


 99.0 F   100   18   124/75   97 


 


 12/18/17 14:55  12/18/17 14:55  12/18/17 14:55  12/18/17 14:55  12/18/17 14:55








 Intake & Output











 12/17/17 12/18/17 12/19/17





 06:59 06:59 06:59


 


Intake Total  150 709


 


Output Total  200 1975


 


Balance  -50 -1266


 


Weight  62.8 kg 











General appearance: PRESENT: no acute distress


Head exam: PRESENT: atraumatic, normocephalic


Eye exam: PRESENT: conjunctiva pink, EOMI, PERRLA.  ABSENT: scleral icterus


Ear exam: PRESENT: normal external ear exam


Mouth exam: PRESENT: moist, tongue midline


Throat exam: ABSENT: post pharyngeal erythema, tonsillar erythema, tonsillar 

exudate, tonsillogmegaly, other


Neck exam: PRESENT: full ROM.  ABSENT: carotid bruit, JVD, lymphadenopathy, 

thyromegaly


Respiratory exam: PRESENT: clear to auscultation brenda


Cardiovascular exam: PRESENT: RRR.  ABSENT: diastolic murmur, rubs, systolic 

murmur


Pulses: PRESENT: normal dorsalis pedis pul, +2 pedal pulses bilateral


Vascular exam: PRESENT: normal capillary refill


GI/Abdominal exam: PRESENT: normal bowel sounds, soft.  ABSENT: distended, 

guarding, mass, organolmegaly, rebound, tenderness


Rectal exam: PRESENT: deferred


Extremities exam: ABSENT: pedal edema


Neurological exam: PRESENT: alert, awake, oriented to person, oriented to place

, oriented to time, oriented to situation, CN II-XII grossly intact.  ABSENT: 

motor sensory deficit


Psychiatric exam: PRESENT: appropriate affect, normal mood.  ABSENT: homicidal 

ideation, suicidal ideation


Skin exam: PRESENT: dry, intact, warm.  ABSENT: cyanosis, rash





Results


Impressions: 


 





Chest X-Ray  12/17/17 21:16


IMPRESSION:  Peribronchial cuffing, increased interstitial changes and small 

patchy airspace opacities are present in the lung bases.


Right-sided central venous catheter tip remains projecting over the right 

internal jugular vein.


 














Assessment & Plan





- Diagnosis


(1) Lobar pneumonia, unspecified organism


Is this a current diagnosis for this admission?: Yes   


Plan: 


See admitting attending orders.








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


Is this a current diagnosis for this admission?: Yes   


Plan: 


See admitting attending orders.








(3) Sickle cell disease with crisis


Is this a current diagnosis for this admission?: Yes   


Plan: 


See admitting attending orders.








(4) Asthma


Qualifiers: 


   Asthma severity: mild   Asthma complication type: uncomplicated 


Is this a current diagnosis for this admission?: Yes   


Plan: 


See admitting attending orders.








- Time


Time Spent: 50 to 70 Minutes


Medications reviewed and adjusted accordingly: Yes


Anticipated discharge: Home


Within: Other





- Inpatient Certification


Based on my medical assessment, after consideration of the patient's 

comorbidities, presenting symptoms, or acuity I expect that the services needed 

warrant INPATIENT care.: Yes


I certify that my determination is in accordance with my understanding of 

Medicare's requirements for reasonable and necessary INPATIENT services [42 CFR 

412.3e].: Yes


Medical Necessity: Need Close Monitoring Due to Risk of Patient Decompensation, 

Need For IV Fluids, Need For Continuous Telemetry Monitoring, Need for Pain 

Control, Need for IV Antibiotics, Risk of Complication if Not Cared For in 

Hospital


Post Hospital Care: D/C Planner Documentation





- Plan Summary


Plan Summary: 





See admitting attending orders.

## 2017-12-19 NOTE — PDOC PROGRESS REPORT
Subjective


Progress Note for:: 12/19/17


Subjective:: 


Patient reported persistent multiple joints aches and pain related to his 

ongoing SS SCD with pain crisis. No abdominal pain, nausea or vomiting. 

Tolerating oral feeding. No fever or chills. 


Reason For Visit: 


PNEUMONIA








Physical Exam


Vital Signs: 


 











Temp Pulse Resp BP Pulse Ox


 


 97.8 F   92   18   125/79   97 


 


 12/19/17 15:00  12/19/17 15:00  12/19/17 15:00  12/19/17 15:00  12/19/17 15:00








 Intake & Output











 12/18/17 12/19/17 12/20/17





 06:59 06:59 06:59


 


Intake Total 150 3054 2882


 


Output Total 200 2875 2000


 


Balance -50 179 882


 


Weight 62.8 kg 62.8 kg 











General appearance: PRESENT: mild distress - related to pain crisis


Head exam: PRESENT: atraumatic, normocephalic


Eye exam: PRESENT: conjunctiva pink, EOMI, PERRLA.  ABSENT: scleral icterus


Respiratory exam: PRESENT: clear to auscultation brenda


Cardiovascular exam: PRESENT: RRR.  ABSENT: diastolic murmur, rubs, systolic 

murmur


GI/Abdominal exam: PRESENT: normal bowel sounds, soft.  ABSENT: distended, 

guarding, mass, organolmegaly, rebound, tenderness


Extremities exam: ABSENT: pedal edema


Musculoskeletal exam: PRESENT: normal inspection


Neurological exam: PRESENT: alert, awake, oriented to person, oriented to place

, oriented to time, oriented to situation, CN II-XII grossly intact.  ABSENT: 

motor sensory deficit


Psychiatric exam: PRESENT: appropriate affect, normal mood.  ABSENT: homicidal 

ideation, suicidal ideation


Skin exam: PRESENT: dry, intact, warm.  ABSENT: cyanosis, rash





Results


Laboratory Results: 


 





 12/19/17 05:30 





 12/19/17 05:30 





 











  12/19/17 12/19/17





  05:30 05:30


 


WBC   7.2


 


RBC   1.98 L


 


Hgb   7.3 L


 


Hct   20.5 L


 


MCV   103 H


 


MCH   36.9 H


 


MCHC   35.8


 


RDW   27.5 H


 


Plt Count   151


 


Seg Neutrophils %   Not Reportable


 


Lymphocytes %   Not Reportable


 


Monocytes %   Not Reportable


 


Eosinophils %   Not Reportable


 


Basophils %   Not Reportable


 


Absolute Neutrophils   Not Reportable


 


Absolute Lymphocytes   Not Reportable


 


Absolute Monocytes   Not Reportable


 


Absolute Eosinophils   Not Reportable


 


Absolute Basophils   Not Reportable


 


Sodium  142.2 


 


Potassium  3.8 


 


Chloride  106 


 


Carbon Dioxide  28 


 


Anion Gap  8 


 


BUN  5 L 


 


Creatinine  0.64 


 


Est GFR ( Amer)  > 60 


 


Est GFR (Non-Af Amer)  > 60 


 


Glucose  105 


 


Calcium  8.4 











Impressions: 


 





Chest X-Ray  12/17/17 21:16


IMPRESSION:  Peribronchial cuffing, increased interstitial changes and small 

patchy airspace opacities are present in the lung bases.


Right-sided central venous catheter tip remains projecting over the right 

internal jugular vein.


 














Assessment & Plan





- Diagnosis


(1) Lobar pneumonia, unspecified organism


Is this a current diagnosis for this admission?: Yes   


Plan: 


See admitting attending orders. Continue IV Zithromax and Cefepime coverage. 

Follow up on cultured organism identification and sensitivity.








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


Is this a current diagnosis for this admission?: Yes   





(3) Sickle cell disease with crisis


Is this a current diagnosis for this admission?: Yes   


Plan: 


See admitting attending orders. Allow use of K-PAD to aide pain management. 

Continue current medication management.








(4) Asthma


Qualifiers: 


   Asthma severity: mild   Asthma complication type: uncomplicated 


Is this a current diagnosis for this admission?: Yes   





(5) Gram positive septicemia


Is this a current diagnosis for this admission?: Yes   


Plan: 


Continue IV Zithromax and Cefepime coverage. Follow up on cultured organism 

identification and sensitivity.








- Time


Time Spent with patient: 25-34 minutes


Medications reviewed and adjusted accordingly: Yes


Anticipated discharge: Home


Within: Other





- Inpatient Certification


Based on my medical assessment, after consideration of the patient's 

comorbidities, presenting symptoms, or acuity I expect that the services needed 

warrant INPATIENT care.: Yes


I certify that my determination is in accordance with my understanding of 

Medicare's requirements for reasonable and necessary INPATIENT services [42 CFR 

412.3e].: Yes


Medical Necessity: Need Close Monitoring Due to Risk of Patient Decompensation, 

Need For IV Fluids, Need For Continuous Telemetry Monitoring, Need for Pain 

Control, Need for IV Antibiotics, Risk of Complication if Not Cared For in 

Hospital


Post Hospital Care: D/C Planner Documentation





- Plan Summary


Plan Summary: 


See attending physician orders.

## 2017-12-20 NOTE — PDOC PROGRESS REPORT
Subjective


Progress Note for:: 12/20/17


Subjective:: 





Patient continue to reported persistent multiple joints aches and pain despite 

current pain management regimen. He denied chest pain or difficulty with 

breathing. There is intermittent nausea and vomiting. No documented fever. he 

denied chills. No diarrhea.


Reason For Visit: 


PNEUMONIA








Physical Exam


Vital Signs: 


 











Temp Pulse Resp BP Pulse Ox


 


 98.3 F   93   16   129/77 H  96 


 


 12/20/17 00:30  12/20/17 07:00  12/20/17 00:30  12/20/17 00:30  12/20/17 00:47








 Intake & Output











 12/19/17 12/20/17 12/21/17





 06:59 06:59 06:59


 


Intake Total 3054 3962 


 


Output Total 2875 3500 


 


Balance 179 462 


 


Weight 62.8 kg 62.1 kg 











Physical Exam: 


General appearance: PRESENT: mild distress - related to pain crisis


Head exam: PRESENT: atraumatic, normocephalic


Eye exam: PRESENT: conjunctiva pink, EOMI, PERRLA.  ABSENT: scleral icterus


Respiratory exam: PRESENT: clear to auscultation brenda


Cardiovascular exam: PRESENT: RRR.  ABSENT: diastolic murmur, rubs, systolic 

murmur


GI/Abdominal exam: PRESENT: normal bowel sounds, soft.  ABSENT: distended, 

guarding, mass, organomegaly, rebound, tenderness


Extremities exam: ABSENT: pedal edema


Musculoskeletal exam: PRESENT: normal inspection


Neurological exam: PRESENT: alert, awake, oriented to person, oriented to place

, oriented to time, oriented to situation, CN II-XII grossly intact.  ABSENT: 

motor sensory deficit


Psychiatric exam: PRESENT: appropriate affect, normal mood.  ABSENT: homicidal 

ideation, suicidal ideation


Skin exam: PRESENT: dry, intact, warm.  ABSENT: cyanosis, rash








Results


Laboratory Results: 


 





 12/20/17 05:30 





 12/20/17 06:20 





 











  12/20/17 12/20/17





  05:30 06:20


 


WBC  6.6 


 


RBC  1.97 L 


 


Hgb  7.2 L 


 


Hct  20.8 L 


 


MCV  105 H 


 


MCH  36.7 H 


 


MCHC  34.9 


 


RDW  25.6 H 


 


Plt Count  144 L 


 


Seg Neutrophils %  Not Reportable 


 


Lymphocytes %  Not Reportable 


 


Monocytes %  Not Reportable 


 


Eosinophils %  Not Reportable 


 


Basophils %  Not Reportable 


 


Absolute Neutrophils  Not Reportable 


 


Absolute Lymphocytes  Not Reportable 


 


Absolute Monocytes  Not Reportable 


 


Absolute Eosinophils  Not Reportable 


 


Absolute Basophils  Not Reportable 


 


Sodium   143.3


 


Potassium   4.1


 


Chloride   107


 


Carbon Dioxide   27


 


Anion Gap   9


 


BUN   5 L


 


Creatinine   0.61


 


Est GFR ( Amer)   > 60


 


Est GFR (Non-Af Amer)   > 60


 


Glucose   106


 


Calcium   8.5











Impressions: 


 





Chest X-Ray  12/17/17 21:16


IMPRESSION:  Peribronchial cuffing, increased interstitial changes and small 

patchy airspace opacities are present in the lung bases.


Right-sided central venous catheter tip remains projecting over the right 

internal jugular vein.


 














Assessment & Plan





- Diagnosis


(1) Lobar pneumonia, unspecified organism


Is this a current diagnosis for this admission?: Yes   





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


Is this a current diagnosis for this admission?: Yes   





(3) Sickle cell disease with crisis


Is this a current diagnosis for this admission?: Yes   





(4) Asthma


Qualifiers: 


   Asthma severity: mild   Asthma complication type: uncomplicated 


Is this a current diagnosis for this admission?: Yes   





(5) Gram positive septicemia


Is this a current diagnosis for this admission?: Yes   





- Time


Time Spent with patient: 25-34 minutes


Medications reviewed and adjusted accordingly: Yes


Anticipated discharge: Home


Within: Other





- Inpatient Certification


Based on my medical assessment, after consideration of the patient's 

comorbidities, presenting symptoms, or acuity I expect that the services needed 

warrant INPATIENT care.: Yes


I certify that my determination is in accordance with my understanding of 

Medicare's requirements for reasonable and necessary INPATIENT services [42 CFR 

412.3e].: Yes


Medical Necessity: Need Close Monitoring Due to Risk of Patient Decompensation, 

Need For IV Fluids, Need For Continuous Telemetry Monitoring, Need for IV 

Antibiotics, Risk of Complication if Not Cared For in Hospital


Post Hospital Care: D/C Planner Documentation





- Plan Summary


Plan Summary: 


Continue current antibiotic coverage. Increase IN Dilaudid to 2mg [id number]. 

Transfuse 2 units PRBC. Continue all other current mediation management. Dr Gonzalez will cover my service thereafter till 12/30/17.

## 2017-12-21 NOTE — PDOC PROGRESS REPORT
Subjective


Progress Note for:: 12/21/17


Subjective:: 





Patient seen by the bedside admitted for the management of pneumonia in the 

setting of sickle cell disease


Reason For Visit: 


PNEUMONIA








Physical Exam


Vital Signs: 


 











Temp Pulse Resp BP Pulse Ox


 


 98.2 F   87   16   132/75 H  96 


 


 12/21/17 19:25  12/21/17 19:25  12/21/17 19:25  12/21/17 19:25  12/21/17 19:25








 Intake & Output











 12/20/17 12/21/17 12/22/17





 06:59 06:59 06:59


 


Intake Total 3962 4536 2333


 


Output Total 3500 1790 1425


 


Balance 462 2746 908


 


Weight 62.1 kg 62.1 kg 











General appearance: PRESENT: mild distress


Head exam: PRESENT: atraumatic, normocephalic


Eye exam: PRESENT: conjunctiva pink, EOMI, PERRLA


Mouth exam: PRESENT: moist, tongue midline


Neck exam: PRESENT: full ROM


Respiratory exam: PRESENT: clear to auscultation brenda


Cardiovascular exam: PRESENT: RRR, +S1, +S2


Vascular exam: PRESENT: normal capillary refill


GI/Abdominal exam: PRESENT: normal bowel sounds, soft


Rectal exam: PRESENT: deferred


Neurological exam: PRESENT: alert


Psychiatric exam: PRESENT: appropriate affect, normal mood


Skin exam: PRESENT: dry, intact, warm





Results


Laboratory Results: 


 





 12/21/17 02:40 





 12/21/17 02:40 





 











  12/21/17 12/21/17





  02:40 02:40


 


WBC   5.2


 


RBC   3.01 L


 


Hgb   9.9 L D


 


Hct   28.5 L


 


MCV   95  D


 


MCH   32.8


 


MCHC   34.7


 


RDW   29.0 H


 


Plt Count   198


 


Seg Neutrophils %   Not Reportable


 


Lymphocytes %   Not Reportable


 


Monocytes %   Not Reportable


 


Eosinophils %   Not Reportable


 


Basophils %   Not Reportable


 


Absolute Neutrophils   Not Reportable


 


Absolute Lymphocytes   Not Reportable


 


Absolute Monocytes   Not Reportable


 


Absolute Eosinophils   Not Reportable


 


Absolute Basophils   Not Reportable


 


Sodium  144.9 


 


Potassium  3.9 


 


Chloride  109 H 


 


Carbon Dioxide  30 


 


Anion Gap  6 


 


BUN  5 L 


 


Creatinine  0.67 


 


Est GFR ( Amer)  > 60 


 


Est GFR (Non-Af Amer)  > 60 


 


Glucose  107 


 


Calcium  9.0 











Impressions: 


 





Chest X-Ray  12/17/17 21:16


IMPRESSION:  Peribronchial cuffing, increased interstitial changes and small 

patchy airspace opacities are present in the lung bases.


Right-sided central venous catheter tip remains projecting over the right 

internal jugular vein.


 














Assessment & Plan





- Diagnosis








(2) Pneumonia


Qualifiers: 


   Pneumonia type: due to unspecified organism   Laterality: right   Lung 

location: lower lobe of lung   Qualified Code(s): J18.1 - Lobar pneumonia, 

unspecified organism   


Is this a current diagnosis for this admission?: Yes   





(3) Gram positive septicemia


Is this a current diagnosis for this admission?: Yes   





- Plan Summary


Plan Summary: 





Continue treatment

## 2017-12-22 NOTE — PDOC PROGRESS REPORT
Subjective


Progress Note for:: 12/22/17


Subjective:: 





Patient seen by the bedside admitted for the management of pneumonia in the 

setting of sickle cell disease


Reason For Visit: 


PNEUMONIA








Physical Exam


Vital Signs: 


 











Temp Pulse Resp BP Pulse Ox


 


 98.2 F   69   20   115/87 H  99 


 


 12/22/17 15:32  12/22/17 19:00  12/22/17 15:32  12/22/17 15:32  12/22/17 15:32








 Intake & Output











 12/21/17 12/22/17 12/23/17





 06:59 06:59 06:59


 


Intake Total 4536 6519 3122


 


Output Total 1790 3925 900


 


Balance 0287 6344 2222


 


Weight 62.1 kg  











General appearance: PRESENT: no acute distress, well-developed, well-nourished


Head exam: PRESENT: atraumatic, normocephalic


Eye exam: PRESENT: conjunctiva pink, EOMI, PERRLA


Ear exam: PRESENT: normal external ear exam


Mouth exam: PRESENT: moist, tongue midline


Neck exam: PRESENT: full ROM


Respiratory exam: PRESENT: clear to auscultation brenda


Cardiovascular exam: PRESENT: RRR, +S1, +S2


Pulses: PRESENT: normal dorsalis pedis pul, +2 pedal pulses bilateral


Vascular exam: PRESENT: normal capillary refill


GI/Abdominal exam: PRESENT: normal bowel sounds, soft.  ABSENT: distended, 

guarding, mass, organolmegaly, rebound, tenderness


Rectal exam: PRESENT: deferred


Neurological exam: PRESENT: alert, awake, oriented to person, oriented to place

, oriented to time, oriented to situation, CN II-XII grossly intact.  ABSENT: 

motor sensory deficit


Psychiatric exam: PRESENT: appropriate affect, normal mood.  ABSENT: homicidal 

ideation, suicidal ideation


Skin exam: PRESENT: dry, intact, warm.  ABSENT: cyanosis, rash





Results


Laboratory Results: 


 





 12/21/17 02:40 





 12/21/17 02:40 





 





12/18/17 01:35   Blood   Blood Culture - Final


                            Staphylococcus Capitis


12/18/17 02:34   Blood   Blood Culture - Final


                            Staphylococcus Capitis








Impressions: 


 





Chest X-Ray  12/17/17 21:16


IMPRESSION:  Peribronchial cuffing, increased interstitial changes and small 

patchy airspace opacities are present in the lung bases.


Right-sided central venous catheter tip remains projecting over the right 

internal jugular vein.


 














Assessment & Plan





- Diagnosis


(1) Sickle cell crisis acute chest syndrome


Is this a current diagnosis for this admission?: Yes   





(2) Pneumonia


Qualifiers: 


   Pneumonia type: due to unspecified organism   Laterality: right   Lung 

location: lower lobe of lung   Qualified Code(s): J18.1 - Lobar pneumonia, 

unspecified organism   


Is this a current diagnosis for this admission?: Yes   





(3) Gram positive septicemia


Is this a current diagnosis for this admission?: Yes

## 2017-12-23 NOTE — PDOC PROGRESS REPORT
Subjective


Progress Note for:: 12/23/17


Subjective:: 





Patient was seen by the bedside, he was admitted for the management of pneumonia

, he seemed to be stable


Reason For Visit: 


PNEUMONIA








Physical Exam


Vital Signs: 


 











Temp Pulse Resp BP Pulse Ox


 


 98.3 F   79   16   147/82 H  96 


 


 12/23/17 15:22  12/23/17 15:22  12/23/17 15:22  12/23/17 15:22  12/23/17 15:22








 Intake & Output











 12/22/17 12/23/17 12/24/17





 06:59 06:59 06:59


 


Intake Total 6519 7849 2241


 


Output Total 3925 2450 1175


 


Balance 2594 5399 1066











General appearance: PRESENT: no acute distress


Head exam: PRESENT: atraumatic, normocephalic


Eye exam: PRESENT: PERRLA


Ear exam: PRESENT: normal external ear exam


Neck exam: PRESENT: full ROM


Respiratory exam: PRESENT: clear to auscultation brenda


Cardiovascular exam: PRESENT: RRR, +S1, +S2


Vascular exam: PRESENT: normal capillary refill


GI/Abdominal exam: PRESENT: normal bowel sounds, soft


Rectal exam: PRESENT: deferred


Neurological exam: PRESENT: alert, awake, oriented to person, oriented to place

, oriented to time, oriented to situation, CN II-XII grossly intact.  ABSENT: 

motor sensory deficit


Psychiatric exam: PRESENT: appropriate affect, normal mood


Skin exam: PRESENT: dry, intact, warm





Results


Laboratory Results: 


 





 12/21/17 02:40 





 12/21/17 02:40 








Impressions: 


 





Chest X-Ray  12/17/17 21:16


IMPRESSION:  Peribronchial cuffing, increased interstitial changes and small 

patchy airspace opacities are present in the lung bases.


Right-sided central venous catheter tip remains projecting over the right 

internal jugular vein.


 














Assessment & Plan





- Diagnosis


(1) Sickle cell crisis acute chest syndrome


Is this a current diagnosis for this admission?: Yes   





(2) Pneumonia


Qualifiers: 


   Pneumonia type: due to unspecified organism   Laterality: right   Lung 

location: lower lobe of lung   Qualified Code(s): J18.1 - Lobar pneumonia, 

unspecified organism   


Is this a current diagnosis for this admission?: Yes   





(3) Gram positive septicemia


Is this a current diagnosis for this admission?: Yes

## 2017-12-24 NOTE — PDOC DISCHARGE SUMMARY
General





- Admit/Disc Date/PCP


Admission Date/Primary Care Provider: 


  12/18/17 00:49





  





Discharge Date: 12/24/17





- Discharge Diagnosis


(1) Pneumonia


Is this a current diagnosis for this admission?: Yes   





(2) Gram positive septicemia


Is this a current diagnosis for this admission?: Yes   





(3) Sickle cell crisis acute chest syndrome


Is this a current diagnosis for this admission?: Yes   





- Additional Information


Discharge Activity: Activity As Tolerated


Home Medications: 








Fentanyl [Duragesic 100 Mcg/Hr Transdermal Patch] 1 patch TOP Q3D 12/18/17 


Folic Acid [Folvite 1 mg Tablet] 1 mg PO DAILY 12/18/17 


Hydroxyurea [Hydrea 500 mg Capsule] 1,000 mg PO SUTUTHSA@1000 12/18/17 


Hydroxyurea [Hydrea 500 mg Capsule] 1,500 mg PO MOWEFR@1000 12/18/17 


Ibuprofen [Motrin 800 mg Tablet] 800 mg PO Q6HP PRN 12/18/17 


Oxycodone HCl [Oxy-Ir 5 mg Tablet] 5 mg PO Q4 12/18/17 











History of Present Illness


History of Present Illness: 


CARMEN BEGUM is a 22 year old male, he has sickle cell disease he was 

admitted for the management of pneumonia








Hospital Course


Hospital Course: 





He has sickle cell disease, he was admitted when he presented with respiratory 

symptoms chest x-ray confirmed pneumonia.  He was empirically treated with IV 

antibiotic, blood culture, sputum culture grew staph capitis he was ultimately 

transitioned to p.o. Levaquin, patient is improved





Physical Exam


Vital Signs: 


 











Temp Pulse Resp BP Pulse Ox


 


 98.5 F   770 H  16   120/77   98 


 


 12/24/17 11:08  12/24/17 14:00  12/24/17 11:08  12/24/17 11:08  12/24/17 11:08








 Intake & Output











 12/23/17 12/24/17 12/25/17





 06:59 06:59 06:59


 


Intake Total 1469 4101 


 


Output Total 8531 3774 


 


Balance 5316 2426 


 


Weight  63.1 kg 











General appearance: PRESENT: no acute distress, well-developed, well-nourished


Head exam: PRESENT: atraumatic, normocephalic


Eye exam: PRESENT: conjunctiva pink, EOMI, PERRLA.  ABSENT: scleral icterus


Ear exam: PRESENT: normal external ear exam


Mouth exam: PRESENT: moist, tongue midline


Neck exam: PRESENT: full ROM


Respiratory exam: PRESENT: chest wall tenderness


Cardiovascular exam: PRESENT: RRR, +S1, +S2.  ABSENT: diastolic murmur, rubs, 

systolic murmur


Pulses: PRESENT: normal dorsalis pedis pul, +2 pedal pulses bilateral


Vascular exam: PRESENT: normal capillary refill


GI/Abdominal exam: PRESENT: normal bowel sounds, soft


Rectal exam: PRESENT: deferred


Neurological exam: PRESENT: alert, awake, oriented to person, oriented to place

, oriented to time, oriented to situation, CN II-XII grossly intact


Psychiatric exam: PRESENT: appropriate affect, normal mood


Skin exam: PRESENT: dry, intact, warm





Results


Laboratory Results: 


 





 12/21/17 02:40 





 12/21/17 02:40 








Impressions: 


 





Chest X-Ray  12/17/17 21:16


IMPRESSION:  Peribronchial cuffing, increased interstitial changes and small 

patchy airspace opacities are present in the lung bases.


Right-sided central venous catheter tip remains projecting over the right 

internal jugular vein.

## 2018-01-13 NOTE — ER DOCUMENT REPORT
ED General





- General


Chief Complaint: Back Pain


Stated Complaint: BACK PAIN


Time Seen by Provider: 01/13/18 20:34


Mode of Arrival: Ambulatory


Notes: 


Patient is a 22-year-old male who presents with complaint of back pain and some 

dyspnea.  He does have history of sickle cell.  Is felt sick for 2 days.  He 

says he is unsure if the weather but he is felt unwell.  No fevers.  No 

vomiting.  No diarrhea.  No chest pain.  No abdominal pain.  Little bit of pain 

to his left leg as well.  No history of PE.  He has had acute chest syndrome 

twice.  This feels a little bit different than his typical acute chest 

syndrome.  He is followed by Dr. Cleaning and Dr. Funez.  No other complaints 

at this time.





TRAVEL OUTSIDE OF THE [place] IN LAST 30 DAYS: No





- Related Data


Allergies/Adverse Reactions: 


 





Coconut * [Coconut] Allergy (Mild, Verified 01/13/18 19:53)


 


transpore tape Allergy (Mild, Uncoded 01/13/18 19:53)


 Hives











Past Medical History





- General


Information source: Patient





- Social History


Smoking Status: Never Smoker


Frequency of alcohol use: None


Drug Abuse: None


Family History: Reviewed & Not Pertinent, Arthritis, DM, Hypertension, Other - 

Sickle cell trait both parents and asthma


Patient has suicidal ideation: No


Patient has homicidal ideation: No


Pulmonary Medical History: Reports: Hx Asthma, Hx Pneumonia


Renal/ Medical History: Denies: Hx Peritoneal Dialysis


Psychiatric Medical History: 


   Denies: Hx Depression


Past Surgical History: Reports: Hx Abdominal Surgery - Gallstones removal, Hx 

Cholecystectomy, Hx Orthopedic Surgery - Fluid drained from right foot, Hx 

Vascular Surgery - Port placement





- Immunizations


Immunizations up to date: Yes


Hx Diphtheria, Pertussis, Tetanus Vaccination: Yes


Hx Pneumococcal Vaccination: 05/16/13





Review of Systems





- Review of Systems


Notes: 





My Normal Review Basic





REVIEW OF SYSTEMS:


CONSTITUTIONAL :  Denies fever,  chills, or sweats.  Denies recent illness.


EENT:   Denies eye, ear, throat, or mouth pain or symptoms.  Denies nasal or 

sinus congestion.


CARDIOVASCULAR:  Denies chest pain.


RESPIRATORY: Mild shortness of breath.


GASTROINTESTINAL:  Denies abdominal pain.  Denies nausea, vomiting, or 

diarrhea.  Denies constipation.  Last BM: 


GENITOURINARY:  Denies difficulty urinating, painful urination, burning, 

frequency, or blood in urine.


MUSCULOSKELETAL: Back pain.  Left leg pain


SKIN:   Denies rash or skin lesions.


HEMATOLOGIC :   Denies easy bruising or bleeding.


LYMPHATIC:  Denies swollen, enlarged glands.


NEUROLOGICAL:  Denies altered mental status or loss of consciousness.  Denies 

headache.  Denies weakness or paralysis or loss of use of either side.  Denies 

problems with gait or speech.  Denies sensory or motor loss.


ALL OTHER SYSTEMS REVIEWED AND NEGATIVE.





Physical Exam





- Vital signs


Vitals: 


 











Temp Pulse Resp BP Pulse Ox


 


 99 F   121 H  18   134/94 H  97 


 


 01/13/18 19:39  01/13/18 19:39  01/13/18 19:39  01/13/18 19:39  01/13/18 19:39














- Notes


Notes: 





General Appearance: Well nourished, alert, cooperative, no acute distress, mild 

obvious discomfort.


Vitals: reviewed, See vital signs table.


Head: no swelling or tenderness to the head


Eyes: PERRL, EOMI, Conjuctiva clear


Mouth: No decreasd moisture


Neck: Supple, no neck tenderness, No thyromegaly


Lungs: No wheezing, No rales, No rhonci, No accessory muscle use, good air 

exchange bilaterally.


Heart: Tachycardic rate, Regular rythm, No murmur, no rub


Abdomen: Normal BS, soft, No rigidity, No abdominal tenderness, No guarding, no 

rebound, no abdominal masses, no organomegaly


Extremities: strength 5/5 in all extremities, good pulses in all extremities, 

no swelling or tenderness in the extremities, no edema.


Skin: warm, dry, appropriate color, no rash


Neuro: speech clear, oriented x 3, normal affect, responds appropriately to 

questions.





Course





- Re-evaluation


Re-evalutation: 





01/13/18 23:28


I reevaluated Mr. Solorzano.  He says pain is improving but he still has some pain 

in his back.  He looks comfortable.  I will give another dose of pain medicine.

  I will place him on the monitor so I can watch his heart rate closer.


01/14/18 00:58








Patient's pain continues to improve.  She still has some pain but it is much 

better when he first arrived.  He still some itching which usually gets with 

Dilaudid.  Will give another 25 mg Benadryl 1 more dose of Dilaudid.  Will give 

another bolus of IV fluids.  Heart rate is still elevated.  He does not appear 

short of breath but he says he initially felt short of breath when he got here 

and his heart rate still elevated has history of sickle cell disease and 

therefore obtain a CTA to rule out PE as a potential cause.





Dictation of this chart was performed using voice recognition software; 

therefore, there may be some unintended grammatical errors.


01/14/18 03:32








Patient is feeling much improved.  His CT is negative.  His heart rate is 

improved.  He looks well.  He did mention he is on of his oxycodone and 

especially Dr. Cleaning on Monday.  I did look up his history and the nurse 

prescriber database.  He does get all of his oxycodone from Dr. Cleaning his 

last prescription was on December 1 so it does coincide with the story.  I will 

write him a very short prescription for him through until Monday when he sees 

Dr. Cleaning.  Patient encouraged to return to ER immediately if he has 

worsening recurrent pain, difficulty breathing, fevers, or feels unwell.  

Patient agrees with plan will be discharged home.





Dictation of this chart was performed using voice recognition software; 

therefore, there may be some unintended grammatical errors.





- Vital Signs


Vital signs: 


 











Temp Pulse Resp BP Pulse Ox


 


 99 F   121 H  19   132/79 H  93 


 


 01/13/18 19:39  01/13/18 19:39  01/14/18 00:11  01/14/18 00:11  01/14/18 00:11














- Laboratory


Result Diagrams: 


 01/13/18 22:49





 01/13/18 22:49


Laboratory results interpreted by me: 


 











  01/13/18 01/13/18





  22:49 22:49


 


RBC  3.18 L 


 


Hgb  10.9 L 


 


Hct  31.2 L 


 


MCV  98 H 


 


MCH  34.2 H 


 


RDW  30.4 H 


 


Retic Count (auto)  10.22 H 


 


Absolute Retic  0.326 H 


 


Sodium   147.1 H


 


Chloride   109 H


 


Total Bilirubin   3.7 H


 


Direct Bilirubin   1.1 H


 


AST   60 H


 


Alkaline Phosphatase   128 H


 


Total Protein   10.1 H


 


Albumin   5.5 H














Discharge





- Discharge


Clinical Impression: 


Back pain


Qualifiers:


 Back pain location: low back pain Chronicity: unspecified Back pain laterality

: bilateral Sciatica presence: without sciatica Qualified Code(s): M54.5 - Low 

back pain





Sickle cell anemia


Qualifiers:


 Sickle-cell associated disorders: with unspecified crisis Qualified Code(s): 

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





Condition: Good


Disposition: HOME, SELF-CARE


Additional Instructions: 


Please follow up closely with Dr. Cleaning on Friday for reevaluation. Please 

return to the ER immediately if you have fevers, worsening pain, difficulty 

breathing, or feel unwell.


Prescriptions: 


Oxycodone HCl [Oxycontin Ir 5 Mg Tablet] 1 - 2 mg PO Q6HP PRN #15 tablet


 PRN Reason: For Pain

## 2018-01-13 NOTE — RADIOLOGY REPORT (SQ)
EXAM DESCRIPTION:  CHEST SINGLE VIEW



COMPLETED DATE/TIME:  1/13/2018 10:30 pm



REASON FOR STUDY:  dyspnea



COMPARISON:  12/17/2017



EXAM PARAMETERS:  NUMBER OF VIEWS: One view.

TECHNIQUE: Single frontal radiographic view of the chest acquired.

RADIATION DOSE: NA

LIMITATIONS: None.



FINDINGS:  LUNGS AND PLEURA: No opacities, masses or pneumothorax. No pleural effusion.

MEDIASTINUM AND HILAR STRUCTURES: No masses.  Contour normal.

HEART AND VASCULAR STRUCTURES: Heart normal in size.  Normal vasculature.

BONES: Changes related to sickle cell.

HARDWARE: Venous access catheter tip at cavoatrial junction.

OTHER: No other significant finding.



IMPRESSION:  NO ACUTE RADIOGRAPHIC FINDING IN THE CHEST.



TECHNICAL DOCUMENTATION:  JOB ID:  [id number]

 2011 [employer]- All Rights Reserved

## 2018-01-13 NOTE — ER DOCUMENT REPORT
ED Medical Screen (RME)





- General


Chief Complaint: Back Pain


Stated Complaint: BACK PAIN


Time Seen by Provider: 01/13/18 20:34


Mode of Arrival: Ambulatory


Information source: Patient


TRAVEL OUTSIDE OF THE [place] IN LAST 30 DAYS: No





- HPI


Patient complains to provider of: sickle cell crisis/back pain


Onset: Other - pt with SCD with severe back pain.  Oxycontin not helping





- Related Data


Allergies/Adverse Reactions: 


 





Coconut * [Coconut] Allergy (Mild, Verified 01/13/18 19:53)


 


transpore tape Allergy (Mild, Uncoded 01/13/18 19:53)


 Hives











Past Medical History





- Social History


Frequency of alcohol use: None


Drug Abuse: None


Family history: None


Pulmonary Medical History: Reports: Hx Asthma, Hx Pneumonia


Renal/ Medical History: Denies: Hx Peritoneal Dialysis


Psychiatric Medical History: 


   Denies: Hx Depression


Past Surgical History: Reports: Hx Abdominal Surgery - Gallstones removal, Hx 

Cholecystectomy, Hx Orthopedic Surgery - Fluid drained from right foot, Hx 

Vascular Surgery - Port placement





- Immunizations


Immunizations up to date: Yes


Hx Diphtheria, Pertussis, Tetanus Vaccination: Yes


History of Influenza Vaccine for 10/2017 - 3/2018 Season: Yes


Influenza Administration Date for 10/2017 - 3/2018 Season: 10/01/17





Physical Exam





- Vital signs


Vitals: 





 











Temp Pulse Resp BP Pulse Ox


 


 99 F   121 H  18   134/94 H  97 


 


 01/13/18 19:39  01/13/18 19:39  01/13/18 19:39  01/13/18 19:39  01/13/18 19:39














Course





- Vital Signs


Vital signs: 





 











Temp Pulse Resp BP Pulse Ox


 


 99 F   121 H  18   134/94 H  97 


 


 01/13/18 19:39  01/13/18 19:39  01/13/18 19:39  01/13/18 19:39  01/13/18 19:39

## 2018-01-17 NOTE — ER DOCUMENT REPORT
ED Medical Screen (RME)





- General


Chief Complaint: Sickle Cell Crisis


Stated Complaint: BACK AND CHEST PAIN


Time Seen by Provider: 01/17/18 10:13


Notes: 





22-year-old sickle cell patient comes emergency room complaining of 2 day 

history of vomiting with diarrhea and back and chest pain.  He was here 4 days 

ago complaining of back and chest pain.  At that time his reticulocyte count 

was just over 10 which is about half what it is on presentations with illness.


Will repeat his CBC, Chem-12, lactic acid, reticulocyte count, urinalysis.








I have greeted and performed a rapid initial assessment of this patient.  A 

comprehensive ED assessment and evaluation of the patient, analysis of test 

results and completion of the medical decision making process will be conducted 

by additional ED providers.


TRAVEL OUTSIDE OF THE [place] IN LAST 30 DAYS: No





- Related Data


Allergies/Adverse Reactions: 


 





Coconut * [Coconut] Allergy (Mild, Verified 01/13/18 19:53)


 


transpore tape Allergy (Mild, Uncoded 01/13/18 19:53)


 Hives











Past Medical History





- Social History


Chew tobacco use (# tins/day): No


Frequency of alcohol use: None


Drug Abuse: None


Family history: None


Pulmonary Medical History: Reports: Hx Asthma, Hx Pneumonia


Renal/ Medical History: Denies: Hx Peritoneal Dialysis


Psychiatric Medical History: 


   Denies: Hx Depression


Past Surgical History: Reports: Hx Abdominal Surgery - Gallstones removal, Hx 

Cholecystectomy, Hx Orthopedic Surgery - Fluid drained from right foot, Hx 

Vascular Surgery - Port placement





- Immunizations


Immunizations up to date: Yes


Hx Diphtheria, Pertussis, Tetanus Vaccination: Yes


History of Influenza Vaccine for 10/2017 - 3/2018 Season: Yes


Influenza Administration Date for 10/2017 - 3/2018 Season: 10/01/17





Physical Exam





- Vital signs


Vitals: 





 











Temp Pulse Resp BP Pulse Ox


 


 98.4 F   105 H  16   132/80 H  96 


 


 01/17/18 09:35  01/17/18 09:35  01/17/18 09:35  01/17/18 09:35  01/17/18 09:35














Course





- Vital Signs


Vital signs: 





 











Temp Pulse Resp BP Pulse Ox


 


 98.4 F   105 H  16   132/80 H  96 


 


 01/17/18 09:35  01/17/18 09:35  01/17/18 09:35  01/17/18 09:35  01/17/18 09:35

## 2018-01-17 NOTE — ER DOCUMENT REPORT
ED General Pain





- General


Chief Complaint: Sickle Cell Crisis


Stated Complaint: BACK AND CHEST PAIN


Time Seen by Provider: 01/17/18 10:13


Notes: 


The patient is a 22-year-old male, past medical history sickle cell disease, 

presents with 2 days of worsening back and chest pain that is typical for his 

sickle cell pain crisis.  He tried his home oxycodone without much relief of 

his symptoms.  Patient denies cough, fevers, shortness of breath, leg swelling, 

history of DVTs, rash, difficult to walking, numbness, tingling or headache.


TRAVEL OUTSIDE OF THE [place] IN LAST 30 DAYS: No





- Related Data


Allergies/Adverse Reactions: 


 





Coconut * [Coconut] Allergy (Mild, Verified 01/13/18 19:53)


 


transpore tape Allergy (Mild, Uncoded 01/13/18 19:53)


 Hives











Past Medical History





- General


Information source: Patient





- Social History


Smoking Status: Never Smoker


Chew tobacco use (# tins/day): No


Frequency of alcohol use: None


Drug Abuse: None


Family History: Reviewed & Not Pertinent, Arthritis, DM, Hypertension, Other - 

Sickle cell trait both parents and asthma


Patient has suicidal ideation: No


Patient has homicidal ideation: No


Pulmonary Medical History: Reports: Hx Asthma, Hx Pneumonia


Renal/ Medical History: Denies: Hx Peritoneal Dialysis


Psychiatric Medical History: 


   Denies: Hx Depression


Past Surgical History: Reports: Hx Abdominal Surgery - Gallstones removal, Hx 

Cholecystectomy, Hx Orthopedic Surgery - Fluid drained from right foot, Hx 

Vascular Surgery - Port placement





- Immunizations


Immunizations up to date: Yes


Hx Diphtheria, Pertussis, Tetanus Vaccination: Yes


Hx Pneumococcal Vaccination: 05/16/13





Review of Systems





- Review of Systems


Notes: 


REVIEW OF SYSTEMS:


CONSTITUTIONAL: -fevers, -chills


EENT: -eye pain, -difficulty swallowing, -nasal congestion


CARDIOVASCULAR: +chest pain, -syncope.


RESPIRATORY: -cough, -SOB


GASTROINTESTINAL: -abdominal pain, -nausea, -vomiting, -diarrhea


GENITOURINARY: -dysuria, -hematuria


MUSCULOSKELETAL: +back pain, -neck pain


SKIN: -rash or skin lesions.


HEMATOLOGIC: -easy bruising or bleeding.


LYMPHATIC: -swollen, enlarged glands.


NEUROLOGICAL: -altered mental status or loss of consciousness, -headache, -

neurologic symptoms


PSYCHIATRIC: -anxiety, -depression.


ALL OTHER SYSTEMS REVIEWED AND NEGATIVE.





Physical Exam





- Vital signs


Vitals: 


 











Temp Pulse Resp BP Pulse Ox


 


 98.4 F   105 H  16   132/80 H  96 


 


 01/17/18 09:35  01/17/18 09:35  01/17/18 09:35  01/17/18 09:35  01/17/18 09:35














- Notes


Notes: 


PHYSICAL EXAMINATION:


GENERAL: Well-appearing, well-nourished and in no acute distress.


HEAD: Atraumatic, normocephalic.


EYES: Pupils equal round and reactive to light, extraocular movements intact, 

sclera anicteric, conjunctiva are normal.


ENT: nares patent, oropharynx clear without exudates.  Moist mucous membranes.


NECK: Normal range of motion, supple without lymphadenopathy


LUNGS: Breath sounds clear to auscultation bilaterally and equal.  No wheezes 

rales or rhonchi.


HEART: Regular rhythm and rate.


ABDOMEN: Soft, nontender, normoactive bowel sounds.  No guarding, no rebound.  

No masses appreciated.


EXTREMITIES: Normal range of motion, no pitting or edema.  No cyanosis.


NEUROLOGICAL: Cranial nerves grossly intact.  Normal speech, normal gait.  

Normal sensory and motor exams.


PSYCH: Normal mood, normal affect.


SKIN: Warm, Dry, normal turgor, no rashes or lesions noted.





Course





- Re-evaluation


Re-evalutation: 


Patient appears well and is in no acute distress.  His chest x-ray does not 

show any focal infiltrates, he does not have a fever his EKG does not show any 

ischemic changes.  His reticulocyte count is 10, which is below some prior 

ranges.  He is not anemic and his pain has improved after 2 doses of IV 

Dilaudid.





01/17/18 13:55 Spoke to Dr. Funez and he recommends discharge.





01/17/18 13:56 Spoke to his Hematologist, Dr. Augustin, and she will see patient 

in her office in the morning.





- Vital Signs


Vital signs: 


 











Temp Pulse Resp BP Pulse Ox


 


 98.4 F   105 H  16   132/80 H  96 


 


 01/17/18 09:35  01/17/18 09:35  01/17/18 09:35  01/17/18 09:35  01/17/18 09:35














- Laboratory


Result Diagrams: 


 01/17/18 11:10





 01/17/18 11:10


Laboratory results interpreted by me: 


 











  01/17/18 01/17/18 01/17/18





  11:10 11:10 12:52


 


RBC  3.21 L  


 


Hgb  11.0 L  


 


Hct  32.3 L  


 


MCV  101 H  


 


MCH  34.3 H  


 


RDW  30.4 H  


 


Retic Count (auto)  10.29 H  


 


Absolute Retic  0.330 H  


 


Potassium   3.5 L 


 


Chloride   108 H 


 


Total Bilirubin   3.0 H 


 


Direct Bilirubin   0.6 H 


 


Total Protein   8.5 H 


 


Urine Protein    30 H


 


Urine Ketones    TRACE H


 


Urine Urobilinogen    4.0 H


 


Urine Ascorbic Acid    20 H














- Diagnostic Test


Radiology reviewed: Image reviewed, Reports reviewed


Radiology results interpreted by me: 


CXR: Chronic interstitial changes, but no acute cardiopulmonary process





- EKG Interpretation by Me


EKG shows normal: Sinus rhythm, Axis, Intervals, QRS Complexes, ST-T Waves


Rate: Normal





Discharge





- Discharge


Clinical Impression: 


 Sickle cell disease with crisis





Condition: Stable


Disposition: HOME, SELF-CARE


Additional Instructions: 


Sickle Cell Crisis





     You have "sickle cell crisis."  Sickle cell disease is caused by abnormal 

hemoglobin.  This hemoglobin can deform red blood cells into a sickle shape.  

These abnormal blood cells can block blood vessels. This causes the pain of 

sickle cell crisis.


     Sickle cell crisis can occur any time.  But attacks are more likely with 

acute infection, dehydration, or altitude change.  A crisis usually causes pain 

in the legs, back, abdomen, and chest.  Sometimes the pain may ease and return 

later.


     The usual treatment is oxygen, pain medication, IV fluids, and treatment 

of infection.  Attacks may take a couple of days to resolve.


     Return if the pain becomes more severe, or if there are new symptoms.


Forms:  Elevated Blood Pressure


Referrals: 


ERIC AUGUSTIN MD [ACTIVE STAFF] - Follow up as needed

## 2018-01-17 NOTE — RADIOLOGY REPORT (SQ)
EXAM DESCRIPTION:  CHEST PA/LAT



COMPLETED DATE/TIME:  1/17/2018 12:33 pm



REASON FOR STUDY:  chest pain



COMPARISON:  12/17/2017



EXAM PARAMETERS:  NUMBER OF VIEWS: two views

TECHNIQUE: Digital Frontal and Lateral radiographic views of the chest acquired.

RADIATION DOSE: NA

LIMITATIONS: none



FINDINGS:  LUNGS AND PLEURA: Mild chronic interstitial changes most prominently in the bases.  No acu
te opacities, masses or pneumothorax. No pleural effusion.

MEDIASTINUM AND HILAR STRUCTURES: No masses or contour abnormalities.

HEART AND VASCULAR STRUCTURES: Heart normal size.  No evidence for failure.

BONES: Chronic sclerotic bony changes.

HARDWARE: Right-sided PICC catheter in superior vena cava.

OTHER: No other significant finding.



IMPRESSION:  Mild chronic interstitial changes without evidence of acute cardiopulmonary disease.



TECHNICAL DOCUMENTATION:  JOB ID:  [id number]

 2011 [employer]- All Rights Reserved

## 2018-01-20 NOTE — PDOC H&P
History of Present Illness


Admission Date/PCP: 


  01/20/18 03:31





  DIYA PHILIPPE





Patient complains of: Chest pain, multiple joints aches and pain, headache


History of Present Illness: 


CARMEN BEGUM is a 22 year old male patient known to my practice with frequent 

hospitalization due to recurrent acute episodes of SS sickle cell disease pain 

and hemolytic crises. Patient was evaluated and discharge from the ED about 2 

days prior to his current presentation with complaint about worsening chest pain

, headache, and multiple joints aches and pain, particularly his knee joints. 

despite administration of IV Dilaudid and hydration his symptoms persist. He 

reported new onset of coughing spells and sore throat since last ED visit. He 

denied any dizziness, shortness of breath, fever, or chills. He admitted to 

nausea but no vomiting. No diarrhea. No genitourinary symptoms to suggest 

ongoing infection. He reported compliance with his home pain management 

medication but no relief from his pain.





Past Medical History


Pulmonary Medical History: Reports: Asthma, Pneumonia


Psychiatric Medical History: 


   Denies: Depression


Hematology: Reports: Anemia, Sickle Cell Disease, Bleeding Tendencies





Past Surgical History


Past Surgical History: Reports: Cholecystectomy, Orthopedic Surgery - Fluid 

drained from right foot, Vascular Surgery - Port placement





Social History


Lives with: Family


Smoking Status: Never Smoker


Frequency of Alcohol Use: None


Hx Recreational Drug Use: No


Drugs: None


Hx Prescription Drug Abuse: No





Family History


Family History: Reviewed & Not Pertinent, Arthritis, DM, Hypertension, Other - 

Sickle cell trait both parents and asthma


Parental Family History Reviewed: Yes


Children Family History Reviewed: Yes


Sibling(s) Family History Reviewed.: Yes





Medication/Allergy


Home Medications: 








Folic Acid [Folvite 1 mg Tablet] 1 mg PO DAILY 01/20/18 


Hydromorphone HCl [Dilaudid 2 mg Tablet] 2 mg PO Q4HP PRN 01/20/18 


Hydroxyurea [Hydrea 500 Mg Capsule] 2,000 mg PO DAILY 01/20/18 


Ibuprofen [Motrin 800 mg Tablet] 800 mg PO Q6HP PRN 01/20/18 








Allergies/Adverse Reactions: 


 





Coconut * [Coconut] Allergy (Mild, Verified 01/19/18 23:14)


 


transpore tape Allergy (Mild, Uncoded 01/19/18 23:14)


 Hives











Review of Systems


All systems: reviewed and no additional remarkable complaints except as stated





Physical Exam


Vital Signs: 


 











Temp Pulse Resp BP Pulse Ox


 


 98.6 F   122 H  19   126/81 H  92 


 


 01/19/18 23:40  01/19/18 23:40  01/20/18 11:01  01/20/18 11:01  01/20/18 11:01











General appearance: PRESENT: no acute distress, well-developed, well-nourished


Head exam: PRESENT: atraumatic, normocephalic


Eye exam: PRESENT: conjunctiva pink, EOMI, PERRLA.  ABSENT: scleral icterus


Ear exam: PRESENT: normal external ear exam


Mouth exam: PRESENT: moist, tongue midline


Neck exam: PRESENT: full ROM.  ABSENT: carotid bruit, JVD, lymphadenopathy, 

thyromegaly


Respiratory exam: PRESENT: clear to auscultation brenda


Cardiovascular exam: PRESENT: +S1, +S2, tachycardia


Pulses: PRESENT: normal dorsalis pedis pul, +2 pedal pulses bilateral


Vascular exam: PRESENT: normal capillary refill.  ABSENT: pallor


GI/Abdominal exam: PRESENT: normal bowel sounds, soft.  ABSENT: distended, 

guarding, mass, organolmegaly, rebound, tenderness


Rectal exam: PRESENT: deferred


Extremities exam: ABSENT: pedal edema


Musculoskeletal exam: PRESENT: normal inspection


Neurological exam: PRESENT: alert, awake, oriented to person, oriented to place

, oriented to time, oriented to situation, CN II-XII grossly intact.  ABSENT: 

motor sensory deficit


Psychiatric exam: PRESENT: appropriate affect, normal mood.  ABSENT: homicidal 

ideation, suicidal ideation


Skin exam: PRESENT: dry, intact, warm.  ABSENT: cyanosis, rash





Results


Laboratory Results: 


I reviewed his lab results on [employer] and form a significant component of my 

medical decision making in this case.


Impressions: 


 





Chest X-Ray  01/20/18 00:02


IMPRESSION:


 


No acute cardiopulmonary findings. 


 


 2011 [employer]- All Rights Reserved


 














Assessment & Plan





- Diagnosis


(1) Sickle cell disease with crisis


Is this a current diagnosis for this admission?: Yes   


Plan: 


See admitting physician orders.








(2) Sickle cell anemia


Qualifiers: 


   Sickle-cell associated disorders: with unspecified crisis   Qualified Code(s)

: D57.00 - Hb-SS disease with crisis, unspecified; D57.0 - Hb-SS disease with 

crisis   


Is this a current diagnosis for this admission?: Yes   


Plan: 


See admitting physician orders.








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


Is this a current diagnosis for this admission?: Yes   


Plan: 


See admitting physician orders.








(4) Asthma


Qualifiers: 


   Asthma severity: mild   Asthma complication type: uncomplicated 


Is this a current diagnosis for this admission?: Yes   


Plan: 


See admitting physician orders.








- Time


Time Spent: 50 to 70 Minutes


Medications reviewed and adjusted accordingly: Yes


Anticipated discharge: Home


Within: Other





- Inpatient Certification


Based on my medical assessment, after consideration of the patient's 

comorbidities, presenting symptoms, or acuity I expect that the services needed 

warrant INPATIENT care.: Yes


I certify that my determination is in accordance with my understanding of 

Medicare's requirements for reasonable and necessary INPATIENT services [42 CFR 

412.3e].: Yes


Medical Necessity: Need Close Monitoring Due to Risk of Patient Decompensation, 

Need For IV Fluids, Need for Pain Control, Risk of Complication if Not Cared 

For in Hospital


Post Hospital Care: D/C Planner Documentation





- Plan Summary


Plan Summary: 





See admitting physician orders.

## 2018-01-20 NOTE — ER DOCUMENT REPORT
ED General





- General


Chief Complaint: Sickle Cell Crisis


Stated Complaint: CHEST PAIN, KNEE PAIN


Time Seen by Provider: 01/20/18 00:02


Notes: 





Patient is a 22-year-old male with a past medical history of sickle cell anemia 

with frequent presentations to the emergency department for pain related issues 

who presents with ongoing pain in his knees, chest and back.  Patient describes 

it as a constant, throbbing, aching pain.  Nothing improves or worsens the 

pain.  He has been taking his home pain medications without relief.  He was 

seen 2 days ago for similar complaints and discharged after receiving pain 

medications in the emergency department.  He has not yet followed up with his 

primary doctor.  He notes that he has had a sore throat and a cough as well in 

the past 24 hours he feels is worsening his chest pain.  He denies any 

shortness of breath, fever, vomiting or diarrhea.  No syncope.  He has a 

history of similar symptoms multiple times in the past.


TRAVEL OUTSIDE OF THE [place] IN LAST 30 DAYS: No





- Related Data


Allergies/Adverse Reactions: 


 





Coconut * [Coconut] Allergy (Mild, Verified 01/19/18 23:14)


 


transpore tape Allergy (Mild, Uncoded 01/19/18 23:14)


 Hives











Past Medical History





- General


Information source: Patient





- Social History


Smoking Status: Never Smoker


Frequency of alcohol use: None


Drug Abuse: None


Lives with: Family


Family History: Reviewed & Not Pertinent, Arthritis, DM, Hypertension, Other - 

Sickle cell trait both parents and asthma


Pulmonary Medical History: Reports: Hx Asthma, Hx Pneumonia


Renal/ Medical History: Denies: Hx Peritoneal Dialysis


Psychiatric Medical History: 


   Denies: Hx Depression


Past Surgical History: Reports: Hx Abdominal Surgery - Gallstones removal, Hx 

Cholecystectomy, Hx Orthopedic Surgery - Fluid drained from right foot, Hx 

Vascular Surgery - Port placement





- Immunizations


Immunizations up to date: Yes


Hx Diphtheria, Pertussis, Tetanus Vaccination: Yes


Hx Pneumococcal Vaccination: 05/16/13





Review of Systems





- Review of Systems


Notes: 





Constitutional: Negative for fever.


HENT: Negative for sore throat.


Eyes: Negative for visual changes.


Cardiovascular: Positive for chest pain.


Respiratory: Negative for shortness of breath.  Positive cough


Gastrointestinal: Negative for abdominal pain, vomiting or diarrhea.


Genitourinary: Negative for dysuria.


Musculoskeletal: Positive bilateral knee pain


Skin: Negative for rash.


Neurological: Negative for headaches, weakness or numbness.





10 point ROS negative except as marked above and in HPI.





Physical Exam





- Vital signs


Vitals: 


 











Temp Pulse Resp BP Pulse Ox


 


 98.6 F   122 H  20   132/81 H  96 


 


 01/19/18 23:40  01/19/18 23:40  01/19/18 23:40  01/19/18 23:40  01/19/18 23:40











Interpretation: Normal


Notes: 





PHYSICAL EXAMINATION:





GENERAL: Well-appearing, well-nourished and in no acute distress.





HEAD: Atraumatic, normocephalic.





EYES: Pupils equal round and reactive to light, extraocular movements intact, 

sclera anicteric, conjunctiva are normal.





ENT: nares patent, oropharynx clear without exudates.  Moderately dry t mucous 

membranes.





NECK: Normal range of motion, supple without lymphadenopathy





LUNGS: Breath sounds clear to auscultation bilaterally and equal.  No wheezes 

rales or rhonchi.





HEART: Regular tachycardia without murmurs





ABDOMEN: Soft, nontender, normoactive bowel sounds.  No guarding, no rebound.  

No masses appreciated.





EXTREMITIES: Normal range of motion, no pitting or edema.  No cyanosis.





NEUROLOGICAL: No focal neurological deficits. Moves all extremities 

spontaneously and on command.





PSYCH: Normal mood, normal affect.





SKIN: Warm, Dry, normal turgor, no rashes or lesions noted.





Course





- Re-evaluation


Re-evalutation: 





01/20/18 00:33


Presentation is most consistent with an uncomplicated sickle cell pain crisis.  

Patient has no evidence of an aplastic crisis on labs.  Chest x-ray and vitals 

are not consistent with acute chest syndrome.  Patient's pain will be 

controlled with up to 3 rounds of IV hyper hydromorphone and if patient's pain 

is unable to be controlled he will require hospitalization.  He otherwise is 

nontoxic in appearance however he does remain tachycardic intermittently


01/20/18 03:20


Patient states that his pain continues to be uncontrolled despite receiving 3 

total rounds of hydromorphone.  He does have ongoing tachycardia at this time 

which had completely resolved after his most recent dose of hydromorphone.  

Patient reports that after the hydromorphone wore off his pain returned to the 

equal intensity.  This does seem to correlate with his tachycardia to suggest 

that the patient is having ongoing uncontrolled pain crisis.  I discussed with 

the patient's primary care physician Dr. Philippe who is agreed to accept the 

patient for hospitalization.





- Vital Signs


Vital signs: 


 











Temp Pulse Resp BP Pulse Ox


 


 98.6 F   122 H  20   132/81 H  96 


 


 01/19/18 23:40  01/19/18 23:40  01/19/18 23:40  01/19/18 23:40  01/19/18 23:40














- Laboratory


Result Diagrams: 


 01/20/18 00:06





 01/20/18 00:06


Laboratory results interpreted by me: 


 











  01/20/18 01/20/18





  00:06 00:06


 


RBC  3.19 L 


 


Hgb  11.0 L 


 


Hct  32.2 L 


 


MCV  101 H 


 


MCH  34.4 H 


 


RDW  27.7 H 


 


Retic Count (auto)  6.78 H 


 


Absolute Retic  0.216 H 


 


Sodium   145.7 H














- Diagnostic Test


Radiology reviewed: Image reviewed, Reports reviewed


Radiology results interpreted by me: 





01/20/18 03:21


Chest x-ray: No acute infiltrate or pneumothorax





Discharge





- Discharge


Clinical Impression: 


 Sickle cell crisis, Sinus tachycardia





Sickle cell anemia


Qualifiers:


 Sickle-cell associated disorders: with unspecified crisis Qualified Code(s): 

D57.00 - Hb-SS disease with crisis, unspecified; D57.0 - Hb-SS disease with 

crisis





Condition: Fair


Disposition: ADMITTED AS OBSERVATION


Admitting Provider: Armin


Unit Admitted: Telemetry


Referrals: 


DIYA PHILIPPE MD [Primary Care Provider] - Follow up as needed

## 2018-01-20 NOTE — EKG REPORT
SEVERITY:- BORDERLINE ECG -

SINUS TACHYCARDIA

PROBABLE LEFT ATRIAL ABNORMALITY

BORDERLINE T ABNORMALITIES, INFERIOR LEADS

:

Confirmed by: Eyal Ryan 20-Jan-2018 10:16:50

## 2018-01-20 NOTE — RADIOLOGY REPORT (SQ)
EXAM DESCRIPTION: 



CHEST SINGLE VIEW



CLINICAL HISTORY: 



22 years, Male, sob



COMPARISON:

1/17/2018.



NUMBER OF VIEWS:1



FINDINGS:



Normal lung volume, chronic blunting of the right costophrenic

angle, else clear parenchyma, normal cardiac silhouette, and

intact bony thorax. Right subclavian PICC tip at the SVC above

the cavoatrial junction.



IMPRESSION:



No acute cardiopulmonary findings. 



 2011 [employer]- All Rights Reserved

## 2018-01-21 NOTE — PDOC PROGRESS REPORT
Subjective


Progress Note for:: 01/21/18


Subjective:: 


Patient continue to express significant pain multiple joints on current pain 

management regimen. He is on Fentanyl patch 75mcg/72 hours at home. He reported 

less chest pain. No significant fever, chills, or difficulty with breathing.


Reason For Visit: 


HOMOGENEOUS SS SICKLE CELL DISEASE WITH ACUTE PAIN








Physical Exam


Vital Signs: 


 











Temp Pulse Resp BP Pulse Ox


 


 98.0 F   104 H  16   116/65   99 


 


 01/21/18 08:00  01/21/18 08:00  01/21/18 08:00  01/21/18 08:00  01/21/18 08:00








 Intake & Output











 01/20/18 01/21/18 01/22/18





 06:59 06:59 06:59


 


Intake Total  2777 


 


Output Total  1000 


 


Balance  1777 











General appearance: PRESENT: mild distress - from pain


Head exam: PRESENT: atraumatic, normocephalic


Mouth exam: PRESENT: moist


Respiratory exam: PRESENT: clear to auscultation brenda


Cardiovascular exam: PRESENT: RRR.  ABSENT: diastolic murmur, rubs, systolic 

murmur


Vascular exam: PRESENT: normal capillary refill.  ABSENT: pallor


GI/Abdominal exam: PRESENT: normal bowel sounds, soft.  ABSENT: distended, 

guarding, mass, organolmegaly, rebound, tenderness


Extremities exam: PRESENT: tenderness - multiple joint sites.  ABSENT: pedal 

edema


Musculoskeletal exam: PRESENT: tenderness - with motion and palpation


Neurological exam: PRESENT: alert, awake, oriented to person, oriented to place

, oriented to time, oriented to situation, CN II-XII grossly intact.  ABSENT: 

motor sensory deficit


Psychiatric exam: PRESENT: appropriate affect, normal mood.  ABSENT: homicidal 

ideation, suicidal ideation


Skin exam: PRESENT: dry, intact, warm.  ABSENT: cyanosis, rash





Results


Laboratory Results: 


 





 01/21/18 05:45 





 01/21/18 05:45 





 











  01/21/18 01/21/18





  05:45 05:45


 


WBC  8.1 


 


RBC  2.71 L 


 


Hgb  9.4 L 


 


Hct  27.1 L 


 


MCV  100 H 


 


MCH  34.8 H 


 


MCHC  34.9 


 


RDW  27.2 H 


 


Plt Count  178 


 


Seg Neutrophils %  49.3 


 


Lymphocytes %  33.8 


 


Monocytes %  11.0 


 


Eosinophils %  5.5 


 


Basophils %  0.4 


 


Absolute Neutrophils  4.0 


 


Absolute Lymphocytes  2.7 


 


Absolute Monocytes  0.9 


 


Absolute Eosinophils  0.4 


 


Absolute Basophils  0.0 


 


Sodium   142.0


 


Potassium   4.6


 


Chloride   105


 


Carbon Dioxide   29


 


Anion Gap   8


 


BUN   4 L


 


Creatinine   0.59


 


Est GFR ( Amer)   > 60


 


Est GFR (Non-Af Amer)   > 60


 


Glucose   99


 


Calcium   8.7


 


Total Bilirubin   2.9 H


 


AST   48


 


ALT   23


 


Alkaline Phosphatase   82


 


Total Protein   6.8


 


Albumin   3.9











Impressions: 


 





Chest X-Ray  01/20/18 00:02


IMPRESSION:


 


No acute cardiopulmonary findings. 


 


 2011 [employer]- All Rights Reserved


 














Assessment & Plan





- Diagnosis


(1) Sickle cell disease with crisis


Is this a current diagnosis for this admission?: Yes   





(2) Sickle cell anemia


Qualifiers: 


   Sickle-cell associated disorders: with unspecified crisis   Qualified Code(s)

: D57.00 - Hb-SS disease with crisis, unspecified; D57.0 - Hb-SS disease with 

crisis   


Is this a current diagnosis for this admission?: Yes   





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


Is this a current diagnosis for this admission?: Yes   





(4) Asthma


Qualifiers: 


   Asthma severity: mild   Asthma complication type: uncomplicated 


Is this a current diagnosis for this admission?: Yes   





- Time


Time Spent with patient: 25-34 minutes


Medications reviewed and adjusted accordingly: Yes


Anticipated discharge: Home


Within: Other





- Inpatient Certification


Based on my medical assessment, after consideration of the patient's 

comorbidities, presenting symptoms, or acuity I expect that the services needed 

warrant INPATIENT care.: Yes


I certify that my determination is in accordance with my understanding of 

Medicare's requirements for reasonable and necessary INPATIENT services [42 CFR 

412.3e].: Yes


Medical Necessity: Need Close Monitoring Due to Risk of Patient Decompensation, 

Need For IV Fluids, Need For Continuous Telemetry Monitoring, Need for Pain 

Control, Risk of Complication if Not Cared For in Hospital


Post Hospital Care: D/C Planner Documentation





- Plan Summary


Plan Summary: 





See  attending physician orders.

## 2018-01-22 NOTE — PDOC PROGRESS REPORT
Subjective


Progress Note for:: 01/22/18


Subjective:: 


Patient continue to experience pain in his knee joint and chest. Episode of 

anxiety last night due to death of his uncle.  No significant fever, chills, or 

difficulty with breathing.


Reason For Visit: 


HOMOGENEOUS SS SICKLE CELL DISEASE WITH ACUTE PAIN








Physical Exam


Vital Signs: 


 











Temp Pulse Resp BP Pulse Ox


 


 98.4 F   111 H  18   115/57 L  93 


 


 01/22/18 04:00  01/22/18 07:00  01/22/18 04:00  01/22/18 04:00  01/22/18 04:00








 Intake & Output











 01/21/18 01/22/18 01/23/18





 06:59 06:59 06:59


 


Intake Total 2777 4313 


 


Output Total 1000  


 


Balance 1777 4313 











Physical Exam: 


General appearance: PRESENT: mild distress - from pain


Head exam: PRESENT: atraumatic, normocephalic


Mouth exam: PRESENT: moist


Respiratory exam: PRESENT: clear to auscultation brenda


Cardiovascular exam: PRESENT: RRR.  ABSENT: diastolic murmur, rubs, systolic 

murmur


Vascular exam: PRESENT: normal capillary refill.  ABSENT: pallor


GI/Abdominal exam: PRESENT: normal bowel sounds, soft.  ABSENT: distended, 

guarding, mass, organolmegaly, rebound, tenderness


Extremities exam: PRESENT: tenderness - multiple joint sites.  ABSENT: pedal 

edema


Musculoskeletal exam: PRESENT: tenderness - with motion and palpation


Neurological exam: PRESENT: alert, awake, oriented to person, oriented to place

, oriented to time, oriented to situation, CN II-XII grossly intact.  ABSENT: 

motor sensory deficit


Psychiatric exam: PRESENT: appropriate affect, normal mood.  ABSENT: homicidal 

ideation, suicidal ideation


Skin exam: PRESENT: dry, intact, warm.  ABSENT: cyanosis, rash








Results


Laboratory Results: 


 





 01/21/18 05:45 





 01/21/18 05:45 








Impressions: 


 





Chest X-Ray  01/20/18 00:02


IMPRESSION:


 


No acute cardiopulmonary findings. 


 


 2011 [employer]- All Rights Reserved


 














Assessment & Plan





- Diagnosis


(1) Sickle cell disease with crisis


Is this a current diagnosis for this admission?: Yes   





(2) Sickle cell anemia


Qualifiers: 


   Sickle-cell associated disorders: with unspecified crisis   Qualified Code(s)

: D57.00 - Hb-SS disease with crisis, unspecified; D57.0 - Hb-SS disease with 

crisis   


Is this a current diagnosis for this admission?: Yes   





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


Is this a current diagnosis for this admission?: Yes   





(4) Asthma


Qualifiers: 


   Asthma severity: mild   Asthma complication type: uncomplicated 


Is this a current diagnosis for this admission?: Yes   





- Time


Time Spent with patient: 25-34 minutes


Medications reviewed and adjusted accordingly: Yes


Anticipated discharge: Home


Within: Other





- Inpatient Certification


Based on my medical assessment, after consideration of the patient's 

comorbidities, presenting symptoms, or acuity I expect that the services needed 

warrant INPATIENT care.: Yes


I certify that my determination is in accordance with my understanding of 

Medicare's requirements for reasonable and necessary INPATIENT services [42 CFR 

412.3e].: Yes


Medical Necessity: Need Close Monitoring Due to Risk of Patient Decompensation, 

Need For IV Fluids, Need For Continuous Telemetry Monitoring, Need for Pain 

Control, Risk of Complication if Not Cared For in Hospital


Post Hospital Care: D/C Planner Documentation





- Plan Summary


Plan Summary: 





See attending physician orders. Obtain CBC with diff and CMP in AM.

## 2018-01-23 NOTE — PDOC PROGRESS REPORT
Subjective


Progress Note for:: 01/23/18


Subjective:: 


Patient reported some improvement in his pain level and mainly to left knee 

joint. There is associated nausea but no vomiting. No significant fever, chills

, or difficulty with breathing.


Reason For Visit: 


HOMOGENEOUS SS SICKLE CELL DISEASE WITH ACUTE PAIN








Physical Exam


Vital Signs: 


 











Temp Pulse Resp BP Pulse Ox


 


 98.5 F   90   14   116/63   100 


 


 01/23/18 16:00  01/23/18 16:00  01/23/18 16:00  01/23/18 16:00  01/23/18 16:00








 Intake & Output











 01/22/18 01/23/18 01/24/18





 06:59 06:59 06:59


 


Intake Total 4313 4808 3070


 


Balance 4313 4808 3070











Physical Exam: 


General appearance: PRESENT: mild distress - from pain


Head exam: PRESENT: atraumatic, normocephalic


Mouth exam: PRESENT: moist


Respiratory exam: PRESENT: clear to auscultation brenda


Cardiovascular exam: PRESENT: RRR.  ABSENT: diastolic murmur, rubs, systolic 

murmur


Vascular exam: PRESENT: normal capillary refill.  ABSENT: pallor


GI/Abdominal exam: PRESENT: normal bowel sounds, soft.  ABSENT: distended, 

guarding, mass, organomegaly, rebound, tenderness


Extremities exam: PRESENT: tenderness - multiple joint sites.  ABSENT: pedal 

edema


Musculoskeletal exam: PRESENT: tenderness - with motion and palpation


Neurological exam: PRESENT: alert, awake, oriented to person, oriented to place

, oriented to time, oriented to situation, CN II-XII grossly intact.  ABSENT: 

motor sensory deficit


Psychiatric exam: PRESENT: appropriate affect, normal mood.  ABSENT: homicidal 

ideation, suicidal ideation


Skin exam: PRESENT: dry, intact, warm.  ABSENT: cyanosis, rash








Results


Laboratory Results: 


 





 01/23/18 06:10 





 01/23/18 06:10 





 











  01/23/18 01/23/18





  06:10 06:10


 


WBC  6.6 


 


RBC  2.49 L 


 


Hgb  8.6 L 


 


Hct  24.1 L 


 


MCV  97 


 


MCH  34.6 H 


 


MCHC  35.9 


 


RDW  28.3 H 


 


Plt Count  185 


 


Seg Neutrophils %  Not Reportable 


 


Lymphocytes %  Not Reportable 


 


Monocytes %  Not Reportable 


 


Eosinophils %  Not Reportable 


 


Basophils %  Not Reportable 


 


Absolute Neutrophils  Not Reportable 


 


Absolute Lymphocytes  Not Reportable 


 


Absolute Monocytes  Not Reportable 


 


Absolute Eosinophils  Not Reportable 


 


Absolute Basophils  Not Reportable 


 


Sodium   141.4


 


Potassium   4.6


 


Chloride   103


 


Carbon Dioxide   30


 


Anion Gap   8


 


BUN   7


 


Creatinine   0.49 L


 


Est GFR ( Amer)   > 60


 


Est GFR (Non-Af Amer)   > 60


 


Glucose   124 H


 


Calcium   9.0


 


Total Bilirubin   3.4 H


 


AST   68 H


 


ALT   24


 


Alkaline Phosphatase   94


 


Total Protein   7.1


 


Albumin   4.1











Impressions: 


 





Chest X-Ray  01/20/18 00:02


IMPRESSION:


 


No acute cardiopulmonary findings. 


 


 2011 [employer]- All Rights Reserved


 














Assessment & Plan





- Diagnosis


(1) Sickle cell disease with crisis


Is this a current diagnosis for this admission?: Yes   





(2) Sickle cell anemia


Qualifiers: 


   Sickle-cell associated disorders: with unspecified crisis   Qualified Code(s)

: D57.00 - Hb-SS disease with crisis, unspecified; D57.0 - Hb-SS disease with 

crisis   


Is this a current diagnosis for this admission?: Yes   





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


Is this a current diagnosis for this admission?: Yes   





(4) Asthma


Qualifiers: 


   Asthma severity: mild   Asthma complication type: uncomplicated 


Is this a current diagnosis for this admission?: Yes   





- Time


Time Spent with patient: 25-34 minutes


Medications reviewed and adjusted accordingly: Yes


Anticipated discharge: Home


Within: Other





- Inpatient Certification


Based on my medical assessment, after consideration of the patient's 

comorbidities, presenting symptoms, or acuity I expect that the services needed 

warrant INPATIENT care.: Yes


I certify that my determination is in accordance with my understanding of 

Medicare's requirements for reasonable and necessary INPATIENT services [42 CFR 

412.3e].: Yes


Medical Necessity: Need Close Monitoring Due to Risk of Patient Decompensation, 

Need For IV Fluids, Need for Pain Control, Risk of Complication if Not Cared 

For in Hospital


Post Hospital Care: D/C Planner Documentation





- Plan Summary


Plan Summary: 





See attending physician orders.

## 2018-01-24 NOTE — PDOC PROGRESS REPORT
Subjective


Progress Note for:: 01/24/18


Subjective:: 


Patient continue to express persistent pain in left leg. No fever or chills. No 

nausea, vomiting, or abdominal pain. No chest pain or difficulty with breathing.


Reason For Visit: 


HOMOGENEOUS SS SICKLE CELL DISEASE WITH ACUTE PAIN








Physical Exam


Vital Signs: 


 











Temp Pulse Resp BP Pulse Ox


 


 98.5 F   114 H  16   134/85 H  92 


 


 01/24/18 00:00  01/24/18 02:00  01/24/18 00:00  01/24/18 00:00  01/24/18 00:00








 Intake & Output











 01/23/18 01/24/18 01/25/18





 06:59 06:59 06:59


 


Intake Total 4808 5215 


 


Balance 4808 5215 











Physical Exam: 


General appearance: PRESENT: mild distress - from pain


Head exam: PRESENT: atraumatic, normocephalic


Mouth exam: PRESENT: moist


Respiratory exam: PRESENT: clear to auscultation brenda


Cardiovascular exam: PRESENT: RRR.  ABSENT: diastolic murmur, rubs, systolic 

murmur


Vascular exam: PRESENT: normal capillary refill.  ABSENT: pallor


GI/Abdominal exam: PRESENT: normal bowel sounds, soft.  ABSENT: distended, 

guarding, mass, organomegaly, rebound, tenderness


Extremities exam: PRESENT: tenderness - multiple joint sites.  ABSENT: pedal 

edema


Musculoskeletal exam: PRESENT: tenderness - with motion and palpation


Neurological exam: PRESENT: alert, awake, oriented to person, oriented to place

, oriented to time, oriented to situation, CN II-XII grossly intact.  ABSENT: 

motor sensory deficit


Psychiatric exam: PRESENT: appropriate affect, normal mood.  ABSENT: homicidal 

ideation, suicidal ideation


Skin exam: PRESENT: dry, intact, warm.  ABSENT: cyanosis, rash





Results


Laboratory Results: 


 





 01/23/18 06:10 





 01/23/18 06:10 





 











  01/23/18





  06:10


 


WBC  6.6


 


RBC  2.49 L


 


Hgb  8.6 L


 


Hct  24.1 L


 


MCV  97


 


MCH  34.6 H


 


MCHC  35.9


 


RDW  28.3 H


 


Plt Count  185


 


Seg Neutrophils %  Not Reportable


 


Lymphocytes %  Not Reportable


 


Monocytes %  Not Reportable


 


Eosinophils %  Not Reportable


 


Basophils %  Not Reportable


 


Absolute Neutrophils  Not Reportable


 


Absolute Lymphocytes  Not Reportable


 


Absolute Monocytes  Not Reportable


 


Absolute Eosinophils  Not Reportable


 


Absolute Basophils  Not Reportable











Impressions: 


 





Chest X-Ray  01/20/18 00:02


IMPRESSION:


 


No acute cardiopulmonary findings. 


 


 2011 [employer]- All Rights Reserved


 














Assessment & Plan





- Diagnosis


(1) Sickle cell disease with crisis


Is this a current diagnosis for this admission?: Yes   





(2) Sickle cell anemia


Qualifiers: 


   Sickle-cell associated disorders: with unspecified crisis   Qualified Code(s)

: D57.00 - Hb-SS disease with crisis, unspecified; D57.0 - Hb-SS disease with 

crisis   


Is this a current diagnosis for this admission?: Yes   





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


Is this a current diagnosis for this admission?: Yes   





(4) Asthma


Qualifiers: 


   Asthma severity: mild   Asthma complication type: uncomplicated 


Is this a current diagnosis for this admission?: Yes   





- Time


Time Spent with patient: 25-34 minutes


Medications reviewed and adjusted accordingly: Yes


Anticipated discharge: Home


Within: Other





- Inpatient Certification


Based on my medical assessment, after consideration of the patient's 

comorbidities, presenting symptoms, or acuity I expect that the services needed 

warrant INPATIENT care.: Yes


I certify that my determination is in accordance with my understanding of 

Medicare's requirements for reasonable and necessary INPATIENT services [42 CFR 

412.3e].: Yes


Medical Necessity: Need Close Monitoring Due to Risk of Patient Decompensation, 

Need For IV Fluids, Need For Continuous Telemetry Monitoring, Need for 

Nebulizer Therapy and Monitoring of Response, Need for Pain Control, Risk of 

Complication if Not Cared For in Hospital


Post Hospital Care: D/C Planner Documentation





- Plan Summary


Plan Summary: 





Continue IV fluid support. Maintain on all current medication management. 

Encourage ambulation on the medical floor.

## 2018-01-25 NOTE — PDOC PROGRESS REPORT
Subjective


Progress Note for:: 01/25/18


Subjective:: 


Patient reported some improvement in his leg and knee pain. Remain on current 

pain management regimen. No chest pain or difficulty with breathing. No fever 

or chills. No nausea, vomiting, or abdominal pain.


Reason For Visit: 


HOMOGENEOUS SS SICKLE CELL DISEASE WITH ACUTE PAIN








Physical Exam


Vital Signs: 


 











Temp Pulse Resp BP Pulse Ox


 


 97.9 F   81   16   120/73   92 


 


 01/25/18 16:24  01/25/18 16:24  01/25/18 16:24  01/25/18 16:24  01/25/18 16:24








 Intake & Output











 01/24/18 01/25/18 01/26/18





 06:59 06:59 06:59


 


Intake Total 5215 4922 1820


 


Balance 5215 4922 1820











Physical Exam: 





General appearance: PRESENT: mild distress - from pain


Head exam: PRESENT: atraumatic, normocephalic


Mouth exam: PRESENT: moist


Respiratory exam: PRESENT: clear to auscultation brenda


Cardiovascular exam: PRESENT: RRR.  ABSENT: diastolic murmur, rubs, systolic 

murmur


Vascular exam: PRESENT: normal capillary refill.  ABSENT: pallor


GI/Abdominal exam: PRESENT: normal bowel sounds, soft.  ABSENT: distended, 

guarding, mass, organomegaly, rebound, tenderness


Extremities exam: PRESENT: tenderness - multiple joint sites.  ABSENT: pedal 

edema


Musculoskeletal exam: PRESENT: tenderness - with motion and palpation


Neurological exam: PRESENT: alert, awake, oriented to person, oriented to place

, oriented to time, oriented to situation, CN II-XII grossly intact.  ABSENT: 

motor sensory deficit


Psychiatric exam: PRESENT: appropriate affect, normal mood.  ABSENT: homicidal 

ideation, suicidal ideation


Skin exam: PRESENT: dry, intact, warm.  ABSENT: cyanosis, rash





Results


Laboratory Results: 


 





 01/23/18 06:10 





 01/23/18 06:10 








Impressions: 


 





Chest X-Ray  01/20/18 00:02


IMPRESSION:


 


No acute cardiopulmonary findings. 


 


 2011 [employer]- All Rights Reserved


 














Assessment & Plan





- Diagnosis


(1) Sickle cell disease with crisis


Is this a current diagnosis for this admission?: Yes   





(2) Sickle cell anemia


Qualifiers: 


   Sickle-cell associated disorders: with unspecified crisis   Qualified Code(s)

: D57.00 - Hb-SS disease with crisis, unspecified; D57.0 - Hb-SS disease with 

crisis   


Is this a current diagnosis for this admission?: Yes   





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


Is this a current diagnosis for this admission?: Yes   





(4) Asthma


Qualifiers: 


   Asthma severity: mild   Asthma complication type: uncomplicated 


Is this a current diagnosis for this admission?: Yes   





- Time


Time Spent with patient: 25-34 minutes


Medications reviewed and adjusted accordingly: Yes


Anticipated discharge: Home


Within: Other





- Inpatient Certification


Based on my medical assessment, after consideration of the patient's 

comorbidities, presenting symptoms, or acuity I expect that the services needed 

warrant INPATIENT care.: Yes


I certify that my determination is in accordance with my understanding of 

Medicare's requirements for reasonable and necessary INPATIENT services [42 CFR 

412.3e].: Yes


Medical Necessity: Need Close Monitoring Due to Risk of Patient Decompensation, 

Need For IV Fluids, Need For Continuous Telemetry Monitoring, Need for 

Nebulizer Therapy and Monitoring of Response, Need for Pain Control, Risk of 

Complication if Not Cared For in Hospital


Post Hospital Care: D/C Planner Documentation





- Plan Summary


Plan Summary: 





Continue current pain management regimen. Possible discharge home in next 24 

hours. I did discuss tis plan with patient and he is agreeable.

## 2018-01-26 NOTE — PDOC DISCHARGE SUMMARY
General





- Admit/Disc Date/PCP


Admission Date/Primary Care Provider: 


  01/20/18 03:31





  DIYA PHILIPPE





Discharge Date: 01/26/18





- Discharge Diagnosis


(1) Sickle cell disease with crisis


Is this a current diagnosis for this admission?: Yes   





(2) Sickle cell anemia


Is this a current diagnosis for this admission?: Yes   





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


Is this a current diagnosis for this admission?: Yes   





(4) Asthma


Is this a current diagnosis for this admission?: Yes   





- Additional Information


Resuscitation Status: Full Code


Discharge Diet: Regular


Discharge Activity: Activity As Tolerated


Home Medications: 








Folic Acid [Folvite 1 mg Tablet] 1 mg PO DAILY 01/20/18 


Hydromorphone HCl [Dilaudid 2 mg Tablet] 2 mg PO Q4HP PRN 01/20/18 


Hydroxyurea [Hydrea 500 mg Capsule] 2,000 mg PO DAILY 01/20/18 


Fentanyl [Duragesic 100 Mcg/Hr Transdermal Patch] 75 mcg TP Q72H 01/21/18 











History of Present Illness


History of Present Illness: 


CARMEN BEGUM is a 22 year old male patient known to my practice with frequent 

hospitalization due to recurrent acute episodes of SS sickle cell disease pain 

and hemolytic crises. Patient was evaluated and discharge from the ED about 2 

days prior to his current presentation with complaint about worsening chest pain

, headache, and multiple joints aches and pain, particularly his knee joints. 

despite administration of IV Dilaudid and hydration his symptoms persist. He 

reported new onset of coughing spells and sore throat since last ED visit. He 

denied any dizziness, shortness of breath, fever, or chills. He admitted to 

nausea but no vomiting. No diarrhea. No genitourinary symptoms to suggest 

ongoing infection. He reported compliance with his home pain management 

medication but no relief from his pain.





Hospital Course


Hospital Course: 


Patient was managed with IV Fluid support with IV Dilaudid and eventually 

addition of his Fentanyl patch. His hemoglobin remain fairly satisfactory. His 

bilirubin show downward trend. His pain is satisfactorily controlled. Patient 

is agreeable to discharge home today. he will follow up in office as instructed 

upon discharge. He was not given any new pain medication upon discharge. He was 

advise to discontinue NSAID usage





Physical Exam


Vital Signs: 


 











Temp Pulse Resp BP Pulse Ox


 


 98.7 F   88   16   117/67   93 


 


 01/26/18 11:32  01/26/18 11:32  01/26/18 11:32  01/26/18 11:32  01/26/18 11:32








 Intake & Output











 01/25/18 01/26/18 01/27/18





 06:59 06:59 06:59


 


Intake Total 4922 4040 


 


Output Total  900 


 


Balance 4922 3140 











Physical Exam: 


General appearance: PRESENT: mild distress - from pain


Head exam: PRESENT: atraumatic, normocephalic


Mouth exam: PRESENT: moist


Respiratory exam: PRESENT: clear to auscultation brenda


Cardiovascular exam: PRESENT: RRR.  ABSENT: diastolic murmur, rubs, systolic 

murmur


Vascular exam: PRESENT: normal capillary refill.  ABSENT: pallor


GI/Abdominal exam: PRESENT: normal bowel sounds, soft.  ABSENT: distended, 

guarding, mass, organomegaly, rebound, tenderness


Extremities exam: PRESENT: tenderness - multiple joint sites.  ABSENT: pedal 

edema


Musculoskeletal exam: PRESENT: tenderness - with motion and palpation


Neurological exam: PRESENT: alert, awake, oriented to person, oriented to place

, oriented to time, oriented to situation, CN II-XII grossly intact.  ABSENT: 

motor sensory deficit


Psychiatric exam: PRESENT: appropriate affect, normal mood.  ABSENT: homicidal 

ideation, suicidal ideation


Skin exam: PRESENT: dry, intact, warm.  ABSENT: cyanosis, rash








Results


Laboratory Results: 


 





 01/26/18 13:35 





 01/26/18 13:32 





 











  01/26/18 01/26/18





  13:32 13:35


 


WBC   6.2


 


RBC   2.41 L


 


Hgb   8.5 L


 


Hct   23.3 L


 


MCV   97


 


MCH   35.2 H


 


MCHC   36.3 H


 


RDW   29.6 H


 


Plt Count   241


 


Sodium  140.3 


 


Potassium  4.5 


 


Chloride  103 


 


Carbon Dioxide  29 


 


Anion Gap  8 


 


BUN  8 


 


Creatinine  0.53 


 


Est GFR ( Amer)  > 60 


 


Est GFR (Non-Af Amer)  > 60 


 


Glucose  107 


 


Calcium  9.5 


 


Total Bilirubin  2.1 H 


 


AST  52 


 


ALT  25 


 


Alkaline Phosphatase  82 


 


Total Protein  7.0 


 


Albumin  4.1 











Impressions: 


 





Chest X-Ray  01/20/18 00:02


IMPRESSION:


 


No acute cardiopulmonary findings. 


 


 2011 [employer]- All Rights Reserved


 














Qualifiers


**PATEINT BEING DISCHARGED WITH ANY OF THE FOLLOWING DIAGNOSIS?: No





Plan


Discharge Plan: 


D/C home today with follow up in office as instructed upon discharge.

## 2018-02-18 NOTE — ER DOCUMENT REPORT
ED General





- General


Chief Complaint: Sickle Cell Pain, back pain


Stated Complaint: LOWER BACK PAIN


Time Seen by Provider: 02/18/18 20:30


Mode of Arrival: Ambulatory


Information source: Patient


Notes: 





This is a 22-year-old man with a history of sickle cell disease who presents to 

the emergency room back pain.  Patient does report having fever of 101 this 

morning.  Patient sickle cell pain is usually in the back and legs.  He states 

that the pain is just in his back today.  He denies any shortness of breath or 

chest pain.  He denies any abdominal pain.  He does have some nausea but there 

is been no vomiting.  He denies any dysuria.  He denies any rash


TRAVEL OUTSIDE OF THE [place] IN LAST 30 DAYS: No





- HPI


Onset: Yesterday


Onset/Duration: Gradual


Quality of pain: Dull


Severity: Moderate


Pain Level: 2


Associated symptoms: Fever.  denies: Chest pain, Chills, Shortness of breath


Exacerbated by: Movement


Relieved by: Denies


Similar symptoms previously: Yes


Recently seen / treated by doctor: Yes





- Related Data


Allergies/Adverse Reactions: 


 





Coconut * [Coconut] Allergy (Mild, Verified 01/19/18 23:14)


 


transpore tape Allergy (Mild, Uncoded 01/19/18 23:14)


 Hives











Past Medical History





- General


Information source: Patient





- Social History


Smoking Status: Never Smoker


Cigarette use (# per day): No


Chew tobacco use (# tins/day): No


Frequency of alcohol use: Rare


Drug Abuse: None


Lives with: Family


Family History: Reviewed & Not Pertinent, Arthritis, DM, Hypertension, Other - 

Sickle cell trait both parents and asthma


Patient has suicidal ideation: No


Patient has homicidal ideation: No


Pulmonary Medical History: Reports: Hx Asthma, Hx Pneumonia


Renal/ Medical History: Denies: Hx Peritoneal Dialysis


Psychiatric Medical History: 


   Denies: Hx Depression


Past Surgical History: Reports: Hx Abdominal Surgery - Gallstones removal, Hx 

Cholecystectomy, Hx Orthopedic Surgery - Fluid drained from right foot, Hx 

Vascular Surgery - Port placement





- Immunizations


Immunizations up to date: Yes


Hx Diphtheria, Pertussis, Tetanus Vaccination: Yes


Hx Pneumococcal Vaccination: 05/16/13





Review of Systems





- Review of Systems


Notes: 





Review of systems:


 


 


Constitutional: Positive for fever


 


EENT: Denies ear pain, sinus tenderness, throat pain, throat swelling.


 


Cardiovascular: Denies chest pain, palpitations, dyspnea or edema.


 


Respiratory: Denies wheezing, cough, hemoptysis.


 


Abdomen: Denies abdominal pain, nausea, vomiting, diarrhea.  Denies BRBPR or 

melena.


 


Genitourinary: Denies dysuria, pyuria, hematuria, flank pain.


 


Musculoskeletal: See H&P


 


Neurologic: Denies headache, photophobia, neck stiffness, weakness.  Denies 

loss of bowel or bladder function.  Denies saddle anesthesia.


 


Skin: Denies rash, lesions.





Physical Exam





- Vital signs


Vitals: 


 











Temp Pulse Resp BP Pulse Ox


 


 99.0 F   125 H  28 H  129/80 H  96 


 


 02/18/18 19:17  02/18/18 19:17  02/18/18 19:17  02/18/18 19:17  02/18/18 19:17











Notes: 





Physical exam:


 


GENERAL: 42-year-old man, alert and oriented 3, no acute distress





HEAD: Atraumatic, normocephalic.





EYES: Pupils equal round and reactive to light, extraocular movements intact, 

sclera anicteric, conjunctiva are normal.





ENT: TMs normal, nares patent, oropharynx clear without exudates.  Moist mucous 

membranes.





NECK: Normal range of motion, supple without obvious mass or JVD.





LUNGS: Breath sounds clear to auscultation bilaterally and equal.  No wheezes 

rales or rhonchi.





HEART: Regular rate and rhythm without murmurs, rubs or gallops.





ABDOMEN: Soft, normoactive bowel sounds.  No tenderness to palpation.  No 

guarding, no rebound.  No masses appreciated.





EXTREMITIES: Right upper extremity port.  Normal range of motion, no pitting or 

edema.  No clubbing or cyanosis.





NEUROLOGICAL: Cranial nerves II through XII grossly intact.  Normal speech, 

moving all extremities.





PSYCH: Normal mood, normal affect.





SKIN: Warm, Dry, normal turgor, no rashes or lesions noted.





Course





- Re-evaluation


Re-evalutation: 





02/18/18 23:01


On reassessment, the patient looks very good.  The chest x-ray is clear and the 

urine is clear.  His labs are generally looking good.  Reticulocyte count is 

10.  I have given the patient the option of coming into the hospital for 

further pain or following up in the office.  Patient states he would rather try 

going home and will follow up with Dr. Osunkoya in the morning.





- Vital Signs


Vital signs: 


 











Temp Pulse Resp BP Pulse Ox


 


 99.0 F   125 H  28 H  129/80 H  96 


 


 02/18/18 19:17  02/18/18 19:17  02/18/18 19:17  02/18/18 19:17  02/18/18 19:17














- Laboratory


Result Diagrams: 


 02/18/18 21:30





 02/18/18 21:30


Laboratory results interpreted by me: 


 











  02/18/18 02/18/18 02/18/18





  20:56 21:30 21:30


 


RBC   3.01 L 


 


Hgb   10.7 L 


 


Hct   31.3 L 


 


MCH   35.6 H 


 


RDW   28.9 H 


 


Retic Count (auto)   10.67 H 


 


Absolute Retic   0.321 H 


 


Sodium    146.1 H


 


Chloride    110 H


 


Total Bilirubin    2.8 H


 


Direct Bilirubin    0.7 H


 


Total Protein    8.9 H


 


Urine Protein  100 H  


 


Urine Urobilinogen  4.0 H  














- Diagnostic Test


Radiology reviewed: Image reviewed, Reports reviewed - Chest x-ray shows no 

infiltrates or effusions





Discharge





- Discharge


Clinical Impression: 


 Sickle cell disease vaso-occlusive crisi





Condition: Stable


Disposition: HOME, SELF-CARE


Instructions:  Sickle Cell Crisis ([facility])


Additional Instructions: 


Recommendations:





Rest, drink plenty of fluids, continue medicines as prescribed.


Follow-up with Dr. Philippe tomorrow.  He will be able to see today's lab tests 

and x-ray tests and his computer.


Return to the emergency room for worsening pain or any concerns or getting 

worse.


Referrals: 


DIYA PHILIPPE MD [Primary Care Provider] - Follow up tomorrow

## 2018-02-18 NOTE — ER DOCUMENT REPORT
ED Medical Screen (RME)





- General


Chief Complaint: Sickle Cell Pain, back pain


Stated Complaint: LOWER BACK PAIN


Time Seen by Provider: 02/18/18 20:30


Mode of Arrival: Ambulatory


Information source: Patient


Notes: 





Patient presents complaining of 2 day history of low back pain.  Patient states 

that he developed a fever of 101 today.  Patient took Motrin at home to relieve 

his fever.  Patient denies any urinary symptoms.  Patient does have a history 

of sickle cell disease and is concerned about a flareup.





hx: Sickle cell disease





I have greeted and performed a rapid initial assessment of this patient.  A 

comprehensive ED assessment and evaluation of the patient, analysis of test 

results and completion of the medical decision making process will be conducted 

by additional ED providers.


TRAVEL OUTSIDE OF THE [place] IN LAST 30 DAYS: No





- Related Data


Allergies/Adverse Reactions: 


 





Coconut * [Coconut] Allergy (Mild, Verified 01/19/18 23:14)


 


transpore tape Allergy (Mild, Uncoded 01/19/18 23:14)


 Hives











Past Medical History





- Social History


Chew tobacco use (# tins/day): No


Frequency of alcohol use: Rare


Drug Abuse: None


Family history: None


Pulmonary Medical History: Reports: Hx Asthma, Hx Pneumonia


Renal/ Medical History: Denies: Hx Peritoneal Dialysis


Psychiatric Medical History: 


   Denies: Hx Depression


Past Surgical History: Reports: Hx Abdominal Surgery - Gallstones removal, Hx 

Cholecystectomy, Hx Orthopedic Surgery - Fluid drained from right foot, Hx 

Vascular Surgery - Port placement





- Immunizations


Immunizations up to date: Yes


Hx Diphtheria, Pertussis, Tetanus Vaccination: Yes


History of Influenza Vaccine for 10/2017 - 3/2018 Season: Yes


Influenza Administration Date for 10/2017 - 3/2018 Season: 10/01/17





Physical Exam





- Vital signs


Vitals: 





 











Temp Pulse Resp BP Pulse Ox


 


 99.0 F   125 H  28 H  129/80 H  96 


 


 02/18/18 19:17  02/18/18 19:17  02/18/18 19:17  02/18/18 19:17  02/18/18 19:17














- Cardiovascular


Rhythm: Tachycardia


Heart sounds: S1 appreciated, S2 appreciated





- Back


Back: CVA tenderness





Course





- Vital Signs


Vital signs: 





 











Temp Pulse Resp BP Pulse Ox


 


 99.0 F   125 H  28 H  129/80 H  96 


 


 02/18/18 19:17  02/18/18 19:17  02/18/18 19:17  02/18/18 19:17  02/18/18 19:17

## 2018-02-23 NOTE — ER DOCUMENT REPORT
ED General





- General


Chief Complaint: Sickle Cell Crisis


Stated Complaint: SICKLE CELL CRISIS


Time Seen by Provider: 02/23/18 23:19


Notes: 


Patient is a 22-year-old male with a history of sickle cell that comes 

emergency department for chief complaint of general body pain but significantly 

worsening pain in his right arm area that started earlier today.  He denies 

injury, fever, chills, nausea or vomiting.  He denies shortness of breath or 

chest pain.


TRAVEL OUTSIDE OF THE [place] IN LAST 30 DAYS: No





- Related Data


Allergies/Adverse Reactions: 


 





Coconut * [Coconut] Allergy (Mild, Verified 01/19/18 23:14)


 


transpore tape Allergy (Mild, Uncoded 01/19/18 23:14)


 Hives











Past Medical History





- General


Information source: Patient





- Social History


Smoking Status: Never Smoker


Drug Abuse: None


Lives with: Family


Family History: Reviewed & Not Pertinent, Arthritis, DM, Hypertension, Other - 

Sickle cell trait both parents and asthma


Pulmonary Medical History: Reports: Hx Asthma, Hx Pneumonia


Renal/ Medical History: Denies: Hx Peritoneal Dialysis


Psychiatric Medical History: 


   Denies: Hx Depression


Past Surgical History: Reports: Hx Abdominal Surgery - Gallstones removal, Hx 

Cholecystectomy, Hx Orthopedic Surgery - Fluid drained from right foot, Hx 

Vascular Surgery - Port placement





- Immunizations


Immunizations up to date: Yes


Hx Diphtheria, Pertussis, Tetanus Vaccination: Yes


Hx Pneumococcal Vaccination: 05/16/13





Review of Systems





- Review of Systems


Constitutional: See HPI


EENT: No symptoms reported


Cardiovascular: See HPI


Respiratory: No symptoms reported


Gastrointestinal: No symptoms reported


Genitourinary: No symptoms reported


Male Genitourinary: No symptoms reported


Musculoskeletal: See HPI


Skin: No symptoms reported


Hematologic/Lymphatic: No symptoms reported


Neurological/Psychological: No symptoms reported





Physical Exam





- Vital signs


Vitals: 


 











Temp Pulse Resp BP Pulse Ox


 


 98.3 F   117 H  20   151/70 H  93 


 


 02/23/18 22:42  02/23/18 22:42  02/23/18 22:42  02/23/18 22:42  02/23/18 22:42














- General


General appearance: Other - Patient occasionally grimaces, does not appear to 

be uncomfortable but has not had any significant distress


In distress: None





- HEENT


Head: Normocephalic, Atraumatic


Eyes: Normal


Extraocular movements intact: Yes


Eyelashes: Normal


Pupils: PERRL


Nasal: Normal


Mouth/Lips: Normal


Mucous membranes: Normal


Pharynx: Normal


Neck: Normal





- Respiratory


Respiratory status: No respiratory distress.  No: Respiratory distress, Labored

, Retractions, Tachypnea


Breath sounds: Normal.  No: Decreased air movement, Wheezing





- Cardiovascular


Rhythm: Regular, Tachycardia


Heart sounds: Normal auscultation, S1 appreciated, S2 appreciated





- Abdominal


Inspection: Normal


Tenderness: Nontender





- Extremities


General upper extremity: Other - Patient with mild tenderness over the right 

forearm, there is a palpable nodular area which is still soft but is definitely 

present, appears to be superficial.  No erythema, significant tenderness over 

the area, or other abnormality noted.  Normal distal pulses, sensation, 

capillary refill.  Normal upper extremity exam otherwise


General lower extremity: Normal inspection, Nontender, Normal strength, Normal 

temperature





- Neurological


Neuro grossly intact: Yes


Cognition: Normal


Orientation: AAOx4


[place] Coma Scale Eye Opening: Spontaneous


[place] Coma Scale Verbal: Oriented


[place] Coma Scale Motor: Obeys Commands


[place] Coma Scale Total: 15


Speech: Normal


Cranial nerves: Normal


Cerebellar coordination: Normal


Motor strength normal: LUE, RUE, LLE, RLE


Additional motor exam normals: Equal 


Sensory: Normal





- Psychological


Associated symptoms: Normal affect, Normal mood





- Skin


Skin Temperature: Warm


Skin Moisture: Dry


Skin Color: Normal





Course





- Re-evaluation


Re-evalutation: 


Patient appears uncomfortable, he is tachycardic, complains of body aches and 

right arm pain.  Right arm with what appears to be a superficial clot, no 

significant swelling, erythema, sign of joint infection, or neurovascular 

abnormality.





CBC does not show leukocytosis, shows mild anemia, reticulocytes are elevated.  

Chemistry shows elevation of indirect bilirubin consistent with hemolysis.





On reexamination patient reports feeling slightly improved but he still is 

tachycardic and in pain.  Patient has been given multiple doses of medication 

now.  Discussed with patient, will discuss with hospitalist for admission.





Discussed with Dr. Gonzalez, on call for Dr. Funez who is patient's provider

, he will accept patient to the hospital in admission.  Venous Doppler of the 

right upper extremity placed to be performed in the morning.








- Vital Signs


Vital signs: 


 











Temp Pulse Resp BP Pulse Ox


 


 98.9 F   111 H  16   128/70 H  94 


 


 02/24/18 03:54  02/24/18 03:54  02/24/18 03:54  02/24/18 03:54  02/24/18 03:54














- Laboratory


Result Diagrams: 


 02/24/18 00:55





 02/24/18 00:55


Laboratory results interpreted by me: 


 











  02/24/18 02/24/18





  00:55 00:55


 


RBC  2.52 L 


 


Hgb  9.2 L 


 


Hct  25.7 L 


 


MCV  102 H 


 


MCH  36.4 H 


 


RDW  26.9 H 


 


Retic Count (auto)  7.04 H 


 


Absolute Retic  0.177 H 


 


Total Bilirubin   3.3 H


 


AST   68 H














Discharge





- Discharge


Clinical Impression: 


 Sickle cell crisis, Tachycardia, Right arm pain





Condition: Stable


Disposition: ADMITTED AS INPATIENT


Admitting Provider: Lisa


Unit Admitted: Telemetry

## 2018-02-24 NOTE — RADIOLOGY REPORT (SQ)
EXAM DESCRIPTION:  VENOUS UNILATERAL UPPER



COMPLETED DATE/TIME:  2/24/2018 10:01 am



REASON FOR STUDY:  right upper arm pain and swelling



COMPARISON:  None.



TECHNIQUE:  Dynamic and static gray scale and color images acquired of the right arm venous system. S
elected spectral images acquired with additional compression and augmentation maneuvers.  Images stor
ed on PACS.



LIMITATIONS:  None.



FINDINGS:  There is nonocclusive thrombus adherent to the port in the right subclavian vein.  There i
s some thrombus that extends distally into the the subclavian vein as well.  The axillary vein, cepha
lic veins, brachial veins, and basilic veins are all patent and compressible without evidence of thro
mbus.  No other abnormalities are appreciated.



IMPRESSION:  Nonocclusive thrombus in the right subclavian vein that is adherent to the port catheter




TECHNICAL DOCUMENTATION:  JOB ID:  [id number]

 2011 [employer]- All Rights Reserved



Reading location - IP/workstation name: [email or web address]

## 2018-02-24 NOTE — PDOC H&P
History of Present Illness


Admission Date/PCP: 


  02/24/18 02:19





  DIYA PHILIPPE





History of Present Illness: 


CARMEN BEGUM is a 22 year old male, he has a history of sickle cell disease, 

he came to the emergency room earlier this morning for evaluation of pain in 

the right upper extremity, he had venous Doppler of the right upper extremity, 

it showed deep vein thrombosis in the right subclavian vein, he has a Port-A-

Cath in the right subclavian vein.  He complained of pain more so in the right 

upper extremity but essentially generalized pain, he has sickle cell disease, 

on the medication with Dilaudid p.o.








Past Medical History


Pulmonary Medical History: Reports: Asthma, Pneumonia


Hematology: Reports: Anemia, Sickle Cell Disease, Bleeding Tendencies





Past Surgical History


Past Surgical History: Reports: Cholecystectomy, Orthopedic Surgery - Fluid 

drained from right foot, Vascular Surgery - Port placement





Social History


Lives with: Family


Smoking Status: Never Smoker


Frequency of Alcohol Use: None


Hx Recreational Drug Use: No


Drugs: None


Hx Prescription Drug Abuse: No





- Advance Directive


Resuscitation Status: Full Code





Family History


Family History: Reviewed & Not Pertinent, Arthritis, DM, Hypertension, Other - 

Sickle cell trait both parents and asthma


Parental Family History Reviewed: Yes


Children Family History Reviewed: Yes


Sibling(s) Family History Reviewed.: Yes





Medication/Allergy


Home Medications: 








Folic Acid [Folvite 1 mg Tablet] 1 mg PO DAILY 01/20/18 


Hydromorphone HCl [Dilaudid 2 mg Tablet] 2 mg PO Q4HP PRN 01/20/18 


Hydroxyurea [Hydrea 500 mg Capsule] 1,500 mg PO DAILY 01/20/18 


Hydrocodone Bitartrate [Zohydro ER] 15 mg PO Q6H PRN 02/18/18 








Allergies/Adverse Reactions: 


 





Coconut * [Coconut] Allergy (Mild, Verified 01/19/18 23:14)


 


transpore tape Allergy (Mild, Uncoded 01/19/18 23:14)


 Hives











Review of Systems


Constitutional: ABSENT: chills, fever(s), headache(s), weight gain, weight loss


Eyes: ABSENT: visual disturbances


Ears: ABSENT: hearing changes


Cardiovascular: ABSENT: chest pain, dyspnea on exertion, edema, orthropnea, 

palpitations


Respiratory: ABSENT: cough, hemoptysis


Gastrointestinal: ABSENT: abdominal pain, constipation, diarrhea, hematemesis, 

hematochezia, nausea, vomiting


Genitourinary: ABSENT: dysuria, hematuria


Musculoskeletal: PRESENT: other - Generalized body pains.  ABSENT: joint 

swelling


Integumentary: ABSENT: rash, wounds


Neurological: ABSENT: abnormal gait, abnormal speech, confusion, dizziness, 

focal weakness, syncope


Psychiatric: ABSENT: anxiety, depression, homidical ideation, suicidal ideation


Endocrine: ABSENT: cold intolerance, heat intolerance, menstrual abnormalities, 

polydipsia, polyuria


Hematologic/Lymphatic: ABSENT: easy bleeding, easy bruising, lymphadenopathy





Physical Exam


Vital Signs: 


 











Temp Pulse Resp BP Pulse Ox


 


 98.7 F   92   18   117/75   90 L


 


 02/24/18 12:34  02/24/18 12:34  02/24/18 12:34  02/24/18 12:34  02/24/18 12:34








 Intake & Output











 02/23/18 02/24/18 02/25/18





 06:59 06:59 06:59


 


Intake Total  572 577


 


Output Total  900 1200


 


Balance  -328 -623


 


Weight  59.5 kg 











General appearance: PRESENT: no acute distress, well-developed, well-nourished


Head exam: PRESENT: atraumatic, normocephalic


Eye exam: PRESENT: conjunctiva pink, EOMI, PERRLA


Ear exam: PRESENT: normal external ear exam


Mouth exam: PRESENT: moist, tongue midline


Neck exam: PRESENT: full ROM


Respiratory exam: PRESENT: clear to auscultation brenda


Cardiovascular exam: PRESENT: RRR, +S1, +S2


Pulses: PRESENT: normal radial pulses


Vascular exam: PRESENT: normal capillary refill


GI/Abdominal exam: PRESENT: normal bowel sounds, soft


Rectal exam: PRESENT: deferred


Extremities exam: PRESENT: tenderness, other - Swelling, tenderness of the 

right upper extremity, there is a port in place


Neurological exam: PRESENT: alert, awake, oriented to person, oriented to place

, oriented to time, oriented to situation, CN II-XII grossly intact


Psychiatric exam: PRESENT: appropriate affect, normal mood


Skin exam: PRESENT: dry, intact, warm





Results


Laboratory Results: 


 





 02/24/18 06:15 





 02/24/18 06:15 





 











  02/24/18 02/24/18





  06:15 06:15


 


WBC  8.8 


 


RBC  2.21 L 


 


Hgb  8.2 L 


 


Hct  22.5 L 


 


MCV  102 H 


 


MCH  37.1 H 


 


MCHC  36.4 H 


 


RDW  26.5 H 


 


Plt Count  225 


 


Seg Neutrophils %  Not Reportable 


 


Lymphocytes %  Not Reportable 


 


Monocytes %  Not Reportable 


 


Eosinophils %  Not Reportable 


 


Basophils %  Not Reportable 


 


Absolute Neutrophils  Not Reportable 


 


Absolute Lymphocytes  Not Reportable 


 


Absolute Monocytes  Not Reportable 


 


Absolute Eosinophils  Not Reportable 


 


Absolute Basophils  Not Reportable 


 


Sodium   143.0


 


Potassium   4.8


 


Chloride   105


 


Carbon Dioxide   29


 


Anion Gap   9


 


BUN   11


 


Creatinine   0.78


 


Est GFR ( Amer)   > 60


 


Est GFR (Non-Af Amer)   > 60


 


Glucose   95


 


Calcium   8.5











Impressions: 


 





Venous Doppler Study  02/24/18 02:14


IMPRESSION:  Nonocclusive thrombus in the right subclavian vein that is 

adherent to the port catheter


 














Assessment & Plan





- Diagnosis


(1) Right subclavian vein thrombosis


Is this a current diagnosis for this admission?: Yes   


Plan: 


Start anticoagulant, Xarelto 15 mg p.o. twice daily








(2) Sickle cell crisis


Is this a current diagnosis for this admission?: Yes   


Plan: 


Continue pain medication, IV fluid

## 2018-02-25 NOTE — PDOC CONSULTATION
Consultation


Consult Date: 02/25/18


Attending physician:: DIANNA OROZCO


Consult reason:: Clot right subclavian vein





History of Present Illness


Admission Date/PCP: 


  02/24/18 02:19





  DIYA PHILIPPE





History of Present Illness: 


CARMEN BEGUM is a 22 year old male, he has a history of sickle cell disease, 

he came to the emergency room earlier this morning for evaluation of pain in 

the right upper extremity, he had venous Doppler of the right upper extremity, 

it showed deep vein thrombosis in the right subclavian vein, he has a Port-A-

Cath in the right subclavian vein.  He complained of pain more so in the right 

upper extremity but essentially generalized pain, he has sickle cell disease, 

on the medication with Dilaudid p.o.








General surgeon's addendum:





The patient reports having had a left subclavian Qrmtre-l-Kbky catheter many 

years ago, and removed approximately 3 years ago.  2 years ago he underwent 

right upper extremity port placement in [place].  The single-chamber port 

and has been functioning satisfactorily.  He reports pain in his right upper 

extremity.  Duplex study was performed which showed hyperechoic areas 

surrounding the catheter in the right subclavian vein which could be clot, 

fibrin or combination thereof.  The axillary vein, cephalic vein, basilic vein 

and brachial veins are all patent.  Your vena cava has flow around the 

catheter.  The port is currently accessed.  The patient was admitted and 

started on 10 a inhibitor for which she is received 2-3 doses.  The patient  

states  the tenderness around the port is about the same may be a little worse.








Past Medical History


Pulmonary Medical History: Reports: Asthma, Pneumonia


Psychiatric Medical History: 


   Denies: Depression


Hematology: Reports: Anemia, Sickle Cell Disease, Bleeding Tendencies





Past Surgical History


Past Surgical History: Reports: Cholecystectomy, Orthopedic Surgery - Fluid 

drained from right foot, Vascular Surgery - Port placement





Social History


Lives with: Family


Smoking Status: Never Smoker


Frequency of Alcohol Use: None


Hx Recreational Drug Use: No


Drugs: None


Hx Prescription Drug Abuse: No





- Advance Directive


Resuscitation Status: Full Code





Family History


Family History: Reviewed & Not Pertinent, Arthritis, DM, Hypertension, Other - 

Sickle cell trait both parents and asthma


Parental Family History Reviewed: Yes


Children Family History Reviewed: Yes


Sibling(s) Family History Reviewed.: Yes





Medication/Allergy


Home Medications: 








Folic Acid [Folvite 1 mg Tablet] 1 mg PO DAILY 01/20/18 


Hydromorphone HCl [Dilaudid 2 mg Tablet] 2 mg PO Q4HP PRN 01/20/18 


Hydroxyurea [Hydrea 500 mg Capsule] 1,500 mg PO DAILY 01/20/18 


Hydrocodone Bitartrate [Zohydro ER] 15 mg PO Q6H PRN 02/18/18 








Allergies/Adverse Reactions: 


 





Coconut * [Coconut] Allergy (Mild, Verified 01/19/18 23:14)


 


transpore tape Allergy (Mild, Uncoded 01/19/18 23:14)


 Hives











Physical Exam


Vital Signs: 


 











Temp Pulse Resp BP Pulse Ox


 


 98.7 F   103 H  18   119/72   97 


 


 02/25/18 16:30  02/25/18 16:30  02/25/18 16:30  02/25/18 16:30  02/25/18 16:30








 Intake & Output











 02/24/18 02/25/18 02/26/18





 06:59 06:59 06:59


 


Intake Total 572 4490 400


 


Output Total 900 4250 1000


 


Balance -328 240 -600


 


Weight 59.5 kg 59.4 kg 











General appearance: PRESENT: no acute distress


Head exam: PRESENT: atraumatic


Neck exam: PRESENT: full ROM


Respiratory exam: PRESENT: clear to auscultation brenda


Cardiovascular exam: PRESENT: RRR


Extremities exam: PRESENT: other - Right upper extremity has port, with Cain 

needle access.  There is some tenderness around the port but no erythema.  

Patient is very thin, no subcutaneous tissue, hematoma no pus.  The right arm 

shows excellent range of motion, palpable radial and ulnar pulses.  There is no 

fidel tenderness, detainee is edema erythema etc.





Results


Laboratory Results: 


 





 02/24/18 06:15 





 02/24/18 06:15 








Impressions: 


 





Venous Doppler Study  02/24/18 02:14


IMPRESSION:  Nonocclusive thrombus in the right subclavian vein that is 

adherent to the port catheter


 














Assessment & Plan





- Diagnosis


(1) Right subclavian vein thrombosis


Is this a current diagnosis for this admission?: Yes   


Plan: 


The patient has identified thrombus versus fibrin sheath surrounding the right 

subclavian catheter placed 2 years ago.  Currently the patient is being treated 

with 10 a inhibitor.  In the absence of right arm swelling, JVD, neck pain, 

chest wall swelling etc., I impression is that the clot around the angle lumen 

catheter in the subclavian vein is chronic.  There is flow around the catheter 

in the AV and vein, and furthermore all veins in the upper extremity are patent 

without thrombus.











Recommendations:





1.  I agree with anticoagulation therapy for 6-8 weeks ; May consider extended 

therapy at lower dose











2.  There is no clinical indication to remove the port at this time, especially 

considering the patient's young age, and need for long-term chronic IV access ; 

note, long-term catheters, pacemaker leads are often associated with long-term 

thrombus: I discussed the above with Dr. Momo Root vascular surgeon.











3.  If patient's condition deteriorates clinically, reconsult surgery








- Time


Time Spent: 50 to 70 Minutes


Smoking Cessation Education: over 10 minutes

## 2018-03-02 NOTE — PDOC DISCHARGE SUMMARY
General





- Admit/Disc Date/PCP


Admission Date/Primary Care Provider: 


  02/24/18 02:19





  DIYA JOSE MARTIN





Discharge Date: 03/02/18





- Discharge Diagnosis


(1) Right subclavian vein thrombosis


Is this a current diagnosis for this admission?: Yes   





(2) Sickle cell disease with crisis


Is this a current diagnosis for this admission?: Yes   





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


Is this a current diagnosis for this admission?: Yes   





- Additional Information


Resuscitation Status: Full Code


Prescriptions: 


Rivaroxaban [Xarelto 15 mg Tablet] 15 mg PO BIDBS #30 tablet


Home Medications: 








Folic Acid [Folvite 1 mg Tablet] 1 mg PO DAILY 01/20/18 


Hydroxyurea [Hydrea 500 mg Capsule] 1,500 mg PO DAILY 01/20/18 


Hydrocodone Bitartrate [Zohydro ER] 15 mg PO Q6H PRN 02/18/18 


Fentanyl [Duragesic 75 Mcg/Hr Transdermal Patch] 1 each TD Q3DAYS  patch.td72 03 /02/18 


Rivaroxaban [Xarelto 15 mg Tablet] 15 mg PO BIDBS #30 tablet 03/02/18 











History of Present Illness


History of Present Illness: 


CARMEN BEGUM is a 22 year old male, he has a history of sickle cell disease, 

he came to the emergency room earlier this morning for evaluation of pain in 

the right upper extremity, he had venous Doppler of the right upper extremity, 

it showed deep vein thrombosis in the right subclavian vein, he has a Port-A-

Cath in the right subclavian vein.  He complained of pain more so in the right 

upper extremity but essentially generalized pain, he has sickle cell disease, 

on the medication with Dilaudid p.o.








Hospital Course


Hospital Course: 


Patient was admitted for right arm DVT and Sickle cell disease homogenous S 

with pain crisis. Patient was started on Xarelto and management with IV 

Dilaudid for pain. He remain on IV fluid therapy. His arm swelling has improved 

and his pain is fairly controlled on current medication regimen. He will be 

discharge home today on his preadmission medication along with Xarelto 15 mg po 

bid for total 21 days and thereafter 20 mg po daily with dinner. He will follow 

up in office as instructed upon discharge.





Physical Exam


Vital Signs: 


 











Temp Pulse Resp BP Pulse Ox


 


 98.4 F   102 H  16   127/69 H  95 


 


 03/02/18 11:35  03/02/18 11:35  03/02/18 11:35  03/02/18 11:35  03/02/18 11:35








 Intake & Output











 03/01/18 03/02/18 03/03/18





 06:59 06:59 06:59


 


Intake Total 4019 4251 


 


Output Total 2000 1025 


 


Balance 2019 3226 


 


Weight 53.8 kg  











Physical Exam: 


General appearance: PRESENT: No acute distress


Head exam: PRESENT: atraumatic, normocephalic


Eye exam: PRESENT: conjunctiva pink, EOMI, PERRLA.  ABSENT: scleral icterus


Mouth exam: PRESENT: moist


Respiratory exam: PRESENT: clear to auscultation brenda


Cardiovascular exam: PRESENT: RRR.  ABSENT: diastolic murmur, rubs, systolic 

murmur


GI/Abdominal exam: PRESENT: normal bowel sounds, soft.  ABSENT: distended, 

guarding, mass, organomegaly, rebound, tenderness


Extremities exam: PRESENT: improved tenderness and right arm swelling.  ABSENT: 

joint swelling, pedal edema


Neurological exam: PRESENT: alert, awake, oriented to person, oriented to place

, oriented to time, oriented to situation, CN II-XII grossly intact.  ABSENT: 

motor sensory deficit


Psychiatric exam: PRESENT: appropriate affect, normal mood.  ABSENT: homicidal 

ideation, suicidal ideation


Skin exam: PRESENT: dry, intact, warm.  ABSENT: cyanosis, rash








Results


Laboratory Results: 


 





 02/27/18 05:55 





 02/24/18 06:15 








Impressions: 


 





Venous Doppler Study  02/24/18 02:14


IMPRESSION:  Nonocclusive thrombus in the right subclavian vein that is 

adherent to the port catheter


 














Qualifiers





- *


**PATEINT BEING DISCHARGED WITH ANY OF THE FOLLOWING DIAGNOSIS?: VTE (PE or DVT)


Reason(s) for not prescribing Overlap Therapy:: Not indicated - on Xarelto 

therapy





Plan


Discharge Plan: 





D/C home today. Follow up in the office as instructed upon discharge.

## 2018-03-18 NOTE — ER DOCUMENT REPORT
ED General





- General


Mode of Arrival: Ambulatory


Information source: Patient


TRAVEL OUTSIDE OF THE [place] IN LAST 30 DAYS: No





- General


Chief Complaint: Sickle Cell Crisis


Stated Complaint: ARM PAIN


Time Seen by Provider: 03/18/18 18:06


Notes: 





22 y.o male with a PMHx of sickle cell anemia presents to the ED for pain in 

his RUE/shoulder. He was here before for pain in his shoulder and diagnosed 

with a DVT. He reports that the pain from previous diagnosis had resolved 

completely but came back yesterday and has been worsening since. Pt also 

reports pain to his lower back that is consistent with his pain due to sickle 

cell. He describes his pain as sharp and constant and states that the pain in 

his shoulder is to a similar extent as it was when he was diagnosed with the 

DVT. He denies any fall or exertion/exercise that could be related to his pain. 

He denies any recent illnesses or infections. Pt reports he is taking his 

Lovenox twice a day as prescribed and is taking his oral Dilaudid as prescribed 

for pain. He denies needing any pain medications at this time.


 (KAMLA MENESES)


DVT was dx in right subclavian vein


 (MARYURI KENT)





- Related Data


Allergies/Adverse Reactions: 


 





Coconut * [Coconut] Allergy (Mild, Verified 03/18/18 17:24)


 


transpore tape Allergy (Mild, Uncoded 03/18/18 17:24)


 Hives











Past Medical History





- General


Information source: Patient





- Social History


Smoking Status: Unknown if Ever Smoked


Chew tobacco use (# tins/day): No


Frequency of alcohol use: None


Drug Abuse: None


Family History: Reviewed & Not Pertinent, Arthritis, DM, Hypertension, Other - 

Sickle cell trait both parents and asthma


Patient has suicidal ideation: No


Patient has homicidal ideation: No


Pulmonary Medical History: Reports: Hx Asthma, Hx Pneumonia


Renal/ Medical History: Denies: Hx Peritoneal Dialysis


Psychiatric Medical History: 


   Denies: Hx Depression


Past Surgical History: Reports: Hx Abdominal Surgery - Gallstones removal, Hx 

Cholecystectomy, Hx Orthopedic Surgery - Fluid drained from right foot, Hx 

Vascular Surgery - Port placement





- Immunizations


Immunizations up to date: Yes


Hx Diphtheria, Pertussis, Tetanus Vaccination: Yes


Hx Pneumococcal Vaccination: 05/16/13





Review of Systems





- Review of Systems


Constitutional: See HPI.  denies: Recent illness


EENT: No symptoms reported


Cardiovascular: No symptoms reported


Respiratory: No symptoms reported


Gastrointestinal: No symptoms reported


Genitourinary: No symptoms reported


Male Genitourinary: No symptoms reported


Musculoskeletal: See HPI, Back pain, Other - RT shoulder


Skin: No symptoms reported


Hematologic/Lymphatic: No symptoms reported


Neurological/Psychological: No symptoms reported


-: Yes All other systems reviewed and negative





Physical Exam





- Vital signs


Vitals: 


 











Temp Pulse Resp BP Pulse Ox


 


 98.4 F   122 H  22 H  139/89 H  93 


 


 03/18/18 17:32  03/18/18 17:32  03/18/18 17:32  03/18/18 17:32  03/18/18 17:32














- Notes


Notes: 





Physical Exam:


 





General: Alert, appears well. 


 


HEENT: Normocephalic. Atraumatic. PERRL. Extraocular movements intact. 

Oropharynx clear.


 


Neck: Supple. Non-tender.


 


Respiratory: No respiratory distress. Clear and equal breath sounds bilaterally.


 


Cardiovascular: Regular rate and rhythm. 


 


Abdominal: Normal Inspection. Non-tender. No distension. Normal Bowel Sounds. 


 


Back: Non-tender. No deformity or step off.


 


Extremities: Moves all four extremities. 


Upper extremities: Normal inspection. Normal ROM. Port in RUE appears well, no 

signs of induration or infection. 


Lower extremities: Normal inspection. No edema. Normal ROM.


 


Neurological: Normal cognition. AAOx4. Normal speech.  


 


Psychological: Normal affect. Normal Mood. 


 


Skin: Warm. Dry. Normal color.


 (KAMLA MENESES)





Course





- Re-evaluation


Re-evalutation: 





03/18/18 19:48


Patient's primary concern is progression of the venous thrombosis in his right 

subclavian vein.  Doppler was ordered but Doppler team had to be called in due 

to weekend.  Due to Doppler technician has just been paged and study has not 

been performed will be transferred to Main pod for final disposition. (MARYURI KENT H)





- Vital Signs


Vital signs: 


 











Temp Pulse Resp BP Pulse Ox


 


 98.4 F   122 H  22 H  139/89 H  93 


 


 03/18/18 17:32  03/18/18 17:32  03/18/18 17:32  03/18/18 17:32  03/18/18 17:32














Discharge





- Discharge


Referrals: 


DIYA PHILIPPE MD [Primary Care Provider] - Follow up as needed





Scribe Documentation





- Scribe


Written by Scribe:: Malcom Diamond 3/18/18 1220


acting as scribe for :: Duran

## 2018-03-18 NOTE — ER DOCUMENT REPORT
ED Medical Screen (RME)





- General


Chief Complaint: Sickle Cell Crisis


Stated Complaint: ARM PAIN


Time Seen by Provider: 03/18/18 18:06


Mode of Arrival: Ambulatory


Information source: Patient


Notes: 





22 y.o male with a PMHx of sickle cell anemia presents to the ED for pain in 

his RUE/shoulder. He was here before for pain in his shoulder and diagnosed 

with a DVT. He reports that the pain from previous diagnosis had resolved 

completely but came back yesterday and has been worsening since. Pt also 

reports pain to his lower back that is consistent with his pain due to sickle 

cell. He describes his pain as sharp and constant and states that the pain in 

his shoulder is to a similar extent as it was when he was diagnosed with the 

DVT. He denies any fall or exertion/exercise that could be related to his pain. 

He denies any recent illnesses or infections. Pt reports he is taking his 

Lovenox twice a day as prescribed and is taking his oral Dilaudid as prescribed 

for pain. He denies needing any pain medications at this time.





I have greeted and performed a rapid initial assessment of the patient.  A 

comprehensive ED assessment and evaluation of the patient, analysis of test 

results, and completion of the medical decision making process will be 

conducted by additional ED providers.


TRAVEL OUTSIDE OF THE [place] IN LAST 30 DAYS: No





- Related Data


Allergies/Adverse Reactions: 


 





Coconut * [Coconut] Allergy (Mild, Verified 03/18/18 17:24)


 


transpore tape Allergy (Mild, Uncoded 03/18/18 17:24)


 Hives











Past Medical History





- General


Information source: Patient





- Social History


Chew tobacco use (# tins/day): No


Frequency of alcohol use: None


Drug Abuse: None


Family history: None


Pulmonary Medical History: Reports: Hx Asthma, Hx Pneumonia


Renal/ Medical History: Denies: Hx Peritoneal Dialysis


Psychiatric Medical History: 


   Denies: Hx Depression


Past Surgical History: Reports: Hx Abdominal Surgery - Gallstones removal, Hx 

Cholecystectomy, Hx Orthopedic Surgery - Fluid drained from right foot, Hx 

Vascular Surgery - Port placement





- Immunizations


Immunizations up to date: Yes


Hx Diphtheria, Pertussis, Tetanus Vaccination: Yes


History of Influenza Vaccine for 10/2017 - 3/2018 Season: Yes


Influenza Administration Date for 10/2017 - 3/2018 Season: 10/01/17





Review of Systems





- Review of Systems


Constitutional: denies: Recent illness


EENT: No symptoms reported


Cardiovascular: No symptoms reported


Respiratory: No symptoms reported


Gastrointestinal: No symptoms reported


Genitourinary: No symptoms reported


Male Genitourinary: No symptoms reported


Musculoskeletal: See HPI, Back pain, Other - RT shoulder pain


Skin: No symptoms reported


Hematologic/Lymphatic: See HPI, Other - sickle cell


Neurological/Psychological: No symptoms reported





Physical Exam





- Vital signs


Vitals: 





 











Temp Pulse Resp BP Pulse Ox


 


 98.4 F   122 H  22 H  139/89 H  93 


 


 03/18/18 17:32  03/18/18 17:32  03/18/18 17:32  03/18/18 17:32  03/18/18 17:32














- Notes


Notes: 





Physical Exam:


 


General: Alert, appears well. 


 


HEENT: Normocephalic. Atraumatic. PERRLA. Extraocular movements intact. 

Oropharynx clear.


 


Neck: Supple. 


 


Respiratory: No respiratory distress. 


 


Abdominal: Normal Inspection. No distension. 


 


Extremities: Moves all four extremities. Port in RUE is well appearing, no 

signs of induration or infection.


 


Neurological: Normal cognition. AAOx4. Normal speech.  


 


Psychological: Normal affect. Normal Mood. 


 


Skin: Warm. Dry. Normal color.





Course





- Vital Signs


Vital signs: 





 











Temp Pulse Resp BP Pulse Ox


 


 98.4 F   122 H  22 H  139/89 H  93 


 


 03/18/18 17:32  03/18/18 17:32  03/18/18 17:32  03/18/18 17:32  03/18/18 17:32














Doctor's Discharge





- Discharge


Referrals: 


DIYA PHILIPPE MD [Primary Care Provider] - Follow up as needed





Scribe Documentation





- Scribe


Written by Malcom:: Malcom Diamond 3/18/18 2142


acting as scribe for :: Duran

## 2018-03-18 NOTE — ER DOCUMENT REPORT
ED Medical Screen (RME)





- General


Chief Complaint: Sickle Cell Crisis


Stated Complaint: ARM PAIN


Time Seen by Provider: 03/18/18 18:06


Mode of Arrival: Ambulatory


Information source: Patient


Notes: 





22 y.o male with a PMHx of sickle cell anemia presents to the ED for pain in 

his RUE/shoulder. He was here before for pain in his shoulder and diagnosed 

with a DVT of the RT subclavian eugene. He reports that the pain from previous 

diagnosis had resolved completely but came back yesterday and has been 

worsening since. Pt also reports pain to his lower back that is consistent with 

his pain due to sickle cell. He describes his pain as sharp and constant and 

states that the pain in his shoulder is to a similar extent as it was when he 

was diagnosed with the DVT. He denies any fall or exertion/exercise that could 

be related to his pain. He denies any recent illnesses or infections. Pt 

reports he is taking his Lovenox twice a day as prescribed and is taking his 

oral Dilaudid as prescribed for pain. He denies needing any pain medications at 

this time.





TRAVEL OUTSIDE OF THE [place] IN LAST 30 DAYS: No





- Related Data


Allergies/Adverse Reactions: 


 





Coconut * [Coconut] Allergy (Mild, Verified 03/18/18 17:24)


 


transpore tape Allergy (Mild, Uncoded 03/18/18 17:24)


 Hives











Past Medical History





- General


Information source: Patient





- Social History


Chew tobacco use (# tins/day): No


Frequency of alcohol use: None


Drug Abuse: None


Family history: None


Pulmonary Medical History: Reports: Hx Asthma, Hx Pneumonia


Renal/ Medical History: Denies: Hx Peritoneal Dialysis


Psychiatric Medical History: 


   Denies: Hx Depression


Past Surgical History: Reports: Hx Abdominal Surgery - Gallstones removal, Hx 

Cholecystectomy, Hx Orthopedic Surgery - Fluid drained from right foot, Hx 

Vascular Surgery - Port placement





- Immunizations


Immunizations up to date: Yes


Hx Diphtheria, Pertussis, Tetanus Vaccination: Yes


History of Influenza Vaccine for 10/2017 - 3/2018 Season: Yes


Influenza Administration Date for 10/2017 - 3/2018 Season: 10/01/17





Review of Systems





- Review of Systems


Constitutional: See HPI.  denies: Recent illness


EENT: No symptoms reported


Cardiovascular: No symptoms reported


Respiratory: No symptoms reported


Gastrointestinal: No symptoms reported


Genitourinary: No symptoms reported


Male Genitourinary: No symptoms reported


Musculoskeletal: See HPI, Back pain, Other - RT shoulder pain


Skin: No symptoms reported


Hematologic/Lymphatic: No symptoms reported


Neurological/Psychological: No symptoms reported


-: Yes All other systems reviewed and negative





Physical Exam





- Vital signs


Vitals: 


 











Temp Pulse Resp BP Pulse Ox


 


 98.4 F   122 H  22 H  139/89 H  93 


 


 03/18/18 17:32  03/18/18 17:32  03/18/18 17:32  03/18/18 17:32  03/18/18 17:32














- Notes


Notes: 





Physical Exam:


 





General: Alert, appears well. 


 


HEENT: Normocephalic. Atraumatic. PERRL. Extraocular movements intact. 

Oropharynx clear.


 


Neck: Supple. Non-tender.


 


Respiratory: No respiratory distress. Clear and equal breath sounds bilaterally.


 


Cardiovascular: Regular rate and rhythm. 


 


Abdominal: Normal Inspection. Non-tender. No distension. Normal Bowel Sounds. 


 


Back: Non-tender. No deformity or step off.


 


Extremities: Moves all four extremities. 


Upper extremities: Normal inspection. Normal ROM. Port in RUE appears well, no 

signs of induration or infection. 


Lower extremities: Normal inspection. No edema. Normal ROM.


 


Neurological: Normal cognition. AAOx4. Normal speech.  


 


Psychological: Normal affect. Normal Mood. 


 


Skin: Warm. Dry. Normal color.








Course





- Re-evaluation


Re-evalutation: 





03/18/18 19:48


Patient's primary concern is progression of the venous thrombosis in his right 

subclavian vein.  Doppler was ordered but Doppler team had to be called in due 

to weekend.  Due to Doppler technician has just been paged and study has not 

been performed will be transferred to Main pod for final disposition.








- Vital Signs


Vital signs: 


 











Temp Pulse Resp BP Pulse Ox


 


 98.4 F   122 H  22 H  139/89 H  93 


 


 03/18/18 17:32  03/18/18 17:32  03/18/18 17:32  03/18/18 17:32  03/18/18 17:32














Doctor's Discharge





- Discharge


Referrals: 


DIYA PHILIPPE MD [Primary Care Provider] - Follow up as needed





Scribe Documentation





- Scribe


Written by Scribe:: Malcom Diamond 3/18/18 1958


acting as scribe for :: Duran

## 2018-03-23 NOTE — ER DOCUMENT REPORT
ED General





- General


Chief Complaint: Allergic Reaction


Stated Complaint: POSSIBLE ALLERGIC REACTION


Time Seen by Provider: 03/23/18 18:38


Notes: 





Patient is a 22-year-old male with past medical history of sickle cell anemia 

with associated chronic pain who presents with an anaphylactic reaction.  

Patient reports that he was [place] the [place] just prior to arrival, that he 

accidentally inhaled or ingested a coconut-containing product.  He reports that 

he immediately began to have a sensation of throat tightness, difficulty 

breathing, nausea and abdominal cramping.  He reports that he did not have his 

EpiPen with him.  He states however this feels exactly the same as to when he 

has had an anaphylactic reaction past.  Nothing has improved or worsened his 

symptoms since onset.  He came directly to the emergency department upon onset 

of his symptoms.  History is otherwise limited secondary to the acuity of this 

patient.


TRAVEL OUTSIDE OF THE [place] IN LAST 30 DAYS: No





- Related Data


Allergies/Adverse Reactions: 


 





Coconut * [Coconut] Allergy (Mild, Verified 03/23/18 18:34)


 


transpore tape Allergy (Mild, Uncoded 03/23/18 18:34)


 Hives











Past Medical History





- General


Information source: Patient





- Social History


Smoking Status: Never Smoker


Frequency of alcohol use: None


Drug Abuse: None


Lives with: Family


Family History: Reviewed & Not Pertinent, Arthritis, DM, Hypertension, Other - 

Sickle cell trait both parents and asthma


Pulmonary Medical History: Reports: Hx Asthma, Hx Pneumonia


Renal/ Medical History: Denies: Hx Peritoneal Dialysis


Psychiatric Medical History: 


   Denies: Hx Depression


Past Surgical History: Reports: Hx Abdominal Surgery - Gallstones removal, Hx 

Cholecystectomy, Hx Orthopedic Surgery - Fluid drained from right foot, Hx 

Vascular Surgery - Port placement





- Immunizations


Immunizations up to date: Yes


Hx Diphtheria, Pertussis, Tetanus Vaccination: Yes


Hx Pneumococcal Vaccination: 05/16/13





Review of Systems





- Review of Systems


Notes: 





Constitutional: Negative for fever.


HENT: Positive for throat tightness


Eyes: Negative for visual changes.


Cardiovascular: Negative for chest pain.


Respiratory: Positive for shortness of breath.


Gastrointestinal: Negative for abdominal pain, positive for nausea


Genitourinary: Negative for dysuria.


Musculoskeletal: Negative for back pain.


Skin: Negative for rash.


Neurological: Negative for headaches, weakness or numbness.





10 point ROS negative except as marked above and in HPI.





Physical Exam





- Vital signs


Vitals: 


 











Resp Pulse Ox


 


 18   96 


 


 03/23/18 18:40  03/23/18 18:40











Interpretation: Tachycardic, Hypoxic, Tachypneic


Notes: 





PHYSICAL EXAMINATION:





GENERAL: Appears uncomfortable, apparent difficulty breathing





HEAD: Atraumatic, normocephalic.





EYES: Pupils equal round and reactive to light, extraocular movements intact, 

sclera anicteric, conjunctiva are normal.





ENT: nares patent, mild posterior oropharyngeal edema but the airway remains 

patent





NECK: Normal range of motion, mild stridor





LUNGS: Scattered wheezing in all lung fields.  Moderate respiratory distress.





HEART: Regular tachycardia without murmurs





ABDOMEN: Soft, nontender, normoactive bowel sounds.  No guarding, no rebound.  

No masses appreciated.





EXTREMITIES: Normal range of motion, no pitting or edema.  No cyanosis.





NEUROLOGICAL: No focal neurological deficits. Moves all extremities 

spontaneously and on command.





PSYCH: Moderately anxious





SKIN: Warm, Dry, normal turgor, no rashes or lesions noted.





Course





- Re-evaluation


Re-evalutation: 





03/23/18 18:39


Patient presents in respiratory distress, saturating 91% on room air, no 

stridor on exam after accidentally ingesting coconut to which she has a history 

of anaphylactic reactions in the past.  Patient was immediately administered 

0.5 mg of intramuscular epinephrine.  His port was accessed.  Will begin 

frequent serial assessments to monitor for needs a repeat doses of epinephrine.

  Also give give a dose of diphenhydramine, Solu-Medrol, famotidine and fluids.

  Patient currently is in critical condition and will require regular, frequent 

reassessments.


03/23/18 19:04


Patient has had marked improvement of his stridor, work of breathing and 

tachycardia.  His initial muffled voice is also improved.  Will continue to 

monitor closely.  However he has moved from critical to guarded condition.  

Will continue to reassess at regular intervals.


03/23/18 19:27


Patient's work of breathing continues to be improved tachycardia, heart rate 

currently 99, now 100% on room air as opposed to 91% initially on room air at 

time of presentation.  Will continue to monitor.


03/23/18 19:46


Patient continues to be stable, no increased work of breathing or hypotension.  

No vomiting or diarrhea.


03/23/18 20:14


Patient has remained hemodynamically within normal limits, continues to have 

any additional symptoms.  He already has an EpiPen available to him at home 

unfortunately did not have with him today.  A.  Monitoring has been completed 

over the course the past 2 hours and patient has remained clinically improved 

since the initial dose of epinephrine.  I believe it is safe for him to be 

discharged home at this time point but I have emphasized emphatically with the 

patient the critical importance of immediate return if the emergency department 

should he have recurrence of symptoms or any new or worsening symptoms that are 

worrisome to him.





- Vital Signs


Vital signs: 


 











Temp Pulse Resp BP Pulse Ox


 


 98.5 F   100   20   137/92 H  95 


 


 03/23/18 21:44  03/23/18 21:44  03/23/18 21:44  03/23/18 21:44  03/23/18 21:44














Critical Care Note





- Critical Care Note


Total time excluding time spent on procedures (mins): 38


Comments: 





Critical care time spent obtaining history from patient or surrogate, 

discussions with consultants, development of treatment plan with patient or 

surrogate, evaluation of patient's response to treatment, examination of patient

, ordering and performing treatments and interventions, ordering and review of 

laboratory studies, re-evaluation of patient's condition, ordering and review 

of radiographic studies and review of old charts





Discharge





- Discharge


Clinical Impression: 


 Sickle cell crisis, Respiratory distress





Anaphylactic reaction


Qualifiers:


 Encounter type: initial encounter Qualified Code(s): T78.2XXA - Anaphylactic 

shock, unspecified, initial encounter





Condition: Good


Disposition: HOME, SELF-CARE


Additional Instructions: 


IF YOU DEVELOP DIFFICULTY BREATHING, RETURN OF HIVES, VOMITING, LIGHTHEADEDNESS

, GIVE YOURSELF THE EPINEPHRINE SHOT IMMEDIATELY AND CALL 911. NEVER HESITATE 

TO GIVE YOURSELF THE EPINEPHRINE AS THIS CAN SAVE YOUR LIFE IF YOU ARE HAVE A 

SERIOUS ALLERGIC REACTION.





Please also follow-up with your primary care doctor for consideration of 

allergy testing. 


Referrals: 


DIYA PHILIPPE MD [Primary Care Provider] - Follow up as needed

## 2018-03-24 NOTE — RADIOLOGY REPORT (SQ)
EXAM DESCRIPTION:  CTA CHEST



COMPLETED DATE/TIME:  3/24/2018 11:53 am



REASON FOR STUDY:  sob with pain, dvt in RUE, low pulse ox



COMPARISON:  8 prior CT angio chest exams since 2012, most recently 1/14/2018



TECHNIQUE:  CT scan of the chest performed using helical scanning technique with dynamic intravenous 
contrast injection.  Images reviewed with lung, soft tissue and bone windows.  Reconstructed coronal 
and sagittal MPR images reviewed.

Additional 3 dimensional post-processing performed to develop Maximal Intensity Projection images (MI
P).  All images stored on PACS.

All CT scanners at this facility use dose modulation, iterative reconstruction, and/or weight based d
osing when appropriate to reduce radiation dose to as low as reasonably achievable (ALARA).

[facility]: Dose Right  [facility]: CareDose    [facility]: Dose Right    [facility]: Teradose 4D    [facility]: Smart Technologies



CONTRAST TYPE AND DOSE:  contrast/concentration: Isovue 370.00 mg/ml; Total Contrast Delivered: 63.0 
ml; Total Saline Delivered: 98.0 ml

Contrast bolus adequate for pulmonary arteries and aorta.



RENAL FUNCTION:  Creatinine 0.65



RADIATION DOSE:  CT Rad equipment meets quality standard of care and radiation dose reduction techniq
ues were employed. CTDIvol: 14.3 - 23.2 mGy. DLP: 506 mGy-cm. .



LIMITATIONS:  None.



FINDINGS:  LUNGS AND PLEURA: There is minimal bibasilar atelectasis.  Pneumonia could not entirely be
 excluded.  No pleural effusions.  No pneumothorax.

AORTA AND GREAT VESSELS: No aneurysm.  Contrast bolus not optimized for the aorta.

HEART: No pericardial effusion. No significant coronary artery calcifications.

PULMONARY ARTERIES: No emboli visualized in the main pulmonary arteries or the segmental branches.

HILAR AND MEDIASTINAL STRUCTURES: No identified masses or abnormal nodes.

HARDWARE: A right PICC line is present with the tip coursing up the right jugular

UPPER ABDOMEN: Calcified tiny spleen.  Clips post cholecystectomy

THYROID AND OTHER SOFT TISSUES: No masses.  No adenopathy.

BONES: Characteristic bony changes of sickle cell.

3D MIPS: Confirm above findings.

OTHER: No other significant finding.



IMPRESSION:  Minimal bibasilar airspace disease atelectasis versus pneumonia.  No pleural effusions.

No acute pulmonary emboli



COMMENT:  Quality ID # 436: Final reports with documentation of one or more dose reduction techniques
 (e.g., Automated exposure control, adjustment of the mA and/or kV according to patient size, use of 
iterative reconstruction technique)



TECHNICAL DOCUMENTATION:  JOB ID:  [id number]

 2011 [employer]- All Rights Reserved



Reading location - IP/workstation name: [email or web address]

## 2018-03-24 NOTE — RADIOLOGY REPORT (SQ)
EXAM DESCRIPTION:  CHEST SINGLE VIEW



COMPLETED DATE/TIME:  3/24/2018 8:45 am



REASON FOR STUDY:  cp



COMPARISON:  CT chest 1/14/2018

Chest films 1/17/2018, 1/20/2018, 2/18/2018



EXAM PARAMETERS:  NUMBER OF VIEWS: One view.

TECHNIQUE: Single frontal radiographic view of the chest acquired.

RADIATION DOSE: NA

LIMITATIONS: None.



FINDINGS:  LUNGS AND PLEURA: Low lung volumes.  Crowding of vascular markings without acute infiltrat
e, pleural effusion, or pneumothorax.

MEDIASTINUM AND HILAR STRUCTURES: No masses.  Contour normal.

HEART AND VASCULAR STRUCTURES: Heart normal in size.  Normal vasculature.

BONES: No acute findings.

HARDWARE: Right PICC line is flipped up into the right jugular vein.  This should not be used for pow
er injection for CT.

OTHER: Gaseous distention of the stomach



IMPRESSION:  Low lung volumes without focal infiltrates.

Right PICC line has flipped, with the tip in the right jugular vein rather than and superior vena cav
a.  This should not be used for power injection for CT.



TECHNICAL DOCUMENTATION:  JOB ID:  [id number]

 2011 [employer]- All Rights Reserved



Reading location - IP/workstation name: [email or web address]

## 2018-03-24 NOTE — HISTORY AND PHYSICAL E
History and Physical



NAME: CARMEN BEGUM

MRN:  [id number]       : 1995   AGE: 22Y

ADMITTED: 2018                    ROOM: 327

 



CHIEF COMPLAINT:

Chest pain.



HISTORY OF PRESENT ILLNESS:

This is a 22-year-old male with a significant history of sickle cell

disease and a history of right subclavian vein thrombosis, and a history

of sickle cell disease with the _______ hemoglobin _______.  Recently had

thrombosis in the right subclavian vein.  He has a [id number] in the

right subclavian vein that has to be replaced next week, and chronic pain

issues, currently seeing Dr. Cleaning and Dr. Funez. He basically came

to the Emergency Department with a feeling of chest pain and difficulty

breathing.  In the Emergency Department, the patient underwent further CT

angiograms to rule out PE, which is negative for any pulmonary embolism

but the patient had a _____ atelectasis and _____ pneumonia and patient is

pretty much complaining of a lot of pain, which is considered more likely

a sickle cell crisis and the ER physicians are requesting to admit the

patient.  The patient has received IV Dilaudid and IV cefepime in the ER. 

When I saw the patient, the patient still was complaining for all over

pain in chest, back, and legs.  The patient at this point recently came

yesterday in the ER because of the patient went to the fair and ate some

coconut, and patient had immediate some throat tightness and difficulty

breathing and abdominal cramping.  Patient in the Emergency Department at

this point pretty much all symptoms are resolved and patient was

discharged.  The patient came back again with the same problem but with a

little bit different issues, and at this point it was decided to admit him

to the hospital for further evaluation.



PAST MEDICAL HISTORY:

1.   History of sickle cell crisis.

2.   History of pneumonia in the past.

3.   History of subclavian vein thrombosis.

4.   History of chronic pain issues.



PAST SURGICAL HISTORY:

1.   Cholecystectomy.

2.   Orthopedic surgeries.

3.   Port placement currently blocked.



SOCIAL HISTORY:

Never smoked, no alcohol, no history of drug abuse.



FAMILY HISTORY:

Noncontributory except sickle cell train in both parents, asthma.



CURRENT MEDICATIONS:

1.   Hydroxyurea 15 mg p.o. daily.

2.   Hydrocodone 15 mg q. 6 p.r.n.

3.   Folic daily.

4.   Fentanyl patch 1 every 3 days.

5.   Xarelto 15 mg p.o. b.i.d.



ALLERGIES:

Patient is allergic to _____.



REVIEW OF SYSTEMS:

As above.  All other pertinent's negative.



PHYSICAL EXAMINATION:

VITAL SIGNS:  Blood pressure is 144/94, pulse is 113, respiratory 15,

oximetry is 97% on 2 liters nasal cannula.

GENERAL:  Patient is alert, awake, in no acute distress.

HEENT:  Head is normocephalic.  MARTHA.

LUNGS:  No wheezing, no rales.

HEART:  S1, S2 is present.

ABDOMEN:  Soft.  Bowel sounds present.

EXTREMITIES:  No edema.

NEUROLOGIC:  The patient is nonfocal neurological moving all extremities

spontaneously.  A little anxious.



LABORATORY DATA:

WBC is 15.4, hemoglobin is 8.8, and platelet count is 159.  Chemistries: 

Sodium was 146, potassium 4.9, BUN 23, and creatinine 0.65.  Patient's

lactic acid is 1.3.  Total bilirubin is 3.5.  AST 65, ALT 28.  Cardiac

enzyme is 0.014.  C-reactive protein was 27.7.  Albumin is 4.8.



Patient's CTA was negative for any segmental_______ or pneumonia.



EKG:  Normal rhythm.



ASSESSMENT AND PLAN:

1.   Pneumonia.

2.   Sickle cell crisis.

3.   Chronic pain syndrome.

4.   Anemia due to the above-conditions

5.   Chest pain most likely from sickle cell crisis.



The plan is admit the patient to IMCU.  Start the patient on IV fluids, IV

pain medications, and IV antibiotics.  I discussed with the mother at the

bedside of the patient's current condition.  Patient has a very

questionable issue with pain medications, especially IV in the past.  We

will try to keep it more p.o.  After 24 hours if patient is otherwise

stable, and if the patient continues to improve, the patient _______ here

for further evaluation for this ________.  Patient continues on

anticoagulation at this point.

SKIN:  Patient has multiple bruises on the legs and the buttocks and the

arms.





DICTATING PHYSICIAN: FOUZIA PIÑA M.D.





[id number]                  DT: 2018    1323

Y#: [id number]            DD: 2018    1313

ID:   [id number]           JOB#: [id number]       ACCT: [id number]



cc:FOUZIA PIÑA M.D.

>

## 2018-03-24 NOTE — ER DOCUMENT REPORT
ED General





- General


Chief Complaint: Chest Wall Pain


Stated Complaint: PAIN ALL OVER


Time Seen by Provider: 03/24/18 08:26


Mode of Arrival: Wheelchair


Information source: Patient, Parent


TRAVEL OUTSIDE OF THE [place] IN LAST 30 DAYS: No





- HPI


Notes: 





22-year-old male with a past medical history of sickle cell anemia with 

associated chronic pain, asthma, dvt in RUE  presents today with complaints of 

chest pain, hypoxemia with a pulse ox of 88 per mother went home.  Patient was 

seen in the emergency room last night for an anaphylactic reaction to coconut-

containing product, mother states he was at the local fair and somehow was in 

contact with his substance.  Patient was given 0.5 IM of epinephrine, Benadryl, 

Solu-Medrol, pepcid and IV fluids which stabilize patient last night.  Patient 

was discharged with a pulse ox of 100 room air and  heart rate of 99.  Mother 

states this morning patient woke up-with sudden onset chest pain and sharp back 

pain.  Has not tried any over-the-counter medications.  Pain 10 out of 10 

chest.   denies any fevers or chills.  Patient was taking oral Coumadin for DVT 

in his right lower extremity as well as Lovenox, her mother Coumadin was DC'd 

on Friday by pcp.  Denies fevers, chills,  chest pain,palpitations, nausea, 

vomiting, diarrhea, abdominal pain, hematuria,blurred vision, double vision, 

loss of vision, speech changes, LH, dizziness, syncope, headaches, wheezing, ST

, URI, neck pain, weakness, bowel or bladder dysfunction, saddle anesthesia, 

numbness or tingling in bilateral upper or lower extremities equally, muscle 

paralysis, weakness in bilateral upper or lower extremities equally or rash.





- Related Data


Allergies/Adverse Reactions: 


 





Coconut * [Coconut] Allergy (Mild, Verified 03/23/18 18:34)


 


transpore tape Allergy (Mild, Uncoded 03/23/18 18:34)


 Hives











Past Medical History





- General


Information source: Patient, Parent





- Social History


Smoking Status: Unknown if Ever Smoked


Family History: Reviewed & Not Pertinent, Arthritis, DM, Hypertension, Other - 

Sickle cell trait both parents and asthma


Pulmonary Medical History: Reports: Hx Asthma, Hx Pneumonia


Renal/ Medical History: Denies: Hx Peritoneal Dialysis


Psychiatric Medical History: 


   Denies: Hx Depression


Past Surgical History: Reports: Hx Abdominal Surgery - Gallstones removal, Hx 

Cholecystectomy, Hx Orthopedic Surgery - Fluid drained from right foot, Hx 

Vascular Surgery - Port placement





- Immunizations


Immunizations up to date: Yes


Hx Diphtheria, Pertussis, Tetanus Vaccination: Yes


Hx Pneumococcal Vaccination: 05/16/13





Review of Systems





- Review of Systems


Notes: 





REVIEW OF SYSTEMS:


CONSTITUTIONAL :  Denies fever,  chills, or sweats.  Denies recent illness.


EENT:   Denies eye, ear, throat, or mouth pain or symptoms.  Denies nasal or 

sinus congestion or discharge.  Denies throat, tongue, or mouth swelling or 

difficulty swallowing.


CARDIOVASCULAR:  reports chest pain.  Denies palpitations or racing or 

irregular heart beat.  Denies ankle edema.


RESPIRATORY:  Denies cough, cold, or chest congestion. denies shortness of 

breath, difficulty breathing, or wheezing.


GASTROINTESTINAL:  Denies abdominal pain or distention.  Denies nausea, vomiting

, or diarrhea.  Denies blood in vomitus, stools, or per rectum.  Denies black, 

tarry stools.  Denies constipation.  


GENITOURINARY:  Denies difficulty urinating, painful urination, burning, 

frequency, blood in urine, or discharge.


MUSCULOSKELETAL:  Denies back or neck pain or stiffness.  Denies joint pain or 

swelling.


SKIN:   Denies rash, lesions or sores.


HEMATOLOGIC :   Denies easy bruising or bleeding.


LYMPHATIC:  Denies swollen, enlarged glands.


NEUROLOGICAL:  Denies confusion or altered mental status.  Denies passing out 

or loss of consciousness.  Denies dizziness or lightheadedness.  Denies 

headache.  Denies weakness or paralysis or loss of use of either side.  Denies 

problems with gait or speech.  Denies sensory loss, numbness, or tingling.  

Denies seizures.


PSYCHIATRIC:  Denies anxiety or stress.  Denies depression, suicidal ideation, 

or homicidal ideation.





ALL OTHER SYSTEMS REVIEWED AND NEGATIVE.





Dictation was performed using Dragon voice recognition software 





PHYSICAL EXAMINATION:





GENERAL: Well-appearing, well-nourished and in moderate distress





HEAD: Atraumatic, normocephalic.





EYES: Pupils equal round and reactive to light, extraocular movements intact, 

sclera anicteric, conjunctiva are normal.





ENT: Nares patent, oropharynx clear without exudates.  Moist mucous membranes.





NECK: Normal range of motion, supple without lymphadenopathy





LUNGS: Breath sounds clear to auscultation bilaterally and equal.  No wheezes 

rales or rhonchi.





HEART: tachycardia, sinus and rhythm without murmurs.  Costochondritis on 

palpation.  Patient states though he also has another chest pain that is deeper.





ABDOMEN: Soft, nontender, nondistended abdomen.  No guarding, no rebound.  No 

masses appreciated.





Musculoskeletal: Normal range of motion, no pitting or edema.  No cyanosis.





NEUROLOGICAL: Cranial nerves grossly intact.  Normal speech, normal gait.  

Normal sensory, motor exams 





PSYCH: Normal mood, normal affect.





SKIN: Warm, Dry, normal turgor, no rashes or lesions noted.





Physical Exam





- Vital signs


Vitals: 


 











Temp Pulse Resp BP Pulse Ox


 


 97.8 F   108 H  20   157/103 H  92 


 


 03/24/18 08:14  03/24/18 08:14  03/24/18 08:14  03/24/18 08:14  03/24/18 08:14














Course





- Re-evaluation


Re-evalutation: 





03/24/18 13:01


22-year-old male presents today with complaints of sudden onset chest pain that 

started this morning.  Patient is afebrile, however he remains tachycardic and 

requires oxygen to keep him above 92%.  CBC shows patient is in sickle cell 

crisis as well as anemia noted decreasing hemoglobin with an elevated retic 

count, however he does not meet criterion for transfusion at this time. Also 

noted a leukocytosis 15.4 with a shift.  Cardiac enzyme slightly elevated at 

0.014, EKG negative for acute STEMI.  Chest x-ray showed that patient's right 

PICC line is in the right jugular vein instead of the right subclavian vein, 

this cannot be used for any injections or IVs.  CTA shows that patient does 

have a probable pneumonia versus atelectasis, I believe this to be true with a 

leukocytosis of 15.4.  Patient was negative for PE and other concerning 

findings.  Patient is obtaining IV hydration as well as pain control.  Patient 

is being maintained.  Patient remains slightly tachycardic he is on nasal 

cannula oxygen for his sickle cell crisis.  Consulted with Dr. Brady, 

hospitalist, to have patient admitted for pneumonia and sickle cell crisis. pt 

started on cefepime 2mg IV after blood cultures were obtained. will keep 

trending his CBC for leukocytosis as well as to see if he is worsening anemia 

that required transfusion.  Patient be admitted to the [facility]











- Vital Signs


Vital signs: 


 











Temp Pulse Resp BP Pulse Ox


 


 98.3 F   125 H  24 H  159/87 H  91 L


 


 03/24/18 15:36  03/24/18 15:38  03/24/18 15:36  03/24/18 15:36  03/24/18 15:36














- Laboratory


Result Diagrams: 


 03/24/18 09:48





 03/24/18 08:32


Laboratory results interpreted by me: 


 











  03/24/18 03/24/18 03/24/18





  08:32 08:32 08:32


 


WBC   


 


RBC   


 


Hgb   


 


Hct   


 


MCV   


 


MCH   


 


RDW   


 


Seg Neuts % (Manual)   


 


Lymphocytes % (Manual)   


 


Abs Neuts (Manual)   


 


Retic Count (auto)   


 


Absolute Retic   


 


APTT   80.5 H 


 


Sodium  146.2 H  


 


Chloride  109 H  


 


Glucose  138 H  


 


Total Bilirubin  3.5 H  


 


Direct Bilirubin  0.7 H  


 


AST  65 H  


 


C-Reactive Protein    27.7 H


 


Total Protein  8.8 H  














  03/24/18 03/24/18





  09:48 09:48


 


WBC  15.4 H 


 


RBC  2.37 L 


 


Hgb  8.8 L 


 


Hct  24.9 L 


 


MCV  105 H 


 


MCH  37.2 H 


 


RDW  27.6 H 


 


Seg Neuts % (Manual)  83 H 


 


Lymphocytes % (Manual)  6 L 


 


Abs Neuts (Manual)  13.4 H 


 


Retic Count (auto)   9.84 H


 


Absolute Retic   0.236 H


 


APTT  


 


Sodium  


 


Chloride  


 


Glucose  


 


Total Bilirubin  


 


Direct Bilirubin  


 


AST  


 


C-Reactive Protein  


 


Total Protein  














Discharge





- Discharge


Clinical Impression: 


 Sickle cell crisis, Pneumonia, Right subclavian vein thrombosis, Tachycardia, 

Leukocytosis, Sickle cell anemia





Disposition: ADMITTED AS INPATIENT


[facility]


[facility]

## 2018-03-25 NOTE — RADIOLOGY REPORT (SQ)
EXAM DESCRIPTION: CHEST SINGLE VIEW



CLINICAL HISTORY: ET Tube Placement, Central Line, NG placed



COMPARISON: 3/24/2018



FINDINGS: Single frontal view of the chest.  Interval placement

of right IJ central venous catheter with tip in the SVC on final

imaging. Right arm PICC with tip in the right IJ is unchanged.

Endotracheal tube with tip at the level of the clavicles. NG tube

with tip below the diaphragm. Leads overlie the chest. Low lung

volumes. Interval increase in bilateral perihilar opacities. No

pneumothorax. Possible small left pleural effusion.  No acute

osseous abnormalities.  Upper abdominal soft tissues are

unremarkable.



IMPRESSION:



1. Right IJ central venous catheter with tip overlying the SVC on

final image. Unchanged right arm PICC with tip in the right IJ.



2. Endotracheal tube and NG tube in appropriate position.



3. Significant interval increase in bilateral alveolar and

interstitial opacities. This could be related to pulmonary edema

or bilateral pneumonia. Continued follow-up recommended.

## 2018-03-25 NOTE — PDOC TRANSFER SUMMARY
General


Admission Date/PCP: 


  03/24/18 13:06





  DIYA PHILIPPE





Transfer Date: 03/25/18


Accepting Facility: [facility]


Resuscitation Status: Full Code





- Transfer Diagnosis


(1) Acute chest syndrome


Is this a current diagnosis for this admission?: Yes   


Diagnosis Summary: 


Patient is currently in acute chest crisis as per discussed with the 

hematologist Dr. Cleaning and suggest the patient's continues of the blood and 

intubated and transferred for exchange  transfusions











(3) Right subclavian vein thrombosis


Is this a current diagnosis for this admission?: Yes   


Diagnosis Summary: 


Currently on IV antibiotic








(4) Sickle cell disease with crisis


Is this a current diagnosis for this admission?: Yes   


Diagnosis Summary: 


Continues IV fluid and blood product








(5) Respiratory distress


Is this a current diagnosis for this admission?: Yes   


Diagnosis Summary: 


Currently intubated








- Transfer Medications


Home Medications: 


 





Folic Acid [Folvite 1 mg Tablet] 1 mg PO DAILY 01/20/18 


Hydroxyurea [Hydrea 500 mg Capsule] 1,500 mg PO MOWEFR@1000 01/20/18 


Enoxaparin Sodium [Lovenox Inj 60 Mg/0.6 Ml Disp.Syrin] 60 mg SUBCUT BID 03/24/ 18 


Fentanyl [Duragesic 75 Mcg/Hr Transdermal Patch] 1 each TD Q3DAYS 03/24/18 


Hydroxyurea [Hydrea 500 mg Capsule] 1,000 mg PO SUTUTHSA@1000 03/24/18 


Oxycodone HCl [Oxy-Ir 5 mg Tablet] 5 mg PO Q4HP PRN 03/24/18 








Transfer Medications: 


 Current Medications





Acetaminophen (Tylenol 325 Mg Tablet)  650 mg PO Q4HP PRN


   PRN Reason: FEVER >101


   Stop: 04/23/18 18:12


Al Hydrox/Mg Hydrox/Simethicone (Maalox Plus Susp 30 Udcup)  15 ml PO Q6HP PRN


   PRN Reason: HEARTBURN


   Stop: 04/23/18 18:12


Albuterol/Ipratropium (Duoneb 3 Ml Ampul)  3 ml NEB RTQ6HP PRN


   PRN Reason: SHORTNESS OF BREATH


   Stop: 04/23/18 18:12


Docusate Sodium (Colace 100 Mg Capsule)  100 mg PO BID HERMILA


   Stop: 04/24/18 09:59


Enoxaparin Sodium (Lovenox Inj 60 Mg/0.6 Ml Disp.Syrin)  60 mg SUBCUT BID [facility]


   Stop: 04/24/18 09:59


Fentanyl (Duragesic 75 Mcg/Hr Transdermal Patch)  1 each TD Q3DAYS [facility]


   Stop: 04/03/18 09:59


Folic Acid (Folvite 1 Mg Tablet)  1 mg PO DAILY [facility]


   Stop: 04/24/18 09:59


Hydromorphone HCl (Dilaudid Inj/Pf 2 Mg/Ml Ampule)  1 mg IV Q1HP PRN


   PRN Reason: SEVERE PAIN


   Stop: 03/31/18 12:41


   Last Admin: 03/24/18 17:10 Dose:  1 mg


Hydromorphone HCl (Dilaudid Inj/Pf 2 Mg/Ml Ampule)  2 mg IV Q2HP PRN


   PRN Reason: FOR PAIN SCALE 3-4


   Stop: 03/31/18 18:18


   Last Admin: 03/25/18 04:20 Dose:  2 mg


Hydroxyurea (Hydrea 500 Mg Capsule)  1,000 mg PO SUTUTHSA@1000 [facility]


   Stop: 04/24/18 09:59


Hydroxyurea (Hydrea 500 Mg Capsule)  1,500 mg PO MOWEFR@1000 [facility]


   Stop: 04/25/18 09:59


Sodium Chloride (Nacl 0.9% 1000 Ml Iv Soln)  1,000 mls @ 150 mls/hr IV 

CONTINUOUS PRN


   PRN Reason: THIS MED IS NOT "PRN"


   Stop: 04/23/18 14:58


   Last Admin: 03/25/18 01:27 Dose:  1,000 ml


Cefepime HCl (Maxipime Rtu 2 Gm-D5w 50 Ml Premix Bag)  2 gm in 50 mls @ 100 mls/

hr IV Q12 [facility]


   Stop: 03/31/18 21:59


   Last Admin: 03/24/18 22:55 Dose:  50 ml


Levofloxacin/Dextrose (Levaquin Rtu 500mg/D5w 100 Ml Premix)  500 mg in 100 mls 

@ 100 mls/hr IV QHS [facility]


   Stop: 03/31/18 21:59


   Last Admin: 03/24/18 21:00 Dose:  100 ml


Sodium Chloride (Nacl 0.9% 1000 Ml Iv Soln)  1,000 mls @ 150 mls/hr IV 

CONTINUOUS PRN


   PRN Reason: THIS MED IS NOT "PRN"


   Stop: 04/23/18 18:12


Ondansetron HCl (Zofran Inj/Pf 4 Mg/2 Ml Sdv)  4 mg IV Q4HP PRN


   PRN Reason: FOR NAUSEA/VOMITING


   Stop: 04/23/18 18:12


Oxycodone HCl (Oxycontin Sr 10 Mg Tablet)  5 mg PO Q12 [facility]


   Stop: 03/31/18 13:44


   Last Admin: 03/24/18 20:58 Dose:  5 mg


Oxycodone/Acetaminophen (Percocet 5-325 Mg Tablet)  1 tab PO Q4HP PRN


   PRN Reason: FOR PAIN SCALE 1-3


   Stop: 03/31/18 18:12


Pantoprazole Sodium (Protonix Iv Inj 40 Mg Vial)  40 mg IV Q12A [facility]


   Stop: 03/27/18 17:59


   Last Admin: 03/24/18 19:31 Dose:  40 mg











- Allergies


Allergies/Adverse Reactions: 


 





Coconut * [Coconut] Allergy (Mild, Verified 03/23/18 18:34)


 


transpore tape Allergy (Mild, Uncoded 03/23/18 18:34)


 Hives











Hospital Course


Hospital Course: 





This is a 22-year-old male present in the ER complaining of chest pain in the 

back pain with history of sickle cell disease and patients underwent for the CT 

angiogram due to the recent history of the subclavian vein thrombosis and 

currently on Lovenox treatment which is negative and suggest possible patient 

have some pneumonia in the lung and patient was sickle cell crisis and 

admitting in the hospital for further IV fluid and IV pain management


Patient hemoglobin was 8.8 and other blood work is stable on the presentation


Admitting in the hospitals patients more worsening the symptoms patient's still 

persistent chest pain in the back pain and patient's O2 sat is dropped to up to 

60-70% and at this point reevaluate the patient and the discussed with the 

hematologist which patients currently for Dr. Cleaning and suggest the patient 

have this chest crisis and need a oxygens transfusions in patients to transfer 

to the tertiary center


Patient also used to see at [facility] sick[facility] cell crisis [facility] and had with 

the l episode 3 times in the past the last 5 years back which patients with 

acute chest crisis and require extensive transfusions


Patient's at this point transfer to the ICU intubated and consult the local 

pulmonary and discussed with the intensivist at [facility] accept 


Very extensive discussed with the mother was in ICU and coordinate all care








Physical Exam


Vital Signs: 


 











Temp Pulse Resp BP Pulse Ox


 


 98.6 F   125 H  20   142/89 H  100 


 


 03/25/18 03:59  03/25/18 03:59  03/24/18 23:22  03/25/18 03:59  03/25/18 03:59








 Intake & Output











 03/23/18 03/24/18 03/25/18





 06:59 06:59 06:59


 


Intake Total   1280


 


Output Total   1300


 


Balance   -20











Exam: 





Currently intubated under sedation's


Eye exam: PRESENT: PERRLA


Mouth exam: PRESENT: neck supple


Respiratory exam: PRESENT: decreased breath sounds


Cardiovascular exam: PRESENT: +S1, +S2


GI/Abdominal exam: PRESENT: normal bowel sounds, soft


Skin exam: PRESENT: dry





Results


Laboratory Results: 


 











  03/24/18





  14:10


 


Urine Color  YELLOW


 


Urine Appearance  CLEAR


 


Urine pH  6.0


 


Ur Specific Gravity  1.026


 


Urine Protein  NEGATIVE


 


Urine Glucose (UA)  NEGATIVE


 


Urine Ketones  NEGATIVE


 


Urine Blood  SMALL H


 


Urine Nitrite  NEGATIVE


 


Ur Leukocyte Esterase  NEGATIVE


 


Urine WBC (Auto)  1


 


Urine RBC (Auto)  0








 











  03/24/18 03/24/18 03/25/18





  19:26 19:26 01:45


 


Creatine Kinase  133   132


 


CK-MB (CK-2)   0.80 


 


Troponin I   0.012 














  03/25/18





  01:45


 


Creatine Kinase 


 


CK-MB (CK-2)  1.23


 


Troponin I  0.014











Impressions: 


 





Chest X-Ray  03/24/18 08:32


IMPRESSION:  Low lung volumes without focal infiltrates.


Right PICC line has flipped, with the tip in the right jugular vein rather than 

and superior vena cava.  This should not be used for power injection for CT.


 








Chest/Abdomen CTA  03/24/18 08:54


IMPRESSION:  Minimal bibasilar airspace disease atelectasis versus pneumonia.  

No pleural effusions.


No acute pulmonary emboli


 














Plan


Time Spent: Greater than 30 Minutes - Patient is a transfer [facility] Discussed with the hematologist Dr. Cleaning also very extensive 

discussions with the mother and also discussed with intensivist

## 2018-03-25 NOTE — PROGRESS NOTE E
Progress Note



NAME: CARMEN BEGUM

MRN: [id number]

: 1995             AGE: 22Y

DATE: 2018               ROOM: [id number]



SUBJECTIVE:

This is a 22-year-old male, basically admitted this afternoon because of

sickle cell crisis, chest pain, back pain.  The patient was started on IV

fluid and IV pain medication.  The patient also underwent CT angiogram in

the morning and hemoglobin was 8.8.  It was all stable.  The patient's

symptoms were worsening and the nurse was called around 4 o'clock this

morning and the patient required nonrebreather.  When I came to the floor

and I reevaluated the patient, the patient was nonrebreather 100%.  As per

discussion with  *------*, the patient's hematologist, suggested the

patient probably in acute crisis, required transfusion and suggested

patient transfer to the ICU, intubate, give blood and then transfer to the

[facility].  Discussed with the mother, who agreed with all the

plans.  The patient at this point is transferred to the ICU and intubated,

consulted pulmonary here locally while the patient is here. At this point,

discussed with the intensivist that [facility] has accepted the patient over

there.  I had a very extensive discussion with the mother regarding the

patient's current condition with the critical care and transfer to [facility].  Again, the patient was reexamined and all care

coordinated and discussed with the mother.  More than 1-1/2 hours spent in

the ICU.





DICTATING PHYSICIAN:  FOUZIA PIÑA M.D.





[id number]                  DT: 2018    [id number]

[facility]#: [id number]            DD: 2018    0532

ID:   [id number]           JOB#: [id number]       ACCT: [id number]



cc:

>

## 2018-03-25 NOTE — PDOC PROGRESS REPORT
Bedside Procedure





- History of Present Illness


Indication for Procedure: phlebosclerosis





- Central Line


  ** Right Internal jugular


Consent obtained: Yes


Central line pre-insertion: Sterile PPE donned, Chloraprep applied, Sterile 

drapes applied


Central line size (Fr.): 7


Central line lumen type: Triple


Anesthetic type: 1% Lidocaine


mL's of anesthesia: 4


Ultrasound guided: Yes - real-time


CM at insertion site: 16


Line secured with sutures: Yes


Central line post-insertion: Blood return from lumens, Biopatch applied, Sutured

, Sterile dressing applied, Position confirmed w/ CXR


Number of attempts: 1


Complications: No


Notes: 





03/25/18 06:58


the initial intravenous  length of the line was 18cm; there was kinking of the 

distal 2cm of the line in the vein after cxray confirmation and so the line was 

withdrawn 2cm and anchored at the 16cm anna with no kink.

## 2018-03-25 NOTE — PDOC CONSULTATION
Consultation


Consult Date: 03/25/18


Attending physician:: ERA HOOVER


Consult reason:: Central line insertion





History of Present Illness


Admission Date/PCP: 


  03/24/18 13:06





  DIYA PHILIPPE





History of Present Illness: 


CARMEN BEGUM is a 22 year old male admitted to the ICU vaso-0cclusive 

sickle cell crisis. He has poor peripheral veins and needs IV access. I have 

been consulted for a central line insertion.








Past Medical History


Pulmonary Medical History: Reports: Asthma, Pneumonia


Psychiatric Medical History: 


   Denies: Depression


Hematology: Reports: Anemia, Sickle Cell Disease, Bleeding Tendencies





Past Surgical History


Past Surgical History: Reports: Cholecystectomy, Orthopedic Surgery - Fluid 

drained from right foot, Vascular Surgery - Port placement





Social History


Smoking Status: Unknown if Ever Smoked


Frequency of Alcohol Use: Rare


Hx Recreational Drug Use: No


Drugs: None


Hx Prescription Drug Abuse: No





- Advance Directive


Resuscitation Status: Full Code





Family History


Family History: Reviewed & Not Pertinent, Arthritis, DM, Hypertension, Other - 

Sickle cell trait both parents and asthma


Parental Family History Reviewed: Yes


Children Family History Reviewed: Yes


Sibling(s) Family History Reviewed.: Yes





Medication/Allergy


Home Medications: 








Folic Acid [Folvite 1 mg Tablet] 1 mg PO DAILY 01/20/18 


Hydroxyurea [Hydrea 500 mg Capsule] 1,500 mg PO MOWEFR@1000 01/20/18 


Enoxaparin Sodium [Lovenox Inj 60 Mg/0.6 Ml Disp.Syrin] 60 mg SUBCUT BID 03/24/ 18 


Fentanyl [Duragesic 75 Mcg/Hr Transdermal Patch] 1 each TD Q3DAYS 03/24/18 


Hydroxyurea [Hydrea 500 mg Capsule] 1,000 mg PO SUTUTHSA@1000 03/24/18 


Oxycodone HCl [Oxy-Ir 5 mg Tablet] 5 mg PO Q4HP PRN 03/24/18 








Allergies/Adverse Reactions: 


 





Coconut * [Coconut] Allergy (Mild, Verified 03/23/18 18:34)


 


transpore tape Allergy (Mild, Uncoded 03/23/18 18:34)


 Hives











Review of Systems


ROS unobtainable: Due to endotracheal tube





Physical Exam


Vital Signs: 


 











Temp Pulse Resp BP Pulse Ox


 


 98.6 F   125 H  20   142/89 H  100 


 


 03/25/18 03:59  03/25/18 03:59  03/24/18 23:22  03/25/18 03:59  03/25/18 05:37








 Intake & Output











 03/23/18 03/24/18 03/25/18





 06:59 06:59 06:59


 


Intake Total   1280


 


Output Total   1300


 


Balance   -20


 


Weight   62.8 kg











General appearance: PRESENT: other - intubated on a ventilator


Head exam: PRESENT: atraumatic, normocephalic


Respiratory exam: PRESENT: clear to auscultation brenda


Cardiovascular exam: PRESENT: +S1, +S2


GI/Abdominal exam: PRESENT: normal bowel sounds, soft


Neurological exam: PRESENT: other - sedated; moves extremities to pain





Results


Laboratory Results: 


 











  03/24/18





  14:10


 


Urine Color  YELLOW


 


Urine Appearance  CLEAR


 


Urine pH  6.0


 


Ur Specific Gravity  1.026


 


Urine Protein  NEGATIVE


 


Urine Glucose (UA)  NEGATIVE


 


Urine Ketones  NEGATIVE


 


Urine Blood  SMALL H


 


Urine Nitrite  NEGATIVE


 


Ur Leukocyte Esterase  NEGATIVE


 


Urine WBC (Auto)  1


 


Urine RBC (Auto)  0








 











  03/24/18 03/24/18 03/25/18





  19:26 19:26 01:45


 


Creatine Kinase  133   132


 


CK-MB (CK-2)   0.80 


 


Troponin I   0.012 














  03/25/18





  01:45


 


Creatine Kinase 


 


CK-MB (CK-2)  1.23


 


Troponin I  0.014











Impressions: 


 





Chest X-Ray  03/24/18 08:32


IMPRESSION:  Low lung volumes without focal infiltrates.


Right PICC line has flipped, with the tip in the right jugular vein rather than 

and superior vena cava.  This should not be used for power injection for CT.


 








Chest/Abdomen CTA  03/24/18 08:54


IMPRESSION:  Minimal bibasilar airspace disease atelectasis versus pneumonia.  

No pleural effusions.


No acute pulmonary emboli


 














Assessment & Plan





- Diagnosis


(1) Phlebosclerosis


Is this a current diagnosis for this admission?: Yes   





(2) Sickle cell crisis


Is this a current diagnosis for this admission?: Yes   





- Plan Summary


Plan Summary: 





A consent was obtained from the mother for a central line insertion - he has 

had this before.

## 2018-04-27 NOTE — ER DOCUMENT REPORT
ED General





- General


Chief Complaint: Sickle Cell Crisis


Stated Complaint: ARM PAIN


Time Seen by Provider: 04/27/18 19:29


Mode of Arrival: Ambulatory


Information source: Patient


Notes: 


Patient is a 22-year-old male who presents to the emergency department with 

complaints of possible sickle cell crisis.  Patient reports that he is having 

bilateral knee pain and this is typical of when he is in crisis.  Patient has a 

medical history of sickle cell and asthma.  Patient reports that his last 

sickle cell crisis was approximately 2 months ago.  Patient denies any nausea 

fever or diarrhea.  Patient additionally reports that he had a port in his 

right arm that was removed approximately 2 months ago due to a clot.


TRAVEL OUTSIDE OF THE [place] IN LAST 30 DAYS: No





- Related Data


Allergies/Adverse Reactions: 


 





Coconut * [Coconut] Allergy (Mild, Verified 04/27/18 18:41)


 


transpore tape Allergy (Mild, Uncoded 04/27/18 18:41)


 Hives











Past Medical History





- General


Information source: Patient





- Social History


Smoking Status: Never Smoker


Family History: Reviewed & Not Pertinent, Arthritis, DM, Hypertension, Other - 

Sickle cell trait both parents and asthma


Pulmonary Medical History: Reports: Hx Asthma, Hx Pneumonia


Renal/ Medical History: Denies: Hx Peritoneal Dialysis


Psychiatric Medical History: 


   Denies: Hx Depression


Past Surgical History: Reports: Hx Abdominal Surgery - Gallstones removal, Hx 

Cholecystectomy, Hx Orthopedic Surgery - Fluid drained from right foot, Hx 

Vascular Surgery - Port placement





- Immunizations


Immunizations up to date: Yes


Hx Diphtheria, Pertussis, Tetanus Vaccination: Yes


Hx Pneumococcal Vaccination: 05/16/13





Review of Systems





- Review of Systems


Constitutional: See HPI


EENT: No symptoms reported


Cardiovascular: No symptoms reported


Respiratory: No symptoms reported


Gastrointestinal: No symptoms reported


Genitourinary: No symptoms reported


Male Genitourinary: No symptoms reported


Musculoskeletal: See HPI


Skin: No symptoms reported


Hematologic/Lymphatic: No symptoms reported


Neurological/Psychological: No symptoms reported





Physical Exam





- Vital signs


Vitals: 


 











Temp Pulse Resp BP Pulse Ox


 


 99.0 F   101 H  18   129/94 H  99 


 


 04/27/18 18:43  04/27/18 18:43  04/27/18 18:43  04/27/18 18:43  04/27/18 18:43














- Notes


Notes: 





PHYSICAL EXAMINATION:





GENERAL: Well-appearing, well-nourished and in no acute distress.





HEAD: Atraumatic, normocephalic.





EYES: Pupils equal round and reactive to light, extraocular movements intact, 

conjunctiva are normal.





ENT: Nares patent, oropharynx clear without exudates.  Moist mucous membranes.





NECK: Normal range of motion, supple without lymphadenopathy





LUNGS: Breath sounds clear to auscultation bilaterally and equal.  No wheezes 

rales or rhonchi.





HEART: Regular rate and rhythm without murmurs





ABDOMEN: Soft, nontender, nondistended abdomen.  No guarding, no rebound.  No 

masses appreciated.





Musculoskeletal: Normal range of motion, no pitting or edema.  No cyanosis.





NEUROLOGICAL: Cranial nerves grossly intact.  Normal speech, normal gait.  

Normal sensory, motor exams 





PSYCH: Normal mood, normal affect.





SKIN: Warm, Dry, normal turgor, no rashes or lesions noted.





Course





- Re-evaluation


Re-evalutation: 





04/27/18 19:39


22-year-old nontoxic appearing male with known history of sickle cell disease.  

Will initiate labs at this time.  Patient does not appear to be in any acute 

distress and denies any recent illness to include fever or shortness of breath 

or cough, unlikely infectious source, unlikely patient has a pneumonia as lung 

sounds are clear bilaterally.  Will treat patient for pain as well as provide 

IV hydration.  Patient reports that he will need to have Zofran and Benadryl 

with his pain medications as he typically has a histamine response with any IV 

narcotics.





Upon reevaluation, patient states that the pain medications provided did help 

although he is requesting a second dose as he states that his pain is 

returning.  Patient does have an elevated reticulocyte count.  Patient states 

that he is feeling well enough for discharge after the second dose of pain 

medications.  





- Vital Signs


Vital signs: 


 











Temp Pulse Resp BP Pulse Ox


 


 98.6 F   93   16   121/88 H  96 


 


 04/27/18 23:50  04/27/18 23:50  04/27/18 23:50  04/27/18 23:50  04/27/18 23:50














- Laboratory


Result Diagrams: 


 04/27/18 20:09





 04/27/18 21:02


Laboratory results interpreted by me: 


 











  04/27/18 04/27/18





  20:09 21:02


 


RBC  3.47 L 


 


Hgb  11.8 L 


 


Hct  34.8 L 


 


MCV  100 H 


 


MCH  34.0 H 


 


RDW  26.5 H 


 


Retic Count (auto)  7.78 H 


 


Absolute Retic  0.270 H 


 


Sodium   147.4 H


 


Total Bilirubin   1.6 H


 


Direct Bilirubin   0.5 H


 


ALT   20 L


 


Alkaline Phosphatase   156 H


 


Total Protein   8.8 H














Discharge





- Discharge


Clinical Impression: 


 Sickle cell crisis





Condition: Stable


Disposition: HOME, SELF-CARE


Instructions:  Sickle Cell Crisis ([facility])


Additional Instructions: 


Please follow-up with Dr. Gonzalez in the next 3-5 days for a recheck.  Return 

to the emergency department if you develop worsening pain, fever, or any other 

symptoms that are concerning to.

## 2018-04-29 NOTE — ER DOCUMENT REPORT
ED General





- General


Chief Complaint: Sickle Cell Crisis


Stated Complaint: KNEE PAIN


Time Seen by Provider: 04/29/18 21:25


Mode of Arrival: Ambulatory


Information source: Patient


Notes: 


Patient is a 22-year-old male with history of sickle cell.  Patient presents 

today with complaint of bilateral knee pain and low back pain.  Patient reports 

that these are his traditional symptoms when he has a sickle cell crisis.  

Patient was seen in this emergency department 2 days ago for same symptoms and 

was discharged as patient's pain was well controlled and reticulocyte count was 

only mildly elevated at that time.  Patient denies any nausea vomiting diarrhea 

or fevers.  Patient denies any other past medical history.


TRAVEL OUTSIDE OF THE [place] IN LAST 30 DAYS: No





- Related Data


Allergies/Adverse Reactions: 


 





Coconut * [Coconut] Allergy (Mild, Verified 04/27/18 18:41)


 


transpore tape Allergy (Mild, Uncoded 04/27/18 18:41)


 Hives











Past Medical History





- General


Information source: Patient





- Social History


Smoking Status: Current Some Day Smoker


Family History: Reviewed & Not Pertinent, Arthritis, DM, Hypertension, Other - 

Sickle cell trait both parents and asthma


Pulmonary Medical History: Reports: Hx Asthma, Hx Pneumonia


Renal/ Medical History: Denies: Hx Peritoneal Dialysis


Psychiatric Medical History: 


   Denies: Hx Depression


Past Surgical History: Reports: Hx Abdominal Surgery - Gallstones removal, Hx 

Cholecystectomy, Hx Orthopedic Surgery - Fluid drained from right foot, Hx 

Vascular Surgery - Port placement





- Immunizations


Immunizations up to date: Yes


Hx Diphtheria, Pertussis, Tetanus Vaccination: Yes


Hx Pneumococcal Vaccination: 05/16/13





Review of Systems





- Review of Systems


Constitutional: No symptoms reported


EENT: No symptoms reported


Cardiovascular: No symptoms reported


Respiratory: No symptoms reported


Gastrointestinal: No symptoms reported


Genitourinary: No symptoms reported


Male Genitourinary: No symptoms reported


Musculoskeletal: Back pain, Other - Bilateral knee pain


Skin: No symptoms reported


Hematologic/Lymphatic: No symptoms reported


Neurological/Psychological: No symptoms reported





Physical Exam





- Vital signs


Vitals: 


 











Temp Pulse Resp BP Pulse Ox


 


 99.0 F   100   18   138/92 H  98 


 


 04/29/18 21:11  04/29/18 21:11  04/29/18 21:11  04/29/18 21:11  04/29/18 21:11














- Notes


Notes: 





PHYSICAL EXAMINATION:





GENERAL: Well-appearing, well-nourished and in no acute distress.





HEAD: Atraumatic, normocephalic.





EYES: Pupils equal round and reactive to light, extraocular movements intact, 

sclera anicteric, conjunctiva are normal.





ENT: Nares patent, oropharynx clear without exudates.  Moist mucous membranes.





NECK: Normal range of motion, supple without lymphadenopathy





LUNGS: Breath sounds clear to auscultation bilaterally and equal.  No wheezes 

rales or rhonchi.





HEART: Regular rate and rhythm without murmurs





ABDOMEN: Soft, nontender, nondistended abdomen.  No guarding, no rebound.  No 

masses appreciated.





Musculoskeletal: Normal range of motion, no pitting or edema.  No cyanosis.





NEUROLOGICAL: Cranial nerves grossly intact.  Normal speech, normal gait.  

Normal sensory, motor exams 





PSYCH: Normal mood, normal affect.





SKIN: Warm, Dry, normal turgor, no rashes or lesions noted.





Course





- Re-evaluation


Re-evalutation: 


22-year-old male seen in this department 2 days ago for similar symptoms.  

Reticulocyte count was mildly elevated at the time.  Patient states that pain 

has persisted.  Will check labs and medicate for pain and hydration.  Patient 

appears well and does not appear toxic or in any acute distress.





Patient's labs are unchanged from his visit 2 days ago.  Patient has mildly 

elevated reticulocyte count.  After several doses of pain medication, patient 

states he feels well enough to go home.  I did offer to call his primary care 

physician and patient declines.  Patient verbalizes understanding close follow-

up with his primary care physician.  Patient understands return precautions for 

emergency department as outlined in his discharge paperwork.








- Vital Signs


Vital signs: 


 











Temp Pulse Resp BP Pulse Ox


 


 99.0 F   100   14   122/92 H  98 


 


 04/29/18 21:11  04/29/18 21:11  04/30/18 02:02  04/30/18 02:03  04/30/18 02:02














- Laboratory


Result Diagrams: 


 04/29/18 22:00





 04/29/18 22:00


Laboratory results interpreted by me: 


 











  04/29/18 04/29/18 04/30/18





  22:00 22:00 00:56


 


RBC  3.66 L  


 


Hgb  12.7 L  


 


Hct  37.2 L  


 


MCV  102 H  


 


MCH  34.7 H  


 


RDW  28.9 H  


 


Retic Count (auto)  8.13 H  


 


Absolute Retic  0.297 H  


 


Sodium   148.2 H 


 


Chloride   108 H 


 


Total Bilirubin   2.0 H 


 


Direct Bilirubin   0.5 H 


 


ALT   19 L 


 


Alkaline Phosphatase   159 H 


 


Total Protein   8.8 H 


 


Urine Protein    30 H


 


Urine Ketones    TRACE H


 


Urine Urobilinogen    2.0 H














Discharge





- Discharge


Clinical Impression: 


 Sickle cell crisis, Pain





Condition: Stable


Disposition: HOME, SELF-CARE


Additional Instructions: 


Sickle Cell Crisis





     You have "sickle cell crisis."  Sickle cell disease is caused by abnormal 

hemoglobin.  This hemoglobin can deform red blood cells into a sickle shape.  

These abnormal blood cells can block blood vessels. This causes the pain of 

sickle cell crisis.


     Sickle cell crisis can occur any time.  But attacks are more likely with 

acute infection, dehydration, or altitude change.  A crisis usually causes pain 

in the legs, back, abdomen, and chest.  Sometimes the pain may ease and return 

later.


     The usual treatment is oxygen, pain medication, IV fluids, and treatment 

of infection.  Attacks may take a couple of days to resolve.


     Return if the pain becomes more severe, or if there are new symptoms.





Please follow-up with Dr. Gonzalez this week for follow-up.


Referrals: 


ERIC AUGUSTIN MD [Primary Care Provider] - Follow up as needed

## 2018-05-10 NOTE — XCELERA REPORT
[address] [address]

                             Tel: [phone]

                             Fax: [phone]



                     Upper Extremity Venous Evaluation

____________________________________________________________________________



Name: CARMEN BEGUM

MRN: [id number]                Age: 22 yrs

Gender: Male                   : 1995

Patient Status: Outpatient     Patient Location: 

Account #: [id number]

Study Date: 2018 01:30 PM

Accession #: [id number]

____________________________________________________________________________



Procedure: Unilateral duplex scan of the right upper extremity veins was

performed, including responses to compression and other maneuvers.

Reason For Study: RUE PAIN





Ordering Physician: ERIC AUGUSTIN

Performed By: Michelle Moralez

____________________________________________________________________________



____________________________________________________________________________





Right Side Venous Evaluation

Study challenging due to body habitus.

Normal vessel filling wall to wall, compression and augmentation as well as

Colour flow down to the forearm veins.

____________________________________________________________________________

Interpretation Summary

Normal compression, patency, spontaneous and phasic flow of the right upper

extremity veins.



____________________________________________________________________________

Electronically signed by:      Lennox Williams      on 05/10/2018 09:09 AM



CC: ERIC AUGUSTIN

>

Williams, Lennox

## 2018-05-14 NOTE — ER DOCUMENT REPORT
ED General





- General


Mode of Arrival: Ambulatory


Information source: Patient


TRAVEL OUTSIDE OF THE [place] IN LAST 30 DAYS: No





<JOCY THOMAS - Last Filed: 05/14/18 23:26>





<FLIP WALTON - Last Filed: 05/15/18 00:59>





- General


Chief Complaint: Sickle Cell Crisis


Stated Complaint: LOW BACK PAIN


Time Seen by Provider: 05/14/18 21:00


Notes: 


Patient is a 22 year old male with a history of sickle cell anemia and asthma 

presents to the emergency department complaining of worsening lower back pain 

and nausea onset yesterday. Patient states his pain is similar to his normal 

sickle cell crisis. Patient denies vomiting, fever, abdominal pain or any 

respiratory symptoms. At bedside patient states he feels better after receiving 

medication in triage. 





Patient reports having an appointment with Dr. Funez on 5/24/2018 and 

previously seeing Dr. Cleaning on 5/8/2018. Patient currently take 5 mg of 

Oxycodone every 4 hours and has a 100 mcg Fentanyl patch. 


 (JOCY THOMAS)





Patient reports that he takes 15 mg of oxycodone times daily and 100 mcg 

fentanyl patch.  [place] controlled substance reporting system indicates 

his prescriptions have been for oxycodone 5 mg 4 times a day and the 100 mcg 

fentanyl patch.


The patient did have an exchange transfusion in [place] recently would 

explain why his hemoglobin is 13.1 which is much higher than he normally is.


The patient's low back pain is reproducible on palpation of the lumbar muscles.

  The fact that he is on a fentanyl patch and oxycodone 4 times daily every day

, suggests that this is a chronic pain narcotic dependency issue and not a 

sickle cell issue. (FLIP WALTON)





- Related Data


Allergies/Adverse Reactions: 


 





Coconut * [Coconut] Allergy (Mild, Verified 04/27/18 18:41)


 


transpore tape Allergy (Mild, Uncoded 04/27/18 18:41)


 Hives











Past Medical History





- General


Information source: Patient





- Social History


Smoking Status: Never Smoker


Chew tobacco use (# tins/day): No


Frequency of alcohol use: Occasional


Drug Abuse: None


Family History: Reviewed & Not Pertinent, Arthritis, DM, Hypertension, Other - 

Sickle cell trait both parents and asthma


Patient has suicidal ideation: No


Patient has homicidal ideation: No


Pulmonary Medical History: Reports: Hx Asthma, Hx Pneumonia


Past Surgical History: Reports: Hx Abdominal Surgery - Gallstones removal, Hx 

Cholecystectomy, Hx Orthopedic Surgery - Fluid drained from right foot, Hx 

Vascular Surgery - Port placement





- Immunizations


Immunizations up to date: Yes


Hx Diphtheria, Pertussis, Tetanus Vaccination: Yes


Hx Pneumococcal Vaccination: 05/16/13





<JOCY THOMAS - Last Filed: 05/14/18 23:26>





Review of Systems





- Review of Systems


Constitutional: No symptoms reported


EENT: No symptoms reported


Cardiovascular: No symptoms reported


Respiratory: No symptoms reported


Gastrointestinal: See HPI, Nausea


Genitourinary: No symptoms reported


Male Genitourinary: No symptoms reported


Musculoskeletal: See HPI, Back pain


Skin: No symptoms reported


Hematologic/Lymphatic: No symptoms reported


Neurological/Psychological: No symptoms reported


-: Yes All other systems reviewed and negative





<JOCY THOMAS - Last Filed: 05/14/18 23:26>





Physical Exam





- General


General appearance: Appears well, Alert


In distress: None





- HEENT


Head: Normocephalic, Atraumatic


Eyes: Normal


Conjunctiva: Normal


Extraocular movements intact: Yes


Pupils: PERRL


Mucous membranes: Normal


Neck: Normal





- Respiratory


Respiratory status: No respiratory distress


Chest status: Nontender


Breath sounds: Wheezing - wheezing on the right, Other - Coarse breath sounds





- Cardiovascular


Rhythm: Regular


Heart sounds: Normal auscultation


Murmur: No


Friction rub: No


Gallop: None auscultated





- Abdominal


Inspection: Normal


Distension: No distension


Bowel sounds: Normal


Tenderness: Nontender


Organomegaly: No organomegaly





- Back


Back: Tender - tender the paravertebral muscles which reproduces patients pain 

stated in HPI





- Extremities


General upper extremity: Normal ROM


General lower extremity: Normal ROM





- Neurological


Neuro grossly intact: Yes


Cognition: Normal


Orientation: AAOx4


[place] Coma Scale Eye Opening: Spontaneous


[place] Coma Scale Verbal: Oriented


[place]hav[place] Coma Scale Motor: Obeys Commands


[place] Coma Scale Total: 15


Speech: Normal





- Psychological


Associated symptoms: Normal affect, Normal mood





- Skin


Skin Temperature: Warm


Skin Moisture: Dry


Skin Color: Normal





<JOCY THOMAS - Last Filed: 05/14/18 23:26>





- Vital signs


Vitals: 


 











Temp Pulse Resp BP Pulse Ox


 


 98.4 F   118 H  18   138/89 H  96 


 


 05/14/18 19:13  05/14/18 19:13  05/14/18 19:13  05/14/18 19:13  05/14/18 19:13














Course





- Laboratory


Result Diagrams: 


 05/14/18 21:15





 05/14/18 22:35





<JOCY THOMAS - Last Filed: 05/14/18 23:26>





- Laboratory


Result Diagrams: 


 05/14/18 21:15





 05/14/18 22:35





<FLIP WALTON - Last Filed: 05/15/18 00:59>





- Re-evaluation


Re-evalutation: 





05/15/18 00:57


Patient is feeling better, he is not itching at this time and feels like he 

will be okay to go home. (FLIP WALTON)





- Vital Signs


Vital signs: 


 











Temp Pulse Resp BP Pulse Ox


 


 98.4 F   118 H  18   138/89 H  96 


 


 05/14/18 19:13  05/14/18 19:13  05/14/18 19:13  05/14/18 19:13  05/14/18 19:13














- Laboratory


Laboratory results interpreted by me: 


 











  05/14/18 05/14/18 05/14/18





  21:15 22:35 23:35


 


RBC  3.77 L  


 


Hgb  13.1 L  


 


MCV  101 H  


 


MCH  34.8 H  


 


RDW  27.8 H  


 


Retic Count (auto)  8.51 H  


 


Absolute Retic  0.321 H  


 


Total Bilirubin   1.8 H 


 


Direct Bilirubin   0.5 H 


 


Alkaline Phosphatase   139 H 


 


Urine Protein    30 H


 


Urine Urobilinogen    4.0 H


 


Urine Ascorbic Acid    40 H














Discharge





<JOCY THOMAS - Last Filed: 05/14/18 23:26>





<FLIP WALTON - Last Filed: 05/15/18 00:59>





- Discharge


Clinical Impression: 


Low back pain


Qualifiers:


 Chronicity: chronic Back pain laterality: bilateral Sciatica presence: without 

sciatica Qualified Code(s): M54.5 - Low back pain





Sickle cell anemia


Qualifiers:


 Sickle-cell associated disorders: without crisis Qualified Code(s): D57.1 - 

Sickle-cell disease without crisis





Condition: Stable


Disposition: HOME, SELF-CARE


Additional Instructions: 


Chronic Back Pain





     Chronic back pain (pain persisting longer than three months) is a common 

problem. A medical evaluation can look for herniated disc, arthritis, 

osteoporosis, tumors, and infections. But at least half the time, there's no 

obvious treatable cause. Anxiety and depression tend to worsen back pain.


     Ibuprofen or other anti-inflammatory medicine can help. A heating pad, 

used for 15-20 minutes at a time, can ease pain. For this type of back pain, 

narcotic medicines should be avoided. Muscle relaxers are rarely helpful unless 

you're having spasms.


     Activity is important. Find an aerobic exercise program that your back can 

tolerate. Too much rest makes back pain worse. Specific back exercises are 

usually prescribed to strengthen the back and abdominal muscles. Often, a 

physical therapist can help. Avoid heavy lifting, working while bent over, or 

standing with both knees straight.


     Most back pain patients do better with a firm mattress.


     If new symptoms of a "herniated disc" (radiation of pain, numbness, or 

tingling down the back of the leg or weakness in the leg) occur, you should be 

re-examined.





********************************************************************************

**************************************





Continue your regular medications.


Follow-up with your doctor this week if not improving.





RETURN TO THE EMERGENCY ROOM IF ANY NEW OR WORSENING SYMPTOMS.








Mariferibe Attestation: 





05/15/18 00:54


I personally performed the services described in the documentation, reviewed 

and edited the documentation which was dictated to the scribe in my presence, 

and it accurately records my words and actions. (FLIP WALTON)





Scribe Documentation





- Scribe


Written by Malcom:: Malcom Christopher, 5/14/2018 23:35


acting as scribe for :: Bharathi





<JOCY THOMAS - Last Filed: 05/14/18 23:26>

## 2018-05-14 NOTE — ER DOCUMENT REPORT
ED Medical Screen (RME)





- General


Chief Complaint: Sickle Cell Crisis


Stated Complaint: LOW BACK PAIN


Time Seen by Provider: 05/14/18 21:00


Notes: 


22-year-old male with history of sickle cell anemia comes for chief complaint 

of worsening lower back pain and nausea since yesterday.  Denies vomiting, fever

, abdominal pain, respiratory symptoms.  States his pain medication is not 

working.  He follows with local hematology.





TRAVEL OUTSIDE OF THE [place] IN LAST 30 DAYS: No





- Related Data


Allergies/Adverse Reactions: 


 





Coconut * [Coconut] Allergy (Mild, Verified 04/27/18 18:41)


 


transpore tape Allergy (Mild, Uncoded 04/27/18 18:41)


 Hives











Past Medical History





- Social History


Chew tobacco use (# tins/day): No


Frequency of alcohol use: Occasional


Drug Abuse: None


Family history: None


Pulmonary Medical History: Reports: Hx Asthma, Hx Pneumonia


Renal/ Medical History: Denies: Hx Peritoneal Dialysis


Psychiatric Medical History: 


   Denies: Hx Depression


Past Surgical History: Reports: Hx Abdominal Surgery - Gallstones removal, Hx 

Cholecystectomy, Hx Orthopedic Surgery - Fluid drained from right foot, Hx 

Vascular Surgery - Port placement





- Immunizations


Immunizations up to date: Yes


Hx Diphtheria, Pertussis, Tetanus Vaccination: Yes


History of Influenza Vaccine for 10/2017 - 3/2018 Season: Yes


Influenza Administration Date for 10/2017 - 3/2018 Season: 10/01/17





Physical Exam





- Vital signs


Vitals: 





 











Temp Pulse Resp BP Pulse Ox


 


 98.4 F   118 H  18   138/89 H  96 


 


 05/14/18 19:13  05/14/18 19:13  05/14/18 19:13  05/14/18 19:13  05/14/18 19:13














- General


General appearance: Appears well


In distress: None





- Respiratory


Respiratory status: No respiratory distress


Breath sounds: Normal.  No: Decreased air movement, Wheezing





- Cardiovascular


Rhythm: Regular, Tachycardia


Heart sounds: Normal auscultation, S1 appreciated, S2 appreciated





Course





- Vital Signs


Vital signs: 





 











Temp Pulse Resp BP Pulse Ox


 


 98.4 F   118 H  18   138/89 H  96 


 


 05/14/18 19:13  05/14/18 19:13  05/14/18 19:13  05/14/18 19:13  05/14/18 19:13

## 2018-05-18 NOTE — ER DOCUMENT REPORT
ED Medical Screen (RME)





- General


Chief Complaint: Sickle Cell Crisis


Stated Complaint: KNEE PAIN


Time Seen by Provider: 05/18/18 18:26


Notes: 





RAPID MEDICAL EVALUATION DISCLOSURE


I have seen this patient as part of a Rapid Medical Evaluation and, if 

applicable, placed any initially appropriate orders. The patient will be seen 

and fully evaluated, including a full history and physical exam, by a provider (

in Main ED or Fast Track) when a room becomes available.





------------------------------------------------------------------





22-year-old male past medical history sickle cell here with complaints of left 

knee pain that started earlier today.  He states when the weather changes he 

triggers a pain crisis which is usually present in his left knee.  He took a 15 

mg oxycodone early this morning but has not taken any since then.  He went to 

his doctor's office but they told him to come here instead.





EXAM


Mildly tachycardic


Minimal left knee TTP


TRAVEL OUTSIDE OF THE [place] IN LAST 30 DAYS: No





- Related Data


Allergies/Adverse Reactions: 


 





Coconut * [Coconut] Allergy (Mild, Verified 05/18/18 17:26)


 


transpore tape Allergy (Mild, Uncoded 05/18/18 17:26)


 Hives











Past Medical History





- Social History


Family history: None


Pulmonary Medical History: Reports: Hx Asthma, Hx Pneumonia


Renal/ Medical History: Denies: Hx Peritoneal Dialysis


Psychiatric Medical History: 


   Denies: Hx Depression


Past Surgical History: Reports: Hx Abdominal Surgery - Gallstones removal, Hx 

Cholecystectomy, Hx Orthopedic Surgery - Fluid drained from right foot, Hx 

Vascular Surgery - Port placement





- Immunizations


Immunizations up to date: Yes


Hx Diphtheria, Pertussis, Tetanus Vaccination: Yes


History of Influenza Vaccine for 10/2017 - 3/2018 Season: Yes


Influenza Administration Date for 10/2017 - 3/2018 Season: 10/01/17





Physical Exam





- Vital signs


Vitals: 





 











Temp Pulse Resp BP Pulse Ox


 


 98.4 F   112 H  16   143/93 H  95 


 


 05/18/18 17:33  05/18/18 17:33  05/18/18 17:33  05/18/18 17:33  05/18/18 17:33














Course





- Vital Signs


Vital signs: 





 











Temp Pulse Resp BP Pulse Ox


 


 98.4 F   112 H  16   143/93 H  95 


 


 05/18/18 17:33  05/18/18 17:33  05/18/18 17:33  05/18/18 17:33  05/18/18 17:33

## 2018-05-18 NOTE — ER DOCUMENT REPORT
ED General





- General


Chief Complaint: Sickle Cell Crisis


Stated Complaint: KNEE PAIN


Time Seen by Provider: 05/18/18 18:26


Notes: 





Patient is a 22-year-old male with a history of sickle cell anemia who presents 

with left-sided knee pain, typical for his pain crisis.  He reports that this 

started approximately 4-5 hours ago and has gotten progressively worse since 

that time.  He took oxycodone without any relief.  He states pain is worsened 

by walking and moving the knee.  Nothing improves the pain.  He has not seen 

his hematologist regarding today's concerns.  He denies any fever, shortness of 

breath or constitutional symptoms.  States this is identical to prior pain 

crises.


TRAVEL OUTSIDE OF THE [place] IN LAST 30 DAYS: No





- Related Data


Allergies/Adverse Reactions: 


 





Coconut * [Coconut] Allergy (Mild, Verified 05/18/18 17:26)


 


transpore tape Allergy (Mild, Uncoded 05/18/18 17:26)


 Hives











Past Medical History





- General


Information source: Patient





- Social History


Smoking Status: Never Smoker


Frequency of alcohol use: Rare


Drug Abuse: None


Lives with: Family


Family History: Reviewed & Not Pertinent, Arthritis, DM, Hypertension, Other - 

Sickle cell trait both parents and asthma


Patient has suicidal ideation: No


Patient has homicidal ideation: No


Pulmonary Medical History: Reports: Hx Asthma, Hx Pneumonia


Renal/ Medical History: Denies: Hx Peritoneal Dialysis


Psychiatric Medical History: 


   Denies: Hx Depression


Past Surgical History: Reports: Hx Abdominal Surgery - Gallstones removal, Hx 

Cholecystectomy, Hx Orthopedic Surgery - Fluid drained from right foot, Hx 

Vascular Surgery - Port placement





- Immunizations


Immunizations up to date: Yes


Hx Diphtheria, Pertussis, Tetanus Vaccination: Yes


Hx Pneumococcal Vaccination: 05/16/13





Review of Systems





- Review of Systems


Notes: 





Constitutional: Negative for fever.


HENT: Negative for sore throat.


Eyes: Negative for visual changes.


Cardiovascular: Negative for chest pain.


Respiratory: Negative for shortness of breath.


Gastrointestinal: Negative for abdominal pain, vomiting or diarrhea.


Genitourinary: Negative for dysuria.


Musculoskeletal: Positive for left knee pain


Skin: Negative for rash.


Neurological: Negative for headaches, weakness or numbness.





10 point ROS negative except as marked above and in HPI.





Physical Exam





- Vital signs


Vitals: 


 











Temp Pulse Resp BP Pulse Ox


 


 98.4 F   112 H  16   143/93 H  95 


 


 05/18/18 17:33  05/18/18 17:33  05/18/18 17:33  05/18/18 17:33  05/18/18 17:33











Interpretation: Tachycardic


Notes: 





PHYSICAL EXAMINATION:





GENERAL: Well-appearing, well-nourished and in no acute distress.





HEAD: Atraumatic, normocephalic.





EYES: Pupils equal round and reactive to light, extraocular movements intact, 

sclera anicteric, conjunctiva are normal.





ENT: nares patent, oropharynx clear without exudates.  Moist mucous membranes.





NECK: Normal range of motion, supple without lymphadenopathy





LUNGS: Breath sounds clear to auscultation bilaterally and equal.  No wheezes 

rales or rhonchi.





HEART: Regular rate and rhythm without murmurs





ABDOMEN: Soft, nontender, normoactive bowel sounds.  No guarding, no rebound.  

No masses appreciated.





EXTREMITIES: Normal range of motion, no pitting or edema.  No cyanosis.





NEUROLOGICAL: No focal neurological deficits. Moves all extremities 

spontaneously and on command.





PSYCH: Normal mood, normal affect.





SKIN: Warm, Dry, normal turgor, no rashes or lesions noted.





Course





- Re-evaluation


Re-evalutation: 





05/18/18 18:56


Presentation is most consistent with an uncomplicated sickle cell pain crisis.  

No indication for laboratories as the patient is here on a regular basis, just 

had labs completed less than 48 hours ago.  Vitals are not consistent with 

acute chest syndrome.  Vitals have remained within normal limits here in the 

emergency department.  Patient's pain has been able to be controlled using IV 

analgesia.  The patient is agreeable to discharge home at this time.  I 

recommended that they follow closely with their primary hematologist.  At this 

time will discharge with return precautions and follow-up recommendations.  

Verbal discharge instructions given a the bedside and opportunity for questions 

given. Medication warnings reviewed. Patient is in agreement with this plan and 

has verbalized understanding of return precautions and the need for primary 

care follow-up in the next 24-72 hours.











- Vital Signs


Vital signs: 


 











Temp Pulse Resp BP Pulse Ox


 


 98.4 F   112 H  18   133/96 H  97 


 


 05/18/18 17:33  05/18/18 17:33  05/18/18 20:01  05/18/18 20:01  05/18/18 20:01














Discharge





- Discharge


Clinical Impression: 


 Sickle cell crisis





Sickle cell anemia


Qualifiers:


 Sickle-cell associated disorders: with unspecified crisis Qualified Code(s): 

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





Condition: Good


Disposition: HOME, SELF-CARE


Additional Instructions: 


You were seen today for sickle cell pain crisis.  Please follow-up with your 

hematologist.  Returning to the ED if you have worsening pain, fever greater 

than 100.4, shortness of breath, persistent vomiting, or any other symptoms 

that are concerning to you.


Referrals: 


DIYA PHILIPPE MD [Primary Care Provider] - Follow up as needed

## 2018-05-21 NOTE — ER DOCUMENT REPORT
ED General





- General


Chief Complaint: Pain All Over


Stated Complaint: PAIN ALL OVER


Time Seen by Provider: 05/21/18 00:26


Notes: 





Patient is a 22-year-old male with past medical history of sickle cell anemia 

who presents with diffuse body pain as well as chest discomfort since he has 

been here in the emergency department.  The patient states that this does feel 

similar to when he has sickle cell crises.  He describes the pain as a throbbing

, aching, severe pain located diffusely throughout his body particularly in his 

bilateral knees.  He was here 2 days ago for the same with right knee pain at 

that time.  He denies any fever, vomiting or constitutional symptoms.  He has 

tried his home pain medications without any significant improvement in his pain.


TRAVEL OUTSIDE OF THE [place] IN LAST 30 DAYS: No





- Related Data


Allergies/Adverse Reactions: 


 





Coconut * [Coconut] Allergy (Mild, Verified 05/18/18 17:26)


 


transpore tape Allergy (Mild, Uncoded 05/18/18 17:26)


 Hives











Past Medical History





- General


Information source: Patient





- Social History


Smoking Status: Never Smoker


Frequency of alcohol use: None


Drug Abuse: None


Lives with: Spouse/Significant other


Family History: Reviewed & Not Pertinent, Arthritis, DM, Hypertension, Other - 

Sickle cell trait both parents and asthma


Pulmonary Medical History: Reports: Hx Asthma, Hx Pneumonia


Renal/ Medical History: Denies: Hx Peritoneal Dialysis


Psychiatric Medical History: 


   Denies: Hx Depression


Past Surgical History: Reports: Hx Abdominal Surgery - Gallstones removal, Hx 

Cholecystectomy, Hx Orthopedic Surgery - Fluid drained from right foot, Hx 

Vascular Surgery - Port placement





- Immunizations


Immunizations up to date: Yes


Hx Diphtheria, Pertussis, Tetanus Vaccination: Yes


Hx Pneumococcal Vaccination: 05/16/13





Review of Systems





- Review of Systems


Notes: 





Constitutional: Negative for fever.


HENT: Negative for sore throat.


Eyes: Negative for visual changes.


Cardiovascular: Positive for chest pain.


Respiratory: Negative for shortness of breath.


Gastrointestinal: Negative for abdominal pain, vomiting or diarrhea.


Genitourinary: Negative for dysuria.


Musculoskeletal: Positive for diffuse musculoskeletal pain.


Skin: Negative for rash.


Neurological: Negative for headaches, weakness or numbness.





10 point ROS negative except as marked above and in HPI.





Physical Exam





- Vital signs


Vitals: 


 











Temp Pulse Resp BP Pulse Ox


 


 98.8 F   97   16   138/86 H  96 


 


 05/21/18 00:15  05/21/18 00:15  05/21/18 00:15  05/21/18 00:15  05/21/18 00:15











Interpretation: Normal


Notes: 





PHYSICAL EXAMINATION:





GENERAL: Well-appearing, well-nourished and in no acute distress.





HEAD: Atraumatic, normocephalic.





EYES: Pupils equal round and reactive to light, extraocular movements intact, 

sclera anicteric, conjunctiva are normal.





ENT: nares patent, oropharynx clear without exudates.  Moist mucous membranes.





NECK: Normal range of motion, supple without lymphadenopathy





LUNGS: Breath sounds clear to auscultation bilaterally and equal.  No wheezes 

rales or rhonchi.





HEART: Regular rate and rhythm without murmurs





ABDOMEN: Soft, nontender, normoactive bowel sounds.  No guarding, no rebound.  

No masses appreciated.





EXTREMITIES: Normal range of motion, no pitting or edema.  No cyanosis.





NEUROLOGICAL: No focal neurological deficits. Moves all extremities 

spontaneously and on command.





PSYCH: Normal mood, normal affect.





SKIN: Warm, Dry, normal turgor, no rashes or lesions noted.





Course





- Re-evaluation


Re-evalutation: 





05/21/18 02:10


Presentation is most consistent with an uncomplicated sickle cell pain crisis.  

Patient has no evidence of an aplastic crisis on labs.  Chest x-ray and vitals 

are not consistent with acute chest syndrome.  Vitals have remained within 

normal limits here in the emergency department.  Patient's pain has been able 

to be controlled using IV analgesia. At this time will discharge with return 

precautions and follow-up recommendations.  Verbal discharge instructions given 

a the bedside and opportunity for questions given. Medication warnings 

reviewed. Patient is in agreement with this plan and has verbalized 

understanding of return precautions and the need for primary care follow-up in 

the next 24-72 hours.





- Vital Signs


Vital signs: 


 











Temp Pulse Resp BP Pulse Ox


 


 98.8 F   97   16   138/86 H  96 


 


 05/21/18 00:15  05/21/18 00:15  05/21/18 00:15  05/21/18 00:15  05/21/18 00:15














- Laboratory


Result Diagrams: 


 05/21/18 00:54





 05/21/18 00:54


Laboratory results interpreted by me: 


 











  05/21/18 05/21/18





  00:54 00:54


 


RBC  3.38 L 


 


Hgb  12.0 L 


 


Hct  35.1 L 


 


MCV  104 H 


 


MCH  35.6 H 


 


RDW  26.9 H 


 


Retic Count (auto)  6.20 H 


 


Absolute Retic  0.209 H 


 


Sodium   149.5 H


 


Chloride   108 H














- Diagnostic Test


Radiology reviewed: Image reviewed, Reports reviewed


Radiology results interpreted by me: 





05/21/18 03:08


Chest x-ray: No acute infiltrate or pneumothorax





- EKG Interpretation by Me


Additional EKG results interpreted by me: 





05/21/18 03:09


Sinus rhythm.  Rate 92.  No ST elevations or depressions.  QTC is 441.





Discharge





- Discharge


Clinical Impression: 


 Sickle cell crisis, Chest discomfort





Sickle cell anemia


Qualifiers:


 Sickle-cell associated disorders: with unspecified crisis Qualified Code(s): 

D57.00 - Hb-SS disease with crisis, unspecified; D57.0 - Hb-SS disease with 

crisis





Condition: Good


Disposition: HOME, SELF-CARE


Additional Instructions: 


You were seen today for sickle cell pain crisis.  Please follow-up with your 

hematologist.  Returning to the ED if you have worsening pain, fever greater 

than 100.4, shortness of breath, persistent vomiting, or any other symptoms 

that are concerning to you.


Referrals: 


DIYA PHILIPPE MD [Primary Care Provider] - Follow up as needed

## 2018-05-21 NOTE — RADIOLOGY REPORT (SQ)
EXAM DESCRIPTION: Single view of the chest



CLINICAL HISTORY: chest pain, sob



COMPARISON: 3/25/2018



FINDINGS: Single frontal view of the chest.  The

cardiomediastinal silhouette has normal size and contour. No

consolidation, pneumothorax, or pleural effusion.  No displaced

rib fractures identified.  Upper abdominal soft tissues are

unremarkable.



IMPRESSION:



1. No acute pulmonary process identified.

## 2018-05-26 NOTE — ER DOCUMENT REPORT
ED General





- General


Chief Complaint: Sickle Cell Crisis


Stated Complaint: SICKLE CELL CRISIS


Time Seen by Provider: 05/26/18 01:43


Notes: 





Patient is a 22-year-old male with past medical history of sickle cell anemia 

who presents with bilateral knee pain worse on the right..  The patient states 

that this does feel similar to when he has sickle cell crises.  He states that 

his trigger was recently performing walking testing at [facility] where he is being 

evaluated for a bone marrow treatment for his sickle cell disease.  He 

describes the pain as a throbbing, aching, severe pain located in his bilateral 

knees.  She was here recently for similar symptoms.  He denies any fever, 

vomiting or constitutional symptoms.  He has tried his home pain medications 

without any significant improvement in his pain.  He is scheduled to follow-up 

with his hematologist on Tuesday.


TRAVEL OUTSIDE OF THE [place] IN LAST 30 DAYS: No





- Related Data


Allergies/Adverse Reactions: 


 





Coconut * [Coconut] Allergy (Mild, Verified 05/18/18 17:26)


 


transpore tape Allergy (Mild, Uncoded 05/18/18 17:26)


 Hives











Past Medical History





- General


Information source: Patient





- Social History


Smoking Status: Never Smoker


Frequency of alcohol use: None


Drug Abuse: None


Lives with: Family


Family History: Reviewed & Not Pertinent, Arthritis, DM, Hypertension, Other - 

Sickle cell trait both parents and asthma


Pulmonary Medical History: Reports: Hx Asthma, Hx Pneumonia


Renal/ Medical History: Denies: Hx Peritoneal Dialysis


Psychiatric Medical History: 


   Denies: Hx Depression


Past Surgical History: Reports: Hx Abdominal Surgery - Gallstones removal, Hx 

Cholecystectomy, Hx Orthopedic Surgery - Fluid drained from right foot, Hx 

Vascular Surgery - Port placement





- Immunizations


Immunizations up to date: Yes


Hx Diphtheria, Pertussis, Tetanus Vaccination: Yes


Hx Pneumococcal Vaccination: 05/16/13





Review of Systems





- Review of Systems


Notes: 





Constitutional: Negative for fever.


HENT: Negative for sore throat.


Eyes: Negative for visual changes.


Cardiovascular: Negative for chest pain.


Respiratory: Negative for shortness of breath.


Gastrointestinal: Negative for abdominal pain, vomiting or diarrhea.


Genitourinary: Negative for dysuria.


Musculoskeletal: Positive for bilateral knee pain


Skin: Negative for rash.


Neurological: Negative for headaches, weakness or numbness.





10 point ROS negative except as marked above and in HPI.





Physical Exam





- Vital signs


Vitals: 


 











Temp Pulse Resp BP Pulse Ox


 


 98.6 F   98   18   130/89 H  98 


 


 05/26/18 00:38  05/26/18 00:38  05/26/18 00:38  05/26/18 00:38  05/26/18 00:38











Interpretation: Normal


Notes: 





PHYSICAL EXAMINATION:





GENERAL: Well-appearing, well-nourished and in no acute distress.





HEAD: Atraumatic, normocephalic.





EYES: Pupils equal round and reactive to light, extraocular movements intact, 

sclera anicteric, conjunctiva are normal.





ENT: nares patent, oropharynx clear without exudates.  Moist mucous membranes.





NECK: Normal range of motion, supple without lymphadenopathy





LUNGS: Breath sounds clear to auscultation bilaterally and equal.  No wheezes 

rales or rhonchi.





HEART: Regular rate and rhythm without murmurs





ABDOMEN: Soft, nontender, normoactive bowel sounds.  No guarding, no rebound.  

No masses appreciated.





EXTREMITIES: Normal range of motion, no pitting or edema.  No cyanosis.





NEUROLOGICAL: No focal neurological deficits. Moves all extremities 

spontaneously and on command.





PSYCH: Normal mood, normal affect.





SKIN: Warm, Dry, normal turgor, no rashes or lesions noted.





Course





- Re-evaluation


Re-evalutation: 





05/26/18 02:23


Presentation is most consistent with an uncomplicated sickle cell pain crisis.  

Patient has no evidence of an aplastic crisis on labs.  The patient denies any 

symptoms to suggest acute chest and vitals otherwise unremarkable.  No 

indication for chest x-ray..  Vitals have remained within normal limits here in 

the emergency department.  Patient's pain has been able to be controlled using 

IV analgesia.  The patient is agreeable to discharge home at this time.  I 

recommended that they follow closely with their primary hematologist.  Return 

precautions have been reviewed and discussed and patient has verbalized 

indications to return to the emergency department.





- Vital Signs


Vital signs: 


 











Temp Pulse Resp BP Pulse Ox


 


 98.6 F   98   18   130/89 H  98 


 


 05/26/18 00:38  05/26/18 00:38  05/26/18 00:38  05/26/18 00:38  05/26/18 00:38














- Laboratory


Result Diagrams: 


 05/26/18 02:40





 05/26/18 02:40


Laboratory results interpreted by me: 


 











  05/26/18 05/26/18





  02:40 02:40


 


RBC  3.26 L 


 


Hgb  11.4 L 


 


Hct  32.9 L 


 


MCV  101 H 


 


MCH  34.9 H 


 


RDW  26.4 H 


 


Retic Count (auto)  5.47 H 


 


Absolute Retic  0.179 H 


 


Sodium   146.1 H


 


Chloride   108 H














Discharge





- Discharge


Clinical Impression: 


 Sickle cell crisis





Right knee pain


Qualifiers:


 Chronicity: acute Qualified Code(s): M25.561 - Pain in right knee





Condition: Good


Disposition: HOME, SELF-CARE


Additional Instructions: 


You were seen today for sickle cell pain crisis.  Please follow-up with your 

hematologist.  Returning to the ED if you have worsening pain, fever greater 

than 100.4, shortness of breath, persistent vomiting, or any other symptoms 

that are concerning to you.

## 2018-05-27 NOTE — RADIOLOGY REPORT (SQ)
EXAM DESCRIPTION:  CHEST SINGLE VIEW



COMPLETED DATE/TIME:  5/27/2018 12:45 pm



REASON FOR STUDY:  tachycardia



COMPARISON:  5/21/2018



EXAM PARAMETERS:  NUMBER OF VIEWS: One view.

TECHNIQUE: Single frontal radiographic view of the chest acquired.

RADIATION DOSE: NA

LIMITATIONS: None.



FINDINGS:  LUNGS AND PLEURA: No opacities, masses or pneumothorax. No pleural effusion.

MEDIASTINUM AND HILAR STRUCTURES: No masses.  Contour normal.

HEART AND VASCULAR STRUCTURES: Heart normal in size.  Normal vasculature.

BONES: No acute findings.

HARDWARE: None in the chest.

OTHER: No other significant finding.



IMPRESSION:  NO ACUTE RADIOGRAPHIC FINDING IN THE CHEST.



TECHNICAL DOCUMENTATION:  JOB ID:  [id number]

 2011 [employer]- All Rights Reserved



Reading location - IP/workstation name: [email or web address]

## 2018-05-27 NOTE — RADIOLOGY REPORT (SQ)
EXAM DESCRIPTION:  KNEE LEFT 4 VIEW



COMPLETED DATE/TIME:  5/27/2018 12:45 pm



REASON FOR STUDY:  L knee pain, hx of L osteonecrosis



COMPARISON:  4/4/2017



NUMBER OF VIEWS:  Four views.



TECHNIQUE:  AP, lateral, and both oblique radiographic images acquired of the left knee.



LIMITATIONS:  None.



FINDINGS:  MINERALIZATION: Known osteonecrosis distal femur and proximal tibia.

BONES: No acute fracture or dislocation.  No worrisome bone lesions.

JOINT: No effusion.

SOFT TISSUES: No soft tissue swelling.  No radio-opaque foreign body.

OTHER: No other significant finding.



IMPRESSION:  Stable osteonecrosis.  No acute findings.



TECHNICAL DOCUMENTATION:  JOB ID:  [id number]

 2011 [employer]- All Rights Reserved



Reading location - IP/workstation name: [email or web address]

## 2018-05-27 NOTE — ER DOCUMENT REPORT
ED General





- General


Chief Complaint: Sickle Cell Crisis


Stated Complaint: KNEE PAIN


Time Seen by Provider: 05/27/18 11:29


Mode of Arrival: Ambulatory


Information source: Patient, Relative


TRAVEL OUTSIDE OF THE [place] IN LAST 30 DAYS: No





- HPI


Notes: 





22-year-old male with a past medical history of vaso-occlusive sickle cell 

crisis associated chronic pain, asthma, DVT and right upper extremity presents 

to emergency room for complaints of headache, vomiting and left knee pain.  

Patient states that this episode does feel similar to his previous sickle cell 

crisis disease.  Patient denies this is nothing similar to when he was admitted 

back in March when he previously had an allergic reaction and then had a sickle 

cell crisis with vasospasm.  Patient has an appointment with Dr. Augustin and 

Dr. Gonzalez on Tuesday and Wednesday this week.  Patient states he has 

throbbing aching pain to his left knee.  Denies fevers, chills,  palpitations,  

shortness of breath, dyspnea, nausea, vomiting, diarrhea, abdominal pain, 

hematuria,blurred vision, double vision, loss of vision, speech changes, LH, 

dizziness, syncope, headaches, wheezing, ST, URI, neck pain, weakness, bowel or 

bladder dysfunction, saddle anesthesia, numbness or tingling in bilateral upper 

or lower extremities equally, muscle paralysis, weakness in bilateral upper or 

lower extremities equally or rash. Denies IV drug use.





- Related Data


Allergies/Adverse Reactions: 


 





Coconut * [Coconut] Allergy (Mild, Verified 05/27/18 11:14)


 


transpore tape Allergy (Mild, Uncoded 05/27/18 11:14)


 Hives











Past Medical History





- General


Information source: Patient





- Social History


Smoking Status: Never Smoker


Chew tobacco use (# tins/day): No


Frequency of alcohol use: Occasional


Drug Abuse: None


Family History: Reviewed & Not Pertinent, Arthritis, DM, Hypertension, Other - 

Sickle cell trait both parents and asthma


Patient has suicidal ideation: No


Patient has homicidal ideation: No


Pulmonary Medical History: Reports: Hx Asthma, Hx Pneumonia


Renal/ Medical History: Denies: Hx Peritoneal Dialysis


Psychiatric Medical History: 


   Denies: Hx Depression


Past Surgical History: Reports: Hx Abdominal Surgery - Gallstones removal, Hx 

Cholecystectomy, Hx Orthopedic Surgery - Fluid drained from right foot, Hx 

Vascular Surgery - Port placement





- Immunizations


Immunizations up to date: Yes


Hx Diphtheria, Pertussis, Tetanus Vaccination: Yes


Hx Pneumococcal Vaccination: 05/16/13





Review of Systems





- Review of Systems


Constitutional: No symptoms reported


EENT: No symptoms reported


Cardiovascular: See HPI


Respiratory: No symptoms reported


Gastrointestinal: No symptoms reported


Genitourinary: No symptoms reported


Male Genitourinary: No symptoms reported


Musculoskeletal: See HPI


Skin: No symptoms reported


Hematologic/Lymphatic: No symptoms reported


Neurological/Psychological: No symptoms reported





Physical Exam





- Vital signs


Vitals: 


 











Temp Pulse Resp BP Pulse Ox


 


 98.7 F   106 H  22 H  135/84 H  94 


 


 05/27/18 11:18  05/27/18 11:18  05/27/18 11:18  05/27/18 11:18  05/27/18 11:18














- Notes


Notes: 





PHYSICAL EXAMINATION:





GENERAL: Well-appearing, well-nourished and in no acute distress.





HEAD: Atraumatic, normocephalic.





EYES: Pupils equal round and reactive to light, extraocular movements intact, 

sclera anicteric, conjunctiva are normal.





ENT: Nares patent, oropharynx clear without exudates.  Moist mucous membranes.





NECK: Normal range of motion, supple without lymphadenopathy





LUNGS: Breath sounds clear to auscultation bilaterally and equal.  No wheezes 

rales or rhonchi.





HEART: Sinus tachycardia, regular rhythm without murmurs





ABDOMEN: Soft, nontender, nondistended abdomen.  No guarding, no rebound.  No 

masses appreciated.





Musculoskeletal: Normal range of motion, no pitting or edema.  No cyanosis. 

left knee pain with palpation to lateral aspect of knee with noted swelling. 

negative gita's sign. anterior and posterior drawer test negative. noted pain 

with lateral movement.  Dtr + 2 in BLE. Full motor and sensory function to BLE 

equally.    No open wounds. No induration or drainage.  Strength 5 out of 5 

bilaterally equally.  Ankle examination normal.  Squeeze test negative.  Hip 

examination normal.  Pulses + 2 bilaterally and equally.negative squeeze 

bilaterally and equally.





NEUROLOGICAL: Cranial nerves grossly intact.  Normal speech, normal gait.  

Normal sensory, motor exams.  PERRLA, EOMI. Full motor and sensory function 

throughout.  + 2 equal bilaterally in BUE. Tongue midline. No pronator 

drift. No ataxia. Neck with APROM. Raises eyebrows. Strength is 5 out of 5 in 

bilateral upper and lower extremities equally.Speaks in full sentences. No 

weakness on one side. Romberg gait steady able to walk straight line. Able to 

recall 5 objects.





PSYCH: Normal mood, normal affect.





SKIN: Warm, Dry, normal turgor, no rashes or lesions noted.





Course





- Re-evaluation


Re-evalutation: 





05/27/18 17:35


Presentation is most consistent with an uncomplicated sickle cell pain crisis.  

There is no evidence of aplastic crisis on labs.  This patient denies any 

symptoms to suggest acute chest vitals otherwise unremarkable.  No indication 

on chest x-ray.  Vitals have remained within normal limits here in the 

emergency room.  Patient's pain has been able to be controlled using IV opiate 

analgesia.  The patient is agreeable to discharge home at this time.  I agree 

that they follow closely with her primary hematologist.  Return precautions 

have been reviewed and discussed the patient has verbalized indication return 

to the emergency department. 





After performing a Medical Screening Examination, I estimate there is LOW risk 

for RUPTURED ESOPHAGUS, PNEUMOTHORAX, PULMONARY EMBOLISM, ACUTE CORONARY 

SYNDROME, OR THORACIC AORTIC DISSECTION, thus I consider the discharge 

disposition reasonable.  I have reevaluated this patient multiple times and no 

significant life threatening changes are noted. The patient and I have 

discussed the diagnosis and risks, and we agree with discharging home with 

close follow-up. We also discussed returning to the Emergency Department 

immediately if new or worsening symptoms occur. We have discussed the symptoms 

which are most concerning (e.g., bloody sputum, worsening pain or shortness of 

breath) that necessitate immediate return.





- Vital Signs


Vital signs: 


 











Temp Pulse Resp BP Pulse Ox


 


 98.7 F   106 H  22 H  135/84 H  94 


 


 05/27/18 11:18  05/27/18 11:18  05/27/18 11:18  05/27/18 11:18  05/27/18 11:18














- Laboratory


Result Diagrams: 


 05/27/18 11:30





 05/27/18 11:30


Laboratory results interpreted by me: 


 











  05/27/18 05/27/18





  11:30 11:30


 


RBC  3.09 L 


 


Hgb  10.8 L 


 


Hct  30.3 L 


 


MCV  98 H 


 


MCH  35.0 H 


 


RDW  26.6 H 


 


Monocytes %  13.4 H 


 


Retic Count (auto)  5.78 H 


 


Absolute Retic  0.178 H 


 


Total Bilirubin   3.3 H


 


Direct Bilirubin   0.5 H


 


AST   72 H


 


Alkaline Phosphatase   137 H


 


Total Protein   8.4 H














Discharge





- Discharge


Clinical Impression: 


 Sickle cell crisis, Sinus tachycardia





Condition: Good


Disposition: HOME, SELF-CARE


Instructions:  Sinus Tachycardia ([facility]), Sickle Cell Crisis ([facility])


Referrals: 


DIYA PHILIPPE MD [Primary Care Provider] - 05/30/18 (at your already 

scheduled appt )


ERIC AUGUSTIN MD [ACTIVE STAFF] - 05/29/18 (at your already scheduled appt )

## 2018-05-27 NOTE — ER DOCUMENT REPORT
ED Medical Screen (RME)





- General


Chief Complaint: Sickle Cell Crisis


Stated Complaint: KNEE PAIN


Time Seen by Provider: 05/27/18 11:29


Notes: 





RAPID MEDICAL EVALUATION DISCLOSURE


I have seen this patient as part of a Rapid Medical Evaluation and, if 

applicable, placed any initially appropriate orders. The patient will be seen 

and fully evaluated, including a full history and physical exam, by a provider (

in Main ED or Fast Track) when a room becomes available.





------------------------------------------------------------------





22-year-old male PMH sickle cell here with complaints of headache nausea/

vomiting knee pain that started early this morning.  He does not know what is 

causing his sickle cell crisis.  His usual symptoms are knee pain but not 

usually the other symptoms.  Due to his vomiting, he has been unable to keep 

down any medications he reports.





EXAM


Mildly tachycardic low 100s


TRAVEL OUTSIDE OF THE [place] IN LAST 30 DAYS: No





- Related Data


Allergies/Adverse Reactions: 


 





Coconut * [Coconut] Allergy (Mild, Verified 05/27/18 11:14)


 


transpore tape Allergy (Mild, Uncoded 05/27/18 11:14)


 Hives











Past Medical History





- Social History


Chew tobacco use (# tins/day): No


Frequency of alcohol use: Occasional


Drug Abuse: None


Family history: None


Pulmonary Medical History: Reports: Hx Asthma, Hx Pneumonia


Renal/ Medical History: Denies: Hx Peritoneal Dialysis


Psychiatric Medical History: 


   Denies: Hx Depression


Past Surgical History: Reports: Hx Abdominal Surgery - Gallstones removal, Hx 

Cholecystectomy, Hx Orthopedic Surgery - Fluid drained from right foot, Hx 

Vascular Surgery - Port placement





- Immunizations


Immunizations up to date: Yes


Hx Diphtheria, Pertussis, Tetanus Vaccination: Yes


History of Influenza Vaccine for 10/2017 - 3/2018 Season: Yes


Influenza Administration Date for 10/2017 - 3/2018 Season: 10/01/17





Physical Exam





- Vital signs


Vitals: 





 











Temp Pulse Resp BP Pulse Ox


 


 98.7 F   106 H  22 H  135/84 H  94 


 


 05/27/18 11:18  05/27/18 11:18  05/27/18 11:18  05/27/18 11:18  05/27/18 11:18














Course





- Vital Signs


Vital signs: 





 











Temp Pulse Resp BP Pulse Ox


 


 98.7 F   106 H  22 H  135/84 H  94 


 


 05/27/18 11:18  05/27/18 11:18  05/27/18 11:18  05/27/18 11:18  05/27/18 11:18

## 2018-06-02 NOTE — ER DOCUMENT REPORT
ED General





- General


Chief Complaint: Sickle Cell Crisis


Stated Complaint: SICKLE CELL, KNEE PAIN


TRAVEL OUTSIDE OF THE [place] IN LAST 30 DAYS: No





- HPI


Notes: 





Patient is a 22-year-old male with history of sickle cell and avascular 

necrosis who presents to the ED complaining of another sickle cell crisis and 

left knee pain.  Patient states that his only pain during this event is to his 

left knee.  Patient states that when he ambulates the pain is worsened.  He 

sees [facility] again on Thursday.  Patient states that the pain does not radiate.  

He has been eating and drinking without difficulties.  He has been urinating 

normal and having normal bowel movements.  No other concerns or complaints.  

Denies any headache, fever, neck pain, URI, sore throat, chest pain, 

palpitations, syncope, cough, shortness of breath, wheeze, dyspnea, abdominal 

pain, nausea/vomiting/diarrhea, urinary retention, dysuria, hematuria, loss of 

control of bowel or bladder, numbness/tingling, saddle anesthesia, muscle 

paralysis/weakness, or rash.





- Related Data


Allergies/Adverse Reactions: 


 





Coconut * [Coconut] Allergy (Mild, Verified 05/27/18 11:14)


 


transpore tape Allergy (Mild, Uncoded 05/27/18 11:14)


 Hives











Past Medical History





- Social History


Smoking Status: Never Smoker


Chew tobacco use (# tins/day): No


Frequency of alcohol use: Rare


Drug Abuse: None


Family History: Reviewed & Not Pertinent, Arthritis, DM, Hypertension, Other - 

Sickle cell trait both parents and asthma


Patient has suicidal ideation: No


Patient has homicidal ideation: No


Pulmonary Medical History: Reports: Hx Asthma, Hx Pneumonia


Renal/ Medical History: Denies: Hx Peritoneal Dialysis


Psychiatric Medical History: 


   Denies: Hx Depression


Past Surgical History: Reports: Hx Abdominal Surgery - Gallstones removal, Hx 

Cholecystectomy, Hx Orthopedic Surgery - Fluid drained from right foot, Hx 

Vascular Surgery - Port placement





- Immunizations


Immunizations up to date: Yes


Hx Diphtheria, Pertussis, Tetanus Vaccination: Yes


Hx Pneumococcal Vaccination: 05/16/13





Review of Systems





- Review of Systems


-: Yes All other systems reviewed and negative





Physical Exam





- Vital signs


Vitals: 


 











Temp Pulse Resp BP Pulse Ox


 


 98.9 F   110 H  18   139/88 H  95 


 


 06/02/18 21:55  06/02/18 21:55  06/02/18 21:55  06/02/18 21:55  06/02/18 21:55














- Notes


Notes: 





PHYSICAL EXAMINATION:





GENERAL: Well-appearing, well-nourished and in no acute distress.  A&ox4.  

Answers questions appropriately.





HEAD: Atraumatic, normocephalic.





EYES: Pupils equal round and reactive to light, extraocular movements intact, 

sclera anicteric, conjunctiva are normal.





ENT: Nares patent and without discharge.  oropharynx clear without exudates.  

No tonsilar hypertrophy or erythema.  Moist mucous membranes. 





NECK: Normal range of motion, supple without lymphadenopathy





LUNGS: Breath sounds clear to auscultation bilaterally and equal.  No wheezes 

rales or rhonchi.





HEART: Regular rate and rhythm without murmurs, rubs, gallops.





ABDOMEN: Soft, nontender, nondistended abdomen.  No guarding, no rebound.  No 

masses appreciated.  Normal bowel sounds present.  No CVA tenderness 

bilaterally.





Musculoskeletal: Lt knee:  FROM to passive/active. Strength 5+/5. N/V intact 

distal.  Non-tender to palp. No obvious erythema, swelling, or ecchymosis/

warmth.





Extremities:  No cyanosis, clubbing, or edema b/l.  Peripheral pulses 2+.  

Capillary refill less than 3 seconds.





NEUROLOGICAL:  Normal speech, normal gait.  Normal sensory, motor exams 





PSYCH: Normal mood, normal affect.





SKIN: Warm, Dry, normal turgor, no rashes or lesions noted.





Course





- Re-evaluation


Re-evalutation: 





06/02/18 23:06


we will provide patient with his normal treatment for his sickle crisis 

episodes.  No indication at this time for a CXR.  


he usually receives 2mg dilaudid every hour x3 hours with 25mg benadryl IV and 

zofran IV with fluids.


Vitals are currently acceptable.


Labs ordered.





06/03/18 02:58


Patient is an afebrile, well-hydrated, 22-year-old male who presents to the ED 

with any sickle cell crisis with pain to his left knee.  Vitals are acceptable.

  Pt currently has a HR of 90 on apical.  PE is otherwise unremarkable for any 

neurovascular compromise, obvious tendon/ligament rupture, obvious fracture/

dislocation, septic joint.  CBC, CMP, reticulocyte count were acceptable at 

this time.  He has no significant tachycardia, tachypnea, or hypoxia.  He is 

tolerating p.o. without any difficulties.  Patient states that he is feeling 

much better and would like to go home.  Patient states that he usually only 

gets admitted if the pain has not significantly improved.  He does have an 

appointment scheduled with a specialist on Thursday.  No other labs or imaging 

warranted at this time based on H&P.  Low suspicion for any acute chest syndrome

, sepsis, meningitis, severe dehydration.  Conservative measures otherwise for 

symptoms.  Recheck with your PCM in 3-5 days as well.  Return to the ED with 

any worsening/concerning symptoms otherwise as reviewed discharge.  Patient is 

in agreement.





- Vital Signs


Vital signs: 


 











Temp Pulse Resp BP Pulse Ox


 


 98.9 F   110 H  20   139/64 H  99 


 


 06/02/18 21:55  06/02/18 21:55  06/03/18 00:03  06/03/18 00:03  06/03/18 00:03














- Laboratory


Result Diagrams: 


 06/02/18 23:15





 06/02/18 23:15


Laboratory results interpreted by me: 


 











  06/02/18 06/02/18





  23:15 23:15


 


RBC  3.47 L 


 


Hgb  12.1 L 


 


Hct  34.5 L 


 


MCV  99 H 


 


MCH  34.8 H 


 


RDW  27.1 H 


 


Retic Count (auto)  7.90 H 


 


Absolute Retic  0.274 H 


 


Sodium   145.4 H


 


BUN   22 H


 


Total Bilirubin   2.1 H


 


Direct Bilirubin   0.5 H


 


Alkaline Phosphatase   156 H


 


Total Protein   9.5 H


 


Albumin   5.2 H














Discharge





- Discharge


Clinical Impression: 


 Sickle cell crisis





Left knee pain


Qualifiers:


 Chronicity: acute Qualified Code(s): M25.562 - Pain in left knee





Condition: Stable


Disposition: HOME, SELF-CARE


Instructions:  Sickle Cell Crisis ([facility])


Additional Instructions: 


Rest, Ice, Compression, Elevation


Tylenol/ibuprofen as needed


Light stretches daily


Strength exercises as able


Moist heat and massage may help


F/u with your PCP in 3-5 days for a recheck


Keep appointment with your [facility] specialist this Thursday





Return to the ED with any worsening symptoms and/or development of fever, 

headache, chest pain, palpitations, syncope, shortness of breath, trouble 

breathing, abdominal pain, n/v/d, muscle weakness/paralysis, numbness/tingling, 

swelling, redness, or other worsening symptoms that are concerning to you.


Forms:  Elevated Blood Pressure


Referrals: 


DIYA PHILIPPE MD [Primary Care Provider] - Follow up in 3-5 days

## 2018-06-04 NOTE — ER DOCUMENT REPORT
ED Medical Screen (RME)





- General


Chief Complaint: Sickle Cell Crisis


Stated Complaint: KNEE PAIN AND HEADACHE


Time Seen by Provider: 06/04/18 05:18


TRAVEL OUTSIDE OF THE [place] IN LAST 30 DAYS: No





- HPI


Notes: 





06/04/18 05:21


Patient is a 22-year-old male with a history of sickle cell who presents to the 

ED complaining of an acute episode of sickle cell with right knee pain and 

headache.  Patient states that he has had headaches in the past, but never this 

bad.  The pain does not radiate, but he does have light sensitivity.  Patient 

states that otherwise his leg pain is typical for a sickle-cell episode.  Home 

meds have not been helping.  I did see this patient 2 days ago, but it was just 

his like at that time.  Patient was treated and discharged without any 

complications.  Patient states that he is otherwise eating and drinking without 

difficulties.  He is urinating normally and having normal bowel movements.  

Patient states that he also felt feverish today.  Denies any head injury, neck 

pain, changes in vision/speech/mentation/hearing, URI, sore throat, chest pain, 

palpitations, syncope, cough, shortness of breath, wheeze, dyspnea, abdominal 

pain, nausea/vomiting/diarrhea, urinary retention, dysuria, hematuria, loss of 

control of bowel or bladder, numbness/tingling, saddle anesthesia, muscle 

paralysis/weakness, or rash.








I have treated and performed a rapid initial assessment of this patient.  A 

comprehensive ED assessment and evaluation of the patient, analysis of test 

results and completion of medical decision making process will be conducted by 

additional ED providers.  I did review CT with Dr. Calderon who is in agreement 

at this time.  Other labs and meds ordered.  Pt usually receives 3 doses of 

pain medication every hour prn along with zofran/benadryl/fluids.








PHYSICAL EXAMINATION:





GENERAL: Well-appearing, well-nourished and in no acute distress.  A&Ox4.  

Answers questions appropriately.





Eyes:  PERRLA. No nystagmus. EOMI.





LUNGS: Breath sounds clear to auscultation bilaterally and equal.  No wheezes 

rales or rhonchi.





HEART: Regular rate and rhythm without murmurs, rubs, gallops.





Rt knee:  FROM. Strength 5+/5. N/v intact distal.  No erythema, swelling, or 

warmth.





Extremities:  No cyanosis, clubbing, or edema b/l.  





NEUROLOGICAL: Cranial nerves grossly intact.  NIH 0.  GCS 15.  Normal speech, 

normal gait. 





PSYCH: Normal mood, normal affect.











- Related Data


Allergies/Adverse Reactions: 


 





Coconut * [Coconut] Allergy (Mild, Verified 05/27/18 11:14)


 


transpore tape Allergy (Mild, Uncoded 05/27/18 11:14)


 Hives











Past Medical History





- Social History


Family history: None


Pulmonary Medical History: Reports: Hx Asthma, Hx Pneumonia


Renal/ Medical History: Denies: Hx Peritoneal Dialysis


Psychiatric Medical History: 


   Denies: Hx Depression


Past Surgical History: Reports: Hx Abdominal Surgery - Gallstones removal, Hx 

Cholecystectomy, Hx Orthopedic Surgery - Fluid drained from right foot, Hx 

Vascular Surgery - Port placement





- Immunizations


Immunizations up to date: Yes


Hx Diphtheria, Pertussis, Tetanus Vaccination: Yes


History of Influenza Vaccine for 10/2017 - 3/2018 Season: Yes


Influenza Administration Date for 10/2017 - 3/2018 Season: 10/01/17





Physical Exam





- Vital signs


Vitals: 





 











Temp Pulse Resp BP Pulse Ox


 


 98.0 F   93   18   129/88 H  93 


 


 06/04/18 05:10  06/04/18 05:10  06/04/18 05:10  06/04/18 05:10  06/04/18 05:10














Course





- Vital Signs


Vital signs: 





 











Temp Pulse Resp BP Pulse Ox


 


 98.0 F   93   18   129/88 H  93 


 


 06/04/18 05:10  06/04/18 05:10  06/04/18 05:10  06/04/18 05:10  06/04/18 05:10














Doctor's Discharge





- Discharge


Referrals: 


DIYA PHILIPPE MD [Primary Care Provider] - Follow up as needed

## 2018-06-04 NOTE — RADIOLOGY REPORT (SQ)
EXAM DESCRIPTION: CT of the head without contrast



CLINICAL HISTORY: headache, sickle cell



COMPARISON: None available



TECHNIQUE: Axial CT of the head obtained from the skull apex to

the skull base without contrast.



FINDINGS: 

No acute intracranial hemorrhage identified. No mass, mass

effect, shift of the midline, abnormal extra-axial fluid

collection or CT evidence of acute ischemic change identified.

The ventricular system is unremarkable.  No acute abnormalities

of the supratentorial white matter, basal ganglia, cerebellum, or

brainstem.



The visualized paranasal sinuses and the mastoids are clear.

  No skull fracture identified.  Visualized orbits and globes are

unremarkable. Left periorbital piercing



DLP:929.50 mGy-cm



IMPRESSION:

1.  No acute intracranial abnormality identified.



This exam was performed according to our departmental

dose-optimization program, which includes automated exposure

control, adjustment of the mA and/or kV according to patient size

and/or use of iterative reconstruction technique.

## 2018-06-04 NOTE — RADIOLOGY REPORT (SQ)
EXAM DESCRIPTION:  KNEE LEFT 3 VIEWS



COMPLETED DATE/TIME:  6/4/2018 8:24 am



REASON FOR STUDY:  Left knee pain history of sickle cell osteonecrosi



COMPARISON:  1/18/2015



NUMBER OF VIEWS:  Three views.



TECHNIQUE:  AP, lateral, and sunrise patella radiographic images acquired of the left knee.



LIMITATIONS:  None.



FINDINGS:  MINERALIZATION: Patchy areas of sclerosis and lucency stable from the previous exam.

BONES: No acute fracture or cortical collapse.

JOINT: No effusion.

SOFT TISSUES: No soft tissue swelling.  No radio-opaque foreign body.

OTHER: No other significant finding.



IMPRESSION:  Chronic stable changes related to the patient's underlying sickle cell disease.  No acut
e fracture or cortical collapse.



TECHNICAL DOCUMENTATION:  JOB ID:  [id number]

 2011 [employer]- All Rights Reserved



Reading location - IP/workstation name: [email or web address]

## 2018-06-04 NOTE — ER DOCUMENT REPORT
ED General





- General


Chief Complaint: Sickle Cell Crisis


Stated Complaint: KNEE PAIN AND HEADACHE


Time Seen by Provider: 06/04/18 05:18


TRAVEL OUTSIDE OF THE [place] IN LAST 30 DAYS: No





- HPI


Patient complains to provider of: Headache knee pain


Notes: 





Patient coming in for evaluation of headache and knee pain.  Patient states 

frontal headache ongoing for approximate 24 hours no nausea no vomiting no 

fevers no chills no history of trauma.  Patient denies history of osteonecrosis 

of the left knee with chronic pain.  Patient states pain is worsened patient 

does have a history of sickle cell disease patient states no relief with his 

home medications.





- Related Data


Allergies/Adverse Reactions: 


 





Coconut * [Coconut] Allergy (Mild, Verified 05/27/18 11:14)


 


transpore tape Allergy (Mild, Uncoded 05/27/18 11:14)


 Hives











Past Medical History





- Social History


Smoking Status: Never Smoker


Frequency of alcohol use: None


Drug Abuse: None


Family History: Reviewed & Not Pertinent, Arthritis, DM, Hypertension, Other - 

Sickle cell trait both parents and asthma


Patient has suicidal ideation: No


Patient has homicidal ideation: No


Pulmonary Medical History: Reports: Hx Asthma, Hx Pneumonia


Renal/ Medical History: Denies: Hx Peritoneal Dialysis


Psychiatric Medical History: 


   Denies: Hx Depression


Past Surgical History: Reports: Hx Abdominal Surgery - Gallstones removal, Hx 

Cholecystectomy, Hx Orthopedic Surgery - Fluid drained from right foot, Hx 

Vascular Surgery - Port placement





- Immunizations


Immunizations up to date: Yes


Hx Diphtheria, Pertussis, Tetanus Vaccination: Yes


Hx Pneumococcal Vaccination: 05/16/13





Review of Systems





- Review of Systems


Constitutional: No symptoms reported


EENT: No symptoms reported


Cardiovascular: No symptoms reported


Respiratory: No symptoms reported


Gastrointestinal: No symptoms reported


Genitourinary: No symptoms reported


Male Genitourinary: No symptoms reported


Musculoskeletal: Other - Knee pain


Skin: No symptoms reported


Hematologic/Lymphatic: No symptoms reported


Neurological/Psychological: Headaches


-: Yes All other systems reviewed and negative





Physical Exam





- Vital signs


Vitals: 


 











Temp Pulse Resp BP Pulse Ox


 


 98.0 F   93   18   129/88 H  93 


 


 06/04/18 05:10  06/04/18 05:10  06/04/18 05:10  06/04/18 05:10  06/04/18 05:10











Interpretation: Normal





- General


General appearance: Appears well, Alert





- HEENT


Head: Normocephalic, Atraumatic


Eyes: Normal


Pupils: PERRL





- Respiratory


Respiratory status: No respiratory distress


Chest status: Nontender


Breath sounds: Normal


Chest palpation: Normal





- Cardiovascular


Rhythm: Regular


Heart sounds: Normal auscultation


Murmur: No





- Abdominal


Inspection: Normal


Distension: No distension


Bowel sounds: Normal


Tenderness: Nontender


Organomegaly: No organomegaly





- Back


Back: Normal, Nontender





- Extremities


General upper extremity: Normal inspection, Nontender, Normal color, Normal ROM

, Normal temperature


General lower extremity: Normal inspection, Nontender, Normal color, Normal ROM

, Normal temperature, Normal weight bearing.  No: Hsira's sign





- Neurological


Neuro grossly intact: Yes


Cognition: Normal


Orientation: AAOx4


[place]hton Coma Scale Eye Opening: Spontaneous


[place] Coma Scale Verbal: Oriented


[place] Coma Scale Motor: Obeys Commands


Dhaval Coma Scale Total: 15


Speech: Normal


Motor strength normal: LUE, RUE, LLE, RLE


Sensory: Normal





- Psychological


Associated symptoms: Normal affect, Normal mood





- Skin


Skin Temperature: Warm


Skin Moisture: Dry


Skin Color: Normal





Course





- Re-evaluation


Re-evalutation: 





06/04/18 15:06


Patient was evaluated after studies show increased reticulocyte count with 

lowering of his hemoglobin in the last 48 hours to 2 g from 12-10 approximately 

patient also now requiring small amount of oxygen to stay within normal 

saturations.  Patient was cannula 2 L.  Did discuss the findings with the PCP 

agrees to admit the patient to telemetry at this time patient denies any chest 

pain no fevers no signs of acute chest syndrome





- Vital Signs


Vital signs: 


 











Temp Pulse Resp BP Pulse Ox


 


 98.5 F   84   12   130/89 H  97 


 


 06/04/18 14:00  06/04/18 14:00  06/04/18 14:00  06/04/18 14:00  06/04/18 14:00














- Laboratory


Result Diagrams: 


 06/04/18 06:39





 06/04/18 06:39


Laboratory results interpreted by me: 


 











  06/04/18 06/04/18





  06:39 06:39


 


RBC  2.90 L 


 


Hgb  9.9 L D 


 


Hct  28.9 L 


 


MCV  100 H 


 


MCH  34.2 H 


 


RDW  27.6 H 


 


Metamyelocytes %  1 H 


 


Retic Count (auto)  8.62 H 


 


Absolute Retic  0.250 H 


 


Total Bilirubin   2.3 H


 


Alkaline Phosphatase   137 H














Discharge





- Discharge


Clinical Impression: 


 Knee osteonecrosis, left, Sickle cell anemia, Sickle cell crisis





Condition: Good


Disposition: ADMITTED AS INPATIENT


Admitting Provider: Armin


Unit Admitted: Telemetry

## 2018-06-04 NOTE — PDOC H&P
History of Present Illness


Admission Date/PCP: 


  06/04/18 09:56





  DIYA SLAVAJOSE GUADALUPEASAF





Patient complains of: Worsening knee joint pain and headache


History of Present Illness: 


CARMEN BEGUM is a 22 year old male known to my practice who presented with 

flare up of his usual sickle cell disease pain. Patient reported compliance 

with his home pain medication. He reported knee pain and headache. He denied 

any recent trauma or instrumentation. He was seen at the ED recently and 

discharged home after evaluation and treatment. He claimed minimal improvement 

in his symptoms. He denied any fever but experience chills. There is no 

definite fever or chills. His ED evaluation was significant for episodes of 

tachypnea that is probably related to his pain exacerbation. His laboratory 

evaluation did suggest ongoing hemolytic process comparative to his recent 

visit laboratory evaluation. His X ray did suggest stable left knee 

osteonecrosis. His head CT scan was devoid of any acute process.








Past Medical History


Pulmonary Medical History: Reports: Asthma, Pneumonia


Psychiatric Medical History: 


   Denies: Depression


Hematology: Reports: Anemia, Sickle Cell Disease, Bleeding Tendencies





Past Surgical History


Past Surgical History: Reports: Cholecystectomy, Orthopedic Surgery - Fluid 

drained from right foot, Vascular Surgery - Port placement





Social History


Information Source: Patient


Smoking Status: Never Smoker


Frequency of Alcohol Use: Rare


Hx Recreational Drug Use: No


Drugs: None


Hx Prescription Drug Abuse: No





- Advance Directive


Resuscitation Status: Full Code





Family History


Family History: Reviewed & Not Pertinent, Arthritis, DM, Hypertension, Other - 

Sickle cell trait both parents and asthma


Parental Family History Reviewed: Yes


Children Family History Reviewed: Yes


Sibling(s) Family History Reviewed.: Yes





Medication/Allergy


Home Medications: 








Enoxaparin Sodium [Lovenox Inj 40 Mg/0.4 Ml Disp.Syrin] 40 mg SUBCUT DAILY 06/04 /18 


Fentanyl [Duragesic 75 Mcg/Hr Transdermal Patch] 1 each TD Q3D 06/04/18 


Folic Acid [Folvite 1 mg Tablet] 1 mg PO DAILY 06/04/18 


Hydroxyurea [Hydrea 500 Mg Capsule] 1,000 mg PO SUTUTHSA@1000 06/04/18 


Hydroxyurea [Hydrea 500 Mg Capsule] 1,500 mg PO MOWEFR@1000 06/04/18 


Oxycodone HCl [Oxy-Ir 5 mg Tablet] 15 mg PO Q4HP PRN 06/04/18 








Allergies/Adverse Reactions: 


 





Coconut * [Coconut] Allergy (Mild, Verified 05/27/18 11:14)


 


transpore tape Allergy (Mild, Uncoded 05/27/18 11:14)


 Hives











Review of Systems


Constitutional: PRESENT: chills


Eyes: PRESENT: visual disturbances


Ears: PRESENT: hearing changes


Nose, Mouth, and Throat: ABSENT: as per HPI, headache(s), mouth pain, sore 

throat, vertigo, other


Cardiovascular: ABSENT: chest pain, dyspnea on exertion, edema, orthropnea, 

palpitations


Respiratory: ABSENT: cough, hemoptysis


Gastrointestinal: ABSENT: abdominal pain, constipation, diarrhea, hematemesis, 

hematochezia, nausea, vomiting


Genitourinary: ABSENT: dysuria, hematuria


Musculoskeletal: ABSENT: joint swelling


Integumentary: ABSENT: rash, wounds


Neurological: ABSENT: abnormal gait, abnormal speech, confusion, dizziness, 

focal weakness, syncope


Psychiatric: ABSENT: anxiety, depression, homidical ideation, suicidal ideation


Endocrine: ABSENT: cold intolerance, heat intolerance, polydipsia, polyuria


Hematologic/Lymphatic: ABSENT: easy bleeding, easy bruising, lymphadenopathy


Allergic/Immunologic: ABSENT: seasonal rhinorrhea





Physical Exam


Vital Signs: 


 











Temp Pulse Resp BP Pulse Ox


 


 98.5 F   84   12   130/89 H  97 


 


 06/04/18 14:00  06/04/18 14:00  06/04/18 14:00  06/04/18 14:00  06/04/18 14:00








 Intake & Output











 06/03/18 06/04/18 06/05/18





 06:59 06:59 06:59


 


Weight   57.153 kg











General appearance: PRESENT: mild distress - due to sickle cell disease pain 

crisis


Head exam: PRESENT: atraumatic, normocephalic


Eye exam: PRESENT: conjunctiva pink, EOMI, PERRLA.  ABSENT: scleral icterus


Mouth exam: PRESENT: moist


Neck exam: PRESENT: full ROM.  ABSENT: carotid bruit, JVD, lymphadenopathy, 

thyromegaly


Respiratory exam: PRESENT: clear to auscultation brenda


Cardiovascular exam: PRESENT: RRR.  ABSENT: diastolic murmur, rubs, systolic 

murmur


Pulses: PRESENT: normal dorsalis pedis pul, +2 pedal pulses bilateral


Vascular exam: PRESENT: normal capillary refill.  ABSENT: pallor


GI/Abdominal exam: PRESENT: normal bowel sounds, soft.  ABSENT: distended, 

guarding, mass, organolmegaly, rebound, tenderness


Rectal exam: PRESENT: deferred


Extremities exam: ABSENT: pedal edema


Musculoskeletal exam: PRESENT: tenderness - left knee


Neurological exam: PRESENT: alert, awake, oriented to person, oriented to place

, oriented to time, oriented to situation, CN II-XII grossly intact.  ABSENT: 

motor sensory deficit


Psychiatric exam: PRESENT: appropriate affect, normal mood.  ABSENT: homicidal 

ideation, suicidal ideation


Skin exam: PRESENT: dry, intact, warm.  ABSENT: cyanosis, rash





Results


Impressions: 


 





Head CT  06/04/18 05:20


IMPRESSION:


1.  No acute intracranial abnormality identified.


 


This exam was performed according to our departmental


dose-optimization program, which includes automated exposure


control, adjustment of the mA and/or kV according to patient size


and/or use of iterative reconstruction technique.


  


 








Chest X-Ray  06/04/18 07:53


IMPRESSION:  NO ACUTE RADIOGRAPHIC FINDING IN THE CHEST.


 








Knee X-Ray  06/04/18 07:53


IMPRESSION:  Chronic stable changes related to the patient's underlying sickle 

cell disease.  No acute fracture or cortical collapse.


 














Assessment & Plan





- Diagnosis


(1) Sickle cell disease with crisis


Is this a current diagnosis for this admission?: Yes   


Plan: 


See admitting attending physician orders.








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


Is this a current diagnosis for this admission?: Yes   


Plan: 


See admitting attending physician orders.








(3) Knee osteonecrosis, left


Is this a current diagnosis for this admission?: Yes   


Plan: 


See admitting attending physician orders.








(4) Asthma


Qualifiers: 


   Asthma severity: mild   Asthma complication type: uncomplicated 


Is this a current diagnosis for this admission?: Yes   


Plan: 


See admitting attending physician orders.








- Time


Time Spent: 50 to 70 Minutes


Medications reviewed and adjusted accordingly: Yes


Anticipated discharge: Home


Within: Other





- Inpatient Certification


Based on my medical assessment, after consideration of the patient's 

comorbidities, presenting symptoms, or acuity I expect that the services needed 

warrant INPATIENT care.: Yes


I certify that my determination is in accordance with my understanding of 

Medicare's requirements for reasonable and necessary INPATIENT services [42 CFR 

412.3e].: Yes


Medical Necessity: Need Close Monitoring Due to Risk of Patient Decompensation, 

Need For IV Fluids, Need For Continuous Telemetry Monitoring, Need for Pain 

Control, Risk of Complication if Not Cared For in Hospital


Post Hospital Care: D/C Planner Documentation





- Plan Summary


Plan Summary: 


See admitting attending physician orders.

## 2018-06-04 NOTE — RADIOLOGY REPORT (SQ)
EXAM DESCRIPTION:  CHEST 2 VIEWS



COMPLETED DATE/TIME:  6/4/2018 8:24 am



REASON FOR STUDY:  sickle cell



COMPARISON:  1/17/2018



EXAM PARAMETERS:  NUMBER OF VIEWS: two views

TECHNIQUE: Digital Frontal and Lateral radiographic views of the chest acquired.

RADIATION DOSE: NA

LIMITATIONS: none



FINDINGS:  LUNGS AND PLEURA: Minimal chronic changes.  No acute opacities.

MEDIASTINUM AND HILAR STRUCTURES: No masses or contour abnormalities.

HEART AND VASCULAR STRUCTURES: Heart normal size.  No evidence for failure.

BONES: Bony changes of sickle disease.

HARDWARE: None in the chest.

OTHER: No other significant finding.



IMPRESSION:  NO ACUTE RADIOGRAPHIC FINDING IN THE CHEST.



TECHNICAL DOCUMENTATION:  JOB ID:  [id number]

 2011 [employer]- All Rights Reserved



Reading location - IP/workstation name: [email or web address]

## 2018-06-05 NOTE — PDOC PROGRESS REPORT
Subjective


Progress Note for:: 06/05/18


Subjective:: 





Patient continue to experience left knee joint pain. No chest pain or 

difficulty with breathing. No fever or chills. Pain is fairly controlled on 

current medication management and use of K-PAD.


Reason For Visit: 


SICKLE CELL DISEASE IN HEMOLYTIC AND PAIN CRISES








Physical Exam


Vital Signs: 


 











Temp Pulse Resp BP Pulse Ox


 


 97.9 F   85   16   118/75   99 


 


 06/05/18 04:29  06/05/18 04:29  06/05/18 04:29  06/05/18 04:29  06/05/18 04:29








 Intake & Output











 06/04/18 06/05/18 06/06/18





 06:59 06:59 06:59


 


Intake Total  3501 


 


Output Total  1800 


 


Balance  1701 


 


Weight  57.2 kg 











General appearance: PRESENT: mild distress - from sickle disease related pain 

crisis


Head exam: PRESENT: atraumatic, normocephalic


Eye exam: PRESENT: conjunctiva pink, EOMI, PERRLA.  ABSENT: scleral icterus


Mouth exam: PRESENT: moist


Respiratory exam: PRESENT: clear to auscultation brenda


Cardiovascular exam: PRESENT: RRR.  ABSENT: diastolic murmur, rubs, systolic 

murmur


GI/Abdominal exam: PRESENT: normal bowel sounds, soft.  ABSENT: distended, 

guarding, mass, organolmegaly, rebound, tenderness


Extremities exam: ABSENT: pedal edema


Musculoskeletal exam: PRESENT: tenderness - left knee joint to palpation


Neurological exam: PRESENT: alert, awake, oriented to person, oriented to place

, oriented to time, oriented to situation, CN II-XII grossly intact.  ABSENT: 

motor sensory deficit


Psychiatric exam: PRESENT: appropriate affect, normal mood.  ABSENT: homicidal 

ideation, suicidal ideation


Skin exam: PRESENT: dry, intact, warm.  ABSENT: cyanosis, rash





Results


Laboratory Results: 


 





 06/05/18 06:24 





 











  06/05/18





  06:24


 


Sodium  141.5


 


Potassium  4.9


 


Chloride  104


 


Carbon Dioxide  27


 


Anion Gap  11


 


BUN  11


 


Creatinine  0.72


 


Est GFR ( Amer)  > 60


 


Est GFR (Non-Af Amer)  > 60


 


Glucose  96


 


Calcium  9.1


 


Total Bilirubin  1.6 H


 


AST  56


 


ALT  29


 


Alkaline Phosphatase  130 H


 


Total Protein  7.6


 


Albumin  4.2











Impressions: 


 





Head CT  06/04/18 05:20


IMPRESSION:


1.  No acute intracranial abnormality identified.


 


This exam was performed according to our departmental


dose-optimization program, which includes automated exposure


control, adjustment of the mA and/or kV according to patient size


and/or use of iterative reconstruction technique.


  


 








Chest X-Ray  06/04/18 07:53


IMPRESSION:  NO ACUTE RADIOGRAPHIC FINDING IN THE CHEST.


 








Knee X-Ray  06/04/18 07:53


IMPRESSION:  Chronic stable changes related to the patient's underlying sickle 

cell disease.  No acute fracture or cortical collapse.


 














Assessment & Plan





- Diagnosis


(1) Sickle cell disease with crisis


Is this a current diagnosis for this admission?: Yes   


Plan: 


See attending physician orders. Continue current IV fluid support, supplemental 

oxygen and pain management.








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


Is this a current diagnosis for this admission?: Yes   


Plan: 


See attending physician orders. Continue current IV fluid support, supplemental 

oxygen and pain management.








(3) Knee osteonecrosis, left


Is this a current diagnosis for this admission?: Yes   


Plan: 


See attending physician orders. Continue current pain management.








(4) Asthma


Qualifiers: 


   Asthma severity: mild   Asthma complication type: uncomplicated 


Is this a current diagnosis for this admission?: Yes   


Plan: 


See attending physician orders. Continue current management.








- Time


Time Spent with patient: 25-34 minutes


Medications reviewed and adjusted accordingly: Yes


Anticipated discharge: Home


Within: Other





- Inpatient Certification


Based on my medical assessment, after consideration of the patient's 

comorbidities, presenting symptoms, or acuity I expect that the services needed 

warrant INPATIENT care.: Yes


I certify that my determination is in accordance with my understanding of 

Medicare's requirements for reasonable and necessary INPATIENT services [42 CFR 

412.3e].: Yes


Medical Necessity: Need Close Monitoring Due to Risk of Patient Decompensation, 

Need For IV Fluids, Need For Continuous Telemetry Monitoring, Need for Pain 

Control, Risk of Complication if Not Cared For in Hospital


Post Hospital Care: D/C Planner Documentation





- Plan Summary


Plan Summary: 





See attending physician orders. Continue current IV fluid support, supplemental 

oxygen and pain management.

## 2018-06-06 NOTE — PDOC PROGRESS REPORT
Subjective


Progress Note for:: 06/06/18


Subjective:: 





Patient reported some degree of improvement in his pain level. He was able to 

ambulate on the floor yesterday. Ho chest pain or difficulty with breathing. No 

fever, chills, nausea or vomiting.


Reason For Visit: 


SICKLE CELL DISEASE IN HEMOLYTIC AND PAIN CRISES








Physical Exam


Vital Signs: 


 











Temp Pulse Resp BP Pulse Ox


 


 98.3 F   91   14   130/87 H  97 


 


 06/05/18 23:29  06/06/18 02:00  06/05/18 23:29  06/05/18 23:29  06/05/18 23:29








 Intake & Output











 06/05/18 06/06/18 06/07/18





 06:59 06:59 06:59


 


Intake Total 3501 3278 


 


Output Total 1800 1700 


 


Balance 1701 1578 


 


Weight 57.2 kg 57.3 kg 











Physical Exam: 





General appearance: PRESENT: mild distress - from sickle disease related pain 

crisis


Head exam: PRESENT: atraumatic, normocephalic


Eye exam: PRESENT: conjunctiva pink, EOMI, PERRLA.  ABSENT: scleral icterus


Mouth exam: PRESENT: moist


Respiratory exam: PRESENT: clear to auscultation brenda


Cardiovascular exam: PRESENT: RRR.  ABSENT: diastolic murmur, rubs, systolic 

murmur


GI/Abdominal exam: PRESENT: normal bowel sounds, soft.  ABSENT: distended, 

guarding, mass, organomegaly, rebound, tenderness


Extremities exam: ABSENT: pedal edema


Musculoskeletal exam: PRESENT: tenderness - improved left knee joint to 

palpation and range of motion


Neurological exam: PRESENT: alert, awake, oriented to person, oriented to place

, oriented to time, oriented to situation, CN II-XII grossly intact.  ABSENT: 

motor sensory deficit


Psychiatric exam: PRESENT: appropriate affect, normal mood.  ABSENT: homicidal 

ideation, suicidal ideation


Skin exam: PRESENT: dry, intact, warm.  ABSENT: cyanosis, rash





Results


Laboratory Results: 


 





 06/05/18 06:24 





 06/05/18 06:24 





 











  06/05/18 06/05/18





  06:24 06:24


 


WBC  6.9 


 


RBC  3.07 L 


 


Hgb  11.0 L 


 


Hct  31.1 L 


 


MCV  101 H 


 


MCH  35.9 H 


 


MCHC  35.5 


 


RDW  29.3 H 


 


Plt Count  282 


 


Seg Neutrophils %  Not Reportable 


 


Lymphocytes %  Not Reportable 


 


Monocytes %  Not Reportable 


 


Eosinophils %  Not Reportable 


 


Basophils %  Not Reportable 


 


Absolute Neutrophils  Not Reportable 


 


Absolute Lymphocytes  Not Reportable 


 


Absolute Monocytes  Not Reportable 


 


Absolute Eosinophils  Not Reportable 


 


Absolute Basophils  Not Reportable 


 


Sodium   141.5


 


Potassium   4.9


 


Chloride   104


 


Carbon Dioxide   27


 


Anion Gap   11


 


BUN   11


 


Creatinine   0.72


 


Est GFR ( Amer)   > 60


 


Est GFR (Non-Af Amer)   > 60


 


Glucose   96


 


Calcium   9.1


 


Total Bilirubin   1.6 H


 


AST   56


 


ALT   29


 


Alkaline Phosphatase   130 H


 


Total Protein   7.6


 


Albumin   4.2











Impressions: 


 





Head CT  06/04/18 05:20


IMPRESSION:


1.  No acute intracranial abnormality identified.


 


This exam was performed according to our departmental


dose-optimization program, which includes automated exposure


control, adjustment of the mA and/or kV according to patient size


and/or use of iterative reconstruction technique.


  


 








Chest X-Ray  06/04/18 07:53


IMPRESSION:  NO ACUTE RADIOGRAPHIC FINDING IN THE CHEST.


 








Knee X-Ray  06/04/18 07:53


IMPRESSION:  Chronic stable changes related to the patient's underlying sickle 

cell disease.  No acute fracture or cortical collapse.


 














Assessment & Plan





- Diagnosis


(1) Sickle cell disease with crisis


Is this a current diagnosis for this admission?: Yes   


Plan: 


See attending physician orders. Continue current IV fluid support, supplemental 

oxygen. Transition to preadmission home oral medication for pain management.








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


Is this a current diagnosis for this admission?: Yes   


Plan: 


See attending physician orders. Continue current IV fluid support, supplemental 

oxygen and pain management.








(3) Knee osteonecrosis, left


Is this a current diagnosis for this admission?: Yes   


Plan: 


See attending physician orders. Continue current pain management.








(4) Asthma


Qualifiers: 


   Asthma severity: mild   Asthma complication type: uncomplicated 


Is this a current diagnosis for this admission?: Yes   


Plan: 


See attending physician orders. Continue current management.








- Time


Time Spent with patient: 25-34 minutes


Medications reviewed and adjusted accordingly: Yes


Anticipated discharge: Home


Within: Other





- Inpatient Certification


Based on my medical assessment, after consideration of the patient's 

comorbidities, presenting symptoms, or acuity I expect that the services needed 

warrant INPATIENT care.: Yes


I certify that my determination is in accordance with my understanding of 

Medicare's requirements for reasonable and necessary INPATIENT services [42 CFR 

412.3e].: Yes


Medical Necessity: Need Close Monitoring Due to Risk of Patient Decompensation, 

Need For IV Fluids, Need For Continuous Telemetry Monitoring, Need for Pain 

Control, Risk of Complication if Not Cared For in Hospital


Post Hospital Care: D/C Planner Documentation





- Plan Summary


Plan Summary: 





See attending physician orders.

## 2018-06-07 NOTE — PDOC DISCHARGE SUMMARY
General





- Admit/Disc Date/PCP


Admission Date/Primary Care Provider: 


  06/04/18 09:56





  DIYA JOSE MARTIN





Discharge Date: 06/07/18





- Discharge Diagnosis


(1) Sickle cell disease with crisis


Is this a current diagnosis for this admission?: Yes   


Summary: 


improved pain and hemolytic process








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


Is this a current diagnosis for this admission?: Yes   


Summary: 


stable








(3) Knee osteonecrosis, left


Is this a current diagnosis for this admission?: Yes   


Summary: 


Resolving pain and improve function








(4) Asthma


Is this a current diagnosis for this admission?: Yes   


Summary: 


stable








- Additional Information


Resuscitation Status: Full Code


Home Medications: 








Enoxaparin Sodium [Lovenox Inj 40 mg/0.4 ml Disp.syrin] 40 mg SUBCUT DAILY 06/04 /18 


Fentanyl [Duragesic 75 Mcg/Hr Transdermal Patch] 1 each TD Q3D 06/04/18 


Folic Acid [Folvite 1 mg Tablet] 1 mg PO DAILY 06/04/18 


Hydroxyurea [Hydrea 500 mg Capsule] 1,000 mg PO SUTUTHSA@1000 06/04/18 


Hydroxyurea [Hydrea 500 mg Capsule] 1,500 mg PO MOWEFR@1000 06/04/18 


Oxycodone HCl [Oxy-Ir 5 mg Tablet] 15 mg PO Q4HP PRN 06/04/18 











History of Present Illness


History of Present Illness: 


CARMEN BEGUM is a 22 year old male known to my practice who presented with 

flare up of his usual sickle cell disease pain. Patient reported compliance 

with his home pain medication. He reported knee pain and headache. He denied 

any recent trauma or instrumentation. He was seen at the ED recently and 

discharged home after evaluation and treatment. He claimed minimal improvement 

in his symptoms. He denied any fever but experience chills. There is no 

definite fever or chills. His ED evaluation was significant for episodes of 

tachypnea that is probably related to his pain exacerbation. His laboratory 

evaluation did suggest ongoing hemolytic process comparative to his recent 

visit laboratory evaluation. His X ray did suggest stable left knee 

osteonecrosis. His head CT scan was devoid of any acute process.








Hospital Course


Hospital Course: 


Patient responded to IV Morphine for pain management, IV fluid support and 

supplemental oxygen therapy. he was able to transition to oral medication 

management for pain control and have remain stable over last 24 hours. he is 

agreeable to discharge home today. he will follow up with Dr Cleaning for his 

sickle cell disease and pain management on outpatient. he will follow up with 

me in the office as instructed upon discharge for his asthma and other medical 

issues management.





Physical Exam


Vital Signs: 


 











Temp Pulse Resp BP Pulse Ox


 


 98.1 F   100   16   132/78 H  96 


 


 06/07/18 04:17  06/07/18 04:17  06/07/18 04:17  06/07/18 04:17  06/07/18 04:17








 Intake & Output











 06/06/18 06/07/18 06/08/18





 06:59 06:59 06:59


 


Intake Total 3778 4113 


 


Output Total 1700 3715 


 


Balance 2078 398 


 


Weight 57.3 kg 60.4 kg 











Physical Exam: 


General appearance: PRESENT: mild distress - from sickle disease related pain 

crisis


Head exam: PRESENT: atraumatic, normocephalic


Eye exam: PRESENT: conjunctiva pink, EOMI, PERRLA.  ABSENT: pallor, scleral 

icterus


Mouth exam: PRESENT: moist


Respiratory exam: PRESENT: clear to auscultation brenda


Cardiovascular exam: PRESENT: RRR.  ABSENT: diastolic murmur, rubs, systolic 

murmur


GI/Abdominal exam: PRESENT: normal bowel sounds, soft.  ABSENT: distended, 

guarding, mass, organomegaly, rebound, tenderness


Extremities exam: ABSENT: pedal edema


Musculoskeletal exam: PRESENT: tenderness - improved left knee joint to 

palpation and range of motion


Neurological exam: PRESENT: alert, awake, oriented to person, oriented to place

, oriented to time, oriented to situation, CN II-XII grossly intact.  ABSENT: 

motor sensory deficit


Psychiatric exam: PRESENT: appropriate affect, normal mood.  ABSENT: homicidal 

ideation, suicidal ideation


Skin exam: PRESENT: dry, intact, warm.  ABSENT: cyanosis, rash








Results


Laboratory Results: 


 





 06/05/18 06:24 





 06/05/18 06:24 








Impressions: 


 





Head CT  06/04/18 05:20


IMPRESSION:


1.  No acute intracranial abnormality identified.


 


This exam was performed according to our departmental


dose-optimization program, which includes automated exposure


control, adjustment of the mA and/or kV according to patient size


and/or use of iterative reconstruction technique.


  


 








Chest X-Ray  06/04/18 07:53


IMPRESSION:  NO ACUTE RADIOGRAPHIC FINDING IN THE CHEST.


 








Knee X-Ray  06/04/18 07:53


IMPRESSION:  Chronic stable changes related to the patient's underlying sickle 

cell disease.  No acute fracture or cortical collapse.


 














Qualifiers





- *


PATIENT BEING DISCHARGED WITH ANY OF THE FOLLOWING DIAGNOSIS: No





Plan


Discharge Plan: 





D/C home today. Follow up with Dr Cleaning as schedule and with me as instructed 

upon discharge.

## 2018-06-10 NOTE — ER DOCUMENT REPORT
ED General





- General


Chief Complaint: Sickle Cell Crisis


Stated Complaint: BACK/CHEST PAIN


Time Seen by Provider: 06/10/18 17:09


Mode of Arrival: Ambulatory


Notes: 





Patient is a 22-year-old male with history of sickle cell SS who presents with 

left-sided chest wall pain as well as right knee pain.  He describes it as a 

stabbing, aching pain to the affected areas.  Nothing improves or worsens this 

pain.  He notes this feels very similar to when he has had sickle cell crises 

in the past.  He denies feeling short of breath or having fever.  He states 

this does not feel like when he has had acute chest in the past.  He continues 

to follow with his hematologist Dr. Cleaning regarding his sickle cell anemia.


TRAVEL OUTSIDE OF THE [place] IN LAST 30 DAYS: No





- Related Data


Allergies/Adverse Reactions: 


 





Coconut * [Coconut] Allergy (Mild, Verified 05/27/18 11:14)


 


transpore tape Allergy (Mild, Uncoded 05/27/18 11:14)


 Hives











Past Medical History





- General


Information source: Patient





- Social History


Smoking Status: Never Smoker


Chew tobacco use (# tins/day): No


Frequency of alcohol use: None


Drug Abuse: None


Lives with: Spouse/Significant other


Family History: Reviewed & Not Pertinent, Arthritis, DM, Hypertension, Other - 

Sickle cell trait both parents and asthma


Patient has suicidal ideation: No


Patient has homicidal ideation: No


Pulmonary Medical History: Reports: Hx Asthma, Hx Pneumonia


Renal/ Medical History: Denies: Hx Peritoneal Dialysis


Psychiatric Medical History: 


   Denies: Hx Depression


Past Surgical History: Reports: Hx Abdominal Surgery - Gallstones removal, Hx 

Cholecystectomy, Hx Orthopedic Surgery - Fluid drained from right foot, Hx 

Vascular Surgery - Port placement





- Immunizations


Immunizations up to date: Yes


Hx Diphtheria, Pertussis, Tetanus Vaccination: Yes


Hx Pneumococcal Vaccination: 05/16/13





Review of Systems





- Review of Systems


Notes: 





Constitutional: Negative for fever.


HENT: Negative for sore throat.


Eyes: Negative for visual changes.


Cardiovascular: Negative for chest pain.


Respiratory: Negative for shortness of breath.


Gastrointestinal: Negative for abdominal pain, vomiting or diarrhea.


Genitourinary: Negative for dysuria.


Musculoskeletal: Positive for chest wall and right knee pain


Skin: Negative for rash.


Neurological: Negative for headaches, weakness or numbness.





10 point ROS negative except as marked above and in HPI.





Physical Exam





- Vital signs


Vitals: 


 











Temp Pulse Resp BP Pulse Ox


 


 99.2 F   119 H  18   134/84 H  94 


 


 06/10/18 16:57  06/10/18 16:57  06/10/18 16:57  06/10/18 16:57  06/10/18 16:57











Interpretation: Tachycardic - Resolved at the time of my assessment


Notes: 





PHYSICAL EXAMINATION:





GENERAL: Well-appearing, well-nourished and in no acute distress.





HEAD: Atraumatic, normocephalic.





EYES: Pupils equal round and reactive to light, extraocular movements intact, 

sclera anicteric, conjunctiva are normal.





ENT: nares patent, oropharynx clear without exudates.  Moist mucous membranes.





NECK: Normal range of motion, supple without lymphadenopathy





LUNGS: Breath sounds clear to auscultation bilaterally and equal.  No wheezes 

rales or rhonchi.





HEART: Regular rate and rhythm without murmurs





ABDOMEN: Soft, nontender, normoactive bowel sounds.  No guarding, no rebound.  

No masses appreciated.





EXTREMITIES: Normal range of motion, no pitting or edema.  No cyanosis.





NEUROLOGICAL: No focal neurological deficits. Moves all extremities 

spontaneously and on command.





PSYCH: Normal mood, normal affect.





SKIN: Warm, Dry, normal turgor, no rashes or lesions noted.





Course





- Re-evaluation


Re-evalutation: 





06/10/18 19:49


Presentation is most consistent with an uncomplicated sickle cell pain crisis.  

Patient has no evidence of an aplastic crisis on labs.  Chest x-ray and vitals 

are not consistent with acute chest syndrome.  Vitals have remained within 

normal limits here in the emergency department.  Patient's pain has been able 

to be controlled using IV analgesia.  The patient is agreeable to discharge 

home at this time.  I recommended that they follow closely with their primary 

hematologist.  Return precautions have been reviewed and discussed and patient 

has verbalized indications to return to the emergency department.





- Vital Signs


Vital signs: 


 











Temp Pulse Resp BP Pulse Ox


 


 99.2 F   119 H  18   129/91 H  96 


 


 06/10/18 16:57  06/10/18 16:57  06/10/18 21:01  06/10/18 21:01  06/10/18 21:01














- Laboratory


Result Diagrams: 


 06/10/18 19:05





 06/10/18 19:05


Laboratory results interpreted by me: 


 











  06/10/18 06/10/18





  19:05 19:05


 


RBC  3.25 L 


 


Hgb  11.3 L 


 


Hct  32.9 L 


 


MCV  101 H 


 


MCH  34.8 H 


 


RDW  28.0 H 


 


Band Neutrophils %  1 L 


 


Abs Neuts (Manual)  0.0 L 


 


Abs Lymphs (Manual)  0.0 L 


 


Abs Monocytes (Manual)  0.0 L 


 


Retic Count (auto)  7.87 H 


 


Absolute Retic  0.256 H 


 


Sodium   147.2 H


 


Total Bilirubin   2.7 H


 


Direct Bilirubin   0.5 H


 


AST   61 H


 


ALT   20 L


 


Alkaline Phosphatase   132 H


 


Total Protein   9.1 H














- Diagnostic Test


Radiology reviewed: Image reviewed, Reports reviewed


Radiology results interpreted by me: 





06/10/18 19:49


Chest x-ray: No acute infiltrate or pneumothorax





Discharge





- Discharge


Clinical Impression: 


 Sickle cell crisis





Sickle cell anemia


Qualifiers:


 Sickle-cell associated disorders: with unspecified crisis Qualified Code(s): 

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





Condition: Good


Disposition: HOME, SELF-CARE


Additional Instructions: 


You were seen today for sickle cell pain crisis.  Please follow-up with your 

hematologist.  Returning to the ED if you have worsening pain, fever greater 

than 100.4, shortness of breath, persistent vomiting, or any other symptoms 

that are concerning to you.


Referrals: 


DIYA PHILIPPE MD [Primary Care Provider] - Follow up as needed

## 2018-06-10 NOTE — RADIOLOGY REPORT (SQ)
EXAM DESCRIPTION:  CHEST 2 VIEWS



COMPLETED DATE/TIME:  6/10/2018 6:58 pm



REASON FOR STUDY:  cp



COMPARISON:  Chest x-ray 6/4/2018.



EXAM PARAMETERS:  NUMBER OF VIEWS: two views

TECHNIQUE: Digital Frontal and Lateral radiographic views of the chest acquired.

RADIATION DOSE: NA

LIMITATIONS: none



FINDINGS:  LUNGS AND PLEURA: No consolidation, pneumothorax or pleural effusion.

MEDIASTINUM AND HILAR STRUCTURES: No masses or contour abnormalities.

HEART AND VASCULAR STRUCTURES: Heart normal size.  No evidence for failure.

BONES: Increased sclerosis at the bilateral humeral heads and H- shaped vertebral bodies, consistent 
with changes of sickle cell disease.

HARDWARE: None in the chest.



IMPRESSION:  No acute radiographic finding in the chest.



TECHNICAL DOCUMENTATION:  JOB ID:  [id number]

[id number]

 2011 [employer] All Rights Reserved



Reading location - IP/workstation name: [email or web address]

## 2018-06-10 NOTE — ER DOCUMENT REPORT
ED Medical Screen (RME)





- General


Chief Complaint: Sickle Cell Crisis


Stated Complaint: BACK/CHEST PAIN


Time Seen by Provider: 06/10/18 17:09


Mode of Arrival: Ambulatory


Information source: Patient


Notes: 





This is a 22-year-old man with a history of sickle cell disease who presents to 

the emergency room with left chest pain.  Patient denies shortness of breath.  

Patient is followed by Dr. Cleaning.  He denies any fever, chills.  His 

medicines include hydroxyurea, folic acid, Advair for asthma and oxycodone.  


TRAVEL OUTSIDE OF THE [place] IN LAST 30 DAYS: No





- Related Data


Allergies/Adverse Reactions: 


 





Coconut * [Coconut] Allergy (Mild, Verified 05/27/18 11:14)


 


transpore tape Allergy (Mild, Uncoded 05/27/18 11:14)


 Hives











Past Medical History





- Social History


Chew tobacco use (# tins/day): No


Frequency of alcohol use: None


Drug Abuse: None


Family history: None


Pulmonary Medical History: Reports: Hx Asthma, Hx Pneumonia


Renal/ Medical History: Denies: Hx Peritoneal Dialysis


Psychiatric Medical History: 


   Denies: Hx Depression


Past Surgical History: Reports: Hx Abdominal Surgery - Gallstones removal, Hx 

Cholecystectomy, Hx Orthopedic Surgery - Fluid drained from right foot, Hx 

Vascular Surgery - Port placement





- Immunizations


Immunizations up to date: Yes


Hx Diphtheria, Pertussis, Tetanus Vaccination: Yes


History of Influenza Vaccine for 10/2017 - 3/2018 Season: Yes


Influenza Administration Date for 10/2017 - 3/2018 Season: 10/01/17





Physical Exam





- Vital signs


Vitals: 





 











Temp Pulse Resp BP Pulse Ox


 


 99.2 F   119 H  18   134/84 H  94 


 


 06/10/18 16:57  06/10/18 16:57  06/10/18 16:57  06/10/18 16:57  06/10/18 16:57














Course





- Vital Signs


Vital signs: 





 











Temp Pulse Resp BP Pulse Ox


 


 99.2 F   119 H  18   134/84 H  94 


 


 06/10/18 16:57  06/10/18 16:57  06/10/18 16:57  06/10/18 16:57  06/10/18 16:57














Doctor's Discharge





- Discharge


Referrals: 


DIYA PHILIPPE MD [Primary Care Provider] - Follow up as needed

## 2018-06-15 NOTE — ER DOCUMENT REPORT
ED General





- General


Chief Complaint: Sickle Cell Crisis


Stated Complaint: RIGHT KNEE PAIN


Time Seen by Provider: 06/14/18 21:56


Notes: 


Patient is a 22-year-old male who presents with complaint of pain in his right 

knee.  He says his pain is pretty typical for him with his sickle cell pain.  

No fevers.  No vomiting.  No chest pain.  No difficulty breathing.  No other 

complaints at this time.  His primary care physician is Dr. Funez.  His 

hematologist is Dr. Cleaning. 





TRAVEL OUTSIDE OF THE [place] IN LAST 30 DAYS: No





- Related Data


Allergies/Adverse Reactions: 


 





Coconut * [Coconut] Allergy (Mild, Verified 05/27/18 11:14)


 


transpore tape Allergy (Mild, Uncoded 05/27/18 11:14)


 Hives











Past Medical History





- Social History


Smoking Status: Unknown if Ever Smoked


Frequency of alcohol use: None


Drug Abuse: None


Family History: Reviewed & Not Pertinent, Arthritis, DM, Hypertension, Other - 

Sickle cell trait both parents and asthma


Patient has suicidal ideation: No


Patient has homicidal ideation: No


Pulmonary Medical History: Reports: Hx Asthma, Hx Pneumonia


Renal/ Medical History: Denies: Hx Peritoneal Dialysis


Psychiatric Medical History: 


   Denies: Hx Depression


Past Surgical History: Reports: Hx Abdominal Surgery - Gallstones removal, Hx 

Cholecystectomy, Hx Orthopedic Surgery - Fluid drained from right foot, Hx 

Vascular Surgery - Port placement





- Immunizations


Immunizations up to date: Yes


Hx Diphtheria, Pertussis, Tetanus Vaccination: Yes


Hx Pneumococcal Vaccination: 05/16/13





Review of Systems





- Review of Systems


Notes: 





My Normal Review Basic





REVIEW OF SYSTEMS:


CONSTITUTIONAL :  Denies fever,  chills, or sweats.  Denies recent illness.


EENT:   Denies eye, ear, throat, or mouth pain or symptoms.  Denies nasal or 

sinus congestion.


CARDIOVASCULAR:  Denies chest pain.


RESPIRATORY:  Denies cough, cold, or chest congestion.  Denies shortness of 

breath, difficulty breathing, or wheezing.


GASTROINTESTINAL:  Denies abdominal pain.  Denies nausea, vomiting, or 

diarrhea.  Denies constipation.  Last BM: 


GENITOURINARY:  Denies difficulty urinating, painful urination, burning, 

frequency, or blood in urine.


MUSCULOSKELETAL: Right knee pain


SKIN:   Denies rash or skin lesions.


HEMATOLOGIC :   History of sickle cell disease


NEUROLOGICAL:  Denies altered mental status or loss of consciousness.  Denies 

headache.  Denies weakness or paralysis or loss of use of either side.  Denies 

problems with gait or speech.  Denies sensory or motor loss.


ALL OTHER SYSTEMS REVIEWED AND NEGATIVE.





Physical Exam





- Vital signs


Vitals: 


 











Temp Pulse Resp BP Pulse Ox


 


 99.1 F   103 H  16   135/88 H  95 


 


 06/14/18 20:34  06/14/18 20:34  06/14/18 20:34  06/14/18 20:34  06/14/18 20:34














- Notes


Notes: 





General Appearance: Well nourished, alert, cooperative, no acute distress, mild 

obvious discomfort.


Vitals: reviewed, See vital signs table.


Head: no swelling or tenderness to the head


Eyes: PERRL, EOMI, Conjuctiva clear


Mouth: No decreasd moisture


Throat: No tonsillar inflammation, No airway obstruction,  No lymphadenopathy


Neck: Supple, no neck tenderness, No thyromegaly


Lungs: No wheezing, No rales, No rhonci, No accessory muscle use, good air 

exchange bilaterally.


Heart: Normal rate, Regular rythm, No murmur, no rub


Abdomen: Normal BS, soft, No rigidity, No abdominal tenderness, No guarding, no 

rebound, no abdominal masses, no organomegaly


Extremities: strength 5/5 in all extremities, good pulses in all extremities, 

patient has no swelling or redness or signs of infection to the right knee.  Is 

able to fully flex and extend the knee without difficulty., no edema.


Skin: warm, dry, appropriate color, no rash


Neuro: speech clear, oriented x 3, normal affect, responds appropriately to 

questions.





Course





- Re-evaluation


Re-evalutation: 





06/15/18 00:28


After 1 dose of fentanyl the patient says his pain is gone and he continues to 

feel well and actually requests to be discharged home.  His anemia is 

consistent with his typical findings.  I feel that he is safe to be discharged 

home.  I strongly encouraged him to return to the ER immediately if he has 

worsening pain, fevers, pain, difficulty breathing, or feels unwell.  His only 

request is that he receives more dose pain medicine for he had some that he has 

bridge until he can go home and take his pain medicine as needed.  I will give 

him 1 more dose of fentanyl.  Patient again requests to go home and has no 

further concerns at this time.  He has no signs of acute chest syndrome.  He 

has no social abdominal pain.  He has no difficulty breathing.  He looks well.





Dictation of this chart was performed using voice recognition software; 

therefore, there may be some unintended grammatical errors.





- Vital Signs


Vital signs: 


 











Temp Pulse Resp BP Pulse Ox


 


 99.1 F   103 H  16   135/88 H  95 


 


 06/14/18 20:34  06/14/18 20:34  06/14/18 20:34  06/14/18 20:34  06/14/18 20:34














- Laboratory


Result Diagrams: 


 06/14/18 22:21





 06/14/18 22:21


Laboratory results interpreted by me: 


 











  06/14/18 06/14/18





  22:21 22:21


 


RBC  3.25 L 


 


Hgb  11.7 L 


 


Hct  33.5 L 


 


MCV  103 H 


 


MCH  36.0 H 


 


RDW  28.7 H 


 


Lymphocytes % (Manual)  49 H 


 


Retic Count (auto)  9.76 H 


 


Absolute Retic  0.317 H 


 


Sodium   147.8 H


 


Total Bilirubin   1.8 H


 


Direct Bilirubin   0.6 H


 


ALT   16 L


 


Alkaline Phosphatase   132 H


 


Total Protein   9.1 H














Discharge





- Discharge


Clinical Impression: 


 Sickle cell crisis, Sickle cell anemia





Condition: Good


Disposition: HOME, SELF-CARE


Additional Instructions: 


Please return to the ER immediately if you develop intractable pain, difficulty 

breathing fevers, vomiting, or feel unwell. Please follow up with Dr. Cleaning 

Friday or Monday.

## 2018-06-17 NOTE — EKG REPORT
SEVERITY:- BORDERLINE ECG -

SINUS RHYTHM

PROBABLE LEFT ATRIAL ABNORMALITY

:

Confirmed by: Storm Bosch MD 17-Jun-2018 00:10:15

## 2018-06-17 NOTE — RADIOLOGY REPORT (SQ)
EXAM DESCRIPTION: 



XR CHEST 2 VIEWS



COMPLETED DATE/TME:  06/17/2018 00:00



CLINICAL HISTORY: chest pain



COMPARISON: 6/10/2018



FINDINGS: Frontal and lateral views of the chest.  The

cardiomediastinal silhouette has normal size and contour. No

consolidation, pneumothorax, or pleural effusion.  No displaced

rib fractures identified.  Prior cholecystectomy.



IMPRESSION:



1. No acute pulmonary process identified.

## 2018-06-17 NOTE — ER DOCUMENT REPORT
ED General





- General


Chief Complaint: Sickle Cell Crisis


Stated Complaint: POSSIBLE  SICKLE CELL OUTBREAK


Time Seen by Provider: 06/17/18 01:44


Notes: 





The patient is a 22-year-old male with history of sickle cell anemia SS who 

presents with a pain crisis.  He describes diffuse muscular skeletal pain 

involving his bilateral upper and lower extremities as well as his chest and 

back.  States the pain started shortly prior to arrival and has been 

progressively worsening since that time.  He has not tried anything for pain 

relief prior to arrival.  He denies associated fever, cough, shortness of 

breath or syncope.  States this feels very similar to his prior sickle cell 

exacerbations that he has had in the past.  He has not seen his general doctor 

regarding today's concerns.


TRAVEL OUTSIDE OF THE [place] IN LAST 30 DAYS: No





- Related Data


Allergies/Adverse Reactions: 


 





Coconut * [Coconut] Allergy (Mild, Verified 05/27/18 11:14)


 


transpore tape Allergy (Mild, Uncoded 05/27/18 11:14)


 Hives











Past Medical History





- General


Information source: Patient





- Social History


Smoking Status: Never Smoker


Frequency of alcohol use: None


Drug Abuse: None


Lives with: Spouse/Significant other


Family History: Reviewed & Not Pertinent, Arthritis, DM, Hypertension, Other - 

Sickle cell trait both parents and asthma


Pulmonary Medical History: Reports: Hx Asthma, Hx Pneumonia


Renal/ Medical History: Denies: Hx Peritoneal Dialysis


Psychiatric Medical History: 


   Denies: Hx Depression


Past Surgical History: Reports: Hx Abdominal Surgery - Gallstones removal, Hx 

Cholecystectomy, Hx Orthopedic Surgery - Fluid drained from right foot, Hx 

Vascular Surgery - Port placement





- Immunizations


Immunizations up to date: Yes


Hx Diphtheria, Pertussis, Tetanus Vaccination: Yes


Hx Pneumococcal Vaccination: 05/16/13





Review of Systems





- Review of Systems


Notes: 





Constitutional: Negative for fever.


HENT: Negative for sore throat.


Eyes: Negative for visual changes.


Cardiovascular: Positive for chest pain.


Respiratory: Negative for shortness of breath.


Gastrointestinal: Negative for abdominal pain, vomiting or diarrhea.


Genitourinary: Negative for dysuria.


Musculoskeletal: Positive for diffuse muscular skeletal pain.


Skin: Negative for rash.


Neurological: Negative for headaches, weakness or numbness.





10 point ROS negative except as marked above and in HPI.





Physical Exam





- Vital signs


Interpretation: Tachycardic


Notes: 





PHYSICAL EXAMINATION:





GENERAL: Appears to be in pain, uncomfortable





HEAD: Atraumatic, normocephalic.





EYES: Pupils equal round and reactive to light, extraocular movements intact, 

sclera anicteric, conjunctiva are normal.





ENT: nares patent, oropharynx clear without exudates.  Moist mucous membranes.





NECK: Normal range of motion, supple without lymphadenopathy





LUNGS: Breath sounds clear to auscultation bilaterally and equal.  No wheezes 

rales or rhonchi.





HEART: Regular tachycardia without murmurs





ABDOMEN: Soft, nontender, normoactive bowel sounds.  No guarding, no rebound.  

No masses appreciated.





EXTREMITIES: Normal range of motion, no pitting or edema.  No cyanosis.





NEUROLOGICAL: No focal neurological deficits. Moves all extremities 

spontaneously and on command.





PSYCH: Normal mood, normal affect.





SKIN: Warm, Dry, normal turgor, no rashes or lesions noted.





Course





- Re-evaluation


Re-evalutation: 





06/17/18 01:48


Presentation is most consistent with an uncomplicated sickle cell pain crisis.  

Patient has no evidence of an aplastic crisis on labs.  Chest x-ray and vitals 

are not consistent with acute chest syndrome.  Vitals have remained within 

normal limits here in the emergency department.  Patient's pain has been able 

to be controlled using IV analgesia.  The patient is agreeable to discharge 

home at this time.  I recommended that they follow closely with their primary 

hematologist.  Return precautions have been reviewed and discussed and patient 

has verbalized indications to return to the emergency department.





- Laboratory


Result Diagrams: 


 06/17/18 01:08





 06/17/18 01:08


Laboratory results interpreted by me: 


 











  06/17/18 06/17/18 06/17/18





  01:08 01:08 01:08


 


RBC  3.44 L  


 


Hgb  12.2 L  


 


Hct  35.2 L  


 


MCV  103 H  


 


MCH  35.5 H  


 


RDW  27.3 H  


 


Abs Neuts (Manual)  0.0 L  


 


Abs Lymphs (Manual)  0.0 L  


 


Abs Monocytes (Manual)  0.0 L  


 


Retic Count (auto)    8.94 H


 


Absolute Retic    0.306 H


 


Sodium   145.7 H 


 


Chloride   108 H 


 


Total Bilirubin   2.3 H 


 


Direct Bilirubin   0.5 H 


 


Total Protein   8.9 H 














- Diagnostic Test


Radiology reviewed: Image reviewed, Reports reviewed


Radiology results interpreted by me: 





06/17/18 02:26


Chest x-ray: No acute infiltrate or pneumothorax





Discharge





- Discharge


Clinical Impression: 


 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 

tachycardia





Condition: Good


Disposition: HOME, SELF-CARE


Additional Instructions: 


You were seen today for sickle cell pain crisis.  Please follow-up with your 

hematologist.  Returning to the ED if you have worsening pain, fever greater 

than 100.4, shortness of breath, persistent vomiting, or any other symptoms 

that are concerning to you.


Referrals: 


DIYA PHILIPPE MD [Primary Care Provider] - Follow up as needed

## 2018-06-22 NOTE — RADIOLOGY REPORT (SQ)
EXAM DESCRIPTION:  CTA CHEST



COMPLETED DATE/TIME:  6/22/2018 11:44 am



REASON FOR STUDY:  hypoxia, concern for acute chest syndrome



COMPARISON:  Chest x-ray 6/22/2018



TECHNIQUE:  CT scan of the chest performed using helical scanning technique with dynamic intravenous 
contrast injection.  Images reviewed with lung, soft tissue and bone windows.  Reconstructed coronal 
and sagittal MPR images reviewed.

Additional 3 dimensional post-processing performed to develop Maximal Intensity Projection images (MI
P).  All images stored on PACS.

All CT scanners at this facility use dose modulation, iterative reconstruction, and/or weight based d
osing when appropriate to reduce radiation dose to as low as reasonably achievable (ALARA).

[facility]: Dose Right  [facility]: CareDose    [facility]: Dose Right    [facility]: Teradose 4D    [facility]: Smart Technologies



CONTRAST TYPE AND DOSE:  contrast/concentration: Isovue 300.00 mg/ml; Total Contrast Delivered: 63.0 
ml; Total Saline Delivered: 80.0 ml

Contrast bolus optimized for the pulmonary arteries. Not diagnostic for the aorta.



RENAL FUNCTION:  BUN 10 creatinine 0.68



RADIATION DOSE:  CT Rad equipment meets quality standard of care and radiation dose reduction techniq
ues were employed. CTDIvol: 14.3 - 23.2 mGy. DLP: 526 mGy-cm. .



LIMITATIONS:  None.



FINDINGS:  LUNGS AND PLEURA: There is patchy opacification in the medial aspect of each lower lobe.

AORTA AND GREAT VESSELS: No aneurysm.  Contrast bolus not optimized for the aorta.

HEART: No pericardial effusion. No significant coronary artery calcifications.

PULMONARY ARTERIES: No emboli visualized in the main pulmonary arteries or the segmental branches.

HILAR AND MEDIASTINAL STRUCTURES: No identified masses or abnormal nodes.

HARDWARE: None in the chest.

UPPER ABDOMEN: Spleen is not identified.  Stomach is distended.

THYROID AND OTHER SOFT TISSUES: No masses.  No adenopathy.

BONES: Heterogeneous sclerotic changes are present in the bones consistent with the history of sickle
 cell disease.

3D MIPS: Confirm above findings.

OTHER: No other significant finding.



IMPRESSION:  1.  There is no evidence of pulmonary emboli.

2.  Bilateral lower lobe airspace disease.

3.  The stomach is distended.

4.  Chronic changes in the bones consistent with the history of sickle cell disease.



COMMENT:  Quality ID # [id number]: Final reports with documentation of one or more dose reduction techniques
 (e.g., Automated exposure control, adjustment of the mA and/or kV according to patient size, use of 
iterative reconstruction technique)



TECHNICAL DOCUMENTATION:  JOB ID:  [id number]

 2011 [employer]- All Rights Reserved



Reading location - IP/workstation name: [email or web address]

## 2018-06-22 NOTE — ER DOCUMENT REPORT
ED General





- General


Chief Complaint: Sickle Cell Crisis


Stated Complaint: SICKLE CELL CRISIS


Time Seen by Provider: 06/22/18 01:25


Notes: 


Patient is a 22-year-old male with a history of sickle cell that comes to the 

emergency department for chief complaint of pain to his right knee.  He states 

he has had worsening pain over the past 2 days, he did not injure it, he states 

he thinks it is related to sickle cell, he has had similar symptoms with a 

sickle cell flare in the past.  He denies any fever or chills, nausea or 

vomiting, shortness of breath. He denies any other areas of pain.  He still 

follows with hematology.  He takes OxyContin and he has a fentanyl patch.





TRAVEL OUTSIDE OF THE [place] IN LAST 30 DAYS: No





- Related Data


Allergies/Adverse Reactions: 


 





Coconut * [Coconut] Allergy (Mild, Verified 05/27/18 11:14)


 


transpore tape Allergy (Mild, Uncoded 05/27/18 11:14)


 Hives











Past Medical History





- General


Information source: Patient





- Social History


Smoking Status: Never Smoker


Frequency of alcohol use: None


Drug Abuse: None


Lives with: Family


Family History: Reviewed & Not Pertinent, Arthritis, DM, Hypertension, Other - 

Sickle cell trait both parents and asthma


Pulmonary Medical History: Reports: Hx Asthma, Hx Pneumonia


Renal/ Medical History: Denies: Hx Peritoneal Dialysis


Psychiatric Medical History: 


   Denies: Hx Depression


Past Surgical History: Reports: Hx Abdominal Surgery - Gallstones removal, Hx 

Cholecystectomy, Hx Orthopedic Surgery - Fluid drained from right foot, Hx 

Vascular Surgery - Port placement





- Immunizations


Immunizations up to date: Yes


Hx Diphtheria, Pertussis, Tetanus Vaccination: Yes


Hx Pneumococcal Vaccination: 05/16/13





Review of Systems





- Review of Systems


Constitutional: No symptoms reported


EENT: No symptoms reported


Cardiovascular: No symptoms reported


Respiratory: No symptoms reported


Gastrointestinal: No symptoms reported


Genitourinary: No symptoms reported


Male Genitourinary: No symptoms reported


Musculoskeletal: See HPI


Skin: No symptoms reported


Hematologic/Lymphatic: See HPI


Neurological/Psychological: No symptoms reported





Physical Exam





- Vital signs


Vitals: 


 











Temp Pulse Resp BP Pulse Ox


 


 98.6 F   112 H  18   136/90 H  95 


 


 06/22/18 00:22  06/22/18 00:22  06/22/18 00:22  06/22/18 00:22 06/22/18 00:22














- Notes


Notes: 





GENERAL: Alert, interacts well. No acute distress.


HEAD: Normocephalic, atraumatic.


EYES: Pupils equal, round, and reactive to light. Extraocular movements intact.


ENT: Oral mucosa moist, tongue midline. 


NECK: Full range of motion. Supple. Trachea midline.


LUNGS: Clear to auscultation bilaterally, no wheezes, rales, or rhonchi. No 

respiratory distress.


HEART: Tachycardic, normal rhythm, no murmur


ABDOMEN: Soft, non-tender. Non-distended. Bowel sounds present in all 4 

quadrants.


EXTREMITIES: Moves all 4 extremities spontaneously. No edema, normal radial and 

dorsalis pedis pulses bilaterally. No cyanosis.


BACK: no cervical, thoracic, lumbar midline tenderness. No saddle anesthesia, 

normal distal neurovascular exam. 


NEUROLOGICAL: Alert and oriented x3. Normal speech. [cranial nerves II through 

XII grossly intact]. 


PSYCH: Normal affect, normal mood.


SKIN: Warm, dry, normal turgor. No rashes or lesions noted.








Course





- Re-evaluation


Re-evalutation: 


Patient is mildly tachycardic but he is well-appearing, calm, afebrile, normal 

blood pressure.  Physical examination of the leg does not show any swelling.





After multiple rounds medications patient states he feels good, he is symptom-

free, and he wants to leave.  I discussed with patient that he is tachycardic, 

he states he is frequently, he denies chest pain, shortness of breath, or any 

other symptoms.  As result I have low suspicion of blood clot, infection, or 

serious emergent etiology.  He denies dizziness/lightheadedness. Patient still 

requesting to leave. Patient states he will return if he worsens.  Discharged 

with return precautions.





- Vital Signs


Vital signs: 


 











Temp Pulse Resp BP Pulse Ox


 


 98.2 F   115 H  19   142/79 H  95 


 


 06/22/18 04:18  06/22/18 04:18  06/22/18 04:18  06/22/18 04:18  06/22/18 04:18














- Laboratory


Result Diagrams: 


 06/22/18 00:35





 06/22/18 00:35


Laboratory results interpreted by me: 


 











  06/22/18 06/22/18





  00:35 00:35


 


RBC  2.96 L 


 


Hgb  10.4 L 


 


Hct  29.5 L 


 


MCV  100 H 


 


MCH  35.2 H 


 


RDW  27.5 H 


 


Retic Count (auto)  8.84 H 


 


Absolute Retic  0.262 H 


 


Chloride   108 H


 


Total Bilirubin   2.8 H


 


Direct Bilirubin   0.7 H


 


AST   76 H


 


Total Protein   9.1 H














Discharge





- Discharge


Clinical Impression: 


 Sickle cell crisis





Condition: Stable


Disposition: HOME, SELF-CARE


Additional Instructions: 


Follow-up with your provider for additional evaluation and management.


Return if you worsen in anyway including fever, difficulty breathing, severe 

pain, or any other concerning or worsening symptoms.


Referrals: 


DIYA PHILIPPE MD [Primary Care Provider] - Follow up tomorrow

## 2018-06-22 NOTE — ER DOCUMENT REPORT
ED General Pain





<MARYURI KENT DANA - Last Filed: 06/22/18 11:54>





- General


Mode of Arrival: Wheelchair


Information source: Patient


TRAVEL OUTSIDE OF THE [place] IN LAST 30 DAYS: No





<WADE CALDERON - Last Filed: 06/22/18 13:56>





- General


Chief Complaint: Shortness Of Breath


Stated Complaint: DIFFICULTY BREATHING


Time Seen by Provider: 06/22/18 09:06


Notes: 





Patient is a 22-year-old male with sickle cell disease who presents to the 

emergency department today with complaints of chest pain, pain with breathing, 

and shortness of breath.  Patient states he thinks he is wheezing as well.  

Patient describes his pain as a dull pain.  Patient has had acute chest in the 

past and he states his pain today feels somewhat similar to his acute chest in 

the past.  Patient was just seen overnight in the ED but wished to be 

discharged home as his pain went away.  Patient states the chest pain was not 

present during his previous encounter in the emergency department.  Patient 

denies any fevers. (WADE CALDERON)





- Related Data


Allergies/Adverse Reactions: 


 





Coconut * [Coconut] Allergy (Mild, Verified 05/27/18 11:14)


 


transpore tape Allergy (Mild, Uncoded 05/27/18 11:14)


 Hives











Past Medical History





<MARYURI KENT DANA - Last Filed: 06/22/18 11:54>





- General


Information source: Patient





- Social History


Smoking Status: Never Smoker


Cigarette use (# per day): No


Frequency of alcohol use: None


Drug Abuse: None


Lives with: Family


Family History: Reviewed & Not Pertinent, Arthritis, DM, Hypertension, Other - 

Sickle cell trait both parents and asthma


Pulmonary Medical History: Reports: Hx Asthma, Hx Pneumonia


Past Surgical History: Reports: Hx Abdominal Surgery - Gallstones removal, Hx 

Cholecystectomy, Hx Orthopedic Surgery - Fluid drained from right foot, Hx 

Vascular Surgery - Port placement





- Immunizations


Immunizations up to date: Yes


Hx Diphtheria, Pertussis, Tetanus Vaccination: Yes


Hx Pneumococcal Vaccination: 05/16/13





<WADE CALDERON - Last Filed: 06/22/18 13:56>





- Medical History


Notes: 





Sickle cell disease (WADE CALDERON)





Review of Systems





- Review of Systems


Constitutional: denies: Fever


EENT: No symptoms reported


Cardiovascular: See HPI, Chest pain


Respiratory: See HPI, Hurts to breathe, Short of breath, Wheezing


Gastrointestinal: No symptoms reported


Genitourinary: No symptoms reported


Male Genitourinary: No symptoms reported


Musculoskeletal: No symptoms reported


Skin: No symptoms reported


Hematologic/Lymphatic: No symptoms reported


Neurological/Psychological: No symptoms reported


-: Yes All other systems reviewed and negative





<WADE CALDERON - Last Filed: 06/22/18 13:56>





Physical Exam





<MARYURI KENT - Last Filed: 06/22/18 11:54>





- Vital signs


Interpretation: Tachycardic, Hypoxic





<WADE CALDERON - Last Filed: 06/22/18 13:56>





- Vital signs


Vitals: 


 











Pulse Ox


 


 85 L


 


 06/22/18 08:57














- Notes


Notes: 





Physical Exam:


 


General: Alert, appears to be in moderate pain. 


 


HEENT: Normocephalic. Atraumatic. PERRL. Extraocular movements intact. 

Oropharynx clear.


 


Neck: Supple. Non-tender.


 


Respiratory: Drops down into the upper 80s without oxygen however clear and 

equal breath sounds bilaterally.


 


Cardiovascular: Tachycardic, regular rhythm.


 


Abdominal: Normal Inspection. Non-tender. No distension. Normal Bowel Sounds. 


 


Back: Non-tender. No deformity or step off.


 


Extremities: Moves all four extremities.


Upper extremities: Normal inspection. Normal ROM.  


Lower extremities: Normal inspection. No edema. Normal ROM.


 


Neurological: Normal cognition. AAOx4. Normal speech.  


 


Psychological: Normal affect. Normal Mood. 


 


Skin: Warm. Dry. Normal color. (WADE CALDERON)





Course





- Laboratory


Result Diagrams: 


 06/22/18 09:17





 06/22/18 09:17





- EKG Interpretation by Me


EKG shows normal: Sinus rhythm


Rate: Normal


Rhythm: NSR - normal axis, normal intervals





<MARYURI KENT - Last Filed: 06/22/18 11:54>





- Laboratory


Result Diagrams: 


 06/22/18 09:17





 06/22/18 09:17





<WADE CALDERON - Last Filed: 06/22/18 13:56>





- Re-evaluation


Re-evalutation: 





06/22/18 11:53


Patient's x-ray concerning for acute chest syndrome.  CT ordered and is pending 

at this time.  Discussed case with [facility] patient will be admitted to medical 

ICU.. Dr. Tony accepting. 


Patient provided fluid drip IV antibiotics blood cultures drawn. (MARYURI KENT)





- Vital Signs


Vital signs: 


 











Temp Pulse Resp BP Pulse Ox


 


 98.1 F      16   142/96 H  97 


 


 06/22/18 12:36     06/22/18 12:36  06/22/18 12:36  06/22/18 12:36














- Laboratory


Laboratory results interpreted by me: 


 











  06/22/18 06/22/18 06/22/18





  09:17 09:17 10:14


 


WBC  12.0 H  


 


RBC  2.63 L  


 


Hgb  9.3 L  


 


Hct  26.3 L  


 


MCV  100 H  


 


MCH  35.3 H  


 


RDW  27.4 H  


 


Band Neutrophils %  1 L  


 


Metamyelocytes %  1 H  


 


Absolute Eos (Manual)  0.7 H  


 


Retic Count (auto)  8.54 H  


 


Absolute Retic  0.225 H  


 


ABG pO2    68.2 L


 


ABG O2 Saturation    92.9 L


 


Chloride   109 H 


 


Total Bilirubin   3.0 H 


 


Direct Bilirubin   0.5 H 














Discharge





- Discharge


Admitting Provider: Cecily





<MARYURI KENT - Last Filed: 06/22/18 11:54>





<WADE CALDERON - Last Filed: 06/22/18 13:56>





- Discharge


Clinical Impression: 


 Acute chest syndrome





Condition: Fair


Disposition: [place]


Referrals: 


DIYA PHILIPPE MD [Primary Care Provider] - Follow up as needed





Scribe Documentation





- Scribe


Written by Scribe:: Malcom Justice, 6/22/2018 1355


acting as scribe for :: Duran





<WADE CALDERON - Last Filed: 06/22/18 13:56> Per Dr. Maddie Wolf written order, may try Celebrex 200mg daily #30 3 RF. Complete PA if neccessary.  Script sent to pharmacy. Patient informed.

## 2018-06-22 NOTE — RADIOLOGY REPORT (SQ)
EXAM DESCRIPTION:  CHEST 2 VIEWS



COMPLETED DATE/TIME:  6/22/2018 9:57 am



REASON FOR STUDY:  sob



COMPARISON:  6/17/2018



EXAM PARAMETERS:  NUMBER OF VIEWS: two views

TECHNIQUE: Digital Frontal and Lateral radiographic views of the chest acquired.

RADIATION DOSE: NA

LIMITATIONS: Poor inspiration



FINDINGS:  LUNGS AND PLEURA: Mild diffuse increased density in the lungs probably both interstitial a
nd alveolar.  Differential includes pulmonary edema and pneumonia.  No effusions.

MEDIASTINUM AND HILAR STRUCTURES: No masses or contour abnormalities.

HEART AND VASCULAR STRUCTURES: Cardiac silhouette is mildly enlarged and there is mild vascular conge
stion.

BONES: Bony changes of sickle cell.

HARDWARE: None in the chest.

OTHER: No other significant finding.



IMPRESSION:  Mild diffuse increased density in the lungs with differential including pulmonary edema 
and pneumonia.



TECHNICAL DOCUMENTATION:  JOB ID:  [id number]

 2011 [employer]- All Rights Reserved



Reading location - IP/workstation name: [email or web address]

## 2018-06-28 NOTE — ER DOCUMENT REPORT
ED General Pain





- General


Chief Complaint: Sickle Cell Crisis


Stated Complaint: SICKLE CELL CRISIS RIGHT KNEE PAIN


Time Seen by Provider: 06/28/18 21:43


Mode of Arrival: Ambulatory


Information source: Patient


Notes: 





Chief complaint: Sickle cell pain








History of complain:( obtained from----patient) 22 years old male with a 

history of sickle cell presents today with pain over the right chin bone.  He 

was out in the sun doing some groceries today.  Denies any fever chills or 

other constitutional symptoms








Onset: As above


Duration: Gradual


Severity: Moderate to severe


Quality: Sharp


Context: Sickle cells


Exacerbating factor and relieving factors:











REVIEW OF SYSTEMS:


CONSTITUTIONAL :  Denies fever,  chills, or sweats.  Denies recent illness.


EENT:   Denies eye, ear, throat, or mouth pain or symptoms.  Denies nasal or 

sinus congestion or discharge.  Denies throat, tongue, or mouth swelling or 

difficulty swallowing.


CARDIOVASCULAR:  Denies chest pain.  Denies palpitations or racing or irregular 

heart beat.  Denies ankle edema.


RESPIRATORY:  Denies cough, cold, or chest congestion.  Denies shortness of 

breath, difficulty breathing, or wheezing.


GASTROINTESTINAL:  Denies  distention.  Denies nausea, vomiting, or diarrhea.  

Denies blood in vomitus, stools, or per rectum.  Denies black, tarry stools.  

Denies constipation. 


GENITOURINARY:  Denies difficulty urinating, painful urination, burning, 

frequency, blood in urine, or discharge.


FEMALE  GENITOURINARY:  Denies vaginal bleeding, heavy or abnormal periods, 

irregular periods.  Denies vaginal discharge or odor. 


MUSCULOSKELETAL:  Denies back or neck pain or stiffness.  Denies joint pain or 

swelling.


SKIN:   Denies rash, lesions or sores.


HEMATOLOGIC :   Denies easy bruising or bleeding.


LYMPHATIC:  Denies swollen, enlarged glands.


NEUROLOGICAL:  Denies confusion or altered mental status.  Denies passing out 

or loss of consciousness.  Denies dizziness or lightheadedness.  Denies 

headache.  Denies weakness or paralysis or loss of use of either side.  Denies 

problems with gait or speech.  Denies sensory loss, numbness, or tingling.  

Denies seizures.


PSYCHIATRIC:  Denies anxiety or stress.  Denies depression, suicidal ideation, 

or homicidal ideation.





ALL OTHER SYSTEMS REVIEWED AND NEGATIVE.











PHYSICAL EXAMINATION:





GENERAL: Well-appearing, well-nourished and in no acute distress.





HEAD: Atraumatic, normocephalic.





EYES: Pupils equal round and reactive to light, extraocular movements intact, 

conjunctiva are normal.





ENT: Nares patent, oropharynx clear without exudates.  Moist mucous membranes.





NECK: Normal range of motion, supple without lymphadenopathy





LUNGS: Breath sounds clear to auscultation bilaterally and equal.  No wheezes 

rales or rhonchi.





HEART: Regular rate and rhythm without murmurs





ABDOMEN: Soft, nontender, nondistended abdomen.  No guarding, no rebound.  No 

masses appreciated.





Examination of genitals-deferred





Musculoskeletal: Normal range of motion, no pitting or edema.  No cyanosis.





NEUROLOGICAL: Cranial nerves grossly intact.  Normal speech, normal gait.  

Normal sensory, motor exams





PSYCH: Normal mood, normal affect.





SKIN: Warm, Dry, normal turgor, no rashes or lesions noted.

















Dictation was performed using Dragon voice recognition software


TRAVEL OUTSIDE OF THE [place] IN LAST 30 DAYS: No





- HPI


Patient complains to provider of: Dictated





- Related Data


Allergies/Adverse Reactions: 


 





Coconut * [Coconut] Allergy (Mild, Verified 06/28/18 20:30)


 


transpore tape Allergy (Mild, Uncoded 05/27/18 11:14)


 Hives











Past Medical History





- Social History


Smoking Status: Never Smoker


Cigarette use (# per day): No


Chew tobacco use (# tins/day): No


Smoking Education Provided: No


Frequency of alcohol use: None


Drug Abuse: None


Family History: Reviewed & Not Pertinent, Arthritis, DM, Hypertension, Other - 

Sickle cell trait both parents and asthma


Patient has suicidal ideation: No


Patient has homicidal ideation: No


Pulmonary Medical History: Reports: Hx Asthma, Hx Pneumonia


Renal/ Medical History: Denies: Hx Peritoneal Dialysis


Psychiatric Medical History: 


   Denies: Hx Depression


Past Surgical History: Reports: Hx Abdominal Surgery - Gallstones removal, Hx 

Cholecystectomy, Hx Orthopedic Surgery - Fluid drained from right foot, Hx 

Vascular Surgery - Port placement





- Immunizations


Immunizations up to date: Yes


Hx Diphtheria, Pertussis, Tetanus Vaccination: Yes


Hx Pneumococcal Vaccination: 05/16/13





Review of Systems





- Review of Systems


Notes: 





Dictated





Physical Exam





- Vital signs


Vitals: 


 











Temp Pulse Resp BP Pulse Ox


 


 98.9 F   122 H  18   138/91 H  94 


 


 06/28/18 20:48  06/28/18 20:48  06/28/18 20:48  06/28/18 20:48  06/28/18 20:48














- Notes


Notes: 





Dictated





Course





- Re-evaluation


Re-evalutation: 





06/29/18 01:13


Given IV Dilaudid and Benadryl subsequently discharged home.  Also given IV 

fluids





- Vital Signs


Vital signs: 


 











Temp Pulse Resp BP Pulse Ox


 


 98.9 F   122 H  18   138/91 H  94 


 


 06/28/18 20:48  06/28/18 20:48  06/28/18 20:48  06/28/18 20:48  06/28/18 20:48














- Laboratory


Result Diagrams: 


 06/28/18 22:31





 06/29/18 00:17


Laboratory results interpreted by me: 


 











  06/28/18 06/29/18





  22:31 00:17


 


RBC  2.88 L 


 


Hgb  9.9 L 


 


Hct  28.0 L 


 


MCH  34.3 H 


 


RDW  28.3 H 


 


Monocytes % (Manual)  15 H 


 


Retic Count (auto)  7.48 H 


 


Absolute Retic  0.215 H 


 


Total Bilirubin   2.6 H


 


Direct Bilirubin   0.6 H


 


AST   81 H


 


ALT   19 L


 


Alkaline Phosphatase   133 H


 


Total Protein   9.1 H














Discharge





- Discharge


Clinical Impression: 


Sickle cell anemia


Qualifiers:


 Sickle-cell associated disorders: without crisis Qualified Code(s): D57.1 - 

Sickle-cell disease without crisis





Condition: Fair


Disposition: HOME, SELF-CARE


Instructions:  Sickle Cell Crisis ([facility])


Referrals: 


DIYA PHILIPPE MD [Primary Care Provider] - Follow up as needed

## 2018-07-01 NOTE — ER DOCUMENT REPORT
ED General





- General


Chief Complaint: Sickle Cell Crisis


Stated Complaint: BACK PAIN


Time Seen by Provider: 07/01/18 19:48


Mode of Arrival: Ambulatory


Notes: 





Patient is a 22 year old male with a past medical history of sickle cell anemia 

who presents in acute sickle cell crisis.  The patient describes a severe, 

throbbing, constant pain to his low back that started early this morning has 

been progressively worsening since that time.  He states that this does feel 

somewhat similar to sickle cell crises he has had in the past.  He denies any 

acute fall or direct injury to the back.  He has not tried anything for pain at 

home.  Nothing worsens or improves his symptoms.  He denies any shortness of 

breath, cough, or fever.  He has not seen his hematologist regarding today's 

concerns with states he has an appointment at 10:15 in the morning tomorrow.


TRAVEL OUTSIDE OF THE [place] IN LAST 30 DAYS: No





- Related Data


Allergies/Adverse Reactions: 


 





Coconut * [Coconut] Allergy (Mild, Verified 07/01/18 19:45)


 


transpore tape Allergy (Mild, Uncoded 07/01/18 19:45)


 Hives











Past Medical History





- General


Information source: Patient





- Social History


Smoking Status: Never Smoker


Chew tobacco use (# tins/day): No


Frequency of alcohol use: Occasional


Drug Abuse: None


Lives with: Spouse/Significant other


Family History: Reviewed & Not Pertinent, Arthritis, DM, Hypertension, Other - 

Sickle cell trait both parents and asthma


Patient has suicidal ideation: No


Patient has homicidal ideation: No


Pulmonary Medical History: Reports: Hx Asthma, Hx Pneumonia


Renal/ Medical History: Denies: Hx Peritoneal Dialysis


Psychiatric Medical History: 


   Denies: Hx Depression


Past Surgical History: Reports: Hx Abdominal Surgery - Gallstones removal, Hx 

Cholecystectomy, Hx Orthopedic Surgery - Fluid drained from right foot, Hx 

Vascular Surgery - Port placement





- Immunizations


Immunizations up to date: Yes


Hx Diphtheria, Pertussis, Tetanus Vaccination: Yes


Hx Pneumococcal Vaccination: 05/16/13





Review of Systems





- Review of Systems


Notes: 





Constitutional: Negative for fever.


HENT: Negative for sore throat.


Eyes: Negative for visual changes.


Cardiovascular: Negative for chest pain.


Respiratory: Negative for shortness of breath.


Gastrointestinal: Negative for abdominal pain, vomiting or diarrhea.


Genitourinary: Negative for dysuria.


Musculoskeletal: Positive for low back pain


Skin: Negative for rash.


Neurological: Negative for headaches, weakness or numbness.





10 point ROS negative except as marked above and in HPI.





Physical Exam





- Vital signs


Vitals: 


 











Temp Pulse Resp BP Pulse Ox


 


 98.9 F   112 H  24 H  143/89 H  96 


 


 07/01/18 19:04  07/01/18 19:04  07/01/18 19:04  07/01/18 19:04  07/01/18 19:04











Interpretation: Tachycardic


Notes: 





PHYSICAL EXAMINATION:





GENERAL: Well-appearing, well-nourished and in no acute distress.





HEAD: Atraumatic, normocephalic.





EYES: Pupils equal round and reactive to light, extraocular movements intact, 

sclera anicteric, conjunctiva are normal.





ENT: nares patent, oropharynx clear without exudates.  Moist mucous membranes.





NECK: Normal range of motion, supple without lymphadenopathy





LUNGS: Breath sounds clear to auscultation bilaterally and equal.  No wheezes 

rales or rhonchi.





HEART: Regular rate and rhythm without murmurs





ABDOMEN: Soft, nontender, normoactive bowel sounds.  No guarding, no rebound.  

No masses appreciated.





EXTREMITIES: Normal range of motion, no pitting or edema.  No cyanosis.





Back: No midline spinal tenderness, step-offs or deformities





NEUROLOGICAL: 5 out of 5 strength both distally and proximally bilateral lower 

extremities.  2+ patellar reflexes bilaterally.  No clonus.  Sensation grossly 

intact in the bilateral lower extremities.  Patient is able to ambulate without 

difficulty.





PSYCH: Normal mood, normal affect.





SKIN: Warm, Dry, normal turgor, no rashes or lesions noted.





Course





- Re-evaluation


Re-evalutation: 





07/01/18 23:23


Presentation is most consistent with an uncomplicated sickle cell pain crisis.  

Patient has no evidence of an aplastic crisis on labs.  Chest x-ray and vitals 

are not consistent with acute chest syndrome.  Vitals have remained within 

normal limits here in the emergency department.  Patient's pain has been able 

to be controlled using IV analgesia.  The patient is agreeable to discharge 

home at this time.  I recommended that they follow closely with their primary 

hematologist.  At this time will discharge with return precautions and follow-

up recommendations.  Verbal discharge instructions given a the bedside and 

opportunity for questions given. Medication warnings reviewed. Patient is in 

agreement with this plan and has verbalized understanding of return precautions 

and the need for primary care follow-up in the next 24-72 hours.








- Vital Signs


Vital signs: 


 











Temp Pulse Resp BP Pulse Ox


 


 98.9 F   112 H  24 H  143/89 H  96 


 


 07/01/18 19:04  07/01/18 19:04  07/01/18 19:04  07/01/18 19:04  07/01/18 19:04














- Laboratory


Result Diagrams: 


 07/01/18 23:12





 07/01/18 22:30


Laboratory results interpreted by me: 


 











  07/01/18 07/01/18





  22:30 23:12


 


RBC   2.58 L


 


Hgb   9.3 L


 


Hct   26.4 L


 


MCV   102 H D


 


MCH   36.0 H


 


RDW   33.0 H


 


Abs Neuts (Manual)   0.0 L


 


Abs Lymphs (Manual)   0.0 L


 


Abs Monocytes (Manual)   0.0 L


 


Sodium  145.2 H 


 


Chloride  109 H 


 


Total Bilirubin  2.3 H 


 


Direct Bilirubin  0.5 H 


 


Alkaline Phosphatase  137 H 


 


Total Protein  8.9 H 














- Diagnostic Test


Radiology reviewed: Image reviewed, Reports reviewed


Radiology results interpreted by me: 





07/01/18 23:24


Chest x-ray: No acute infiltrate





Discharge





- Discharge


Clinical Impression: 


 Sickle cell crisis





Low back pain


Qualifiers:


 Chronicity: acute Back pain laterality: bilateral Sciatica presence: without 

sciatica Qualified Code(s): M54.5 - Low back pain





Condition: Good


Disposition: HOME, SELF-CARE


Additional Instructions: 


You were seen today for sickle cell pain crisis.  Please follow-up with your 

hematologist.  Returning to the ED if you have worsening pain, fever greater 

than 100.4, shortness of breath, persistent vomiting, or any other symptoms 

that are concerning to you.


Referrals: 


DIYA PHILIPPE MD [Primary Care Provider] - Follow up as needed

## 2018-07-01 NOTE — RADIOLOGY REPORT (SQ)
EXAM DESCRIPTION:  CHEST 2 VIEWS



COMPLETED DATE/TIME:  7/1/2018 8:19 pm



REASON FOR STUDY:  sickle cell



COMPARISON:  6/22/2018



EXAM PARAMETERS:  NUMBER OF VIEWS: two views

TECHNIQUE: Digital Frontal and Lateral radiographic views of the chest acquired.

RADIATION DOSE: NA

LIMITATIONS: none



FINDINGS:  LUNGS AND PLEURA: No opacities, masses or pneumothorax. No pleural effusion.

MEDIASTINUM AND HILAR STRUCTURES: No masses or contour abnormalities.

HEART AND VASCULAR STRUCTURES: Heart normal size.  No evidence for failure.

BONES: The bony changes of sickle cell disease.

HARDWARE: None in the chest.

OTHER: No other significant finding.



IMPRESSION:  Marked improved aeration of the lungs.  No acute pulmonary disease.



TECHNICAL DOCUMENTATION:  JOB ID:  [id number]

 2011 [employer]- All Rights Reserved



Reading location - IP/workstation name: [email or web address]

## 2018-07-01 NOTE — ER DOCUMENT REPORT
ED Medical Screen (RME)





- General


Chief Complaint: Sickle Cell Crisis


Stated Complaint: BACK PAIN


Time Seen by Provider: 07/01/18 19:48


Mode of Arrival: Ambulatory


Information source: Patient


Notes: 





22-year-old male history of sickle cell disease who presents with complaints of 

generalized body aches.  Patient notes he was outdoors today and believes he 

overexerted himself








I have greeted and performed a rapid initial assessment of this patient.  A 

comprehensive ED assessment and evaluation of the patient, analysis of test 

results and completion of the medical decision making process will be conducted 

by additional ED providers.





PHYSICAL EXAMINATION:





GENERAL: Well-appearing, well-nourished and in no acute distress.





HEAD: Atraumatic, normocephalic.





EYES: Pupils equal round extraocular movements intact,  conjunctiva are normal.





ENT: Nares patent





NECK: Normal range of motion





LUNGS: No respiratory distress





Musculoskeletal: Normal range of motion





NEUROLOGICAL:  Normal speech, normal gait. 





PSYCH: Normal mood, normal affect.





SKIN: Warm, Dry, normal turgor, no rashes or lesions noted.





TRAVEL OUTSIDE OF THE [place] IN LAST 30 DAYS: No





- Related Data


Allergies/Adverse Reactions: 


 





Coconut * [Coconut] Allergy (Mild, Verified 07/01/18 19:45)


 


transpore tape Allergy (Mild, Uncoded 07/01/18 19:45)


 Hives











Past Medical History





- Social History


Chew tobacco use (# tins/day): No


Frequency of alcohol use: Occasional


Drug Abuse: None


Family history: None


Pulmonary Medical History: Reports: Hx Asthma, Hx Pneumonia


Renal/ Medical History: Denies: Hx Peritoneal Dialysis


Psychiatric Medical History: 


   Denies: Hx Depression


Past Surgical History: Reports: Hx Abdominal Surgery - Gallstones removal, Hx 

Cholecystectomy, Hx Orthopedic Surgery - Fluid drained from right foot, Hx 

Vascular Surgery - Port placement





- Immunizations


Immunizations up to date: Yes


Hx Diphtheria, Pertussis, Tetanus Vaccination: Yes


History of Influenza Vaccine for 10/2017 - 3/2018 Season: Yes


Influenza Administration Date for 10/2017 - 3/2018 Season: 10/01/17





Physical Exam





- Vital signs


Vitals: 





 











Temp Pulse Resp BP Pulse Ox


 


 98.9 F   112 H  24 H  143/89 H  96 


 


 07/01/18 19:04  07/01/18 19:04  07/01/18 19:04  07/01/18 19:04  07/01/18 19:04














Course





- Vital Signs


Vital signs: 





 











Temp Pulse Resp BP Pulse Ox


 


 98.9 F   112 H  24 H  143/89 H  96 


 


 07/01/18 19:04  07/01/18 19:04  07/01/18 19:04  07/01/18 19:04  07/01/18 19:04














Doctor's Discharge





- Discharge


Referrals: 


DIYA PHILIPPE MD [Primary Care Provider] - Follow up as needed

## 2018-07-03 NOTE — ER DOCUMENT REPORT
ED General





- General


Chief Complaint: Sickle Cell Crisis


Stated Complaint: BACK AND RIGHT KNEE PAIN


Time Seen by Provider: 07/03/18 00:20


Notes: 





Patient is a 22-year-old male with a past medical history of sickle cell anemia

, hemoglobin SS who presents with right knee pain as well as ongoing low back 

pain.  I saw the patient yesterday at that time he did not have any right knee 

pain was only complaining of low back pain.  He states that he felt well after 

discharge yesterday but today has had a progressive worsening of a throbbing, 

aching, constant pain to his right knee.  He states that he continues to have 

some mild, dull, throbbing pain to his low back but notes that is much better 

than yesterday.  He denies any cough, shortness of breath, fever or 

constitutional symptoms.  He did follow-up with his hematologist today as 

scheduled.  Nothing is been noted to improve or worsen his pain.


TRAVEL OUTSIDE OF THE [place] IN LAST 30 DAYS: No





- Related Data


Allergies/Adverse Reactions: 


 





Coconut * [Coconut] Allergy (Mild, Verified 07/01/18 19:45)


 


transpore tape Allergy (Mild, Uncoded 07/01/18 19:45)


 Hives











Past Medical History





- General


Information source: Patient





- Social History


Smoking Status: Never Smoker


Chew tobacco use (# tins/day): No


Frequency of alcohol use: None


Drug Abuse: None


Lives with: Spouse/Significant other


Family History: Reviewed & Not Pertinent, Arthritis, DM, Hypertension, Other - 

Sickle cell trait both parents and asthma


Patient has suicidal ideation: No


Patient has homicidal ideation: No


Pulmonary Medical History: Reports: Hx Asthma, Hx Pneumonia


Renal/ Medical History: Denies: Hx Peritoneal Dialysis


Psychiatric Medical History: 


   Denies: Hx Depression


Past Surgical History: Reports: Hx Abdominal Surgery - Gallstones removal, Hx 

Cholecystectomy, Hx Orthopedic Surgery - Fluid drained from right foot, Hx 

Vascular Surgery - Port placement





- Immunizations


Immunizations up to date: Yes


Hx Diphtheria, Pertussis, Tetanus Vaccination: Yes


Hx Pneumococcal Vaccination: 05/16/13





Review of Systems





- Review of Systems


Notes: 





Constitutional: Negative for fever.


HENT: Negative for sore throat.


Eyes: Negative for visual changes.


Cardiovascular: Negative for chest pain.


Respiratory: Negative for shortness of breath.


Gastrointestinal: Negative for abdominal pain, vomiting or diarrhea.


Genitourinary: Negative for dysuria.


Musculoskeletal: Positive for right knee pain and low back pain


Skin: Negative for rash.


Neurological: Negative for headaches, weakness or numbness.





10 point ROS negative except as marked above and in HPI.





Physical Exam





- Vital signs


Vitals: 


 











Temp Pulse Resp BP Pulse Ox


 


 98.8 F   119 H  17   115/77   95 


 


 07/02/18 23:55  07/02/18 23:55  07/02/18 23:55  07/02/18 23:55  07/02/18 23:55











Interpretation: Tachycardic


Notes: 





PHYSICAL EXAMINATION:





GENERAL: Appears mildly uncomfortable but in no acute distress





HEAD: Atraumatic, normocephalic.





EYES: Pupils equal round and reactive to light, extraocular movements intact, 

sclera anicteric, conjunctiva are normal.





ENT: nares patent, oropharynx clear without exudates.  Moist mucous membranes.





NECK: Normal range of motion, supple without lymphadenopathy





LUNGS: Breath sounds clear to auscultation bilaterally and equal.  No wheezes 

rales or rhonchi.





HEART: Regular tachycardia without murmurs





ABDOMEN: Soft, nontender, normoactive bowel sounds.  No guarding, no rebound.  

No masses appreciated.





EXTREMITIES: Normal range of motion, no pitting or edema.  No cyanosis.





NEUROLOGICAL: No focal neurological deficits. Moves all extremities 

spontaneously and on command.





PSYCH: Normal mood, normal affect.





SKIN: Warm, Dry, normal turgor, no rashes or lesions noted.





Course





- Re-evaluation


Re-evalutation: 





07/03/18 02:19


Presentation is most consistent with an uncomplicated sickle cell pain crisis.  

Patient has no evidence of an aplastic crisis on labs.  Chest x-ray and vitals 

are not consistent with acute chest syndrome.  Vitals have remained within 

normal limits here in the emergency department.  Patient's pain has been able 

to be controlled using IV analgesia.  The patient is agreeable to discharge 

home at this time.  I recommended that they follow closely with their primary 

hematologist.  At this time will discharge with return precautions and follow-

up recommendations.  Verbal discharge instructions given a the bedside and 

opportunity for questions given. Medication warnings reviewed. Patient is in 

agreement with this plan and has verbalized understanding of return precautions 

and the need for primary care follow-up in the next 24-72 hours.





- Vital Signs


Vital signs: 


 











Temp Pulse Resp BP Pulse Ox


 


 98.8 F   119 H  17   115/77   95 


 


 07/02/18 23:55  07/02/18 23:55  07/02/18 23:55  07/02/18 23:55  07/02/18 23:55














- Laboratory


Result Diagrams: 


 07/03/18 01:11





 07/03/18 01:11


Laboratory results interpreted by me: 


 











  07/03/18 07/03/18





  01:11 01:11


 


RBC  2.82 L 


 


Hgb  10.0 L 


 


Hct  29.1 L 


 


MCV  103 H 


 


MCH  35.6 H 


 


RDW  33.1 H 


 


Retic Count (auto)  13.15 H 


 


Absolute Retic  0.370 H 


 


Sodium   147.0 H


 


Chloride   110 H














Discharge





- Discharge


Clinical Impression: 


 Sickle cell crisis





Right knee pain


Qualifiers:


 Chronicity: acute Qualified Code(s): M25.561 - Pain in right knee





Condition: Good


Disposition: HOME, SELF-CARE


Additional Instructions: 


You were seen today for sickle cell pain crisis.  Please follow-up with your 

hematologist.  Returning to the ED if you have worsening pain, fever greater 

than 100.4, shortness of breath, persistent vomiting, or any other symptoms 

that are concerning to you.


Referrals: 


DIYA PHILIPPE MD [Primary Care Provider] - Follow up as needed

## 2018-07-07 NOTE — ER DOCUMENT REPORT
ED General





- General


Chief Complaint: Sickle Cell Crisis


Stated Complaint: KNEE PAIN


Time Seen by Provider: 07/07/18 01:37


Mode of Arrival: Ambulatory


Information source: Patient


Notes: 





Patient is a 22-year-old male who is well-known to our facility who presents 

with complaint of possible sickle cell crisis.  Patient presents with bilateral 

knee pain.  Patient reports that this is been going on for approximately 1 day.

  Patient reports that the symptoms have gotten worse faster than usual.  

Patient denies any fevers, chest pain, shortness of breath, abdominal pain or 

any other symptoms.


TRAVEL OUTSIDE OF THE [place] IN LAST 30 DAYS: No





- Related Data


Allergies/Adverse Reactions: 


 





Coconut * [Coconut] Allergy (Mild, Verified 07/01/18 19:45)


 


transpore tape Allergy (Mild, Uncoded 07/01/18 19:45)


 Hives











Past Medical History





- General


Information source: Patient





- Social History


Smoking Status: Never Smoker


Frequency of alcohol use: None


Drug Abuse: None


Family History: Reviewed & Not Pertinent, Arthritis, DM, Hypertension, Other - 

Sickle cell trait both parents and asthma


Pulmonary Medical History: Reports: Hx Asthma, Hx Pneumonia


Renal/ Medical History: Denies: Hx Peritoneal Dialysis


Psychiatric Medical History: 


   Denies: Hx Depression


Past Surgical History: Reports: Hx Abdominal Surgery - Gallstones removal, Hx 

Cholecystectomy, Hx Orthopedic Surgery - Fluid drained from right foot, Hx 

Vascular Surgery - Port placement





- Immunizations


Immunizations up to date: Yes


Hx Diphtheria, Pertussis, Tetanus Vaccination: Yes


Hx Pneumococcal Vaccination: 05/16/13





Review of Systems





- Review of Systems


Constitutional: No symptoms reported


EENT: No symptoms reported


Cardiovascular: No symptoms reported


Respiratory: No symptoms reported


Gastrointestinal: No symptoms reported


Genitourinary: No symptoms reported


Male Genitourinary: No symptoms reported


Musculoskeletal: See HPI


Skin: No symptoms reported


Hematologic/Lymphatic: No symptoms reported


Neurological/Psychological: No symptoms reported





Physical Exam





- Vital signs


Vitals: 


 











Temp Pulse Resp BP Pulse Ox


 


 99.4 F   115 H  16   135/83 H  91 L


 


 07/06/18 23:59  07/06/18 23:59  07/06/18 23:59  07/06/18 23:59  07/06/18 23:59














- Notes


Notes: 





PHYSICAL EXAMINATION:





GENERAL: Well-appearing, well-nourished and in no acute distress.





HEAD: Atraumatic, normocephalic.





EYES: Pupils equal round and reactive to light, extraocular movements intact, 

sclera anicteric, conjunctiva are normal.





ENT: Nares patent, oropharynx clear without exudates.  Moist mucous membranes.





NECK: Normal range of motion, supple without lymphadenopathy





LUNGS: Breath sounds clear to auscultation bilaterally and equal.  No wheezes 

rales or rhonchi.





HEART: Regular rate and rhythm without murmurs





ABDOMEN: Soft, nontender, nondistended abdomen.  No guarding, no rebound.  No 

masses appreciated.





Musculoskeletal: Normal range of motion, no pitting or edema.  No cyanosis.





NEUROLOGICAL: Cranial nerves grossly intact.  Normal speech, normal gait.  

Normal sensory, motor exams 





PSYCH: Normal mood, normal affect.





SKIN: Warm, Dry, normal turgor, no rashes or lesions noted.





Course





- Re-evaluation


Re-evalutation: 


22-year-old male patient who is well-known to our facility presenting with 

bilateral knee pain.  Patient has a history of sickle cell and typically 

presents with knee pain when he is in crisis.  Patient reports that this 

episode started yesterday morning and has progressively gotten worse through 

the day.  Patient's initial vital signs were stable other than an initial 

oxygen saturation of 91% however upon arrival to the room patient's oxygen 

saturation was 96% on room air.  Will draw CBC, reticulocyte count, basic 

metabolic panel and medicate patient for pain and nausea.  Patient will be 

reevaluated at that point.  





Reticulocyte count 11.79.  Patient reports moderate pain relief after 

administration of Dilaudid.  Will re-dose with additional 1 mg IV Dilaudid.





On reevaluation, patient reports his pain is mostly resolved.  Patient's oxygen 

saturation is 98% on room air, vital signs are all stable.  Patient will be 

discharged in stable condition.  Patient will take his own chronic pain 

medications at home and no prescriptions will be provided.





- Vital Signs


Vital signs: 


 











Temp Pulse Resp BP Pulse Ox


 


 99.4 F   107 H  22 H  135/83 H  100 


 


 07/06/18 23:59  07/07/18 01:00  07/07/18 00:00  07/06/18 23:59  07/07/18 01:00














- Laboratory


Result Diagrams: 


 07/07/18 04:10





 07/07/18 06:15


Laboratory results interpreted by me: 


 











  07/07/18 07/07/18





  04:10 06:15


 


RBC  2.88 L 


 


Hgb  10.2 L 


 


Hct  28.9 L 


 


MCV  100 H 


 


MCH  35.6 H 


 


RDW  30.6 H 


 


Abs Neuts (Manual)  0.0 L 


 


Abs Lymphs (Manual)  0.0 L 


 


Abs Monocytes (Manual)  0.0 L 


 


Retic Count (auto)  11.79 H 


 


Absolute Retic  0.339 H 


 


Sodium   146.7 H


 


Glucose   134 H














Discharge





- Discharge


Clinical Impression: 


 Sickle cell crisis





Condition: Stable


Disposition: HOME, SELF-CARE


Additional Instructions: 


Sickle Cell Crisis





     You have "sickle cell crisis."  Sickle cell disease is caused by abnormal 

hemoglobin.  This hemoglobin can deform red blood cells into a sickle shape.  

These abnormal blood cells can block blood vessels. This causes the pain of 

sickle cell crisis.


     Sickle cell crisis can occur any time.  But attacks are more likely with 

acute infection, dehydration, or altitude change.  A crisis usually causes pain 

in the legs, back, abdomen, and chest.  Sometimes the pain may ease and return 

later.


     The usual treatment is oxygen, pain medication, IV fluids, and treatment 

of infection.  Attacks may take a couple of days to resolve.


     Return if the pain becomes more severe, or if there are new symptoms.





Referrals: 


DIYA PHILIPPE MD [Primary Care Provider] - Follow up as needed

## 2018-07-09 NOTE — RADIOLOGY REPORT (SQ)
Clinical History : chest pain, sickle cell ,



Exam : PA and lateral views of the chest 7/9/2018 3:22 AM CDT



Comparisons : PA and lateral views of the chest July 1, 2018



Findings : 







The lungs are clear without focal consolidation or pleural

effusion.



The heart is normal in size.  The mediastinal contours are normal

in appearance.



. Stable early changes of sickle cell disease throughout the

thoracic spine..  The shoulders are unremarkable.



Limited evaluation of the upper abdomen demonstrates no gross

abnormalities.



Impression:

1. No acute cardiopulmonary disease

2. Stable osseous changes of sickle cell disease.

## 2018-07-09 NOTE — PDOC H&P
History of Present Illness


Admission Date/PCP: 


  07/09/18 06:09





  DIYA PHILIPPE





History of Present Illness: 


CARMEN BEGUM is a 22 year old male known to my practice who presented to the 

ED with complain on new onset worsening chest, lower back and bilateral knee 

joint pain. Patient claimed compliance with his pre-admission pain medication 

as well as his sickle cell disease medication management. He denied any acute 

onset fever, chills, coughing, nasal or sinus congestion, nausea, vomiting, 

diarrhea or abdominal pain, headache, dizziness, numbness, tingling, or focal 

weakness. He denied any genitourinary symptoms to suggest ongoing infection. 

Due to worsening pain intensity and minimal response to ED treatment including 

IV Dilaudid and IV fluid, he was advised hospitalization for further evaluation 

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





Past Medical History


Pulmonary Medical History: Reports: Asthma, Pneumonia


Psychiatric Medical History: 


   Denies: Depression


Hematology: Reports: Anemia, Sickle Cell Disease, Bleeding Tendencies





Past Surgical History


Past Surgical History: Reports: Cholecystectomy, Orthopedic Surgery - Fluid 

drained from right foot, Vascular Surgery - Port placement





Social History


Smoking Status: Never Smoker


Frequency of Alcohol Use: None


Hx Recreational Drug Use: No


Drugs: None


Hx Prescription Drug Abuse: No





- Advance Directive


Resuscitation Status: Full Code





Family History


Family History: Reviewed & Not Pertinent, Arthritis, DM, Hypertension, Other - 

Sickle cell trait both parents and asthma


Parental Family History Reviewed: Yes


Children Family History Reviewed: Yes


Sibling(s) Family History Reviewed.: Yes





Medication/Allergy


Home Medications: 








Albuterol Sulfate [Proair HFA] 2 puff IH Q4H 07/09/18 


Enoxaparin Sodium [Lovenox Inj 40 mg/0.4 ml Disp.syrin] 40 mg SQ DAILY 07/09/18 


Fentanyl [Duragesic 75 Mcg/Hr Transdermal Patch] 75 mcg TOP Q3D 07/09/18 


Folic Acid [Folvite 1 mg Tablet] 1 mg PO DAILY 07/09/18 


Hydroxyurea [Hydrea 500 mg Capsule] 1,000 mg PO SUTUTHSA@1000 07/09/18 


Hydroxyurea [Hydrea 500 mg Capsule] 1,500 mg PO MOWEFR@1000 07/09/18 


Oxycodone HCl [Oxy-Ir 5 mg Tablet] 15 mg PO Q4HP PRN 07/09/18 








Allergies/Adverse Reactions: 


 





Coconut * [Coconut] Allergy (Mild, Verified 07/01/18 19:45)


 


transpore tape Allergy (Mild, Uncoded 07/01/18 19:45)


 Hives











Review of Systems


All systems: as per PMH





Physical Exam


Vital Signs: 


 











Temp Pulse Resp BP Pulse Ox


 


 99.2 F   122 H  25 H  149/99 H  94 


 


 07/09/18 16:00  07/09/18 16:00  07/09/18 16:00  07/09/18 16:00  07/09/18 16:00








 Intake & Output











 07/08/18 07/09/18 07/10/18





 06:59 06:59 06:59


 


Weight   60 kg











General appearance: PRESENT: mild distress - due to pain


Head exam: PRESENT: atraumatic, normocephalic


Eye exam: PRESENT: conjunctiva pink, EOMI, PERRLA.  ABSENT: scleral icterus


Ear exam: PRESENT: normal external ear exam


Mouth exam: PRESENT: moist, tongue midline


Neck exam: PRESENT: full ROM.  ABSENT: carotid bruit, JVD, lymphadenopathy, 

thyromegaly


Respiratory exam: PRESENT: clear to auscultation brenda


Cardiovascular exam: PRESENT: RRR, +S1, +S2, tachycardia


Pulses: PRESENT: normal dorsalis pedis pul, +2 pedal pulses bilateral


Vascular exam: PRESENT: normal capillary refill.  ABSENT: pallor


GI/Abdominal exam: PRESENT: normal bowel sounds, soft.  ABSENT: distended, 

guarding, mass, organolmegaly, rebound, tenderness


Rectal exam: PRESENT: deferred


Extremities exam: PRESENT: tenderness - knee joints and lower lumbar spine 

region.  ABSENT: pedal edema


Musculoskeletal exam: PRESENT: normal inspection


Neurological exam: PRESENT: alert, awake, oriented to person, oriented to place

, oriented to time, oriented to situation, CN II-XII grossly intact.  ABSENT: 

motor sensory deficit


Psychiatric exam: PRESENT: appropriate affect, normal mood.  ABSENT: homicidal 

ideation, suicidal ideation


Skin exam: PRESENT: dry, intact, warm.  ABSENT: cyanosis, rash





Results


Laboratory Results: 





I reviewed his lab results on [facility] and form significant part of my medical 

decision making.





Assessment & Plan





- Diagnosis


(1) Sickle cell disease with crisis


Is this a current diagnosis for this admission?: Yes   


Plan: 


See admitting attending physician orders.








(2) Sinus tachycardia


Is this a current diagnosis for this admission?: Yes   


Plan: 


See admitting attending physician orders.








(3) Asthma


Qualifiers: 


   Asthma severity: mild   Asthma complication type: uncomplicated 


Is this a current diagnosis for this admission?: Yes   


Plan: 


See admitting attending physician orders.








- Time


Time Spent: 50 to 70 Minutes


Medications reviewed and adjusted accordingly: Yes


Anticipated discharge: Home


Within: Other





- Inpatient Certification


Based on my medical assessment, after consideration of the patient's 

comorbidities, presenting symptoms, or acuity I expect that the services needed 

warrant INPATIENT care.: Yes


I certify that my determination is in accordance with my understanding of 

Medicare's requirements for reasonable and necessary INPATIENT services [42 CFR 

412.3e].: Yes


Medical Necessity: Need Close Monitoring Due to Risk of Patient Decompensation, 

Need For IV Fluids, Need For Continuous Telemetry Monitoring, Need for Pain 

Control, Risk of Complication if Not Cared For in Hospital


Post Hospital Care: D/C Planner Documentation





- Plan Summary


Plan Summary: 





See admitting attending physician orders.

## 2018-07-09 NOTE — ER DOCUMENT REPORT
ED General





- General


Chief Complaint: Sickle Cell Crisis


Stated Complaint: KNEE AND BACK PAIN


Time Seen by Provider: 07/09/18 03:22


TRAVEL OUTSIDE OF THE [place] IN LAST 30 DAYS: No





- HPI


Notes: 





Patient is a 22-year-old male with history of sickle cell and avascular 

necrosis who presents to the ED complaining of another sickle cell crisis with b

/l knee pain, low back pain, and chest pain.  His pains do not radiate.  Pt 

states that his pain is typical for his sickle cell flare ups.  He has been 

eating and drinking without difficulties.  He has been urinating normal and 

having normal bowel movements.  No other concerns or complaints.  Denies any 

headache, fever, neck pain, URI, sore throat, palpitations, syncope, cough, 

shortness of breath, wheeze, dyspnea, abdominal pain, nausea/vomiting/diarrhea, 

urinary retention, dysuria, hematuria, loss of control of bowel or bladder, 

numbness/tingling, saddle anesthesia, muscle paralysis/weakness, or rash.











- Related Data


Allergies/Adverse Reactions: 


 





Coconut * [Coconut] Allergy (Mild, Verified 07/01/18 19:45)


 


transpore tape Allergy (Mild, Uncoded 07/01/18 19:45)


 Hives











Past Medical History





- Social History


Smoking Status: Never Smoker


Family History: Reviewed & Not Pertinent, Arthritis, DM, Hypertension, Other - 

Sickle cell trait both parents and asthma


Pulmonary Medical History: Reports: Hx Asthma, Hx Pneumonia


Renal/ Medical History: Denies: Hx Peritoneal Dialysis


Psychiatric Medical History: 


   Denies: Hx Depression


Past Surgical History: Reports: Hx Abdominal Surgery - Gallstones removal, Hx 

Cholecystectomy, Hx Orthopedic Surgery - Fluid drained from right foot, Hx 

Vascular Surgery - Port placement





- Immunizations


Immunizations up to date: Yes


Hx Diphtheria, Pertussis, Tetanus Vaccination: Yes


Hx Pneumococcal Vaccination: 05/16/13





Review of Systems





- Review of Systems


-: Yes All other systems reviewed and negative





Physical Exam





- Vital signs


Vitals: 


 











Temp Pulse Resp BP Pulse Ox


 


 98.3 F   100   16   136/89 H  92 


 


 07/09/18 03:07  07/09/18 03:07  07/09/18 03:07  07/09/18 03:07  07/09/18 03:07














- Notes


Notes: 





PHYSICAL EXAMINATION:





GENERAL: Well-appearing, well-nourished and in no acute distress.  A&ox4.  

Answers questions appropriately.





HEAD: Atraumatic, normocephalic.





EYES: Pupils equal round and reactive to light, extraocular movements intact, 

sclera anicteric, conjunctiva are normal.





ENT: Nares patent and without discharge.  oropharynx clear without exudates.  

No tonsilar hypertrophy or erythema.  Moist mucous membranes. 





NECK: Normal range of motion, supple without lymphadenopathy





LUNGS: Breath sounds clear to auscultation bilaterally and equal.  No wheezes 

rales or rhonchi.





HEART: Regular rate and rhythm without murmurs, rubs, gallops.





ABDOMEN: Soft, nontender, nondistended abdomen.  No guarding, no rebound.  No 

masses appreciated.  Normal bowel sounds present.  No CVA tenderness 

bilaterally.





Musculoskeletal: Knees b/l:  FROM to passive/active. Strength 5+/5. N/V intact 

distal.  Non-tender to palp. No obvious erythema, swelling, or ecchymosis/

warmth.





Extremities:  No cyanosis, clubbing, or edema b/l.  Peripheral pulses 2+.  

Capillary refill less than 3 seconds.





NEUROLOGICAL:  Normal speech, normal gait.  Normal sensory, motor exams 





PSYCH: Normal mood, normal affect.





SKIN: Warm, Dry, normal turgor, no rashes or lesions noted.











Course





- Re-evaluation


Re-evalutation: 





07/09/18 05:57


Patient is an afebrile, well-hydrated, 22-year-old male who presents to the ED 

with sickle cell crisis with minimal improvements in pain status post treatment 

here in the ED today.  Vitals are acceptable.  PE is otherwise unremarkable.  

Chest x-ray was unremarkable.  Reticulocyte count did increase from 11-14 and 

he did have a slight drop in hemoglobin from 10.2-9.6.  Reviewed with Dr. Calderon who advised admission.  Briefed case with Dr. Cleaning who is agreeable 

to admission at this time (Dr. Brady wanted me to check with hematology first).

  Dr. Brady accepted patient for Dr. Funez.  Pt in agreement with plan.





- Vital Signs


Vital signs: 


 











Temp Pulse Resp BP Pulse Ox


 


 98.3 F   100   16   136/89 H  92 


 


 07/09/18 03:07  07/09/18 03:07  07/09/18 03:07  07/09/18 03:07  07/09/18 03:07














- Laboratory


Result Diagrams: 


 07/09/18 03:35





 07/09/18 03:35


Laboratory results interpreted by me: 


 











  07/09/18 07/09/18





  03:35 03:35


 


RBC  2.69 L 


 


Hgb  9.6 L 


 


Hct  27.2 L 


 


MCV  101 H 


 


MCH  35.7 H 


 


RDW  30.2 H 


 


Abs Neuts (Manual)  0.0 L 


 


Abs Lymphs (Manual)  0.0 L 


 


Abs Monocytes (Manual)  0.0 L 


 


Retic Count (auto)  14.92 H 


 


Absolute Retic  0.401 H 


 


Sodium   148.9 H


 


Chloride   110 H


 


Total Bilirubin   2.5 H


 


Direct Bilirubin   0.5 H


 


AST   66 H














Discharge





- Discharge


Clinical Impression: 


 Sickle cell anemia with pain





Chest pain


Qualifiers:


 Chest pain type: unspecified Qualified Code(s): R07.9 - Chest pain, unspecified





Condition: Stable


Disposition: ADMITTED AS INPATIENT


Admitting Provider: Armin


Unit Admitted: Telemetry

## 2018-07-10 NOTE — PDOC PROGRESS REPORT
Subjective


Progress Note for:: 07/10/18


Subjective:: 





Patient continue to express significant pain. Reported agitation and 

tachycardia with Dilaudid. No fever or chills. Remain on supplemental oxygen 

via nasal cannula. No nausea, vomiting, or abdominal pain.


Reason For Visit: 


SS HEMOGLOBIN SICKLE CELL DISEASE IN PAIN AND








Physical Exam


Vital Signs: 


 











Temp Pulse Resp BP Pulse Ox


 


 99.8 F   122 H  20   145/86 H  91 L


 


 07/10/18 04:00  07/10/18 04:00  07/10/18 04:00  07/10/18 04:00  07/10/18 04:00








 Intake & Output











 07/09/18 07/10/18 07/11/18





 06:59 06:59 06:59


 


Intake Total  5640 


 


Output Total  2575 


 


Balance  3065 


 


Weight  60 kg 











General appearance: PRESENT: mild distress - due to pain crisis from sickle ce;

ll disease


Head exam: PRESENT: atraumatic, normocephalic


Eye exam: ABSENT: scleral icterus


Mouth exam: PRESENT: moist


Respiratory exam: PRESENT: clear to auscultation brenda


Cardiovascular exam: PRESENT: RRR, +S1, +S2, tachycardia


Vascular exam: PRESENT: normal capillary refill.  ABSENT: pallor


GI/Abdominal exam: PRESENT: normal bowel sounds, soft.  ABSENT: distended, 

guarding, mass, organolmegaly, rebound, tenderness


Extremities exam: ABSENT: pedal edema


Musculoskeletal exam: PRESENT: normal inspection


Neurological exam: PRESENT: alert, awake, oriented to person, oriented to place

, oriented to time, oriented to situation, CN II-XII grossly intact.  ABSENT: 

motor sensory deficit


Psychiatric exam: PRESENT: appropriate affect, normal mood.  ABSENT: homicidal 

ideation, suicidal ideation


Skin exam: PRESENT: dry, intact, warm.  ABSENT: cyanosis, rash





Results


Laboratory Results: 


 





 07/10/18 06:37 





 07/10/18 06:37 





 











  07/09/18 07/10/18 07/10/18





  21:18 06:37 06:37


 


WBC  15.7 H D  13.3 H 


 


RBC  2.12 L  2.29 L 


 


Hgb  7.5 L D  8.1 L 


 


Hct  20.8 L  22.1 L 


 


MCV  98 H  97 


 


MCH  35.3 H  35.2 H 


 


MCHC  35.9  36.5 H 


 


RDW  28.8 H  30.6 H 


 


Plt Count  185  209 


 


Seg Neutrophils %  Not Reportable  Not Reportable 


 


Lymphocytes %  Not Reportable  Not Reportable 


 


Monocytes %  Not Reportable  Not Reportable 


 


Eosinophils %  Not Reportable  Not Reportable 


 


Basophils %  Not Reportable  Not Reportable 


 


Absolute Neutrophils  Not Reportable  Not Reportable 


 


Absolute Lymphocytes  Not Reportable  Not Reportable 


 


Absolute Monocytes  Not Reportable  Not Reportable 


 


Absolute Eosinophils  Not Reportable  Not Reportable 


 


Absolute Basophils  Not Reportable  Not Reportable 


 


Sodium    139.6


 


Potassium    4.8


 


Chloride    103


 


Carbon Dioxide    27


 


Anion Gap    10


 


BUN    12


 


Creatinine    0.64


 


Est GFR ( Amer)    > 60


 


Est GFR (Non-Af Amer)    > 60


 


Glucose    109


 


Calcium    8.9


 


Total Bilirubin    3.7 H


 


AST    110 H


 


ALT    26


 


Alkaline Phosphatase    118


 


Total Protein    7.8


 


Albumin    4.1














Assessment & Plan





- Diagnosis


(1) Sickle cell disease with crisis


Is this a current diagnosis for this admission?: Yes   


Plan: 


See attending physician orders. D/C Dilaudid. Start on IV Morphine 2 mg q3 

hours prn for pain management.








(2) Sinus tachycardia


Is this a current diagnosis for this admission?: Yes   


Plan: 


See attending physician orders.








(3) Asthma


Qualifiers: 


   Asthma severity: mild   Asthma complication type: uncomplicated 


Is this a current diagnosis for this admission?: Yes   


Plan: 


See attending physician orders.








- Time


Time Spent with patient: 25-34 minutes


Medications reviewed and adjusted accordingly: Yes


Anticipated discharge: Home


Within: Other





- Inpatient Certification


Based on my medical assessment, after consideration of the patient's 

comorbidities, presenting symptoms, or acuity I expect that the services needed 

warrant INPATIENT care.: Yes


I certify that my determination is in accordance with my understanding of 

Medicare's requirements for reasonable and necessary INPATIENT services [42 CFR 

412.3e].: Yes


Medical Necessity: Need Close Monitoring Due to Risk of Patient Decompensation, 

Need For IV Fluids, Need For Continuous Telemetry Monitoring, Need for Pain 

Control, Risk of Complication if Not Cared For in Hospital


Post Hospital Care: D/C Planner Documentation





- Plan Summary


Plan Summary: 





See attending physician orders.

## 2018-07-11 NOTE — PDOC PROGRESS REPORT
Subjective


Progress Note for:: 07/11/18


Subjective:: 





Patient continue to express significant pain. Reported temperature elevation 

overnight which has improved with Ibuprofen and cooling blanket. No chills. 

Remain on supplemental oxygen via nasal cannula. No nausea, vomiting, or 

abdominal pain.


Reason For Visit: 


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








Physical Exam


Vital Signs: 


 











Temp Pulse Resp BP Pulse Ox


 


 98.6 F   123 H  17   149/101 H  99 


 


 07/11/18 03:29  07/11/18 06:50  07/11/18 03:29  07/11/18 03:29  07/11/18 03:29








 Intake & Output











 07/10/18 07/11/18 07/12/18





 06:59 06:59 06:59


 


Intake Total 5640 3949 


 


Output Total 2575 2650 


 


Balance 3065 1299 


 


Weight 60 kg 59.7 kg 











Physical Exam: 


General appearance: PRESENT: mild distress - due to pain crisis from sickle ce;

ll disease


Head exam: PRESENT: atraumatic, normocephalic


Eye exam: ABSENT: scleral icterus


Mouth exam: PRESENT: moist


Respiratory exam: PRESENT: clear to auscultation brenda


Cardiovascular exam: PRESENT: RRR, +S1, +S2, tachycardia


Vascular exam: PRESENT: normal capillary refill.  ABSENT: pallor


GI/Abdominal exam: PRESENT: normal bowel sounds, soft.  ABSENT: distended, 

guarding, mass, organolmegaly, rebound, tenderness


Extremities exam: ABSENT: pedal edema


Musculoskeletal exam: PRESENT: normal inspection


Neurological exam: PRESENT: alert, awake, oriented to person, oriented to place

, oriented to time, oriented to situation, CN II-XII grossly intact.  ABSENT: 

motor sensory deficit


Psychiatric exam: PRESENT: appropriate affect, normal mood.  ABSENT: homicidal 

ideation, suicidal ideation


Skin exam: PRESENT: dry, intact, warm.  ABSENT: cyanosis, rash





Results


Laboratory Results: 


 





 07/11/18 04:04 





 07/11/18 04:04 





 











  07/11/18 07/11/18





  04:04 04:04


 


WBC  10.8 H 


 


RBC  2.53 L 


 


Hgb  8.9 L 


 


Hct  25.0 L 


 


MCV  99 H 


 


MCH  35.4 H 


 


MCHC  35.8 


 


RDW  28.2 H 


 


Plt Count  213 


 


Seg Neutrophils %  Not Reportable 


 


Lymphocytes %  Not Reportable 


 


Monocytes %  Not Reportable 


 


Eosinophils %  Not Reportable 


 


Basophils %  Not Reportable 


 


Absolute Neutrophils  Not Reportable 


 


Absolute Lymphocytes  Not Reportable 


 


Absolute Monocytes  Not Reportable 


 


Absolute Eosinophils  Not Reportable 


 


Absolute Basophils  Not Reportable 


 


Sodium   143.9


 


Potassium   4.3


 


Chloride   105


 


Carbon Dioxide   30


 


Anion Gap   9


 


BUN   8


 


Creatinine   0.53


 


Est GFR ( Amer)   > 60


 


Est GFR (Non-Af Amer)   > 60


 


Glucose   109


 


Calcium   8.6


 


Total Bilirubin   3.2 H


 


AST   92 H


 


ALT   28


 


Alkaline Phosphatase   150 H


 


Total Protein   7.4


 


Albumin   3.9














Assessment & Plan





- Diagnosis


(1) Sickle cell disease with crisis


Is this a current diagnosis for this admission?: Yes   


Plan: 


See attending physician orders. Maintain on IV Morphine 2 mg q 2 hours for pain 

management. Patient do remain on his preadmission Fentanyl patch therapy at 

75mcg/hour q72 hours.








(2) Sinus tachycardia


Is this a current diagnosis for this admission?: Yes   


Plan: 


Continue IV hydration support. See attending physician orders.








(3) Asthma


Qualifiers: 


   Asthma severity: mild   Asthma complication type: uncomplicated 


Is this a current diagnosis for this admission?: Yes   


Plan: 


Continue current medication management. See attending physician orders.








- Time


Time Spent with patient: 25-34 minutes


Medications reviewed and adjusted accordingly: Yes


Anticipated discharge: Home


Within: Other





- Inpatient Certification


Based on my medical assessment, after consideration of the patient's 

comorbidities, presenting symptoms, or acuity I expect that the services needed 

warrant INPATIENT care.: Yes


I certify that my determination is in accordance with my understanding of 

Medicare's requirements for reasonable and necessary INPATIENT services [42 CFR 

412.3e].: Yes


Medical Necessity: Need Close Monitoring Due to Risk of Patient Decompensation, 

Need For IV Fluids, Need For Continuous Telemetry Monitoring, Need for Pain 

Control, Risk of Complication if Not Cared For in Hospital


Post Hospital Care: D/C Planner Documentation





- Plan Summary


Plan Summary: 





See attending physician orders.

## 2018-07-12 NOTE — RADIOLOGY REPORT (SQ)
EXAM DESCRIPTION:  CHEST 2 VIEWS



COMPLETED DATE/TIME:  7/12/2018 8:58 am



REASON FOR STUDY:  SS Hemoglobin Sickle cell Disease; Chest Pain



COMPARISON:  Two-view chest 7/9/2018, 7/1/2018,

CT angio chest 6/22/2018

CT abdomen pelvis 3/26/2016



EXAM PARAMETERS:  NUMBER OF VIEWS: two views

TECHNIQUE: Digital Frontal and Lateral radiographic views of the chest acquired.

RADIATION DOSE: NA

LIMITATIONS: none



FINDINGS:  LUNGS AND PLEURA: Chronic scarring in the posteromedial right and left lung bases.

No fluffy alveolar infiltrates worrisome for edema or pneumonia.  No pleural effusion.  No pneumothor
ax.

MEDIASTINUM AND HILAR STRUCTURES: No masses or contour abnormalities.

HEART AND VASCULAR STRUCTURES: Heart normal size.  No evidence for failure.

BONES: Diffuse sclerosis and characteristic biconcave vertebral body endplate changes from sickle juana
l

HARDWARE: None in the chest.

OTHER: No other significant finding.



IMPRESSION:  No acute findings.  Chronic scarring in the medial right and left lung bases



TECHNICAL DOCUMENTATION:  JOB ID:  [id number]

 2011 [employer]- All Rights Reserved



Reading location - IP/workstation name: [facility]

## 2018-07-12 NOTE — PDOC PROGRESS REPORT
Subjective


Progress Note for:: 07/12/18


Subjective:: 


Patient reported chest pain this morning. There is recurrent fever with 

temperature of 100.5F this morning. No chills. Remain on supplemental oxygen 

via nasal cannula. No nausea, vomiting, or abdominal pain.


Reason For Visit: 


SS HEMOGLOBIN SICKLE CELL DISEASE IN PAIN AND








Physical Exam


Vital Signs: 


 











Temp Pulse Resp BP Pulse Ox


 


 100.6 F H  122 H  18   159/105 H  99 


 


 07/12/18 07:28  07/12/18 07:28  07/12/18 07:28  07/12/18 07:28  07/12/18 07:28








 Intake & Output











 07/11/18 07/12/18 07/13/18





 06:59 06:59 06:59


 


Intake Total 3949 5741 


 


Output Total 2650 3625 


 


Balance 1299 2116 


 


Weight 59.7 kg 62.3 kg 











Physical Exam: 


General appearance: PRESENT: mild distress - due to pain crisis from sickle ce;

ll disease


Head exam: PRESENT: atraumatic, normocephalic


Eye exam: ABSENT: scleral icterus


Mouth exam: PRESENT: moist


Respiratory exam: PRESENT: clear to auscultation brenda


Cardiovascular exam: PRESENT: RRR, +S1, +S2, tachycardia


Vascular exam: PRESENT: normal capillary refill.  ABSENT: pallor


GI/Abdominal exam: PRESENT: normal bowel sounds, soft.  ABSENT: distended, 

guarding, mass, organomegaly, rebound, tenderness


Extremities exam: ABSENT: pedal edema


Musculoskeletal exam: PRESENT: normal inspection


Neurological exam: PRESENT: alert, awake, oriented to person, oriented to place

, oriented to time, oriented to situation, CN II-XII grossly intact.  ABSENT: 

motor sensory deficit


Psychiatric exam: PRESENT: appropriate affect, normal mood.  ABSENT: homicidal 

ideation, suicidal ideation


Skin exam: PRESENT: dry, intact, warm.  ABSENT: cyanosis, rash





Results


Laboratory Results: 


 





 07/12/18 05:23 





 07/12/18 05:23 





 











  07/12/18 07/12/18





  05:23 05:23


 


WBC  10.3 


 


RBC  2.47 L 


 


Hgb  8.6 L 


 


Hct  24.4 L 


 


MCV  99 H 


 


MCH  34.7 H 


 


MCHC  35.2 


 


RDW  25.2 H 


 


Plt Count  233 


 


Seg Neutrophils %  Not Reportable 


 


Lymphocytes %  Not Reportable 


 


Monocytes %  Not Reportable 


 


Eosinophils %  Not Reportable 


 


Basophils %  Not Reportable 


 


Absolute Neutrophils  Not Reportable 


 


Absolute Lymphocytes  Not Reportable 


 


Absolute Monocytes  Not Reportable 


 


Absolute Eosinophils  Not Reportable 


 


Absolute Basophils  Not Reportable 


 


Sodium   139.3


 


Potassium   4.2


 


Chloride   102


 


Carbon Dioxide   29


 


Anion Gap   8


 


BUN   6 L


 


Creatinine   0.48 L


 


Est GFR ( Amer)   > 60


 


Est GFR (Non-Af Amer)   > 60


 


Glucose   125 H


 


Calcium   8.5


 


Total Bilirubin   2.7 H


 


AST   63 H


 


ALT   26


 


Alkaline Phosphatase   150 H


 


Total Protein   7.1


 


Albumin   3.5














Assessment & Plan





- Diagnosis


(1) Sickle cell disease with crisis


Is this a current diagnosis for this admission?: Yes   


Plan: 


See attending physician orders. Continue current medication management. Repeat 

chest X ray PA and Lateral views for further evaluation of his new chest pain 

report.








(2) Sinus tachycardia


Is this a current diagnosis for this admission?: Yes   


Plan: 


Maintain on IV hydration support. See attending physician orders.








(3) Asthma


Qualifiers: 


   Asthma severity: mild   Asthma complication type: uncomplicated 


Is this a current diagnosis for this admission?: Yes   


Plan: 


Maintain on current medication management. See attending physician orders.








- Time


Time Spent with patient: 25-34 minutes


Medications reviewed and adjusted accordingly: Yes


Anticipated discharge: Home


Within: Other





- Inpatient Certification


Based on my medical assessment, after consideration of the patient's 

comorbidities, presenting symptoms, or acuity I expect that the services needed 

warrant INPATIENT care.: Yes


I certify that my determination is in accordance with my understanding of 

Medicare's requirements for reasonable and necessary INPATIENT services [42 CFR 

412.3e].: Yes


Medical Necessity: Need Close Monitoring Due to Risk of Patient Decompensation, 

Need For IV Fluids, Need For Continuous Telemetry Monitoring, Need for Pain 

Control, Risk of Complication if Not Cared For in Hospital


Post Hospital Care: D/C Planner Documentation





- Plan Summary


Plan Summary: 





See attending physician orders.

## 2018-07-13 NOTE — PDOC PROGRESS REPORT
Subjective


Progress Note for:: 07/13/18


Subjective:: 


Patient reported persistent multiple joints aches and pain. There is incident 

nasal bleeding. No chest pain or difficulty with breathing. No fever or chills. 

Remain on supplemental oxygen via nasal cannula. No nausea, vomiting, or 

abdominal pain.


Reason For Visit: 


SS HEMOGLOBIN SICKLE CELL DISEASE IN PAIN AND








Physical Exam


Vital Signs: 


 











Temp Pulse Resp BP Pulse Ox


 


 100.4 F   140 H  16   139/87 H  99 


 


 07/13/18 16:15  07/13/18 16:15  07/13/18 16:15  07/13/18 16:15  07/13/18 16:15








 Intake & Output











 07/12/18 07/13/18 07/14/18





 06:59 06:59 06:59


 


Intake Total 5741 4458 772


 


Output Total 3625 3050 550


 


Balance 2116 1408 222


 


Weight 62.3 kg 63.9 kg 











Physical Exam: 


General appearance: PRESENT: mild distress - due to pain crisis from sickle ce;

ll disease


Head exam: PRESENT: atraumatic, normocephalic


Eye exam: ABSENT: scleral icterus


Mouth exam: PRESENT: moist


Respiratory exam: PRESENT: clear to auscultation brenda


Cardiovascular exam: PRESENT: RRR, +S1, +S2, tachycardia


Vascular exam: PRESENT: normal capillary refill.  ABSENT: pallor


GI/Abdominal exam: PRESENT: normal bowel sounds, soft.  ABSENT: distended, 

guarding, mass, organomegaly, rebound, tenderness


Extremities exam: ABSENT: pedal edema


Musculoskeletal exam: PRESENT: normal inspection


Neurological exam: PRESENT: alert, awake, oriented to person, oriented to place

, oriented to time, oriented to situation, CN II-XII grossly intact.  ABSENT: 

motor sensory deficit


Psychiatric exam: PRESENT: appropriate affect, normal mood.  ABSENT: homicidal 

ideation, suicidal ideation


Skin exam: PRESENT: dry, intact, warm.  ABSENT: cyanosis, rash





Results


Laboratory Results: 


 





 07/13/18 05:48 





 07/13/18 05:48 





 











  07/13/18 07/13/18 07/13/18





  05:48 05:48 09:06


 


WBC  7.6  


 


RBC  2.07 L  


 


Hgb  7.2 L  


 


Hct  20.4 L  


 


MCV  98 H  


 


MCH  35.0 H  


 


MCHC  35.6  


 


RDW  24.7 H  


 


Plt Count  231  


 


Seg Neutrophils %  Not Reportable  


 


Lymphocytes %  Not Reportable  


 


Monocytes %  Not Reportable  


 


Eosinophils %  Not Reportable  


 


Basophils %  Not Reportable  


 


Absolute Neutrophils  Not Reportable  


 


Absolute Lymphocytes  Not Reportable  


 


Absolute Monocytes  Not Reportable  


 


Absolute Eosinophils  Not Reportable  


 


Absolute Basophils  Not Reportable  


 


Sodium   141.1 


 


Potassium   4.2 


 


Chloride   102 


 


Carbon Dioxide   31 H 


 


Anion Gap   8 


 


BUN   9 


 


Creatinine   0.47 L 


 


Est GFR ( Amer)   > 60 


 


Est GFR (Non-Af Amer)   > 60 


 


Glucose   147 H 


 


Calcium   8.1 L 


 


Total Bilirubin   2.3 H 


 


AST   46 


 


ALT   27 


 


Alkaline Phosphatase   116 


 


Total Protein   6.2 L 


 


Albumin   3.1 L 


 


Blood Type    A POSITIVE


 


Antibody Screen    NEGATIVE











Impressions: 


 





Chest X-Ray  07/12/18 00:00


IMPRESSION:  No acute findings.  Chronic scarring in the medial right and left 

lung bases


 














Assessment & Plan





- Diagnosis


(1) Sickle cell disease with crisis


Is this a current diagnosis for this admission?: Yes   





(2) Sinus tachycardia


Is this a current diagnosis for this admission?: Yes   





(3) Asthma


Qualifiers: 


   Asthma severity: mild   Asthma complication type: uncomplicated 


Is this a current diagnosis for this admission?: Yes   





- Time


Time Spent with patient: 25-34 minutes


Medications reviewed and adjusted accordingly: Yes


Anticipated discharge: Home


Within: Other





- Inpatient Certification


Based on my medical assessment, after consideration of the patient's 

comorbidities, presenting symptoms, or acuity I expect that the services needed 

warrant INPATIENT care.: Yes


I certify that my determination is in accordance with my understanding of 

Medicare's requirements for reasonable and necessary INPATIENT services [42 CFR 

412.3e].: Yes


Medical Necessity: Need Close Monitoring Due to Risk of Patient Decompensation, 

Need For IV Fluids, Need For Continuous Telemetry Monitoring, Need for Pain 

Control, Risk of Complication if Not Cared For in Hospital


Post Hospital Care: D/C Planner Documentation





- Plan Summary


Plan Summary: 


See attending physician orders. Patient will be transfused 2 units PRBC for 

downward trend of his hemoglobin.

## 2018-07-14 NOTE — PDOC PROGRESS REPORT
Subjective


Progress Note for:: 07/14/18


Subjective:: 





Patient was seen by the bedside he has swelling of the left calf, start venous 

Doppler negative for DVT, admitted for sickle cell crisis


Reason For Visit: 


SS HEMOGLOBIN SICKLE CELL DISEASE IN PAIN AND








Physical Exam


Vital Signs: 


 











Temp Pulse Resp BP Pulse Ox


 


 98.6 F   102 H  20   130/93 H  100 


 


 07/14/18 03:37  07/14/18 14:00  07/14/18 03:37  07/14/18 03:37  07/14/18 03:37








 Intake & Output











 07/13/18 07/14/18 07/15/18





 06:59 06:59 06:59


 


Intake Total 4458 4262 


 


Output Total 3050 2450 


 


Balance 1408 1812 


 


Weight 63.9 kg 63.2 kg 











General appearance: PRESENT: no acute distress


Eye exam: PRESENT: PERRLA


Respiratory exam: PRESENT: clear to auscultation brenda


Cardiovascular exam: PRESENT: +S1, +S2


GI/Abdominal exam: PRESENT: soft


Neurological exam: PRESENT: alert





Results


Laboratory Results: 


 





 07/14/18 01:50 





 07/13/18 05:48 





 











  07/13/18 07/14/18





  09:06 01:50


 


WBC   5.7


 


RBC   2.94 L


 


Hgb   9.7 L D


 


Hct   27.3 L


 


MCV   93  D


 


MCH   32.7


 


MCHC   35.2


 


RDW   24.6 H


 


Plt Count   251


 


Blood Type  A POSITIVE 


 


Antibody Screen  NEGATIVE 











Impressions: 


 





Chest X-Ray  07/12/18 00:00


IMPRESSION:  No acute findings.  Chronic scarring in the medial right and left 

lung bases


 














Assessment & Plan





- Diagnosis


(1) Sickle cell crisis


Is this a current diagnosis for this admission?: Yes   





(2) Sinus tachycardia


Is this a current diagnosis for this admission?: Yes   





(3) Tachycardia


Is this a current diagnosis for this admission?: Yes

## 2018-07-15 NOTE — PDOC PROGRESS REPORT
Subjective


Progress Note for:: 07/15/18


Subjective:: 





Patient has no new complaints, continue pain medication


Reason For Visit: 


SS HEMOGLOBIN SICKLE CELL DISEASE IN PAIN AND








Physical Exam


Vital Signs: 


 











Temp Pulse Resp BP Pulse Ox


 


 99.1 F   98   18   148/98 H  100 


 


 07/15/18 08:05  07/15/18 13:53  07/15/18 08:05  07/15/18 08:05  07/15/18 08:05








 Intake & Output











 07/14/18 07/15/18 07/16/18





 06:59 06:59 06:59


 


Intake Total 4262 4596 900


 


Output Total 2450 4625 700


 


Balance 1812 -29 200


 


Weight 63.2 kg 63.3 kg 











General appearance: PRESENT: no acute distress


Eye exam: PRESENT: PERRLA


Respiratory exam: PRESENT: clear to auscultation brenda


Cardiovascular exam: PRESENT: +S1, +S2


GI/Abdominal exam: PRESENT: soft


Neurological exam: PRESENT: alert





Results


Laboratory Results: 


 





 07/14/18 01:50 





 07/13/18 05:48 








Impressions: 


 





Chest X-Ray  07/12/18 00:00


IMPRESSION:  No acute findings.  Chronic scarring in the medial right and left 

lung bases


 














Assessment & Plan





- Diagnosis


(1) Sickle cell crisis


Is this a current diagnosis for this admission?: Yes   





(2) Sinus tachycardia


Is this a current diagnosis for this admission?: Yes   





(3) Tachycardia


Is this a current diagnosis for this admission?: Yes

## 2018-07-15 NOTE — XCELERA REPORT
[address] [address]

                             Tel: [phone]

                             Fax: [phone]



                     Lower Extremity Venous Evaluation

____________________________________________________________________________



Name: CARMEN BEUGM

MRN: [id number]                Age: 22 yrs

Gender: Male                   : 1995

Patient Status: Inpatient      Patient Location: [address] [place] [address]

Account #: [id number]

Study Date: 2018 01:22 PM

Accession #: [id number]

____________________________________________________________________________



Procedure: Color flow and duplex imaging of the veins of the left lower

extremity as well as the right Common Femoral vein.

Reason For Study: leg swelling and pain





Ordering Physician: DIANNA OROZCO

Performed By: Tigre Johnson

____________________________________________________________________________



____________________________________________________________________________





Right Sided Venous Evaluation

The right common femoral vein is fully compressible. Spontaneous and phasic

flow is present in the right common femoral vein.



Left Sided Venous Evaluation

Normal vessel filling wall to wall, compression and augmentation as well as

Colour flow down to the infrageniculate veins.

____________________________________________________________________________





Interpretation Summary

No duplex evidence of DVT or obstruction in the left lower extremity nor in

the right Common Femoral vein.

____________________________________________________________________________



____________________________________________________________________________



Electronically signed by:      Lennox Williams      on 07/15/2018 12:39 PM



CC: DIANNA OROZCO

>

Williams, Lennox

## 2018-07-16 NOTE — PDOC PROGRESS REPORT
Subjective


Progress Note for:: 07/16/18


Subjective:: 


Patient denied chest pain or difficulty with breathing. No fever or chills. 

Remain on supplemental oxygen via nasal cannula. No nausea, vomiting, or 

abdominal pain. His multiple joints aches and pain fairly controlled. He was 

able to ambulate and participate in self care. There is report of possible bone 

marrow transplant donor through [facility] as per his 

mother this evening.


Reason For Visit: 


SS HEMOGLOBIN SICKLE CELL DISEASE IN PAIN AND








Physical Exam


Vital Signs: 


 











Temp Pulse Resp BP Pulse Ox


 


 98.3 F   101 H  14   114/86 H  95 


 


 07/16/18 15:10  07/16/18 15:10  07/16/18 15:10  07/16/18 15:10  07/16/18 15:10








 Intake & Output











 07/15/18 07/16/18 07/17/18





 06:59 06:59 06:59


 


Intake Total 4596 5093 900


 


Output Total 4625 2600 1000


 


Balance -29 2493 -100


 


Weight 63.3 kg 63.7 kg 











Physical Exam: 


General appearance: PRESENT: No acute distress


Head exam: PRESENT: atraumatic, normocephalic


Eye exam: ABSENT: scleral icterus


Mouth exam: PRESENT: moist


Respiratory exam: PRESENT: clear to auscultation brenda


Cardiovascular exam: PRESENT: RRR, +S1, +S2, tachycardia


Vascular exam: PRESENT: normal capillary refill.  ABSENT: pallor


GI/Abdominal exam: PRESENT: normal bowel sounds, soft.  ABSENT: distended, 

guarding, mass, organomegaly, rebound, tenderness


Extremities exam: ABSENT: pedal edema


Musculoskeletal exam: PRESENT: normal inspection


Neurological exam: PRESENT: alert, awake, oriented to person, oriented to place

, oriented to time, oriented to situation, CN II-XII grossly intact.  ABSENT: 

motor sensory deficit


Psychiatric exam: PRESENT: appropriate affect, normal mood.  ABSENT: homicidal 

ideation, suicidal ideation


Skin exam: PRESENT: dry, intact, warm.  ABSENT: cyanosis, rash








Results


Laboratory Results: 


 





 07/14/18 01:50 





 07/13/18 05:48 








Impressions: 


 





Chest X-Ray  07/12/18 00:00


IMPRESSION:  No acute findings.  Chronic scarring in the medial right and left 

lung bases


 














Assessment & Plan





- Diagnosis


(1) Sickle cell disease with crisis


Is this a current diagnosis for this admission?: Yes   


Plan: 


See attending physician orders.  D/C IV Morphine. Restart on preadmission pain 

management regimen.








(2) Sinus tachycardia


Is this a current diagnosis for this admission?: Yes   


Plan: 


Maintain on IV hydration support. See attending physician orders.








(3) Asthma


Qualifiers: 


   Asthma severity: mild   Asthma complication type: uncomplicated 


Is this a current diagnosis for this admission?: Yes   


Plan: 


Maintain on current medication management. See attending physician orders.








- Time


Time Spent with patient: 25-34 minutes


Medications reviewed and adjusted accordingly: Yes


Anticipated discharge: Home


Within: within 24 hours





- Inpatient Certification


Based on my medical assessment, after consideration of the patient's 

comorbidities, presenting symptoms, or acuity I expect that the services needed 

warrant INPATIENT care.: Yes


I certify that my determination is in accordance with my understanding of 

Medicare's requirements for reasonable and necessary INPATIENT services [42 CFR 

412.3e].: Yes


Medical Necessity: Need Close Monitoring Due to Risk of Patient Decompensation, 

Need For IV Fluids, Need For Continuous Telemetry Monitoring, Need for Pain 

Control, Risk of Complication if Not Cared For in Hospital


Post Hospital Care: D/C Planner Documentation





- Plan Summary


Plan Summary: 





See attending physician orders.

## 2018-07-17 NOTE — PDOC DISCHARGE SUMMARY
General





- Admit/Disc Date/PCP


Admission Date/Primary Care Provider: 


  07/09/18 06:09





  DIYA PHILIPPE





Discharge Date: 07/17/18





- Discharge Diagnosis


(1) Sickle cell disease with crisis


Is this a current diagnosis for this admission?: Yes   


Summary: 


Improved recurrent pain and hemolytic crises. Continue on preadmission 

medications. Stable for discharge home today.








(2) Sinus tachycardia


Is this a current diagnosis for this admission?: Yes   


Summary: 


Improved and related to his acute pain and hemolytic crises.








(3) Asthma


Is this a current diagnosis for this admission?: Yes   


Summary: 


Continue on preadmission medications.








- Additional Information


Resuscitation Status: Full Code


Discharge Diet: Regular


Discharge Activity: Activity As Tolerated


Home Medications: 








Albuterol Sulfate [Proair HFA] 2 puff IH Q4H 07/09/18 


Enoxaparin Sodium [Lovenox Inj 40 mg/0.4 ml Disp.syrin] 40 mg SQ DAILY 07/09/18 


Fentanyl [Duragesic 75 Mcg/Hr Transdermal Patch] 75 mcg TOP Q3D 07/09/18 


Folic Acid [Folvite 1 mg Tablet] 1 mg PO DAILY 07/09/18 


Hydroxyurea [Hydrea 500 mg Capsule] 1,000 mg PO SUTUTHSA@1000 07/09/18 


Hydroxyurea [Hydrea 500 mg Capsule] 1,500 mg PO MOWEFR@1000 07/09/18 


Oxycodone HCl [Oxy-Ir 5 mg Tablet] 15 mg PO Q4HP PRN 07/09/18 











History of Present Illness


History of Present Illness: 


CARMEN BEGUM is a 22 year old male known to my practice who presented to the 

ED with complain on new onset worsening chest, lower back and bilateral knee 

joint pain. Patient claimed compliance with his pre-admission pain medication 

as well as his sickle cell disease medication management. He denied any acute 

onset fever, chills, coughing, nasal or sinus congestion, nausea, vomiting, 

diarrhea or abdominal pain, headache, dizziness, numbness, tingling, or focal 

weakness. He denied any genitourinary symptoms to suggest ongoing infection. 

Due to worsening pain intensity and minimal response to ED treatment including 

IV Dilaudid and IV fluid, he was advised hospitalization for further evaluation 

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





Hospital Course


Hospital Course: 


He was managed with IV opiate including Dilaudid and eventually Morphine 

sulfate. He remain on Fentanyl patch at 75 mcg / hr q 72 hours while on 

admission. He was supported with IV Normal Saline infusion as required. He was 

eventually transfused 2 units of PRBC due to worsening hemoglobin secondary to 

hemolytic crisis. His pain was eventually adequately controlled and with 

improvement he was able to participate in self care. He was transition to oral 

opiate pain management regimen at preadmission level and he has remain fairly 

stable. He is agreeable to discharge home today. He will follow up in the 

office as instructed upon discharge. He will follow up at [facility] as arranged regarding bone marrow transplant in the management o 

his SS Hemoglobin sickle Cell disease.





Physical Exam


Vital Signs: 


 











Temp Pulse Resp BP Pulse Ox


 


 98.2 F   107 H  16   127/81 H  98 


 


 07/17/18 11:37  07/17/18 14:00  07/17/18 07:59  07/17/18 11:37  07/17/18 11:37








 Intake & Output











 07/16/18 07/17/18 07/18/18





 06:59 06:59 06:59


 


Intake Total 5093 4378 2275


 


Output Total 2600 3650 2160


 


Balance 2493 728 115


 


Weight 63.7 kg 61.1 kg 











Physical Exam: 


General appearance: PRESENT: No acute distress


Head exam: PRESENT: atraumatic, normocephalic


Eye exam: ABSENT: scleral icterus


Mouth exam: PRESENT: moist


Respiratory exam: PRESENT: clear to auscultation brenda


Cardiovascular exam: PRESENT: RRR, +S1, +S2, tachycardia


Vascular exam: PRESENT: normal capillary refill.  ABSENT: pallor


GI/Abdominal exam: PRESENT: normal bowel sounds, soft.  ABSENT: distended, 

guarding, mass, organomegaly, rebound, tenderness


Extremities exam: ABSENT: pedal edema


Musculoskeletal exam: PRESENT: normal inspection


Neurological exam: PRESENT: alert, awake, oriented to person, oriented to place

, oriented to time, oriented to situation, CN II-XII grossly intact.  ABSENT: 

motor sensory deficit


Psychiatric exam: PRESENT: appropriate affect, normal mood.  ABSENT: homicidal 

ideation, suicidal ideation


Skin exam: PRESENT: dry, intact, warm.  ABSENT: cyanosis, rash





Results


Laboratory Results: 


 





 07/14/18 01:50 





 07/13/18 05:48 








Impressions: 


 





Chest X-Ray  07/12/18 00:00


IMPRESSION:  No acute findings.  Chronic scarring in the medial right and left 

lung bases


 














Qualifiers





- *


PATIENT BEING DISCHARGED WITH ANY OF THE FOLLOWING DIAGNOSIS: No





Plan


Discharge Plan: 


D/C home today. Follow up with me in the office as instructed upon discharge. 

Patient will followup at [facility] as instructed with bone 

marrow transplant team.

## 2018-08-04 NOTE — ER DOCUMENT REPORT
ED General





- General


Chief Complaint: Sickle Cell Crisis


Stated Complaint: KNEE/ARM PAIN


Time Seen by Provider: 08/04/18 00:28


Notes: 





Patient is a pleasant 22-year-old male with history of sickle cell disease who 

is well-known to ER who presents with complaint of bilateral knee pain.  Says 

pain started today.  Pain is very typical of his sickle cell exacerbations.  He 

takes oxycodone 50 mg at home.  He says has not been controlling his pain today 

and therefore is come to the ER.  No chest pain.  No shortness of breath.  No 

cough or congestion.  No fevers.  No abdominal pain.  He does have previous 

history of acute chest syndrome.  He still has his gallbladder and spleen.  No 

other complaints at this time.


TRAVEL OUTSIDE OF THE [place] IN LAST 30 DAYS: No





- Related Data


Allergies/Adverse Reactions: 


 





Coconut * [Coconut] Allergy (Mild, Verified 07/01/18 19:45)


 


transpore tape Allergy (Mild, Uncoded 07/01/18 19:45)


 Hives











Past Medical History





- Social History


Smoking Status: Never Smoker


Frequency of alcohol use: None


Drug Abuse: None


Family History: Reviewed & Not Pertinent, Arthritis, DM, Hypertension, Other - 

Sickle cell trait both parents and asthma


Pulmonary Medical History: Reports: Hx Asthma, Hx Pneumonia


Renal/ Medical History: Denies: Hx Peritoneal Dialysis


Psychiatric Medical History: 


   Denies: Hx Depression


Past Surgical History: Reports: Hx Abdominal Surgery - Gallstones removal, Hx 

Cholecystectomy, Hx Orthopedic Surgery - Fluid drained from right foot, Hx 

Vascular Surgery - Port placement





- Immunizations


Immunizations up to date: Yes


Hx Diphtheria, Pertussis, Tetanus Vaccination: Yes


Hx Pneumococcal Vaccination: 05/16/13





Review of Systems





- Review of Systems


Notes: 





My Normal Review Basic





REVIEW OF SYSTEMS:


CONSTITUTIONAL :  Denies fever,  chills, or sweats.  Denies recent illness.


EENT:   Denies eye, ear, throat, or mouth pain or symptoms.  Denies nasal or 

sinus congestion.


CARDIOVASCULAR:  Denies chest pain.


RESPIRATORY:  Denies cough, cold, or chest congestion.  Denies shortness of 

breath, difficulty breathing, or wheezing.


GASTROINTESTINAL:  Denies abdominal pain.  Denies nausea, vomiting, or 

diarrhea. 


GENITOURINARY:  Denies difficulty urinating, painful urination, burning, 

frequency, or blood in urine.


MUSCULOSKELETAL: Knee pain


SKIN:   Denies rash or skin lesions.


HEMATOLOGIC :   History of sickle cell disease


LYMPHATIC:  Denies swollen, enlarged glands.


NEUROLOGICAL:  Denies altered mental status or loss of consciousness.  Denies 

headache.  Denies weakness or paralysis or loss of use of either side.  Denies 

problems with gait or speech.  Denies sensory or motor loss.


ALL OTHER SYSTEMS REVIEWED AND NEGATIVE.





Physical Exam





- Vital signs


Vitals: 


 











Temp Pulse Resp BP Pulse Ox


 


 98.9 F   109 H  16   131/87 H  96 


 


 08/03/18 23:42  08/03/18 23:42  08/03/18 23:42  08/03/18 23:42  08/03/18 23:42














- Notes


Notes: 





General Appearance: Well nourished, alert, cooperative, no acute distress, 

moderate obvious discomfort.


Vitals: reviewed, See vital signs table.


Head: no swelling or tenderness to the head


Eyes: PERRL, EOMI, Conjuctiva clear


Mouth: No decreasd moisture


Lungs: No wheezing, No rales, No rhonci, No accessory muscle use, good air 

exchange bilaterally.


Heart: Tachycardic rate, Regular rythm, No murmur, no rub


Abdomen: Normal BS, soft, No rigidity, No abdominal tenderness, No guarding, no 

rebound, no abdominal masses, no organomegaly


Extremities: strength 5/5 in all extremities, good pulses in all extremities, 

no swelling or tenderness in the extremities, no redness or swelling to either 

knee.  Patient does have good range of motion with flexion extension of the 

knees.  No edema.


Skin: warm, dry, appropriate color, no rash


Neuro: speech clear, oriented x 3, normal affect, responds appropriately to 

questions.





Course





- Re-evaluation


Re-evalutation: 





08/04/18 05:19


Patient's pain is much improved.  He says he feels well and would like to go 

home.  He does request more dose pain medicine before he leaves.  He does not 

feel that he needs admission at this time.  His laboratory evaluation is 

unremarkable at this time.  He has no fevers and he looks well on exam.  He has 

no chest pain or shortness of breath.  She does not have any associated 

abdominal pain.  I will discharge patient home by strongly encouraged him follow

-up closely with his oncologist on Monday.  I strongly encourage him return to 

ER immediately if he has intractable pain, fevers, difficulty breathing, chest 

pain, abdominal pain, or feels unwell.  Patient agrees with plan will be 

discharged home.





Dictation of this chart was performed using voice recognition software; 

therefore, there may be some unintended grammatical errors.





- Vital Signs


Vital signs: 


 











Temp Pulse Resp BP Pulse Ox


 


 98.9 F   109 H  16   131/87 H  96 


 


 08/03/18 23:42  08/03/18 23:42  08/03/18 23:42  08/03/18 23:42  08/03/18 23:42














- Laboratory


Result Diagrams: 


 08/04/18 02:14





 08/04/18 02:14


Laboratory results interpreted by me: 


 











  08/04/18 08/04/18





  02:14 02:14


 


RBC  3.18 L 


 


Hgb  10.6 L 


 


Hct  31.0 L 


 


RDW  26.0 H 


 


Metamyelocytes %  1 H 


 


Abs Neuts (Manual)  0.0 L 


 


Abs Lymphs (Manual)  0.0 L 


 


Abs Monocytes (Manual)  0.0 L 


 


Retic Count (auto)  8.53 H 


 


Absolute Retic  0.271 H 


 


Sodium   145.3 H


 


Chloride   108 H


 


Total Bilirubin   1.9 H


 


Direct Bilirubin   0.6 H


 


ALT   19 L


 


Alkaline Phosphatase   150 H


 


Total Protein   8.3 H














Discharge





- Discharge


Clinical Impression: 


 Sickle cell crisis





Sickle cell anemia


Qualifiers:


 Sickle-cell associated disorders: with unspecified crisis Qualified Code(s): 

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





Condition: Good


Disposition: HOME, SELF-CARE


Additional Instructions: 


Please take your pain medications as prescribed. Please follow up with Dr. Cleaning for reevaluation on Monday. Please return to the ER if you have 

recurrent worsening pain, chest pain, difficulty breathing, fevers, or feel 

unwell.

## 2018-08-08 NOTE — ER DOCUMENT REPORT
ED Medical Screen (RME)





- General


Chief Complaint: Sickle Cell Crisis


Stated Complaint: SICKLE CELL PAIN IN BOTH KNEES


Time Seen by Provider: 08/08/18 23:06


Mode of Arrival: Ambulatory


Information source: Patient


Notes: 





Patient is a 22-year-old male who presents with chief complaint of bilateral 

knee pain 2 days.  Patient also reports nausea and shortness of breath with 

ambulation.  Patient reports this feels like one of his typical sickle cell 

crisis.





Exam:


Patient ambulated with steady gait.


Patient's lung sounds clear to auscultation bilaterally.





I have greeted and performed a rapid initial assessment of this patient.  A 

comprehensive ED assessment and evaluation of the patient, analysis of test 

results and completion of the medical decision making process will be conducted 

by additional ED providers.  Dictation of this chart was performed using voice 

recognition software; therefore, there may be some unintended grammatical 

errors.


TRAVEL OUTSIDE OF THE [place] IN LAST 30 DAYS: No





- Related Data


Allergies/Adverse Reactions: 


 





Coconut * [Coconut] Allergy (Mild, Verified 07/01/18 19:45)


 


transpore tape Allergy (Mild, Uncoded 07/01/18 19:45)


 Hives











Past Medical History





- Social History


Family history: None


Pulmonary Medical History: Reports: Hx Asthma, Hx Pneumonia


Renal/ Medical History: Denies: Hx Peritoneal Dialysis


Psychiatric Medical History: 


   Denies: Hx Depression


Past Surgical History: Reports: Hx Abdominal Surgery - Gallstones removal, Hx 

Cholecystectomy, Hx Orthopedic Surgery - Fluid drained from right foot, Hx 

Vascular Surgery - Port placement





- Immunizations


Immunizations up to date: Yes


Hx Diphtheria, Pertussis, Tetanus Vaccination: Yes


History of Influenza Vaccine for 10/2017 - 3/2018 Season: Yes


Influenza Administration Date for 10/2017 - 3/2018 Season: 10/01/17





Physical Exam





- Vital signs


Vitals: 





 











Temp Pulse Resp BP Pulse Ox


 


 99.2 F   108 H  18   132/82 H  92 


 


 08/08/18 21:45  08/08/18 21:45  08/08/18 21:45  08/08/18 21:45  08/08/18 21:45














Course





- Vital Signs


Vital signs: 





 











Temp Pulse Resp BP Pulse Ox


 


 99.2 F   108 H  18   132/82 H  92 


 


 08/08/18 21:45  08/08/18 21:45  08/08/18 21:45  08/08/18 21:45  08/08/18 21:45














Doctor's Discharge





- Discharge


Referrals: 


DIYA PHILIPPE MD [Primary Care Provider] - Follow up as needed

## 2018-08-09 NOTE — ER DOCUMENT REPORT
ED General





- General


Chief Complaint: Sickle Cell Crisis


Stated Complaint: SICKLE CELL PAIN IN BOTH KNEES


Time Seen by Provider: 08/08/18 23:06


Mode of Arrival: Ambulatory


TRAVEL OUTSIDE OF THE [place] IN LAST 30 DAYS: No





- HPI


Patient complains to provider of: Pain in the lower extremities


Onset: Other - 22 year old man with a history of sickle cell anemia and 

frequent pain crisis that presents with stereotypical pain crisis in the knees 

bilaterally. He is generally controlled with oxycodone 15 mg as needed for his 

pain but he notes over the last 2 days that his pain has been unimproved with 

his regimen. He notes movement makes is worse, nothing makes it better, it has 

a throbbing aching quality. He denies fever, chills, chest pain, shortness of 

breath, abdominal pain, diarrhea, constipation dysuria, rashes, lightheadedness

, numbness or weakness.





- Related Data


Allergies/Adverse Reactions: 


 





Coconut * [Coconut] Allergy (Mild, Verified 08/10/18 15:13)


 


transpore tape Allergy (Mild, Uncoded 08/10/18 15:13)


 Hives











Past Medical History





- General


Information source: Patient





- Social History


Smoking Status: Never Smoker


Chew tobacco use (# tins/day): No


Frequency of alcohol use: None


Drug Abuse: None


Family History: Reviewed & Not Pertinent, Arthritis, DM, Hypertension, Other - 

Sickle cell trait both parents and asthma


Patient has suicidal ideation: No


Patient has homicidal ideation: No





- Medical History


Medical History: Other - sickle cell anemia


Pulmonary Medical History: Reports: Hx Asthma, Hx Pneumonia


Renal/ Medical History: Denies: Hx Peritoneal Dialysis


Psychiatric Medical History: 


   Denies: Hx Depression


Past Surgical History: Reports: Hx Abdominal Surgery - Gallstones removal, Hx 

Cholecystectomy, Hx Orthopedic Surgery - Fluid drained from right foot, Hx 

Vascular Surgery - Port placement





- Immunizations


Immunizations up to date: Yes


Hx Diphtheria, Pertussis, Tetanus Vaccination: Yes


Hx Pneumococcal Vaccination: 05/16/13





Review of Systems





- Review of Systems


-: Yes All other systems reviewed and negative





Physical Exam





- Vital signs


Vitals: 


 











Temp Pulse Resp BP Pulse Ox


 


 99.2 F   108 H  18   132/82 H  92 


 


 08/08/18 21:45  08/08/18 21:45  08/08/18 21:45  08/08/18 21:45  08/08/18 21:45














- General


General appearance: Appears well


In distress: None





- HEENT


Head: Normocephalic


Eyes: Normal


Conjunctiva: Normal


Pupils: PERRL


Neck: Normal





- Respiratory


Respiratory status: No respiratory distress


Chest status: Nontender


Breath sounds: Normal





- Cardiovascular


Rhythm: Regular


Heart sounds: Normal auscultation


Murmur: No





- Abdominal


Inspection: Normal


Distension: No distension


Bowel sounds: Normal





- Back


Back: Normal





- Extremities


General upper extremity: Normal inspection


General lower extremity: Other


Hip: Normal


Thigh: Normal


Knee: Tender, Other - over the tibial plateu bilaterally there is tenderness to 

palpation


Calf: Tender





Course





- Re-evaluation


Re-evalutation: 





08/11/18 06:52


this 21 yo male with SSA that presens with stereotypical SSA pain crisis. He 

received labs and narcotic analgesia through triage. On evaluation he is in no 

obvious distress but reports that his pain is persistent. As such planned to 

redose analgesia, His labs are at baseline or better than usual and he has no 

respiratory symptoms as such do not believe this represents acute chest or a 

hemolytic crisis. 


After second dose of analgesia patient's pain is improved, he is tolerating PO 

and would like to go home. As he is well appearing will plan for dc with return 

precautions. 





- Vital Signs


Vital signs: 


 











Temp Pulse Resp BP Pulse Ox


 


 99.2 F   108 H  18   125/80   92 


 


 08/08/18 21:45  08/08/18 21:45  08/09/18 03:01  08/09/18 03:01  08/09/18 03:01














- Laboratory


Result Diagrams: 


 08/08/18 23:25





 08/08/18 23:25


Laboratory results interpreted by me: 


 











  08/08/18 08/08/18





  23:25 23:25


 


RBC  3.12 L 


 


Hgb  10.4 L 


 


Hct  30.8 L 


 


MCV  99 H 


 


RDW  26.3 H 


 


Retic Count (auto)  8.44 H 


 


Absolute Retic  0.264 H 


 


Sodium   145.5 H


 


Total Bilirubin   2.0 H


 


AST   62 H


 


ALT   7 L


 


Alkaline Phosphatase   129 H


 


Total Protein   9.0 H














Discharge





- Discharge


Clinical Impression: 


 Sickle cell crisis





Leg pain


Qualifiers:


 Laterality: bilateral Qualified Code(s): M79.604 - Pain in right leg





Condition: Good


Disposition: HOME, SELF-CARE


Instructions:  Sickle Cell Crisis ([facility])


Referrals: 


DIYA PHILIPPE MD [Primary Care Provider] - Follow up as needed

## 2018-08-10 NOTE — ER DOCUMENT REPORT
ED General





- General


Chief Complaint: Sickle Cell Crisis


Stated Complaint: LOWER BACK,KNEE PAIN


Time Seen by Provider: 08/10/18 15:21


Mode of Arrival: Ambulatory


Notes: 





Patient is a 22-year-old male with a past medical history of sickle cell anemia 

who presents with a sickle cell pain crisis.  He states that he has a severe, 

throbbing, constant pain to his left knee and low back.  He states that the 

symptoms started earlier today and have been persistent since that time.  He 

has not tried anything to improve the pain.  Nothing seems to worsen the pain.  

He states that this feels very similar to when he has had sickle cell crises in 

the past.  He denies any fever or constitutional symptoms.  He denies any 

shortness of breath.  Denies any symptoms that feels similar to when he has had 

acute chest syndrome in the past.  He has not contacted his general doctor or 

hematologist regarding today's concerns.


TRAVEL OUTSIDE OF THE [place] IN LAST 30 DAYS: No





- Related Data


Allergies/Adverse Reactions: 


 





Coconut * [Coconut] Allergy (Mild, Verified 08/10/18 15:13)


 


transpore tape Allergy (Mild, Uncoded 08/10/18 15:13)


 Hives











Past Medical History





- General


Information source: Patient





- Social History


Smoking Status: Never Smoker


Frequency of alcohol use: None


Drug Abuse: None


Lives with: Spouse/Significant other


Family History: Reviewed & Not Pertinent, Arthritis, DM, Hypertension, Other - 

Sickle cell trait both parents and asthma


Patient has suicidal ideation: No


Patient has homicidal ideation: No


Pulmonary Medical History: Reports: Hx Asthma, Hx Pneumonia


Renal/ Medical History: Denies: Hx Peritoneal Dialysis


Psychiatric Medical History: 


   Denies: Hx Depression


Past Surgical History: Reports: Hx Abdominal Surgery - Gallstones removal, Hx 

Cholecystectomy, Hx Orthopedic Surgery - Fluid drained from right foot, Hx 

Vascular Surgery - Port placement





- Immunizations


Immunizations up to date: Yes


Hx Diphtheria, Pertussis, Tetanus Vaccination: Yes


Hx Pneumococcal Vaccination: 05/16/13





Review of Systems





- Review of Systems


Notes: 





Constitutional: Negative for fever.


HENT: Negative for sore throat.


Eyes: Negative for visual changes.


Cardiovascular: Negative for chest pain.


Respiratory: Negative for shortness of breath.


Gastrointestinal: Negative for abdominal pain, vomiting or diarrhea.


Genitourinary: Negative for dysuria.


Musculoskeletal: Positive for low back pain and left knee pain


Skin: Negative for rash.


Neurological: Negative for headaches, weakness or numbness.





10 point ROS negative except as marked above and in HPI.





Physical Exam





- Vital signs


Vitals: 


 











Temp Pulse Resp BP Pulse Ox


 


 99.5 F   108 H  18   132/90 H  94 


 


 08/10/18 14:56  08/10/18 14:56  08/10/18 14:56  08/10/18 14:56  08/10/18 14:56











Interpretation: Tachycardic


Notes: 





PHYSICAL EXAMINATION:





GENERAL: Well-appearing, well-nourished and in no acute distress.





HEAD: Atraumatic, normocephalic.





EYES: Pupils equal round and reactive to light, extraocular movements intact, 

sclera anicteric, conjunctiva are normal.





ENT: nares patent, oropharynx clear without exudates.  Moist mucous membranes.





NECK: Normal range of motion, supple without lymphadenopathy





LUNGS: Breath sounds clear to auscultation bilaterally and equal.  No wheezes 

rales or rhonchi.





HEART: Regular tachycardia without murmurs





ABDOMEN: Soft, nontender, normoactive bowel sounds.  No guarding, no rebound.  

No masses appreciated.





EXTREMITIES: Normal range of motion, no pitting or edema.  No cyanosis.





NEUROLOGICAL: No focal neurological deficits. Moves all extremities 

spontaneously and on command.





PSYCH: Normal mood, normal affect.





SKIN: Warm, Dry, normal turgor, no rashes or lesions noted.





Course





- Re-evaluation


Re-evalutation: 





08/10/18 18:44


Presentation is most consistent with an uncomplicated sickle cell pain crisis.  

Patient has no evidence of an aplastic crisis on labs.  History and vitals are 

not consistent with acute chest syndrome.  Vitals have remained within normal 

limits here in the emergency department.  Patient's pain has been able to be 

controlled using IV analgesia.  The patient is agreeable to discharge home at 

this time.  I recommended that they follow closely with their primary 

hematologist.  At this time will discharge with return precautions and follow-

up recommendations.  Verbal discharge instructions given a the bedside and 

opportunity for questions given. Medication warnings reviewed. Patient is in 

agreement with this plan and has verbalized understanding of return precautions 

and the need for primary care follow-up in the next 24-72 hours.





- Vital Signs


Vital signs: 


 











Temp Pulse Resp BP Pulse Ox


 


 97.7 F   72   16   100/58 L  98 


 


 08/10/18 22:45  08/10/18 22:45  08/10/18 22:45  08/10/18 22:45  08/10/18 22:45














- Laboratory


Result Diagrams: 


 08/10/18 16:46





Laboratory results interpreted by me: 


 











  08/10/18





  16:46


 


RBC  3.21 L


 


Hgb  10.8 L


 


Hct  31.7 L


 


MCV  99 H


 


MCH  33.6 H


 


RDW  27.2 H


 


Monocytes % (Manual)  14 H


 


Retic Count (auto)  10.18 H


 


Absolute Retic  0.326 H














Discharge





- Discharge


Clinical Impression: 


 Sickle cell crisis





Left knee pain


Qualifiers:


 Chronicity: acute Qualified Code(s): M25.562 - Pain in left knee





Condition: Good


Disposition: HOME, SELF-CARE


Additional Instructions: 


You were seen today for sickle cell pain crisis.  Please follow-up with your 

hematologist.  Returning to the ED if you have worsening pain, fever greater 

than 100.4, shortness of breath, persistent vomiting, or any other symptoms 

that are concerning to you.


Referrals: 


DIYA PHILIPPE MD [Primary Care Provider] - Follow up as needed

## 2018-08-10 NOTE — ER DOCUMENT REPORT
ED Medical Screen (RME)





- General


Chief Complaint: Sickle Cell Crisis


Stated Complaint: LOWER BACK,KNEE PAIN


Time Seen by Provider: 08/10/18 15:21


Mode of Arrival: Ambulatory


Information source: Patient


TRAVEL OUTSIDE OF THE [place] IN LAST 30 DAYS: No





- HPI


Patient complains to provider of: sickle cell crisis


Onset: This morning - pt. with h/o SCD with c/o crisis starting earlier this am 

with L knee and LBP





- Related Data


Allergies/Adverse Reactions: 


 





Coconut * [Coconut] Allergy (Mild, Verified 08/10/18 15:13)


 


transpore tape Allergy (Mild, Uncoded 08/10/18 15:13)


 Hives











Past Medical History





- Social History


Family history: None


Pulmonary Medical History: Reports: Hx Asthma, Hx Pneumonia


Renal/ Medical History: Denies: Hx Peritoneal Dialysis


Psychiatric Medical History: 


   Denies: Hx Depression


Past Surgical History: Reports: Hx Abdominal Surgery - Gallstones removal, Hx 

Cholecystectomy, Hx Orthopedic Surgery - Fluid drained from right foot, Hx 

Vascular Surgery - Port placement





- Immunizations


Immunizations up to date: Yes


Hx Diphtheria, Pertussis, Tetanus Vaccination: Yes


History of Influenza Vaccine for 10/2017 - 3/2018 Season: Yes


Influenza Administration Date for 10/2017 - 3/2018 Season: 10/01/17





Physical Exam





- Vital signs


Vitals: 





 











Temp Pulse Resp BP Pulse Ox


 


 99.5 F   108 H  18   132/90 H  94 


 


 08/10/18 14:56  08/10/18 14:56  08/10/18 14:56  08/10/18 14:56  08/10/18 14:56














Course





- Vital Signs


Vital signs: 





 











Temp Pulse Resp BP Pulse Ox


 


 99.5 F   108 H  18   132/90 H  94 


 


 08/10/18 14:56  08/10/18 14:56  08/10/18 14:56  08/10/18 14:56  08/10/18 14:56














Doctor's Discharge





- Discharge


Referrals: 


DIYA PHILIPPE MD [Primary Care Provider] - Follow up as needed

## 2018-08-12 NOTE — RADIOLOGY REPORT (SQ)
EXAM DESCRIPTION: 



XR CHEST 2 VIEWS



COMPLETED DATE/TME:  08/12/2018 04:20



CLINICAL HISTORY: chest pain



COMPARISON: 7/12/2018



FINDINGS: Frontal and lateral views of the chest.  The

cardiomediastinal silhouette has normal size and contour. No

consolidation, pneumothorax, or pleural effusion. Chronic

scarring of the lung bases. No acute osseous abnormalities.

Osseous findings suggestive of sickle cell disease.  Prior

cholecystectomy.



IMPRESSION:



1. No acute pulmonary process identified. Chronic changes are

stable.

## 2018-08-12 NOTE — ER DOCUMENT REPORT
ED General





- General


Chief Complaint: Sickle Cell Crisis


Stated Complaint: KNEE/CHEST PAIN


Time Seen by Provider: 08/12/18 04:30


Mode of Arrival: Ambulatory


Information source: Patient


TRAVEL OUTSIDE OF THE [place] IN LAST 30 DAYS: No





- HPI


Onset: This morning


Onset/Duration: Sudden


Quality of pain: Sharp


Severity: Severe


Pain Level: 5


Associated symptoms: None


Exacerbated by: Denies


Relieved by: Denies


Similar symptoms previously: Yes


Recently seen / treated by doctor: No





- Related Data


Allergies/Adverse Reactions: 


 





Coconut * [Coconut] Allergy (Mild, Verified 08/10/18 15:13)


 


transpore tape Allergy (Mild, Uncoded 08/10/18 15:13)


 Hives











Past Medical History





- Social History


Smoking Status: Never Smoker


Chew tobacco use (# tins/day): No


Frequency of alcohol use: Rare


Drug Abuse: None


Family History: Reviewed & Not Pertinent, Arthritis, DM, Hypertension, Other - 

Sickle cell trait both parents and asthma


Patient has suicidal ideation: No


Patient has homicidal ideation: No


Pulmonary Medical History: Reports: Hx Asthma, Hx Pneumonia


Renal/ Medical History: Denies: Hx Peritoneal Dialysis


Psychiatric Medical History: 


   Denies: Hx Depression


Past Surgical History: Reports: Hx Abdominal Surgery - Gallstones removal, Hx 

Cholecystectomy, Hx Orthopedic Surgery - Fluid drained from right foot, Hx 

Vascular Surgery - Port placement





- Immunizations


Immunizations up to date: Yes


Hx Diphtheria, Pertussis, Tetanus Vaccination: Yes


Hx Pneumococcal Vaccination: 05/16/13





Review of Systems





- Review of Systems


Constitutional: denies: Chills, Fever


EENT: denies: Eye pain, Eye discharge


Cardiovascular: Chest pain.  denies: Palpitations


Respiratory: No symptoms reported


Gastrointestinal: No symptoms reported


Genitourinary: No symptoms reported


Male Genitourinary: No symptoms reported


Musculoskeletal: No symptoms reported


Skin: No symptoms reported


Hematologic/Lymphatic: No symptoms reported


Neurological/Psychological: No symptoms reported


-: Yes All other systems reviewed and negative





Physical Exam





- Vital signs


Vitals: 


 











Temp Pulse Resp BP Pulse Ox


 


 98.9 F   108 H  18   146/85 H  91 L


 


 08/12/18 04:00  08/12/18 04:00  08/12/18 04:00  08/12/18 04:00  08/12/18 04:00














- General


General appearance: Appears well, Alert


In distress: Mild





- HEENT


Head: Normocephalic, Atraumatic


Eyes: Normal


Pupils: PERRL





- Respiratory


Respiratory status: No respiratory distress


Chest status: Nontender


Breath sounds: Normal


Chest palpation: Normal





- Cardiovascular


Rhythm: Regular


Heart sounds: Normal auscultation


Murmur: No





- Abdominal


Inspection: Normal


Distension: No distension


Bowel sounds: Normal


Tenderness: Nontender


Organomegaly: No organomegaly





- Back


Back: Normal, Nontender





- Extremities


General upper extremity: Normal inspection, Nontender, Normal color, Normal ROM

, Normal temperature


General lower extremity: Normal inspection, Nontender, Normal color, Normal ROM

, Normal temperature, Normal weight bearing.  No: Shira's sign





- Neurological


Neuro grossly intact: Yes


Cognition: Normal


Orientation: AAOx4


[place] Coma Scale Eye Opening: Spontaneous


Dhaval Coma Scale Verbal: Oriented


Dhaval Coma Scale Motor: Obeys Commands


[place] Coma Scale Total: 15


Speech: Normal


Motor strength normal: LUE, RUE, LLE, RLE


Sensory: Normal





- Psychological


Associated symptoms: Normal affect, Normal mood





- Skin


Skin Temperature: Warm


Skin Moisture: Dry


Skin Color: Normal





Course





- Vital Signs


Vital signs: 


 











Temp Pulse Resp BP Pulse Ox


 


 98.9 F   108 H  15   141/78 H  99 


 


 08/12/18 04:00  08/12/18 04:00  08/12/18 11:00  08/12/18 09:02  08/12/18 11:00














- Laboratory


Result Diagrams: 


 08/12/18 04:17





 08/12/18 04:17


Laboratory results interpreted by me: 


 











  08/12/18 08/12/18





  04:17 04:17


 


RBC  2.91 L 


 


Hgb  10.2 L 


 


Hct  28.6 L 


 


MCV  98 H 


 


MCH  35.1 H 


 


RDW  28.2 H 


 


Plt Count  137 L 


 


Abs Neuts (Manual)  0.0 L 


 


Abs Lymphs (Manual)  0.0 L 


 


Abs Monocytes (Manual)  0.0 L 


 


Retic Count (auto)  10.31 H 


 


Absolute Retic  0.300 H 


 


Sodium   145.5 H


 


Chloride   108 H


 


Total Bilirubin   2.9 H


 


AST   60 H


 


ALT   20 L


 


Alkaline Phosphatase   151 H


 


Total Protein   8.7 H














- Diagnostic Test


Radiology reviewed: Image reviewed, Reports reviewed





- EKG Interpretation by Me


EKG shows normal: Sinus rhythm


Rate: Normal - 95


When compared to previous EKG there are: Previous EKG unavailable


Additional EKG results interpreted by me: 





08/12/18 10:53


LVH, No STEMI.





- Transfer of Care


Notes: 





08/12/18 10:54


Patient says he still has his oxycodone at home and does not need any refills 

at this time.  He will follow-up with his primary doctor tomorrow morning and 

get more refills as needed for the future.





Critical Care Note





- Critical Care Note


Total time excluding time spent on procedures (mins): 35





Discharge





- Discharge


Clinical Impression: 


 Sickle cell anemia with pain





Condition: Stable


Disposition: HOME, SELF-CARE


Instructions:  Sickle Cell Crisis ([facility])


Additional Instructions: 


Please follow-up with your primary doctor tomorrow morning.  Return to the 

emergency room if your condition worsens.


Referrals: 


DIYA PHILIPPE MD [Primary Care Provider] - Follow up as needed

## 2018-08-12 NOTE — ER DOCUMENT REPORT
Doctor's Note


Notes: 





08/12/18 04:23


I performed a quick triage evaluation of the patient.  Patient is a 22-year-old 

male with a history of sickle cell disease who presents with complaint of pain 

in his knees, back, chest.  Has mild shortness of breath.  No fevers.  No 

vomiting.  No coughing up blood.  No abdominal pain.  He still has his 

gallbladder and spleen.  Have previous history of acute chest syndrome.  Says 

currently does not feel quite like that.  Symptoms started this morning.  I 

have ordered pain medicine as well as labs and chest x-ray.  Patient on monitor.

## 2018-08-13 NOTE — RADIOLOGY REPORT (SQ)
EXAM DESCRIPTION:  CHEST 2 VIEWS



COMPLETED DATE/TIME:  8/13/2018 9:01 pm



REASON FOR STUDY:  Sickle cell



COMPARISON:  8/12/2018



EXAM PARAMETERS:  NUMBER OF VIEWS: two views

TECHNIQUE: Digital Frontal and Lateral radiographic views of the chest acquired.

RADIATION DOSE: NA

LIMITATIONS: none



FINDINGS:  LUNGS AND PLEURA: Mild chronic interstitial changes.

MEDIASTINUM AND HILAR STRUCTURES: No masses or contour abnormalities.

HEART AND VASCULAR STRUCTURES: Heart normal size.  No evidence for failure.

BONES: No acute findings.

HARDWARE: None in the chest.

OTHER: No other significant finding.



IMPRESSION:  Mild chronic lung changes with no acute cardiopulmonary findings.



TECHNICAL DOCUMENTATION:  JOB ID:  [id number]

 2011 [employer]- All Rights Reserved



Reading location - IP/workstation name: [email or web address]

## 2018-08-13 NOTE — ER DOCUMENT REPORT
ED General





- General


Chief Complaint: Sickle Cell Crisis


Stated Complaint: FEVER/BODY PAIN


Time Seen by Provider: 08/13/18 20:34


Notes: 





Patient is a 22-year-old male with a past medical history of hemoglobin SS well-

known to me with recurrent pain crises who presents with 1 of his typical same 

sickle cell pain crises.  He describes the pain as being a severe, aching, 

cramping pain to his right knee and low back.  He has not training to improve 

the pain.  Nothing worsens the pain.  He states this feels very similar to 

prior sickle cell crises he has had in the past.  He is scheduled for a bone 

marrow transplant on the 17th of this month at [facility].  He denies any fever or 

constitutional symptoms.  No shortness of breath.  He states that does not feel 

like when he has had acute chest in the past.  He has not contacted his 

hematologist regarding today's concerns.


TRAVEL OUTSIDE OF THE [place] IN LAST 30 DAYS: No





- Related Data


Allergies/Adverse Reactions: 


 





Coconut * [Coconut] Allergy (Mild, Verified 08/10/18 15:13)


 


transpore tape Allergy (Mild, Uncoded 08/10/18 15:13)


 Hives











Past Medical History





- General


Information source: Patient





- Social History


Smoking Status: Never Smoker


Frequency of alcohol use: Rare


Drug Abuse: None


Lives with: Spouse/Significant other


Family History: Reviewed & Not Pertinent, Arthritis, DM, Hypertension, Other - 

Sickle cell trait both parents and asthma


Patient has suicidal ideation: No


Patient has homicidal ideation: No


Pulmonary Medical History: Reports: Hx Asthma, Hx Pneumonia


Renal/ Medical History: Denies: Hx Peritoneal Dialysis


Psychiatric Medical History: 


   Denies: Hx Depression


Past Surgical History: Reports: Hx Abdominal Surgery - Gallstones removal, Hx 

Cholecystectomy, Hx Orthopedic Surgery - Fluid drained from right foot, Hx 

Vascular Surgery - Port placement





- Immunizations


Immunizations up to date: Yes


Hx Diphtheria, Pertussis, Tetanus Vaccination: Yes


Hx Pneumococcal Vaccination: 05/16/13





Review of Systems





- Review of Systems


Notes: 





Constitutional: Negative for fever.


HENT: Negative for sore throat.


Eyes: Negative for visual changes.


Cardiovascular: Negative for chest pain.


Respiratory: Negative for shortness of breath.


Gastrointestinal: Negative for abdominal pain, vomiting or diarrhea.


Genitourinary: Negative for dysuria.


Musculoskeletal: Positive for low back pain and right knee pain


Skin: Negative for rash.


Neurological: Negative for headaches, weakness or numbness.





10 point ROS negative except as marked above and in HPI.





Physical Exam





- Vital signs


Vitals: 


 











Temp Pulse Resp BP Pulse Ox


 


 98.5 F   108 H  16   134/85 H  93 


 


 08/13/18 18:54  08/13/18 18:54  08/13/18 18:54  08/13/18 18:54  08/13/18 18:54











Interpretation: Normal


Notes: 





PHYSICAL EXAMINATION:





GENERAL: Well-appearing, well-nourished and in no acute distress.





HEAD: Atraumatic, normocephalic.





EYES: Pupils equal round and reactive to light, extraocular movements intact, 

sclera anicteric, conjunctiva are normal.





ENT: nares patent, oropharynx clear without exudates.  Moist mucous membranes.





NECK: Normal range of motion, supple without lymphadenopathy





LUNGS: Breath sounds clear to auscultation bilaterally and equal.  No wheezes 

rales or rhonchi.





HEART: Regular rate and rhythm without murmurs





ABDOMEN: Soft, nontender, normoactive bowel sounds.  No guarding, no rebound.  

No masses appreciated.





EXTREMITIES: Normal range of motion, no pitting or edema.  No cyanosis.





NEUROLOGICAL: No focal neurological deficits. Moves all extremities 

spontaneously and on command.





PSYCH: Normal mood, normal affect.





SKIN: Warm, Dry, normal turgor, no rashes or lesions noted.





Course





- Re-evaluation


Re-evalutation: 





08/13/18 23:28


Presentation is most consistent with an uncomplicated sickle cell pain crisis.  

Patient has no evidence of an aplastic crisis on labs.  Chest x-ray and vitals 

are not consistent with acute chest syndrome.  Vitals have remained within 

normal limits here in the emergency department.  Patient's pain has been able 

to be controlled using IV analgesia.  The patient is agreeable to discharge 

home at this time.  I recommended that they follow closely with their primary 

hematologist.  Return precautions have been reviewed and discussed and patient 

has verbalized indications to return to the emergency department.





- Vital Signs


Vital signs: 


 











Temp Pulse Resp BP Pulse Ox


 


 98.6 F   97   16   121/83   93 


 


 08/13/18 23:40  08/13/18 23:40  08/13/18 18:54  08/13/18 23:40  08/13/18 23:40














- Laboratory


Result Diagrams: 


 08/13/18 23:10





 08/13/18 23:10





- Diagnostic Test


Radiology reviewed: Image reviewed, Reports reviewed


Radiology results interpreted by me: 





08/13/18 23:28


Chest x-ray: No acute infiltrate or pneumothorax





Discharge





- Discharge


Clinical Impression: 


 Sickle cell crisis





Right knee pain


Qualifiers:


 Chronicity: acute Qualified Code(s): M25.561 - Pain in right knee





Condition: Good


Disposition: HOME, SELF-CARE


Additional Instructions: 


You were seen today for sickle cell pain crisis.  Please follow-up with your 

hematologist.  Returning to the ED if you have worsening pain, fever greater 

than 100.4, shortness of breath, persistent vomiting, or any other symptoms 

that are concerning to you.


Referrals: 


DIYA PHILIPPE MD [Primary Care Provider] - Follow up as needed
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
## 2018-08-15 NOTE — ER DOCUMENT REPORT
ED General





- General


Chief Complaint: Sickle Cell Crisis


Stated Complaint: CHEST PAIN


Time Seen by Provider: 08/15/18 01:01


Notes: 





Patient is a 22-year-old male with history of sickle cell disease who presents 

for the third day and wrote to the ER due to pain.  This time he is actually 

having some social shortness of breathing.  Says started this morning.  He is 

due for a bone marrow transplant this Friday at [facility].

  He denies any fevers.  He does have previous history of acute chest syndrome.

  He does have some nausea but no vomiting.  No associated abdominal pain.  He 

still has gallbladder and spleen.  He has some mild pain throughout his chest.


TRAVEL OUTSIDE OF THE [place] IN LAST 30 DAYS: No





- Related Data


Allergies/Adverse Reactions: 


 





Coconut * [Coconut] Allergy (Mild, Verified 08/10/18 15:13)


 


transpore tape Allergy (Mild, Uncoded 08/10/18 15:13)


 Hives











Past Medical History





- Social History


Smoking Status: Never Smoker


Frequency of alcohol use: None


Drug Abuse: None


Family History: Reviewed & Not Pertinent, Arthritis, DM, Hypertension, Other - 

Sickle cell trait both parents and asthma


Pulmonary Medical History: Reports: Hx Asthma, Hx Pneumonia


Renal/ Medical History: Denies: Hx Peritoneal Dialysis


Psychiatric Medical History: 


   Denies: Hx Depression


Past Surgical History: Reports: Hx Abdominal Surgery - Gallstones removal, Hx 

Cholecystectomy, Hx Orthopedic Surgery - Fluid drained from right foot, Hx 

Vascular Surgery - Port placement





- Immunizations


Immunizations up to date: Yes


Hx Diphtheria, Pertussis, Tetanus Vaccination: Yes


Hx Pneumococcal Vaccination: 05/16/13





Review of Systems





- Review of Systems


Notes: 





My Normal Review Basic





REVIEW OF SYSTEMS:


CONSTITUTIONAL :  Denies fever,  chills, or sweats.  Denies recent illness.


EENT:   Denies eye, ear, throat, or mouth pain or symptoms.  Denies nasal or 

sinus congestion.


CARDIOVASCULAR: Some chest pain


RESPIRATORY:  Denies cough, cold, or chest congestion.  His breath.


GASTROINTESTINAL:  Denies abdominal pain.  Denies nausea, vomiting, or diarrhea.


GENITOURINARY:  Denies difficulty urinating, painful urination, burning, 

frequency, or blood in urine.


MUSCULOSKELETAL: Pain in joints and knees.


SKIN:   Denies rash or skin lesions.


HEMATOLOGIC : Sickle cell disease


NEUROLOGICAL:  Denies altered mental status or loss of consciousness.  Denies 

headache.  Denies weakness or paralysis or loss of use of either side.  Denies 

problems with gait or speech.  Denies sensory or motor loss.


ALL OTHER SYSTEMS REVIEWED AND NEGATIVE.





Physical Exam





- Vital signs


Vitals: 


 











Temp Pulse Resp BP Pulse Ox


 


 98.6 F   110 H  16   135/84 H  90 L


 


 08/14/18 23:56  08/14/18 23:56  08/14/18 23:56  08/14/18 23:56  08/14/18 23:56














- Notes


Notes: 





General Appearance: Well nourished, alert, cooperative, mild acute distress, 

moderate obvious discomfort.


Vitals: reviewed, See vital signs table.


Head: no swelling or tenderness to the head


Eyes: PERRL, EOMI, Conjuctiva clear


Mouth: No decreasd moisture


Throat: No tonsillar inflammation, No airway obstruction,  No lymphadenopathy


Neck: Supple, no neck tenderness, No thyromegaly


Lungs: slight wheeze in right lower lung field., No rales, No rhonci, mild 

accessory muscle use, good air exchange bilaterally.


Heart: tachycardic rate, Regular rythm, No murmur, no rub


Abdomen: Normal BS, soft, No rigidity, No abdominal tenderness, No guarding, no 

rebound


Extremities: strength 5/5 in all extremities, good pulses in all extremities, 

no swelling or tenderness in the extremities, no edema.


Skin: warm, dry, appropriate color, no rash


Neuro: speech clear, oriented x 3, normal affect, responds appropriately to 

questions.





Course





- Re-evaluation


Re-evalutation: 





08/15/18 06:12


I did obtain a CTA of the patient's chest except continued tachycardia and 

hypoxemia when off oxygen.  CT fortunately shows no evidence of PE.  Patient 

does not appear to be in acute chest syndrome.  Denies any fevers.  When he 

receives the pain medicines pain is controlled but it does not last long any 

has required frequent re-dosages.  I did call [facility] for transfer because is 

under initial impression the patient is scheduled for a bone marrow transplant 

this Friday.  Patient's mother showed up approximately 15 minutes ago and has 

informed me that that is actually been canceled and he has a new donor match 

and that they were supposed to go up there anyways to talk about the 

pretransplant.  Initially the mother did not want until the patient because she 

says that sometimes when he gets stressed his crises get worse.  I talked to 

mother at length informed her that he will find out very soon when he gets a 

[place] and rather him know now supposed to be surprised when he gets up to do.  

The mother did tell him and I did also speak with him as well.  Patient is 

understanding of this.  I did speak with Dr. Hinds, hospitalist at [facility], who 

agrees to accept the patient for transfer.





- Vital Signs


Vital signs: 


 











Temp Pulse Resp BP Pulse Ox


 


 98.6 F   110 H  16   135/84 H  90 L


 


 08/14/18 23:56  08/14/18 23:56  08/14/18 23:56  08/14/18 23:56  08/14/18 23:56














- Laboratory


Result Diagrams: 


 08/15/18 01:19





 08/15/18 01:19


Laboratory results interpreted by me: 


 











  08/15/18 08/15/18





  01:19 01:19


 


RBC  2.93 L 


 


Hgb  10.1 L 


 


Hct  28.8 L 


 


MCV  98 H 


 


MCH  34.5 H 


 


RDW  28.5 H 


 


Abs Neuts (Manual)  0.0 L 


 


Abs Lymphs (Manual)  0.0 L 


 


Abs Monocytes (Manual)  0.0 L 


 


Retic Count (auto)  13.44 H 


 


Absolute Retic  0.393 H 


 


Sodium   145.7 H


 


Glucose   113 H


 


Total Bilirubin   2.3 H


 


AST   79 H


 


ALT   9 L


 


Alkaline Phosphatase   134 H


 


Total Protein   8.9 H














- EKG Interpretation by Me


Additional EKG results interpreted by me: 





08/15/18 01:30


EKG is reviewed and interpreted by me.  EKG shows normal sinus rhythm with rate 

of 97 bpm.  No ST segment elevation or depression.  No ischemic T-wave 

inversions.  FL interval, QRS duration, QTc intervals are within normal range.  

Old EKG for comparison is from August 12, 2018.





Discharge





- Discharge


Clinical Impression: 


 Sickle cell crisis, Hypoxemia





Condition: Stable


Disposition: [facility]

## 2018-08-15 NOTE — RADIOLOGY REPORT (SQ)
EXAM DESCRIPTION: 



CT CHEST ANGIOGRAPHY WITHOUT THEN WITH IV CONTRAST



COMPLETED DATE/TME:  08/15/2018 02:21



CLINICAL HISTORY: 



22 years, Male, dyspnea



COMPARISON:

6/22/2018



TECHNIQUE:

Axial CT images of the chest were obtained after the injection of

IV contrast. MPR and MIP reconstructions were performed.  

Images stored on PACS.

 

All CT scanners at this facility use dose modulation, iterative

reconstruction, and/or weight based dosing when appropriate to

reduce radiation dose to as low as reasonably achievable (ALARA).





[facility]: Dose Right [facility]: CareDose   [facility]: Dose Right    [facility]:

Teradose 4D    [facility]: Smart Technologies



LIMITATIONS:

None.



FINDINGS:



Upper abdomen: Partially imaged.



Thoracic aorta: Unremarkable.



Heart: No right atrial thrombus.

RV/LV ratio:  Within normal limits.



Pulmonary arteries:

Technical: Adequate opacification to the level of the segmental

vessels.

Pulmonary embolus: No low-density filling defect to suggest acute

PE.

Overall embolic burden: None.

 

Mediastinum: No pathologic sized middle mediastinal

lymphadenopathy.

 

Tracheobronchial tree: Unremarkable. 



Lungs:

Lobar consolidation: Negative.

Pleural effusion: Negative.

Pneumothorax: Negative.

Other:  Negative.

 

Bones: There is diffuse sclerosis of the bones, likely related to

the patient's known history of sickle cell disease

 





IMPRESSION:



No CT evidence of acute PE.

 

TECHNICAL DOCUMENTATION:



Quality ID # [id number]: Final reports with documentation of one or more

dose reduction techniques (e.g., Automated exposure control,

adjustment of the mA and/or kV according to patient size, use of

iterative reconstruction technique)



 2011 [employer]- All Rights Reserved

## 2018-08-15 NOTE — ER DOCUMENT REPORT
Doctor's Note


Notes: 





08/15/18 17:14


EMS transport arrived.  Patient sitting up in stretcher smiling without any 

distress.  Stable for transfer.

## 2018-08-15 NOTE — RADIOLOGY REPORT (SQ)
EXAM DESCRIPTION: 



XR CHEST 1 VIEW



COMPLETED DATE/TME:  08/15/2018 01:01



CLINICAL HISTORY: 



22 years, Male, hypoxia



COMPARISON:

8/13/2018



NUMBER OF VIEWS:

One



TECHNIQUE:

AP view of the chest



LIMITATIONS:

None.



FINDINGS:



The lungs are clear. The heart is normal in size. There is no

pneumothorax or pleural effusion. There is no acute fracture



IMPRESSION:



No acute cardiopulmonary abnormality

 



 2011 [employer]- All Rights Reserved

## 2018-08-15 NOTE — ER DOCUMENT REPORT
ED Medical Screen (RME)





- General


Chief Complaint: Sickle Cell Crisis


Stated Complaint: CHEST PAIN


Time Seen by Provider: 08/15/18 01:01


Notes: 





22-year-old male with a history of sickle cell disease seen yesterday and 

decided to go home because he felt better, went home and felt worse, describes 

shortness of breath and chest pain.  Denies fever.


TRAVEL OUTSIDE OF THE [place] IN LAST 30 DAYS: No





- Related Data


Allergies/Adverse Reactions: 


 





Coconut * [Coconut] Allergy (Mild, Verified 08/10/18 15:13)


 


transpore tape Allergy (Mild, Uncoded 08/10/18 15:13)


 Hives











Past Medical History





- Social History


Family history: None


Pulmonary Medical History: Reports: Hx Asthma, Hx Pneumonia


Renal/ Medical History: Denies: Hx Peritoneal Dialysis


Psychiatric Medical History: 


   Denies: Hx Depression


Past Surgical History: Reports: Hx Abdominal Surgery - Gallstones removal, Hx 

Cholecystectomy, Hx Orthopedic Surgery - Fluid drained from right foot, Hx 

Vascular Surgery - Port placement





- Immunizations


Immunizations up to date: Yes


Hx Diphtheria, Pertussis, Tetanus Vaccination: Yes


History of Influenza Vaccine for 10/2017 - 3/2018 Season: Yes


Influenza Administration Date for 10/2017 - 3/2018 Season: 10/01/17





Physical Exam





- Vital signs


Vitals: 





 











Temp Pulse Resp BP Pulse Ox


 


 98.6 F   110 H  16   135/84 H  90 L


 


 08/14/18 23:56  08/14/18 23:56  08/14/18 23:56  08/14/18 23:56  08/14/18 23:56














- Respiratory


Respiratory status: No respiratory distress


Breath sounds: Normal.  No: Decreased air movement, Wheezing





- Cardiovascular


Rhythm: Regular, Tachycardia


Heart sounds: Normal auscultation, S1 appreciated, S2 appreciated





Course





- Vital Signs


Vital signs: 





 











Temp Pulse Resp BP Pulse Ox


 


 98.6 F   110 H  16   135/84 H  90 L


 


 08/14/18 23:56  08/14/18 23:56  08/14/18 23:56  08/14/18 23:56  08/14/18 23:56














Doctor's Discharge





- Discharge


Referrals: 


DIYA PHILIPPE MD [Primary Care Provider] - Follow up as needed

## 2018-09-02 NOTE — ER DOCUMENT REPORT
ED General





- General


Chief Complaint: Sickle Cell Crisis


Stated Complaint: BACK PAIN


Time Seen by Provider: 09/02/18 19:19


Notes: 





Patient is a 22-year-old male with a past medical history of sickle cell anemia 

who presents with an acute pain crisis in his low back and right knee.  Patient 

reports that the symptoms started approximately 12 hours ago and has been 

constant since that time.  The patient reports that this feels identical to his 

prior sickle cell crises.  He denies any associated fever, headache, cough or 

shortness of breath.  He denies that the symptoms feel similar to when he has 

had acute chest in the past.  He has not contacted his hematologist or primary 

doctor regarding today's concerns.  Nothing has been noted to improve or worsen 

his symptoms.


TRAVEL OUTSIDE OF THE [place] IN LAST 30 DAYS: No





- Related Data


Allergies/Adverse Reactions: 


 





Coconut * [Coconut] Allergy (Mild, Verified 08/10/18 15:13)


 


transpore tape Allergy (Mild, Uncoded 08/10/18 15:13)


 Hives











Past Medical History





- General


Information source: Patient





- Social History


Smoking Status: Never Smoker


Frequency of alcohol use: None


Drug Abuse: None


Lives with: Spouse/Significant other


Family History: Reviewed & Not Pertinent, Arthritis, DM, Hypertension, Other - 

Sickle cell trait both parents and asthma


Patient has suicidal ideation: No


Patient has homicidal ideation: No


Pulmonary Medical History: Reports: Hx Asthma, Hx Pneumonia


Renal/ Medical History: Denies: Hx Peritoneal Dialysis


Psychiatric Medical History: 


   Denies: Hx Depression


Past Surgical History: Reports: Hx Abdominal Surgery - Gallstones removal, Hx 

Cholecystectomy, Hx Orthopedic Surgery - Fluid drained from right foot, Hx 

Vascular Surgery - Port placement





- Immunizations


Immunizations up to date: Yes


Hx Diphtheria, Pertussis, Tetanus Vaccination: Yes


Hx Pneumococcal Vaccination: 05/16/13





Review of Systems





- Review of Systems


Notes: 





Constitutional: Negative for fever.


HENT: Negative for sore throat.


Eyes: Negative for visual changes.


Cardiovascular: Negative for chest pain.


Respiratory: Negative for shortness of breath.


Gastrointestinal: Negative for abdominal pain, vomiting or diarrhea.


Genitourinary: Negative for dysuria.


Musculoskeletal: Positive for low back pain and right knee pain


Skin: Negative for rash.


Neurological: Negative for headaches, weakness or numbness.





10 point ROS negative except as marked above and in HPI.





Physical Exam





- Vital signs


Vitals: 


 











Temp Pulse Resp BP Pulse Ox


 


 97.9 F   107 H  14   131/87 H  98 


 


 09/02/18 18:11  09/02/18 18:11  09/02/18 18:11  09/02/18 18:11  09/02/18 18:11











Interpretation: Normal


Notes: 





PHYSICAL EXAMINATION:





GENERAL: Well-appearing, well-nourished and in no acute distress.





HEAD: Atraumatic, normocephalic.





EYES: Pupils equal round and reactive to light, extraocular movements intact, 

sclera anicteric, conjunctiva are normal.





ENT: nares patent, oropharynx clear without exudates.  Moist mucous membranes.





NECK: Normal range of motion, supple without lymphadenopathy





LUNGS: Breath sounds clear to auscultation bilaterally and equal.  No wheezes 

rales or rhonchi.





HEART: Regular rate and rhythm without murmurs





ABDOMEN: Soft, nontender, normoactive bowel sounds.  No guarding, no rebound.  

No masses appreciated.





EXTREMITIES: Normal range of motion, no pitting or edema.  No swelling or 

erythema of the right knee.  No joint effusion.  No cyanosis.





Back: No midline spinal tenderness, step-offs or deformities.  No point 

tenderness in the spine.





NEUROLOGICAL: No focal neurological deficits. Moves all extremities 

spontaneously and on command.





PSYCH: Normal mood, normal affect.





SKIN: Warm, Dry, normal turgor, no rashes or lesions noted.





Course





- Re-evaluation


Re-evalutation: 





09/02/18 20:40


Presentation is most consistent with an uncomplicated sickle cell pain crisis.  

Patient has no evidence of an aplastic crisis on labs.  Clinical history, exam 

and vitals are not consistent with acute chest syndrome.  Vitals have remained 

within normal limits here in the emergency department.  Patient's pain has been 

able to be controlled using IV analgesia.  The patient is agreeable to 

discharge home at this time.  I recommended that they follow closely with their 

primary hematologist.  Return precautions have been reviewed and discussed and 

patient has verbalized indications to return to the emergency department.





- Vital Signs


Vital signs: 


 











Temp Pulse Resp BP Pulse Ox


 


 97.9 F   85   16   128/72 H  100 


 


 09/02/18 18:11  09/02/18 23:01  09/02/18 23:01  09/02/18 23:01  09/02/18 23:01














- Laboratory


Result Diagrams: 


 09/02/18 19:39





 09/02/18 19:39


Laboratory results interpreted by me: 


 











  09/02/18 09/02/18





  19:39 19:39


 


RBC  3.12 L 


 


Hgb  10.5 L 


 


Hct  30.4 L 


 


MCV  98 H D 


 


MCH  33.6 H 


 


RDW  31.3 H 


 


Retic Count (auto)  14.71 H 


 


Absolute Retic  0.466 H 


 


Sodium   145.6 H


 


Chloride   108 H


 


Lactate Dehydrogenase   666 H














Discharge





- Discharge


Clinical Impression: 


 Sickle cell pain crisis





Right knee pain


Qualifiers:


 Chronicity: acute Qualified Code(s): M25.561 - Pain in right knee





Low back pain


Qualifiers:


 Chronicity: acute Back pain laterality: bilateral Sciatica presence: without 

sciatica Qualified Code(s): M54.5 - Low back pain





Condition: Good


Disposition: HOME, SELF-CARE


Additional Instructions: 


You were seen today for sickle cell pain crisis.  Please follow-up with your 

hematologist.  Returning to the ED if you have worsening pain, fever greater 

than 100.4, shortness of breath, persistent vomiting, or any other symptoms 

that are concerning to you.


Referrals: 


DAVID EVANS PA-C [Primary Care Provider] - Follow up as needed

## 2018-09-02 NOTE — ER DOCUMENT REPORT
ED Medical Screen (RME)





- General


Chief Complaint: Sickle Cell Crisis


Stated Complaint: BACK PAIN


Time Seen by Provider: 09/02/18 19:19


TRAVEL OUTSIDE OF THE [place] IN LAST 30 DAYS: No





- HPI


Notes: 





09/02/18 19:22


Patient coming of sickle cell pain in his lower back similar to his pain before.





- Related Data


Allergies/Adverse Reactions: 


 





Coconut * [Coconut] Allergy (Mild, Verified 08/10/18 15:13)


 


transpore tape Allergy (Mild, Uncoded 08/10/18 15:13)


 Hives











Past Medical History





- Social History


Family history: None


Pulmonary Medical History: Reports: Hx Asthma, Hx Pneumonia


Renal/ Medical History: Denies: Hx Peritoneal Dialysis


Psychiatric Medical History: 


   Denies: Hx Depression


Past Surgical History: Reports: Hx Abdominal Surgery - Gallstones removal, Hx 

Cholecystectomy, Hx Orthopedic Surgery - Fluid drained from right foot, Hx 

Vascular Surgery - Port placement





- Immunizations


Immunizations up to date: Yes


Hx Diphtheria, Pertussis, Tetanus Vaccination: Yes


History of Influenza Vaccine for 10/2017 - 3/2018 Season: Yes


Influenza Administration Date for 10/2017 - 3/2018 Season: 10/01/17





Review of Systems





- Review of Systems


Constitutional: Other - sickle cell pain





Physical Exam





- Vital signs


Vitals: 





 











Temp Pulse Resp BP Pulse Ox


 


 97.9 F   107 H  14   131/87 H  98 


 


 09/02/18 18:11  09/02/18 18:11  09/02/18 18:11  09/02/18 18:11  09/02/18 18:11














- HEENT


Head: Normocephalic


Eyes: Normal


Conjunctiva: Normal





- Cardiovascular


Rhythm: Regular


Heart sounds: Normal auscultation





Course





- Vital Signs


Vital signs: 





 











Temp Pulse Resp BP Pulse Ox


 


 97.9 F   107 H  14   131/87 H  98 


 


 09/02/18 18:11  09/02/18 18:11  09/02/18 18:11  09/02/18 18:11  09/02/18 18:11














Doctor's Discharge





- Discharge


Referrals: 


DAVID EVANS PA-C [Primary Care Provider] - Follow up as needed

## 2018-09-04 NOTE — ER DOCUMENT REPORT
ED Medical Screen (RME)





- General


Chief Complaint: Sickle Cell Crisis


Stated Complaint: BACK PAIN


Time Seen by Provider: 09/04/18 20:04


Notes: 





Patient is  a 22-year-old male with sickle cell disease that presents to the 

emergency department for chief complaint of back pain, knee pain, nausea and 

vomiting.  Patient recently admitted and discharged for presumed infection, on 

IV antibiotics with Rocephin, with a PICC line.  Today started having vomiting 

and chills and pain in his back and knees seem to be worse today.





ROS:


GENERAL: Denies fever or chills


CV: Denies chest pain  





PHYSICAL EXAMINATION:





Vital signs reviewed. 





GENERAL: Well-appearing, well-nourished and in no acute distress.





HEAD: Atraumatic, normocephalic.





EYES: Pupils equal round extraocular movements intact,  conjunctiva are normal.





ENT: Nares patent





NECK: Normal range of motion





CV: Heart rate tachycardic and rhythm





LUNGS: No respiratory distress





Musculoskeletal: Normal range of motion, tenderness over the right knee, and 

right lumbar paraspinal region.





NEUROLOGICAL:  Normal speech





PSYCH: Normal mood, normal affect.





MDM:


Patient seen and examined for rapid initial assessment. Vital signs reviewed.  

A comprehensive ED assessment and evaluation of the patient, analysis of test 

results and completion of the medical decision making process will be conducted 

by additional ED providers.





*Note is created using voice recognition software and may contain spelling, 

syntax or grammatical errors.  











TRAVEL OUTSIDE OF THE [place] IN LAST 30 DAYS: No





- Related Data


Allergies/Adverse Reactions: 


 





Coconut * [Coconut] Allergy (Mild, Verified 08/10/18 15:13)


 


transpore tape Allergy (Mild, Uncoded 08/10/18 15:13)


 Hives











Past Medical History





- Social History


Chew tobacco use (# tins/day): No


Frequency of alcohol use: Rare


Drug Abuse: None


Family history: None


Pulmonary Medical History: Reports: Hx Asthma, Hx Pneumonia


Renal/ Medical History: Denies: Hx Peritoneal Dialysis


Psychiatric Medical History: 


   Denies: Hx Depression


Past Surgical History: Reports: Hx Abdominal Surgery - Gallstones removal, Hx 

Cholecystectomy, Hx Orthopedic Surgery - Fluid drained from right foot, Hx 

Vascular Surgery - Port placement





- Immunizations


Immunizations up to date: Yes


Hx Diphtheria, Pertussis, Tetanus Vaccination: Yes


History of Influenza Vaccine for 10/2017 - 3/2018 Season: Yes


Influenza Administration Date for 10/2017 - 3/2018 Season: 10/01/17





Physical Exam





- Vital signs


Vitals: 





 











Temp Pulse Resp BP Pulse Ox


 


 98.9 F   109 H  15   132/87 H  95 


 


 09/04/18 19:30  09/04/18 19:30  09/04/18 19:30  09/04/18 19:30  09/04/18 19:30














Course





- Vital Signs


Vital signs: 





 











Temp Pulse Resp BP Pulse Ox


 


 98.9 F   109 H  15   132/87 H  95 


 


 09/04/18 19:30  09/04/18 19:30  09/04/18 19:30  09/04/18 19:30  09/04/18 19:30














Doctor's Discharge





- Discharge


Referrals: 


REMI WILLIAMSON MD [Primary Care Provider] - Follow up as needed

## 2018-09-05 NOTE — EKG REPORT
SEVERITY:- ABNORMAL ECG -

SINUS RHYTHM

CONSIDER LEFT VENTRICULAR HYPERTROPHY

:

Confirmed by: Storm Bosch MD 05-Sep-2018 07:28:37

## 2018-09-05 NOTE — ER DOCUMENT REPORT
ED General





- General


Chief Complaint: Sickle Cell Crisis


Stated Complaint: BACK PAIN


Time Seen by Provider: 09/04/18 20:04


TRAVEL OUTSIDE OF THE [place] IN LAST 30 DAYS: No





- HPI


Patient complains to provider of: Sickle cell pain


Onset: Other - with a past medical history of sickle cell anemia who presents 

with an acute pain crisis in his low back and right knee.  Patient reports that 

the symptoms started approximately 12 hours ago and has been constant since 

that time.  The patient reports that this feels identical to his prior sickle 

cell crises.  He denies any associated fever, headache, cough or shortness of 

breath.  He denies that the symptoms feel similar to when he has had acute 

chest in the past.  He has not contacted his hematologist or primary doctor 

regarding today's concerns.  Nothing has been noted to improve or worsen his 

symptoms.





- Related Data


Allergies/Adverse Reactions: 


 





Coconut * [Coconut] Allergy (Mild, Verified 08/10/18 15:13)


 


transpore tape Allergy (Mild, Uncoded 08/10/18 15:13)


 Hives











Past Medical History





- General


Information source: Patient





- Social History


Smoking Status: Never Smoker


Chew tobacco use (# tins/day): No


Frequency of alcohol use: Rare


Drug Abuse: None


Family History: Reviewed & Not Pertinent, Arthritis, DM, Hypertension, Other - 

Sickle cell trait both parents and asthma


Patient has suicidal ideation: No


Patient has homicidal ideation: No


Pulmonary Medical History: Reports: Hx Asthma, Hx Pneumonia


Renal/ Medical History: Denies: Hx Peritoneal Dialysis


Psychiatric Medical History: 


   Denies: Hx Depression


Past Surgical History: Reports: Hx Abdominal Surgery - Gallstones removal, Hx 

Cholecystectomy, Hx Orthopedic Surgery - Fluid drained from right foot, Hx 

Vascular Surgery - Port placement





- Immunizations


Immunizations up to date: Yes


Hx Diphtheria, Pertussis, Tetanus Vaccination: Yes


Hx Pneumococcal Vaccination: 05/16/13





Review of Systems





- Review of Systems


-: Yes All other systems reviewed and negative





Physical Exam





- Vital signs


Vitals: 


 











Temp Pulse Resp BP Pulse Ox


 


 98.9 F   109 H  15   132/87 H  95 


 


 09/04/18 19:30  09/04/18 19:30  09/04/18 19:30  09/04/18 19:30  09/04/18 19:30














- General


General appearance: Appears well


In distress: None





- HEENT


Head: Normocephalic


Eyes: Normal


Conjunctiva: Normal


Cornea: Normal


Extraocular movements intact: Yes


Eyelashes: Normal


Pupils: PERRL





- Respiratory


Respiratory status: No respiratory distress


Chest status: Nontender


Breath sounds: Normal


Chest palpation: Normal





- Cardiovascular


Rhythm: Regular


Heart sounds: Normal auscultation


Murmur: No





- Abdominal


Inspection: Normal


Distension: No distension


Tenderness: Nontender





- Back


Back: Normal





- Extremities


General upper extremity: Normal inspection, Nontender, Normal strength, Normal 

temperature


General lower extremity: Normal inspection, Tender, Normal strength, Normal 

temperature





- Neurological


Neuro grossly intact: Yes


Cognition: Normal


Orientation: AAOx4


Dhaval Coma Scale Eye Opening: Spontaneous


Dhaval Coma Scale Verbal: Oriented


Marion Coma Scale Motor: Obeys Commands


Marion Coma Scale Total: 15





Course





- Re-evaluation


Re-evalutation: 





09/05/18 07:32


Presentation is most consistent with an uncomplicated sickle cell pain crisis.  

Patient has no evidence of an aplastic crisis on labs.  Clinical history, exam 

and vitals are not consistent with acute chest syndrome.  Vitals have remained 

within normal limits here in the emergency department.  Patient's pain has been 

able to be controlled using IV analgesia.  The patient is agreeable to 

discharge home at this time.  I recommended that they follow closely with their 

primary hematologist.  Return precautions have been reviewed and discussed and 

patient has verbalized indications to return to the emergency department.





- Vital Signs


Vital signs: 


 











Temp Pulse Resp BP Pulse Ox


 


 98.9 F   101 H  18   126/81 H  96 


 


 09/04/18 19:30  09/05/18 01:10  09/05/18 03:01  09/05/18 03:01  09/05/18 03:01














- Laboratory


Result Diagrams: 


 09/04/18 21:21





 09/04/18 21:21


Laboratory results interpreted by me: 


 











  09/04/18 09/04/18





  21:21 21:21


 


WBC  11.4 H 


 


RBC  3.02 L 


 


Hgb  10.1 L 


 


Hct  29.9 L 


 


MCV  99 H 


 


MCH  33.5 H 


 


RDW  31.2 H 


 


Seg Neuts % (Manual)  87 H 


 


Lymphocytes % (Manual)  8 L 


 


Abs Neuts (Manual)  9.9 H 


 


Retic Count (auto)  14.28 H 


 


Absolute Retic  0.431 H 


 


Chloride   108 H


 


Total Bilirubin   2.3 H


 


AST   65 H


 


ALT   12 L


 


Total Protein   8.7 H














Discharge





- Discharge


Clinical Impression: 


 Sickle cell crisis





Condition: Good


Disposition: HOME, SELF-CARE


Instructions:  Sickle Cell Crisis ([facility])


Referrals: 


REMI WILLIAMSON MD [Primary Care Provider] - Follow up as needed

## 2018-09-07 NOTE — RADIOLOGY REPORT (SQ)
EXAM DESCRIPTION: 



CT CHEST ANGIOGRAPHY WITH IV CONTRAST



COMPLETED DATE/TME:  09/07/2018 05:42



CLINICAL HISTORY: hypoxia, sickle cell



COMPARISON: 8/15/2018



TECHNIQUE: CTA of the chest obtained following the uncomplicated

intravenous administration of 75 mL Omnipaque 350. 3-D/MIP

reformatted images of the chest available for evaluation.



DLP:  478.60 mGycm



FINDINGS: 



Chest:

Pulmonary arteries: Contrast bolus is adequate.No filling defects

identified in the pulmonary arteries to suggest pulmonary

embolus. Enlargement of the main pulmonary artery. This could be

seen with pulmonary arterial hypertension.

Thyroid:No abnormalities of the visualized thyroid.

Great Vessels:Great vessels have normal anatomic configuration.

Thoracic Aorta:No abnormalities of the thoracic aorta identified.

Heart:No cardiomegaly, significant pericardial effusion, or

coronary artery atherosclerosis

Lymph Nodes:No enlarged mediastinal lymph nodes identified.

Esophagus:No abnormalities of the esophagus identified.

Other:No additional findings.



Lungs: Chronic bibasilar interstitial opacities lung bases are

stable. No acute consolidation.

Pleura:No pleural effusion or pneumothorax.

Trachea/Airways:No abnormalities of the visualized trachea or

airways.



Bones: Diffuse sclerotic changes of the spine compatible with

history of sickle cell disease.



Upper Abdomen:Limited images of the upper abdomen demonstrate no

definite abnormalities of visualized portions of the liver, 

pancreas,  adrenal glands, or kidneys. Calcified splenic remnant.







IMPRESSION: 



1. No pulmonary embolus identified.





This exam was performed according to our departmental

dose-optimization program, which includes automated exposure

control, adjustment of the mA and/or kV according to patient size

and/or use of iterative reconstruction technique.

## 2018-09-07 NOTE — RADIOLOGY REPORT (SQ)
EXAM DESCRIPTION:

XR CHEST 1 VIEW



COMPLETED DATE/TME:  09/07/2018 02:44



CLINICAL HISTORY:

22 years Male, hypoxia, sob



COMPARISON:

8.15.18



NUMBER OF VIEWS/TECHNIQUE:

1/AP



FINDINGS:



Adequate lung volume, clear parenchyma, normal cardiac

silhouette, and intact bony thorax. Left PICC tip at the

cavoatrial junction. Right upper abdominal clips.



IMPRESSION:



No acute cardiopulmonary findings.

## 2018-09-07 NOTE — PDOC H&P
History of Present Illness


Admission Date/PCP: 


  09/07/18 08:38





  REMI WILLIAMSON MD





Patient complains of: Worsening back pain


History of Present Illness: 


CARMEN BEGUM is a 22 year old male patient known to my practice who presented 

to the ED with worsening mid back pain over last 2 weeks. He denied any recent 

instrumentation in same area, fall, or trauma. 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 

SS hemoglobin sickle cell disease. He denied any significant relieving or 

aggravating factors. He reported compliance with his pain management regimen. 

He was recently discharged from [facility] following 

admission for diagnosis with bacteremia and on home infusion on Cefazolin 

therapy. he denied any fever or chills. No nausea, vomiting or abdominal pain. 

His initial evaluation in the ED was significant for tachycardia, hypoxemia, 

anemia with hemolytic features and negative CTA chest and chest X ray. he was 

advised hospitalization due to SS hemoglobin Sickle cell disease in pain and 

hemolytic crises. His morbidities include Asthma and recurrent SS Hemoglobin 

sickle cell disease crises.





Past Medical History


Pulmonary Medical History: Reports: Asthma, Pneumonia


Psychiatric Medical History: 


   Denies: Depression


Hematology: Reports: Anemia, Sickle Cell Disease, Bleeding Tendencies





Past Surgical History


Past Surgical History: Reports: Cholecystectomy, Orthopedic Surgery - Fluid 

drained from right foot, Vascular Surgery - Port placement





Social History


Lives with: Spouse/Significant other


Smoking Status: Never Smoker


Frequency of Alcohol Use: Occasional


Hx Recreational Drug Use: No


Drugs: None


Hx Prescription Drug Abuse: No





- Advance Directive


Resuscitation Status: Full Code





Family History


Family History: Reviewed & Not Pertinent, Arthritis, DM, Hypertension, Other - 

Sickle cell trait both parents and asthma


Parental Family History Reviewed: Yes


Children Family History Reviewed: Yes


Sibling(s) Family History Reviewed.: Yes





Medication/Allergy


Home Medications: 








Albuterol Sulfate [Proair HFA Inhalation Aerosol 8.5 gm MDI] 2 puff IH Q4HP PRN 

09/07/18 


Fentanyl [Duragesic 75 Mcg/Hr Transdermal Patch] 1 patch TOP Q3D 09/07/18 


Folic Acid [Folvite 1 mg Tablet] 1 mg PO DAILY 09/07/18 


Hydroxyurea [Hydrea 500 mg Capsule] 1,500 mg PO MOWEFR@1000 09/07/18 


Hydroxyurea [Hydrea 500 mg Capsule] 500 mg PO SUTUTHSA@1000 09/07/18 


Oxycodone HCl [Oxy-Ir 5 mg Tablet] 5 mg PO Q4 09/07/18 








Allergies/Adverse Reactions: 


 





Coconut * [Coconut] Allergy (Mild, Verified 08/10/18 15:13)


 


transpore tape Allergy (Mild, Uncoded 08/10/18 15:13)


 Hives











Review of Systems


Constitutional: ABSENT: chills, fever(s), headache(s), weight gain, weight loss


Eyes: ABSENT: visual disturbances


Ears: ABSENT: hearing changes


Nose, Mouth, and Throat: ABSENT: as per HPI, headache(s), mouth pain, sore 

throat, vertigo, other


Cardiovascular: ABSENT: chest pain, dyspnea on exertion, edema, orthropnea, 

palpitations


Respiratory: ABSENT: cough, hemoptysis


Gastrointestinal: ABSENT: abdominal pain, constipation, diarrhea, hematemesis, 

hematochezia, nausea, vomiting


Genitourinary: ABSENT: dysuria, hematuria


Musculoskeletal: PRESENT: back pain.  ABSENT: joint swelling, muscle weakness


Integumentary: ABSENT: rash, wounds


Neurological: ABSENT: abnormal gait, abnormal speech, confusion, dizziness, 

focal weakness, syncope


Psychiatric: ABSENT: anxiety, depression, homidical ideation, suicidal ideation


Endocrine: ABSENT: cold intolerance, heat intolerance, polydipsia, polyuria


Hematologic/Lymphatic: ABSENT: easy bleeding, easy bruising, lymphadenopathy


Allergic/Immunologic: ABSENT: seasonal rhinorrhea





Physical Exam


Vital Signs: 


 











Temp Pulse Resp BP Pulse Ox


 


 98.0 F   105 H  18   117/79   97 


 


 09/07/18 15:40  09/07/18 15:40  09/07/18 15:40  09/07/18 15:40  09/07/18 15:40








 Intake & Output











 09/06/18 09/07/18 09/08/18





 06:59 06:59 06:59


 


Weight   61.5 kg











General appearance: PRESENT: mild distress - moderate level due to mid back pain


Head exam: PRESENT: atraumatic, normocephalic


Eye exam: PRESENT: conjunctiva pink, EOMI, PERRLA.  ABSENT: scleral icterus


Ear exam: PRESENT: normal external ear exam


Mouth exam: PRESENT: moist, tongue midline


Teeth exam: ABSENT: dental caries, dental tenderness, edentulous, poor dentation

, other


Throat exam: ABSENT: post pharyngeal erythema, tonsillar erythema, tonsillar 

exudate, tonsillogmegaly, other


Neck exam: ABSENT: lymphadenopathy, tenderness, thyromegaly


Respiratory exam: PRESENT: clear to auscultation brenda


Cardiovascular exam: PRESENT: +S1, +S2, tachycardia.  ABSENT: diastolic murmur, 

systolic murmur


Pulses: PRESENT: normal dorsalis pedis pul, +2 pedal pulses bilateral


Vascular exam: PRESENT: normal capillary refill.  ABSENT: pallor


GI/Abdominal exam: PRESENT: normal bowel sounds, soft.  ABSENT: distended, 

guarding, mass, organolmegaly, rebound, tenderness


Rectal exam: PRESENT: deferred


Extremities exam: ABSENT: pedal edema


Musculoskeletal exam: PRESENT: tenderness - mid back level.  ABSENT: deformity


Neurological exam: PRESENT: alert, awake, oriented to person, oriented to place

, oriented to time, oriented to situation, CN II-XII grossly intact.  ABSENT: 

motor sensory deficit


Psychiatric exam: PRESENT: appropriate affect, normal mood.  ABSENT: homicidal 

ideation, suicidal ideation


Skin exam: PRESENT: dry, warm, other - left arm PICC line





Results


Laboratory Results: 





I reviewed his lab results on Cono-C and form significant component of my 

medical decision making.


Impressions: 


 





Chest X-Ray  09/07/18 02:44


IMPRESSION:


 


No acute cardiopulmonary findings.


 








Chest/Abdomen CTA  09/07/18 05:42


IMPRESSION: 


 


1. No pulmonary embolus identified.


 


 


This exam was performed according to our departmental


dose-optimization program, which includes automated exposure


control, adjustment of the mA and/or kV according to patient size


and/or use of iterative reconstruction technique.


 


 














Assessment & Plan





- Diagnosis


(1) Sickle cell disease with crisis


Is this a current diagnosis for this admission?: Yes   


Plan: 


See admitting physician orders.








(2) Hypoxia


Is this a current diagnosis for this admission?: Yes   


Plan: 


See admitting physician orders.








(3) Homozygous sickle cell (SS) disease


Is this a current diagnosis for this admission?: Yes   


Plan: 


See admitting physician orders.








(4) Asthma


Qualifiers: 


   Asthma severity: mild   Asthma complication type: unspecified 


Is this a current diagnosis for this admission?: Yes   


Plan: 


See admitting physician orders.








(5) Bacteremia of undetermined etiology


Is this a current diagnosis for this admission?: Yes   


Plan: 


See admitting physician orders.








- Time


Time Spent: 50 to 70 Minutes


Medications reviewed and adjusted accordingly: Yes


Anticipated discharge: Home


Within: Other





- Inpatient Certification


Based on my medical assessment, after consideration of the patient's 

comorbidities, presenting symptoms, or acuity I expect that the services needed 

warrant INPATIENT care.: Yes


I certify that my determination is in accordance with my understanding of 

Medicare's requirements for reasonable and necessary INPATIENT services [42 CFR 

412.3e].: Yes


Medical Necessity: Need Close Monitoring Due to Risk of Patient Decompensation, 

Need For IV Fluids, Need For Continuous Telemetry Monitoring, Need for Pain 

Control, Need for IV Antibiotics, Risk of Complication if Not Cared For in 

Hospital


Post Hospital Care: D/C Planner Documentation





- Plan Summary


Plan Summary: 


See admitting physician orders.

## 2018-09-07 NOTE — ER DOCUMENT REPORT
ED General





- General


TRAVEL OUTSIDE OF THE [place] IN LAST 30 DAYS: No





<KAMI PINA - Last Filed: 09/07/18 03:31>





<MICKIE SORIANO - Last Filed: 09/07/18 08:02>





<ALESSANDRA QUIÑONEZ - Last Filed: 09/07/18 08:19>





- General


Chief Complaint: Sickle Cell Crisis


Stated Complaint: POSSIBLE SICKLE CELL CRISIS


Time Seen by Provider: 09/07/18 02:25


Notes: 





Patient is a 22-year-old male well-known to me, hemoglobin SS sickle cell 

disease at baseline who presents with complaints of 2 days of progressively 

worsening mid back pain.  He describes this as a aching, throbbing, constant 

pain.  Nothing improves the pain.  Nothing worsens the pain.  He states that 

this does feel very similar to prior episodes of sickle cell crises that he has 

had in the past.  The patient was recently started on cefazolin due to concern 

of bacteremia and was taken off of his hydroxyurea.  He reports that this feels 

very similar in terms of to when he has been taken off hydroxyurea in the past.

  He states it does not feel like when he has had acute chest syndrome in the 

past.  However, patient is noted to be hypoxic in triage which is not his 

baseline. (KAMI PINA)





- Related Data


Allergies/Adverse Reactions: 


 





Coconut * [Coconut] Allergy (Mild, Verified 08/10/18 15:13)


 


transpore tape Allergy (Mild, Uncoded 08/10/18 15:13)


 Hives











Past Medical History





- General


Information source: Patient





- Social History


Smoking Status: Never Smoker


Frequency of alcohol use: None


Drug Abuse: None


Lives with: Spouse/Significant other


Family History: Reviewed & Not Pertinent, Arthritis, DM, Hypertension, Other - 

Sickle cell trait both parents and asthma


Pulmonary Medical History: Reports: Hx Asthma, Hx Pneumonia


Renal/ Medical History: Denies: Hx Peritoneal Dialysis


Psychiatric Medical History: 


   Denies: Hx Depression


Past Surgical History: Reports: Hx Abdominal Surgery - Gallstones removal, Hx 

Cholecystectomy, Hx Orthopedic Surgery - Fluid drained from right foot, Hx 

Vascular Surgery - Port placement





- Immunizations


Immunizations up to date: Yes


Hx Diphtheria, Pertussis, Tetanus Vaccination: Yes


Hx Pneumococcal Vaccination: 05/16/13





<KAMI PINA - Last Filed: 09/07/18 03:31>





Review of Systems





<KAMI PINA - Last Filed: 09/07/18 03:31>





<MICKIE SORIANO - Last Filed: 09/07/18 08:02>





<ALESSANDRA QUIÑONEZ - Last Filed: 09/07/18 08:19>





- Review of Systems


Notes: 





Constitutional: Negative for fever.


HENT: Negative for sore throat.


Eyes: Negative for visual changes.


Cardiovascular: Negative for chest pain.


Respiratory: Negative for shortness of breath.


Gastrointestinal: Negative for abdominal pain, vomiting or diarrhea.


Genitourinary: Negative for dysuria.


Musculoskeletal: Positive for mid back pain


Skin: Negative for rash.


Neurological: Negative for headaches, weakness or numbness.





10 point ROS negative except as marked above and in HPI. (KAMI PINA)





Physical Exam





- Vital signs


Interpretation: Tachycardic, Hypoxic





<KAMI PINA - Last Filed: 09/07/18 03:31>





<MICKIE SORIANO - Last Filed: 09/07/18 08:02>





<ALESSANDRA QUIÑONEZ - Last Filed: 09/07/18 08:19>





- Vital signs


Vitals: 





 











Temp Pulse Resp BP Pulse Ox


 


 97.8 F   111 H  18   135/90 H  90 L


 


 09/07/18 01:28  09/07/18 01:28  09/07/18 01:28  09/07/18 01:28  09/07/18 01:28











Notes: 





PHYSICAL EXAMINATION:





GENERAL: Well-appearing, well-nourished and in no acute distress.





HEAD: Atraumatic, normocephalic.





EYES: Pupils equal round and reactive to light, extraocular movements intact, 

sclera anicteric, conjunctiva are normal.





ENT: nares patent, oropharynx clear without exudates.  Moist mucous membranes.





NECK: Normal range of motion, supple without lymphadenopathy





LUNGS: Breath sounds clear to auscultation bilaterally and equal.  No wheezes 

rales or rhonchi.





HEART: Regular rate and rhythm without murmurs





ABDOMEN: Soft, nontender, normoactive bowel sounds.  No guarding, no rebound.  

No masses appreciated.





EXTREMITIES: Normal range of motion, no pitting or edema.  No cyanosis.





Back: No midline spinal tenderness, step-offs or deformities





NEUROLOGICAL: No focal neurological deficits. Moves all extremities 

spontaneously and on command.





PSYCH: Normal mood, normal affect.





SKIN: Warm, Dry, normal turgor, no rashes or lesions noted. (KAMI PINA)





Course





<KAMI PINA - Last Filed: 09/07/18 03:31>





- Laboratory


Result Diagrams: 


 09/07/18 03:45





 09/07/18 03:45





<FRIMICKIE MAURICIO - Last Filed: 09/07/18 08:02>





- Laboratory


Result Diagrams: 


 09/07/18 03:45





 09/07/18 03:45





- Diagnostic Test


Radiology reviewed: Reports reviewed





<ALESSANDRA QUIÑONEZ - Last Filed: 09/07/18 08:19>





- Re-evaluation


Re-evalutation: 





09/07/18 02:49


Patient presents complaining of 2 days of progressively worsening back pain 

typical for his sickle cell pain crises.  However, patient is noted to be 

hypoxic today to 90% on room air which is not all his baseline.  The last time 

he had a similar episode of hypoxia was admitted August when he was found to 

have probable acute chest syndrome and required transfer.  The patient is again 

noted to be tachycardic which is typical for his presentations of sickle cell 

crises.  Will proceed with labs, chest x-ray, pain control, and patient will 

very likely require transfer to a higher level of care due to concerns of acute 

chest.


09/07/18 03:31


Patient continues to be persistently hypoxic to 90-91% on room air.  Requires 3 

L by nasal cannula to maintain saturations of 95%.  At this point despite a 

negative chest x-ray I am concerned the patient has developed acute chest 

syndrome in the context of being off hydroxyurea.  He will require transfer to 

a tertiary care center.  I will place a call to [facility] and request transfer. (

KAMI PINA)


09/07/18 04:40


Called and spoke with the transfer center, pending call back.  CBC shows no 

leukocytosis, hemoglobin of 9, there is elevation of reticulocytes.  Chemistry 

generally unremarkable.  At bedside patient is 96% on 3 L nasal cannula, he is 

mildly tachycardic at 109, he is complaining of pain at this time, he denies 

shortness of breath.





09/07/18 05:43


Spoke with Dr. Hernandez, internal medicine at [facility], he states they would 

be happy to accept the patient, however unfortunately at this time they are on 

diversion and did not have any beds. He also recommends a CTA of the chest due 

to sickle cell hx, hypoxia, tachycardia, no infiltrate on chest x-ray.





09/07/18 


Spoke with patient about due to being on diversion.  He requests staying here 

if possible.  CTA of the chest with no acute findings, no pulmonary emboli.  We 

will discussed with Dr. Funez, patient's provider for possible admission 

here. He follows with local oncology/hematology Dr. Cleaning. 





09/07/18 08:02


Still pending call back from Dr. Funez.  Patient will be given additional 

medications for pain on request.  Introduced to Lisa Quiñonez APC at bedside 

pending possible admission versus transfer.  Patient is hoping to stay.  

Patient was also discussed with Dr. Rodriguez. (MICKIE SORIANO)








09/07/18 08:17


Consulted with Dr. Funez per patient request.  Dr. Funez is agreeable 

with admitting patient to telemetry floor at this time.  Patient updated and is 

agreeable with this plan of care. (ALESSANDRA QUIÑONEZ)





- Vital Signs


Vital signs: 





 











Temp Pulse Resp BP Pulse Ox


 


 97.8 F   111 H  16   135/94 H  98 


 


 09/07/18 01:28  09/07/18 01:28  09/07/18 06:46  09/07/18 06:46  09/07/18 06:46














- Laboratory


Laboratory results interpreted by me: 





 











  09/07/18 09/07/18





  03:45 03:45


 


RBC  2.57 L 


 


Hgb  9.0 L 


 


Hct  24.6 L 


 


MCH  35.0 H 


 


MCHC  36.7 H 


 


RDW  30.4 H 


 


Monocytes % (Manual)  18 H 


 


Abs Monocytes (Manual)  1.7 H 


 


Retic Count (auto)  12.73 H 


 


Absolute Retic  0.327 H 


 


Chloride   108 H














09/07/18 08:18





 Labs- Entire Visit











  09/07/18 09/07/18





  03:45 03:45


 


WBC  8.5 


 


RBC  2.57 L 


 


Hgb  9.0 L 


 


Hct  24.6 L 


 


MCV  96 


 


MCH  35.0 H 


 


MCHC  36.7 H 


 


RDW  30.4 H 


 


Plt Count  221 


 


Total Counted  100 


 


Seg Neuts % (Manual)  46 


 


Lymphocytes % (Manual)  35 


 


Monocytes % (Manual)  18 H 


 


Eosinophils % (Manual)  1 


 


Basophils % (Manual)  0 


 


Abs Neuts (Manual)  4.3 


 


Abs Lymphs (Manual)  3.3 


 


Abs Monocytes (Manual)  1.7 H 


 


Absolute Eos (Manual)  0.1 


 


Abs Basophils (Manual)  0.0 


 


Nucleated RBCs  28 


 


Toxic Vacuolation  PRESENT 


 


Platelet Comment  ADEQUATE 


 


Polychromasia  1+ 


 


Poikilocytosis  2+ 


 


Anisocytosis  4+ 


 


Sickle Cells  2+ 


 


Target Cells  SLIGHT 


 


Peters-Chrisney Bodies  PRESENT 


 


Retic Count (auto)  12.73 H 


 


Absolute Retic  0.327 H 


 


Sodium   143.0


 


Potassium   4.4


 


Chloride   108 H


 


Carbon Dioxide   27


 


Anion Gap   8


 


BUN   16


 


Creatinine   0.77


 


Est GFR ( Amer)   > 60


 


Est GFR (Non-Af Amer)   > 60


 


Glucose   105


 


Calcium   9.0








 (ALESSANDRA QUIÑONEZ)





Discharge





<KAMI PINA - Last Filed: 09/07/18 03:31>





<MICKIE SORIANO - Last Filed: 09/07/18 08:02>





- Discharge


[facility]


Unit Admitted: Telemetry





<ALESSANDRA QUIÑONEZ - Last Filed: 09/07/18 08:19>





- Discharge


Clinical Impression: 


 Sickle cell pain crisis, Hypoxia





Back pain


Qualifiers:


 Back pain location: back pain in unspecified location Chronicity: acute Back 

pain laterality: unspecified Qualified Code(s): M54.9 - Dorsalgia, unspecified





Condition: Fair


Disposition: ADMITTED AS INPATIENT


Referrals: 


REMI WILLIAMSON MD [Primary Care Provider] - Follow up as needed

## 2018-09-08 NOTE — PDOC PROGRESS REPORT
Subjective


Progress Note for:: 09/08/18


Subjective:: 





Patient is seen by the bedside admitted for management of vaso-occlusive pain 

crisis


Reason For Visit: 


SICKLE CELL








Physical Exam


Vital Signs: 


 











Temp Pulse Resp BP Pulse Ox


 


 97.9 F   103 H  19   128/101 H  93 


 


 09/08/18 15:24  09/08/18 15:24  09/08/18 15:24  09/08/18 15:24  09/08/18 15:24








 Intake & Output











 09/07/18 09/08/18 09/09/18





 06:59 06:59 06:59


 


Intake Total  1665 1200


 


Output Total  900 


 


Balance  765 1200


 


Weight  64.4 kg 











General appearance: PRESENT: no acute distress


Eye exam: PRESENT: PERRLA


Respiratory exam: PRESENT: clear to auscultation brenda


Cardiovascular exam: PRESENT: +S1, +S2


GI/Abdominal exam: PRESENT: soft


Neurological exam: PRESENT: alert





Results


Laboratory Results: 


 





 09/08/18 06:27 





 09/08/18 06:27 





 











  09/08/18 09/08/18





  06:27 06:27


 


WBC  9.9 


 


RBC  2.32 L 


 


Hgb  7.9 L 


 


Hct  22.0 L 


 


MCV  95 


 


MCH  34.0 H 


 


MCHC  35.8 


 


RDW  29.0 H 


 


Plt Count  168 


 


Seg Neutrophils %  Not Reportable 


 


Lymphocytes %  Not Reportable 


 


Monocytes %  Not Reportable 


 


Eosinophils %  Not Reportable 


 


Basophils %  Not Reportable 


 


Absolute Neutrophils  Not Reportable 


 


Absolute Lymphocytes  Not Reportable 


 


Absolute Monocytes  Not Reportable 


 


Absolute Eosinophils  Not Reportable 


 


Absolute Basophils  Not Reportable 


 


Sodium   140.4


 


Potassium   4.4


 


Chloride   103


 


Carbon Dioxide   30


 


Anion Gap   7


 


BUN   10


 


Creatinine   0.57


 


Est GFR ( Amer)   > 60


 


Est GFR (Non-Af Amer)   > 60


 


Glucose   97


 


Calcium   8.7


 


Total Bilirubin   2.0 H


 


AST   60 H


 


ALT   19 L


 


Alkaline Phosphatase   88


 


Total Protein   6.9


 


Albumin   3.7











Impressions: 


 





Chest X-Ray  09/07/18 02:44


IMPRESSION:


 


No acute cardiopulmonary findings.


 








Chest/Abdomen CTA  09/07/18 05:42


IMPRESSION: 


 


1. No pulmonary embolus identified.


 


 


This exam was performed according to our departmental


dose-optimization program, which includes automated exposure


control, adjustment of the mA and/or kV according to patient size


and/or use of iterative reconstruction technique.


 


 














Assessment & Plan





- Diagnosis


(1) Sickle cell crisis


Is this a current diagnosis for this admission?: Yes   


Plan: 


Continue treatment








(2) Sickle cell crisis acute chest syndrome


Is this a current diagnosis for this admission?: Yes

## 2018-09-09 NOTE — PDOC PROGRESS REPORT
Subjective


Progress Note for:: 09/09/18


Subjective:: 





Patient is seen by the bedside admitted for management of vaso-occlusive pain 

crisis


Reason For Visit: 


SICKLE CELL








Physical Exam


Vital Signs: 


 











Temp Pulse Resp BP Pulse Ox


 


 98.4 F   101 H  18   132/83 H  99 


 


 09/09/18 11:19  09/09/18 11:19  09/09/18 11:19  09/09/18 11:19  09/09/18 11:19








 Intake & Output











 09/08/18 09/09/18 09/10/18





 06:59 06:59 06:59


 


Intake Total 1665 4306 1200


 


Output Total 900 3850 


 


Balance 


 


Weight 64.4 kg 61.8 kg 











General appearance: PRESENT: no acute distress


Eye exam: PRESENT: PERRLA


Respiratory exam: PRESENT: clear to auscultation brenda


Cardiovascular exam: PRESENT: +S1, +S2


GI/Abdominal exam: PRESENT: soft





Results


Laboratory Results: 


 





 09/08/18 06:27 





 09/08/18 06:27 








Impressions: 


 





Chest X-Ray  09/07/18 02:44


IMPRESSION:


 


No acute cardiopulmonary findings.


 








Chest/Abdomen CTA  09/07/18 05:42


IMPRESSION: 


 


1. No pulmonary embolus identified.


 


 


This exam was performed according to our departmental


dose-optimization program, which includes automated exposure


control, adjustment of the mA and/or kV according to patient size


and/or use of iterative reconstruction technique.


 


 














Assessment & Plan





- Diagnosis


(1) Sickle cell crisis


Is this a current diagnosis for this admission?: Yes   





(2) Sickle cell crisis acute chest syndrome


Is this a current diagnosis for this admission?: Yes

## 2018-09-10 NOTE — PDOC PROGRESS REPORT
Subjective


Progress Note for:: 09/10/18


Subjective:: 


Patient reported persistent mid back pain and requested for KPAD usage. No 

chest pain or difficulty with breathing. No nausea, vomiting or abdominal pain. 


Reason For Visit: 


SICKLE CELL








Physical Exam


Vital Signs: 


 











Temp Pulse Resp BP Pulse Ox


 


 98.4 F   92   16   124/79   100 


 


 09/10/18 15:56  09/10/18 15:56  09/10/18 15:56  09/10/18 15:56  09/10/18 15:56








 Intake & Output











 09/09/18 09/10/18 09/11/18





 06:59 06:59 06:59


 


Intake Total 4306 3331 1777


 


Output Total 3850 2030 500


 


Balance 456 1301 1277


 


Weight 61.8 kg 58.9 kg 











General appearance: PRESENT: mild distress - due to sickle cell disease pain 

crisis


Head exam: PRESENT: atraumatic, normocephalic


Eye exam: PRESENT: conjunctiva pink, EOMI, PERRLA.  ABSENT: scleral icterus


Ear exam: PRESENT: normal external ear exam


Mouth exam: PRESENT: moist


Respiratory exam: PRESENT: clear to auscultation brenda


Cardiovascular exam: PRESENT: RRR, +S1, +S2, tachycardia - on cardiac 

monitoring.  ABSENT: diastolic murmur, rubs, systolic murmur


Vascular exam: PRESENT: normal capillary refill.  ABSENT: pallor


GI/Abdominal exam: PRESENT: normal bowel sounds, soft.  ABSENT: distended, 

guarding, mass, organolmegaly, rebound, tenderness


Extremities exam: ABSENT: pedal edema


Musculoskeletal exam: PRESENT: normal inspection, tenderness - mid back region


Neurological exam: PRESENT: alert, awake, oriented to person, oriented to place

, oriented to time, oriented to situation, CN II-XII grossly intact.  ABSENT: 

motor sensory deficit


Psychiatric exam: PRESENT: appropriate affect, normal mood.  ABSENT: homicidal 

ideation, suicidal ideation


Skin exam: PRESENT: dry, warm, other - Left arm PICC line site is 

satisfactory..  ABSENT: cyanosis, rash





Results


Laboratory Results: 


 





 09/10/18 06:00 





 09/08/18 06:27 





 











  09/10/18





  06:00


 


WBC  6.9


 


RBC  2.52 L


 


Hgb  8.6 L


 


Hct  23.9 L


 


MCV  95


 


MCH  34.2 H


 


MCHC  36.0


 


RDW  28.0 H


 


Plt Count  215











Impressions: 


 





Chest X-Ray  09/07/18 02:44


IMPRESSION:


 


No acute cardiopulmonary findings.


 








Chest/Abdomen CTA  09/07/18 05:42


IMPRESSION: 


 


1. No pulmonary embolus identified.


 


 


This exam was performed according to our departmental


dose-optimization program, which includes automated exposure


control, adjustment of the mA and/or kV according to patient size


and/or use of iterative reconstruction technique.


 


 














Assessment & Plan





- Diagnosis


(1) Sickle cell disease with crisis


Is this a current diagnosis for this admission?: Yes   





(2) Hypoxia


Is this a current diagnosis for this admission?: Yes   





(3) Homozygous sickle cell (SS) disease


Is this a current diagnosis for this admission?: Yes   





(4) Asthma


Qualifiers: 


   Asthma severity: mild   Asthma complication type: unspecified 


Is this a current diagnosis for this admission?: Yes   





(5) Bacteremia of undetermined etiology


Is this a current diagnosis for this admission?: Yes   





- Time


Time Spent with patient: 25-34 minutes


Medications reviewed and adjusted accordingly: Yes





- Inpatient Certification


Based on my medical assessment, after consideration of the patient's 

comorbidities, presenting symptoms, or acuity I expect that the services needed 

warrant INPATIENT care.: Yes


I certify that my determination is in accordance with my understanding of 

Medicare's requirements for reasonable and necessary INPATIENT services [42 CFR 

412.3e].: Yes


Medical Necessity: Need Close Monitoring Due to Risk of Patient Decompensation, 

Need For IV Fluids, Need For Continuous Telemetry Monitoring, Need for Pain 

Control, Need for IV Antibiotics, Risk of Complication if Not Cared For in 

Hospital


Post Hospital Care: D/C Planner Documentation





- Plan Summary


Plan Summary: 





Continue current medication management. Allow use of KPAD prn basis to aide 

pain management.

## 2018-09-11 NOTE — PDOC DISCHARGE SUMMARY
General





- Admit/Disc Date/PCP


Admission Date/Primary Care Provider: 


  09/07/18 08:38





  REMI WILLIAMSON MD





Discharge Date: 09/11/18





- Discharge Diagnosis


(1) Sickle cell disease with crisis


Is this a current diagnosis for this admission?: Yes   





(2) Hypoxia


Is this a current diagnosis for this admission?: Yes   





(3) Homozygous sickle cell (SS) disease


Is this a current diagnosis for this admission?: Yes   





(4) Asthma


Is this a current diagnosis for this admission?: Yes   





(5) Bacteremia of undetermined etiology


Is this a current diagnosis for this admission?: Yes   





- Additional Information


Resuscitation Status: Full Code


Home Medications: 








Albuterol Sulfate [Proair HFA Inhalation Aerosol 8.5 gm MDI] 2 puff IH Q4HP PRN 

09/07/18 


Fentanyl [Duragesic 75 Mcg/Hr Transdermal Patch] 1 patch TOP Q3D 09/07/18 


Folic Acid [Folvite 1 mg Tablet] 1 mg PO DAILY 09/07/18 


Hydroxyurea [Hydrea 500 mg Capsule] 1,500 mg PO MOWEFR@1000 09/07/18 


Hydroxyurea [Hydrea 500 mg Capsule] 500 mg PO SUTUTHSA@1000 09/07/18 


Oxycodone HCl [Oxy-Ir 5 mg Tablet] 5 mg PO Q4 09/07/18 











History of Present Illness


Patient complains of: Worsening mid back pain


History of Present Illness: 


CARMEN BEGUM is a 22 year old male patient known to my practice who presented 

to the ED with worsening mid back pain over last 2 weeks. He denied any recent 

instrumentation in same area, fall, or trauma. 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 

SS hemoglobin sickle cell disease. He denied any significant relieving or 

aggravating factors. He reported compliance with his pain management regimen. 

He was recently discharged from [facility] following 

admission for diagnosis with bacteremia and on home infusion on Cefazolin 

therapy. he denied any fever or chills. No nausea, vomiting or abdominal pain. 

His initial evaluation in the ED was significant for tachycardia, hypoxemia, 

anemia with hemolytic features and negative CTA chest and chest X ray. he was 

advised hospitalization due to SS hemoglobin Sickle cell disease in pain and 

hemolytic crises. His morbidities include Asthma and recurrent SS Hemoglobin 

sickle cell disease crises.





Hospital Course


Hospital Course: 


Patient was managed with IV Dilaudid and Fentanyl Patch for pain management, he 

remain on IV fluid and IV Cefazolin coverage. His pain did improved but 

persist. He remain afebrile. No chest pain, nausea, vomiting or abdominal pain. 

He will be discharged home today with follow up arrangement at [facility] and myself as instructed upon discharge.





Physical Exam


Vital Signs: 


 











Temp Pulse Resp BP Pulse Ox


 


 98.3 F   102 H  18   114/84   96 


 


 09/11/18 08:42  09/11/18 08:42  09/11/18 08:42  09/11/18 08:42  09/11/18 08:42








 Intake & Output











 09/10/18 09/11/18 09/12/18





 06:59 06:59 06:59


 


Intake Total 3331 4958 200


 


Output Total 2030 3050 


 


Balance 1301 1908 200


 


Weight 58.9 kg 58.1 kg 











Physical Exam: 


General appearance: PRESENT: mild distress - due to sickle cell disease pain 

crisis


Head exam: PRESENT: atraumatic, normocephalic


Eye exam: PRESENT: conjunctiva pink, EOMI, PERRLA.  ABSENT: scleral icterus


Ear exam: PRESENT: normal external ear exam


Mouth exam: PRESENT: moist


Respiratory exam: PRESENT: clear to auscultation brenda


Cardiovascular exam: PRESENT: RRR, +S1, +S2 ABSENT: diastolic murmur, rubs, 

systolic murmur


Vascular exam: PRESENT: normal capillary refill.  ABSENT: pallor


GI/Abdominal exam: PRESENT: normal bowel sounds, soft.  ABSENT: distended, 

guarding, mass, organomegaly, rebound, tenderness


Extremities exam: ABSENT: pedal edema


Musculoskeletal exam: PRESENT: normal inspection, tenderness - mid back region


Neurological exam: PRESENT: alert, awake, oriented to person, oriented to place

, oriented to time, oriented to situation, CN II-XII grossly intact.  ABSENT: 

motor sensory deficit


Psychiatric exam: PRESENT: appropriate affect, normal mood.  ABSENT: homicidal 

ideation, suicidal ideation


Skin exam: PRESENT: dry, warm, other - Left arm PICC line site is 

satisfactory..  ABSENT: cyanosis, rash








Results


Laboratory Results: 


 





 09/10/18 06:00 





 09/08/18 06:27 








Impressions: 


 





Chest X-Ray  09/07/18 02:44


IMPRESSION:


 


No acute cardiopulmonary findings.


 








Chest/Abdomen CTA  09/07/18 05:42


IMPRESSION: 


 


1. No pulmonary embolus identified.


 


 


This exam was performed according to our departmental


dose-optimization program, which includes automated exposure


control, adjustment of the mA and/or kV according to patient size


and/or use of iterative reconstruction technique.


 


 














Qualifiers





- *


PATIENT BEING DISCHARGED WITH ANY OF THE FOLLOWING DIAGNOSIS: No





Plan


Discharge Plan: 


D/C home today. Follow up with prior arrangement at [facility] and myself as instructed upon discharge.

## 2018-09-11 NOTE — PDOC PROGRESS REPORT
Subjective


Progress Note for:: 09/11/18


Subjective:: 


No chest pain or difficulty with breathing. His pain is fairly controlled on 

current regimen. No nausea, vomiting or abdominal pain.


Reason For Visit: 


SICKLE CELL








Physical Exam


Vital Signs: 


 











Temp Pulse Resp BP Pulse Ox


 


 98.3 F   102 H  18   114/84   96 


 


 09/11/18 08:42  09/11/18 08:42  09/11/18 08:42  09/11/18 08:42  09/11/18 08:42








 Intake & Output











 09/10/18 09/11/18 09/12/18





 06:59 06:59 06:59


 


Intake Total 3331 4958 200


 


Output Total 2030 3050 


 


Balance 1301 1908 200


 


Weight 58.9 kg 58.1 kg 











Physical Exam: 


General appearance: PRESENT: mild distress - due to sickle cell disease pain 

crisis


Head exam: PRESENT: atraumatic, normocephalic


Eye exam: PRESENT: conjunctiva pink, EOMI, PERRLA.  ABSENT: scleral icterus


Ear exam: PRESENT: normal external ear exam


Mouth exam: PRESENT: moist


Respiratory exam: PRESENT: clear to auscultation brenda


Cardiovascular exam: PRESENT: RRR, +S1, +S2 ABSENT: diastolic murmur, rubs, 

systolic murmur


Vascular exam: PRESENT: normal capillary refill.  ABSENT: pallor


GI/Abdominal exam: PRESENT: normal bowel sounds, soft.  ABSENT: distended, 

guarding, mass, organomegaly, rebound, tenderness


Extremities exam: ABSENT: pedal edema


Musculoskeletal exam: PRESENT: normal inspection, tenderness - mid back region


Neurological exam: PRESENT: alert, awake, oriented to person, oriented to place

, oriented to time, oriented to situation, CN II-XII grossly intact.  ABSENT: 

motor sensory deficit


Psychiatric exam: PRESENT: appropriate affect, normal mood.  ABSENT: homicidal 

ideation, suicidal ideation


Skin exam: PRESENT: dry, warm, other - Left arm PICC line site is 

satisfactory..  ABSENT: cyanosis, rash





Results


Laboratory Results: 


 





 09/10/18 06:00 





 09/08/18 06:27 








Impressions: 


 





Chest X-Ray  09/07/18 02:44


IMPRESSION:


 


No acute cardiopulmonary findings.


 








Chest/Abdomen CTA  09/07/18 05:42


IMPRESSION: 


 


1. No pulmonary embolus identified.


 


 


This exam was performed according to our departmental


dose-optimization program, which includes automated exposure


control, adjustment of the mA and/or kV according to patient size


and/or use of iterative reconstruction technique.


 


 














Assessment & Plan





- Diagnosis


(1) Sickle cell disease with crisis


Is this a current diagnosis for this admission?: Yes   





(2) Hypoxia


Is this a current diagnosis for this admission?: Yes   





(3) Homozygous sickle cell (SS) disease


Is this a current diagnosis for this admission?: Yes   





(4) Asthma


Qualifiers: 


   Asthma severity: mild   Asthma complication type: unspecified 


Is this a current diagnosis for this admission?: Yes   





(5) Bacteremia of undetermined etiology


Is this a current diagnosis for this admission?: Yes   





- Time


Time Spent with patient: 25-34 minutes


Medications reviewed and adjusted accordingly: Yes


Anticipated discharge: Home


Within: Other





- Inpatient Certification


Based on my medical assessment, after consideration of the patient's 

comorbidities, presenting symptoms, or acuity I expect that the services needed 

warrant INPATIENT care.: Yes


I certify that my determination is in accordance with my understanding of 

Medicare's requirements for reasonable and necessary INPATIENT services [42 CFR 

412.3e].: Yes


Medical Necessity: Need Close Monitoring Due to Risk of Patient Decompensation, 

Need For IV Fluids, Need For Continuous Telemetry Monitoring, Need for Pain 

Control, Need for IV Antibiotics, Risk of Complication if Not Cared For in 

Hospital


Post Hospital Care: D/C Planner Documentation





- Plan Summary


Plan Summary: 


Continue current medication management. I had extensive discussion with patient 

and mother at bedside regarding discharge due to the weather issue and 

mandatory evacuation in our area. He insisted upon staying in the hospital at 

this time.

## 2018-09-18 NOTE — ER DOCUMENT REPORT
ED General





- General


Chief Complaint: Knee Pain


Stated Complaint: RIGHT KNEE PAIN


Time Seen by Provider: 09/18/18 18:56


Notes: 





Patient is a 22-year-old male with sickle cell disease that presents to the 

emergency department for chief complaint of knee pain.  Patient states that 

this pain started about 2 days ago, he was taking his oxycodone at home, 

without enough relief so he decided to come to the emergency department.  

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 

occasionally flares with vaso-occlusive crisis.  He denies having any fevers, 

chills, night sweats, chest pain, shortness of breath, nausea, vomiting.  He 

currently rates his pain as a 5 out of 10, aching and occasionally sharp in 

nature, not particularly worse with movements.  He was treated prior to my 

evaluation with Dilaudid, but still having some pain.





Past Medical History: Sickle cell disease


Past Surgical History: Gallstone removal, PICC line placement


Social History: Denies tobacco, alcohol or illicit drug use.


Family History: Reviewed and noncontributory for presenting illness


Allergies: Reviewed, see documented allergy list. 





REVIEW OF SYSTEMS:


Unless otherwise stated in this report the patient's positive and negative 

responses for review of systems for constitutional, eyes, ENT, cardiovascular, 

respiratory, gastrointestinal, neurological, genitourinary, musculoskeletal, 

and integumentary systems and related systems to the presenting problem are 

either as stated in the HPI or were not pertinent or were negative for the 

symptoms and/or complaints related to the presenting medical problem.





PHYSICAL EXAMINATION:





Vital signs reviewed, nursing noted reviewed. 





GENERAL: Well-appearing, well-nourished and in no acute distress.





HEAD: Atraumatic, normocephalic.





EYES: Eyes appear normal, extraocular movements intact, sclera anicteric, 

conjunctiva are normal.





ENT: nares patent, oropharynx clear without exudates.  Moist mucous membranes.





NECK: Normal range of motion, supple without lymphadenopathy





LUNGS: Breath sounds clear to auscultation bilaterally and equal.  No wheezes 

rales or rhonchi.





HEART: Regular rate and rhythm without murmurs





ABDOMEN: Soft, nontender, normoactive bowel sounds.  No rebound, guarding, or 

rigidity. No masses appreciated.





EXTREMITIES: Nontender, good range of motion, no pitting or edema.  Right knee: 

No pain with range of motion of the knee, no surrounding erythema, or edema.





NEUROLOGICAL: No focal neurological deficits. Moves all extremities 

spontaneously Motor and sensory grossly intact on exam.





PSYCH: Normal mood, normal affect.





SKIN: Warm, Dry, normal turgor, no rashes or lesions noted on exposed skin





TRAVEL OUTSIDE OF THE [place] IN LAST 30 DAYS: No





- Related Data


Allergies/Adverse Reactions: 


 





Coconut * [Coconut] Allergy (Mild, Verified 09/18/18 18:14)


 


transpore tape Allergy (Mild, Uncoded 08/10/18 15:13)


 Hives











Past Medical History





- Social History


Smoking Status: Never Smoker


Frequency of alcohol use: None


Drug Abuse: None


Family History: Reviewed & Not Pertinent, Arthritis, DM, Hypertension, Other - 

Sickle cell trait both parents and asthma


Patient has suicidal ideation: No


Patient has homicidal ideation: No


Pulmonary Medical History: Reports: Hx Asthma, Hx Pneumonia


Renal/ Medical History: Denies: Hx Peritoneal Dialysis


Psychiatric Medical History: 


   Denies: Hx Depression


Past Surgical History: Reports: Hx Abdominal Surgery - Gallstones removal, Hx 

Cholecystectomy, Hx Orthopedic Surgery - Fluid drained from right foot, Hx 

Vascular Surgery - Port placement





- Immunizations


Immunizations up to date: Yes


Hx Diphtheria, Pertussis, Tetanus Vaccination: Yes


Hx Pneumococcal Vaccination: 05/16/13





Physical Exam





- Vital signs


Vitals: 


 











Temp Pulse Resp BP Pulse Ox


 


 98.5 F   115 H  16   118/73   93 


 


 09/18/18 18:39  09/18/18 18:39  09/18/18 18:39  09/18/18 18:39  09/18/18 18:39














Course





- Re-evaluation


Re-evalutation: 





Patient seen and examined vital signs reviewed. 





Laboratory data and imaging were ordered as appropriate for the patient's 

presenting symptoms and complaint, with consideration of any critical or life 

threatening conditions that may be associated with their obtained history and 

exam as noted above.





Patient was treated with IV fluids, and 2 doses of IV Dilaudid, and Benadryl





Results were reviewed when available and demonstrated appropriate elevated 

reticulocyte count, anemia, at baseline





The patient was re-evaluated and was improved, and felt comfortable with 

discharge, to take his home medications at this time.





Evaluation was most consistent with sickle cell pain crisis to the right knee





While the patient was in the ED, his PICC line, did clot, we did use 2 doses of 

alteplase per protocol, without success and difficulty flushing the line, then 

proceeded to use a flexible wire, to remove any obstruction, the wire was 

inserted in a sterile fashion, and went in easily, without resistance, and then 

was removed, the PICC line was then withdrawn, and then flushed twice with 10 

cc of normal saline, and flushed easily and carolyn back blood easily, patient was 

unable to receive his second dose of Dilaudid, and he was feeling much better, 

at this point he is comfortable being discharged home.





Results were discussed with the patient at this point, after careful 

consideration I feel that that patient can be discharged from the emergency 

department, the patient was educated treatments and reasons to return to the 

emergency department based on their presumed diagnosis as noted above, they 

were advised to followup with a primary care physician in 2-3 days. Patient was 

agreeable to plan of care.





*Note is created using voice recognition software and may contain spelling, 

syntax or grammatical errors.








Laboratory











  09/18/18





  19:45


 


WBC  10.3


 


RBC  2.70 L


 


Hgb  8.9 L


 


Hct  25.6 L


 


MCV  95


 


MCH  33.2


 


MCHC  34.9


 


RDW  30.3 H


 


Plt Count  251


 


Total Counted  100


 


Seg Neutrophils %  Not Reportable


 


Seg Neuts % (Manual)  61


 


Band Neutrophils %  1 L


 


Lymphocytes %  Not Reportable


 


Lymphocytes % (Manual)  25


 


Monocytes %  Not Reportable


 


Monocytes % (Manual)  3


 


Eosinophils %  Not Reportable


 


Eosinophils % (Manual)  9 H


 


Basophils %  Not Reportable


 


Basophils % (Manual)  1


 


Absolute Neutrophils  Not Reportable


 


Abs Neuts (Manual)  6.4


 


Absolute Lymphocytes  Not Reportable


 


Abs Lymphs (Manual)  2.6


 


Absolute Monocytes  Not Reportable


 


Abs Monocytes (Manual)  0.3


 


Absolute Eosinophils  Not Reportable


 


Absolute Eos (Manual)  0.9 H


 


Absolute Basophils  Not Reportable


 


Abs Basophils (Manual)  0.1


 


Nucleated RBCs  19


 


Large Platelets  PRESENT


 


Giant Platelets  PRESENT


 


Platelet Comment  ADEQUATE


 


Polychromasia  2+


 


Poikilocytosis  3+


 


Anisocytosis  4+


 


Sickle Cells  3+


 


Target Cells  2+


 


Ovalocytes  1+


 


Retic Count (auto)  12.79 H


 


Absolute Retic  0.345 H























- Vital Signs


Vital signs: 


 











Temp Pulse Resp BP Pulse Ox


 


 98.5 F   115 H  16   118/73   93 


 


 09/18/18 18:39  09/18/18 18:39  09/18/18 18:39  09/18/18 18:39  09/18/18 18:39














- Laboratory


Result Diagrams: 


 09/18/18 19:45





Laboratory results interpreted by me: 


 











  09/18/18





  19:45


 


RBC  2.70 L


 


Hgb  8.9 L


 


Hct  25.6 L


 


RDW  30.3 H


 


Band Neutrophils %  1 L


 


Eosinophils % (Manual)  9 H


 


Absolute Eos (Manual)  0.9 H


 


Retic Count (auto)  12.79 H


 


Absolute Retic  0.345 H














Discharge





- Discharge


Clinical Impression: 


 Sickle cell anemia with pain





Condition: Stable


Disposition: HOME, SELF-CARE


Instructions:  Sickle Cell Crisis ([facility])


Additional Instructions: 


Please return to the emergency department if you have any worsening, or concern 

of your symptoms.





Please return to the emergency department if you develop chest pain, difficulty 

breathing, severe abdominal pain, or ongoing vomiting.





Please follow-up with your primary care physician in 2-3 days and any other 

recommended physicians.





If prescribed, take all medications as directed.  





If you have any questions or concerns do not hesitate to return the emergency 

department for evaluation. 


Referrals: 


REMI WILLIAMSON MD [Primary Care Provider] - Follow up in 3-5 days

## 2018-09-18 NOTE — ER DOCUMENT REPORT
ED Medical Screen (RME)





- General


Chief Complaint: Knee Pain


Stated Complaint: RIGHT KNEE PAIN


Time Seen by Provider: 09/18/18 18:56


Notes: 





22 years old male with a history of sickle cell disease, frequent ER visits, 

presents today with pain syndrome.  He is also being treated for endocarditis 

with IV antibiotic.


TRAVEL OUTSIDE OF THE [place] IN LAST 30 DAYS: No





- Related Data


Allergies/Adverse Reactions: 


 





Coconut * [Coconut] Allergy (Mild, Verified 09/18/18 18:14)


 


transpore tape Allergy (Mild, Uncoded 08/10/18 15:13)


 Hives











Past Medical History





- Social History


Frequency of alcohol use: None


Drug Abuse: None


Family history: None


Pulmonary Medical History: Reports: Hx Asthma, Hx Pneumonia


Renal/ Medical History: Denies: Hx Peritoneal Dialysis


Psychiatric Medical History: 


   Denies: Hx Depression


Past Surgical History: Reports: Hx Abdominal Surgery - Gallstones removal, Hx 

Cholecystectomy, Hx Orthopedic Surgery - Fluid drained from right foot, Hx 

Vascular Surgery - Port placement





- Immunizations


Immunizations up to date: Yes


Hx Diphtheria, Pertussis, Tetanus Vaccination: Yes


History of Influenza Vaccine for 10/2017 - 3/2018 Season: Yes


Influenza Administration Date for 10/2017 - 3/2018 Season: 10/01/17





Physical Exam





- Vital signs


Vitals: 





 











Temp Pulse Resp BP Pulse Ox


 


 98.5 F   115 H  16   118/73   93 


 


 09/18/18 18:39  09/18/18 18:39  09/18/18 18:39  09/18/18 18:39  09/18/18 18:39














Course





- Vital Signs


Vital signs: 





 











Temp Pulse Resp BP Pulse Ox


 


 98.5 F   115 H  16   118/73   93 


 


 09/18/18 18:39  09/18/18 18:39  09/18/18 18:39  09/18/18 18:39  09/18/18 18:39














Doctor's Discharge





- Discharge


Referrals: 


REMI WILLIAMSON MD [Primary Care Provider] - Follow up as needed

## 2018-09-20 NOTE — ER DOCUMENT REPORT
ED General





- General


Chief Complaint: Sickle Cell Crisis


Stated Complaint: KNEE PAIN


Time Seen by Provider: 09/20/18 14:14


Mode of Arrival: Ambulatory


Information source: Patient, [facility]


Notes: 





22-year-old male with sickle cell disease, asthma presents with complaint of 

right knee pain 2 days.  Patient states the pain is been constant, and is 

currently 4 out of 10.  He has had several recent episodes of pain to this 

right knee.  Patient denies any history of trauma to the right knee.  Patient 

states he has not ran out of his home medication.  He is here today due to 

increasing pain of the right knee.  Patient reports that this is his typical 

flare.  Patient also reports sinus congestion and headache for the past 4 

hours.  Patient denies chest pain, syncope, shortness of breath, fever, 

abdominal pain, nausea, vomiting, diarrhea.


TRAVEL OUTSIDE OF THE [place] IN LAST 30 DAYS: No





- HPI


Onset: Other


Onset/Duration: Intermittent, Persistent


Quality of pain: Throbbing


Severity: Moderate


Associated symptoms: denies: Chest pain, Fever, Hurts to breath, Shortness of 

breath


Exacerbated by: Movement


Relieved by: Denies


Similar symptoms previously: Yes


Recently seen / treated by doctor: Yes - September 18, 2018





- Related Data


Allergies/Adverse Reactions: 


 





Coconut * [Coconut] Allergy (Mild, Verified 09/20/18 13:09)


 


transpore tape Allergy (Mild, Uncoded 09/20/18 13:09)


 Hives











Past Medical History





- General


Information source: Patient, [facility]





- Social History


Smoking Status: Never Smoker


Frequency of alcohol use: None


Drug Abuse: None


Lives with: Spouse/Significant other


Family History: Reviewed & Not Pertinent, Arthritis, DM, Hypertension, Other - 

Sickle cell trait both parents and asthma


Patient has suicidal ideation: No


Patient has homicidal ideation: No


Pulmonary Medical History: Reports: Hx Asthma, Hx Pneumonia


Renal/ Medical History: Denies: Hx Peritoneal Dialysis


Psychiatric Medical History: 


   Denies: Hx Depression


Past Surgical History: Reports: Hx Abdominal Surgery - Gallstones removal, Hx 

Cholecystectomy, Hx Orthopedic Surgery - Fluid drained from right foot, Hx 

Vascular Surgery - Port placement





- Immunizations


Immunizations up to date: Yes


Hx Diphtheria, Pertussis, Tetanus Vaccination: Yes


Hx Pneumococcal Vaccination: 05/16/13





Review of Systems





- Review of Systems


Notes: 


REVIEW OF SYSTEMS:


CONSTITUTIONAL :  Denies fever,  chills, or sweats.  Denies recent illness. 

Denies weight loss, recent hospitalizations. 


EENT: Denies visual changes, eye pain.  Denies sore throat, oral lesions, 

difficulty swallowing.


CARDIOVASCULAR:  Denies chest pain.  Denies palpitations. Denies lower 

extremity edema.


RESPIRATORY:  Denies cough. Denies shortness of breath, wheezing.


GASTROINTESTINAL:  Denies abdominal pain or distention.  Denies nausea, vomiting

, or diarrhea.  Denies blood in vomitus, stools, or per rectum.  Denies black, 

tarry stools.  Denies constipation.  


GENITOURINARY:  Denies difficulty urinating, painful urination,  frequency, 

blood in urine, testicular pain or penile discharge.


MUSCULOSKELETAL:  Denies back or neck pain or stiffness. 


SKIN:   Denies rash, lesions or sores.


HEMATOLOGIC :   Denies easy bruising or bleeding.


LYMPHATIC:  Denies swollen glands.


NEUROLOGICAL:  Denies confusion or altered mental status.  Denies loss of 

consciousness.  Denies dizziness or lightheadedness.  Denies headache.  Denies 

weakness or paralysis.  Denies problems difficulty with ambulation, slurred 

speech.  Denies sensory loss, numbness, or tingling.  Denies seizures.


PSYCHIATRIC:  Denies anxiety or stress.  Denies depression, suicidal ideation, 

or





Physical Exam





- Vital signs


Vitals: 


 











Temp Pulse Resp BP Pulse Ox


 


 98.0 F   117 H  20   137/75 H  95 


 


 09/20/18 13:18  09/20/18 13:18  09/20/18 13:18  09/20/18 13:18  09/20/18 13:18











Interpretation: Hypertensive, Tachycardic





- Notes


Notes: 





PHYSICAL EXAMINATION:





GENERAL: Well-appearing, well-nourished and in no acute distress.





HEAD: Atraumatic, normocephalic.





EYES: Pupils equal round and reactive to light, extraocular movements intact, 

sclera anicteric, conjunctiva are normal.





ENT: Nares patent, oropharynx clear without exudates.  Moist mucous membranes.





NECK: Normal range of motion, supple without lymphadenopathy





LUNGS: Breath sounds clear to auscultation bilaterally and equal.  No wheezes 

rales or rhonchi.





HEART: Regular rate and rhythm without murmurs





ABDOMEN: Soft, nontender, nondistended abdomen.  No guarding, no rebound.  No 

masses appreciated.





Musculoskeletal: Normal range of motion, no pitting or edema.  No cyanosis.





NEUROLOGICAL: Cranial nerves grossly intact.  Normal speech, normal gait.  

Normal sensory, motor exams 





PSYCH: Normal mood, normal affect.





SKIN: Warm, Dry, normal turgor, no rashes or lesions noted.





Course





- Re-evaluation


Re-evalutation: 





09/22/18 02:03


22-year-old male with sickle cell disease presents with complaint of right knee 

pain.  Patient states right knee pain started 2 days prior to arrival.  He 

states this is typical of his flare.  He takes Dilaudid at home but states it 

has not been working.  He denies any fever, chills, chest pain, cough.  Patient 

was seen by myself upon arrival.  Vital signs were reviewed. Patient is afebrile

, normotensive and not hypoxic.  Patient does not appear toxic or dehydrated.  

They are in no acute distress.  Previous medical records and nursing notes 

reviewed.  Patient received IV fluids, Dilaudid, Benadryl.  On reevaluation he 

reports improvement of his pain. Patient provided the opportunity to ask 

questions, and express concerns.  Discharge instructions discussed. Patient is 

agreeable with discharge home.  Return indications explained and discussed with 

the patient who displays understanding.  Patient encouraged to return to the 

emergency department immediately with any concerns.





Results were discussed with the patient at this point, after careful 

consideration I feel that that patient can be discharged from the emergency 

department, the patient was educated treatments and reasons to return to the 

emergency department based on their presumed diagnosis as noted above, they 

were advised to followup with a primary care physician in 2-3 days. Patient was 

agreeable to plan of care.








Dictation on this chart was performed using voice recognition software and may 

result in unintended grammatical, spelling, syntax or errors.














- Vital Signs


Vital signs: 


 











Temp Pulse Resp BP Pulse Ox


 


 98.3 F   98   16   130/72 H  98 


 


 09/20/18 19:08  09/20/18 19:08  09/20/18 19:08  09/20/18 19:08  09/20/18 19:08














- Laboratory


Result Diagrams: 


 09/20/18 14:45





Laboratory results interpreted by me: 


 











  09/20/18





  14:45


 


RBC  2.67 L


 


Hgb  9.0 L


 


Hct  24.4 L


 


MCH  33.9 H


 


MCHC  37.0 H


 


RDW  30.4 H


 


Band Neutrophils %  1 L


 


Monocytes % (Manual)  16 H


 


Abs Monocytes (Manual)  1.6 H


 


Retic Count (auto)  13.84 H


 


Absolute Retic  0.370 H














Discharge





- Discharge


Clinical Impression: 


 Sickle cell pain crisis, Elevated blood pressure reading





Right knee pain


Qualifiers:


 Chronicity: acute Qualified Code(s): M25.561 - Pain in right knee





Condition: Good


Disposition: HOME, SELF-CARE


Instructions:  Sickle Cell Crisis (OM)


Forms:  Elevated Blood Pressure


Referrals: 


REMI WILLIAMSON MD [NO LOCAL MD] - Follow up as needed

## 2018-09-20 NOTE — ER DOCUMENT REPORT
ED Medical Screen (RME)





- General


Chief Complaint: Sickle Cell Crisis


Stated Complaint: KNEE PAIN


Time Seen by Provider: 09/20/18 14:14


Notes: 





22 years old male with a history of sickle cell disease was seen 3 days ago by 

me, returns today with pain syndrome.


TRAVEL OUTSIDE OF THE [place] IN LAST 30 DAYS: No





- Related Data


Allergies/Adverse Reactions: 


 





Coconut * [Coconut] Allergy (Mild, Verified 09/20/18 13:09)


 


transpore tape Allergy (Mild, Uncoded 09/20/18 13:09)


 Hives











Past Medical History





- Social History


Family history: None


Pulmonary Medical History: Reports: Hx Asthma, Hx Pneumonia


Renal/ Medical History: Denies: Hx Peritoneal Dialysis


Psychiatric Medical History: 


   Denies: Hx Depression


Past Surgical History: Reports: Hx Abdominal Surgery - Gallstones removal, Hx 

Cholecystectomy, Hx Orthopedic Surgery - Fluid drained from right foot, Hx 

Vascular Surgery - Port placement





- Immunizations


Immunizations up to date: Yes


Hx Diphtheria, Pertussis, Tetanus Vaccination: Yes


History of Influenza Vaccine for 10/2017 - 3/2018 Season: Yes


Influenza Administration Date for 10/2017 - 3/2018 Season: 10/01/17





Physical Exam





- Vital signs


Vitals: 





 











Temp Pulse Resp BP Pulse Ox


 


 98.0 F   117 H  20   137/75 H  95 


 


 09/20/18 13:18  09/20/18 13:18  09/20/18 13:18  09/20/18 13:18  09/20/18 13:18














Course





- Vital Signs


Vital signs: 





 











Temp Pulse Resp BP Pulse Ox


 


 98.0 F   117 H  20   137/75 H  95 


 


 09/20/18 13:18  09/20/18 13:18  09/20/18 13:18  09/20/18 13:18  09/20/18 13:18














Doctor's Discharge





- Discharge


Referrals: 


REMI WILLIAMSON MD [Primary Care Provider] - Follow up as needed

## 2018-09-23 NOTE — ER DOCUMENT REPORT
ED General





- General


Chief Complaint: Sickle Cell Crisis


Stated Complaint: KNEE PAIN


Time Seen by Provider: 09/23/18 18:51


TRAVEL OUTSIDE OF THE [place] IN LAST 30 DAYS: No





- HPI


Notes: 





Patient is a 22-year-old male with history of sickle cell and avascular 

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 

pain.  Patient states that he overdid it at a [employer] function today, but did 

not have any obvious injury.  Patient has otherwise been eating and drinking 

without any difficulties.  He is urinating normally and having normal bowel 

movements.  Patient was recently evaluated 3 days ago and had a stable workup 

noted at that time.  Patient states that he does not feel as bad as his normal 

crisis and is scheduled for an appointment with [facility] for a blood exchange 

tomorrow.  No melena or hematochezia.  Denies any headache, fever, neck pain, 

URI, sore throat, chest pain, palpitations, syncope, cough, shortness of breath

, wheeze, dyspnea, abdominal pain, nausea/vomiting/diarrhea, urinary retention, 

dysuria, hematuria, loss of control of bowel or bladder, numbness/tingling, 

saddle anesthesia, muscle paralysis/weakness, or rash.





- Related Data


Allergies/Adverse Reactions: 


 





Coconut * [Coconut] Allergy (Mild, Verified 09/23/18 18:54)


 


transpore tape Allergy (Mild, Uncoded 09/23/18 18:54)


 Hives











Past Medical History





- Social History


Smoking Status: Never Smoker


Chew tobacco use (# tins/day): No


Frequency of alcohol use: None


Drug Abuse: None


Family History: Reviewed & Not Pertinent, Arthritis, DM, Hypertension, Other - 

Sickle cell trait both parents and asthma


Patient has suicidal ideation: No


Patient has homicidal ideation: No


Pulmonary Medical History: Reports: Hx Asthma, Hx Pneumonia


Renal/ Medical History: Denies: Hx Peritoneal Dialysis


Psychiatric Medical History: 


   Denies: Hx Depression


Past Surgical History: Reports: Hx Abdominal Surgery - Gallstones removal, Hx 

Cholecystectomy, Hx Orthopedic Surgery - Fluid drained from right foot, Hx 

Vascular Surgery - Port placement





- Immunizations


Immunizations up to date: Yes


Hx Diphtheria, Pertussis, Tetanus Vaccination: Yes


Hx Pneumococcal Vaccination: 05/16/13





Review of Systems





- Review of Systems


-: Yes All other systems reviewed and negative





Physical Exam





- Vital signs


Vitals: 


 











Temp Pulse Resp BP Pulse Ox


 


 98.2 F   110 H  20   138/86 H  91 L


 


 09/23/18 18:38  09/23/18 18:38  09/23/18 18:38  09/23/18 18:38  09/23/18 18:38














- Notes


Notes: 





PHYSICAL EXAMINATION:





GENERAL: Well-appearing, well-nourished and in no acute distress.





HEAD: Atraumatic, normocephalic.





EYES: Pupils equal round and reactive to light, extraocular movements intact, 

sclera anicteric, conjunctiva are normal.





ENT: Nares patent and without discharge.  oropharynx clear without exudates.  

No tonsilar hypertrophy or erythema.  Moist mucous membranes.  





NECK: Normal range of motion, supple without lymphadenopathy





LUNGS: Breath sounds clear to auscultation bilaterally and equal.  No wheezes 

rales or rhonchi.





HEART: Regular rate and rhythm without murmurs, rubs, gallops.





ABDOMEN: Soft, nontender, nondistended abdomen.  No guarding, no rebound.  No 

masses appreciated.  Normal bowel sounds present.  No CVA tenderness 

bilaterally.





Musculoskeletal: Rt knee: FROM to passive/active. Strength 5+/5. N/v intact.





Extremities:  No cyanosis, clubbing, or edema b/l.  Peripheral pulses 2+.  

Capillary refill less than 3 seconds.





NEUROLOGICAL: Normal speech, normal gait. 





PSYCH: Normal mood, normal affect.





SKIN: Warm, Dry, normal turgor, no rashes or lesions noted.





Course





- Re-evaluation


Re-evalutation: 





09/23/18 23:45


I did call and discuss with Dr. Cleaning, his oncologist, who recommended 1 unit 

of blood and if able, discharge to home.  She would like him to keep his 

appointment with [facility] tomorrow.  Pt is in agreement with this plan.  Fluids and 

meds ordered along with 1 unit of blood.





09/24/18 04:57


Patient is an afebrile, well-hydrated 22-year-old male who presents to the ED 

with sickle cell crisis without acute chest syndrome.  Vitals are acceptable 

without any significant tachycardia, tachypnea, or hypoxia.  PE is otherwise 

unremarkable for any neurovascular compromise, obvious tendon/ligament rupture, 

obvious fracture/dislocation, septic joint.  Patient is nontoxic-appearing and 

is tolerating p.o. without difficulties.  Patient was given his typical 

medications for an acute flareup which improved his symptoms.  Patient states 

that he is feeling much better.  No further labs or imaging warranted at this 

time based on H&P.  Per his oncologist, 1 unit of blood has been started and he 

will be cleared for discharge thereafter.  Recheck with [facility] as scheduled 

today.  Recheck with your oncologist this week as well.  Return to the ED with 

any worsening/concerning symptoms otherwise as reviewed in discharge.  Patient 

is in agreement.





- Vital Signs


Vital signs: 


 











Temp Pulse Resp BP Pulse Ox


 


 98.2 F   110 H  20   138/86 H  91 L


 


 09/23/18 18:38  09/23/18 18:38  09/23/18 18:38  09/23/18 18:38  09/23/18 18:38














- Laboratory


Result Diagrams: 


 09/23/18 19:34





 09/23/18 19:34


Laboratory results interpreted by me: 


 











  09/23/18 09/23/18 09/23/18





  19:34 19:34 21:08


 


RBC  2.30 L  


 


Hgb  7.7 L  


 


Hct  21.3 L  


 


RDW  32.4 H  


 


Seg Neuts % (Manual)  34 L  


 


Lymphocytes % (Manual)  51 H  


 


Abs Lymphs (Manual)  4.9 H  


 


Retic Count (auto)  17.03 H  


 


Absolute Retic  0.392 H  


 


Chloride   109 H 


 


Total Bilirubin   3.2 H 


 


Direct Bilirubin   1.0 H 


 


AST   73 H 


 


ALT   16 L 


 


Urine Protein    30 H


 


Urine Blood    SMALL H


 


Urine Urobilinogen    2.0 H


 


Crossmatch   














  09/24/18





  00:21


 


RBC 


 


Hgb 


 


Hct 


 


RDW 


 


Seg Neuts % (Manual) 


 


Lymphocytes % (Manual) 


 


Abs Lymphs (Manual) 


 


Retic Count (auto) 


 


Absolute Retic 


 


Chloride 


 


Total Bilirubin 


 


Direct Bilirubin 


 


AST 


 


ALT 


 


Urine Protein 


 


Urine Blood 


 


Urine Urobilinogen 


 


Crossmatch  See Detail














Discharge





- Discharge


Clinical Impression: 


 Sickle cell crisis





Right knee pain


Qualifiers:


 Chronicity: acute Qualified Code(s): M25.561 - Pain in right knee





Anemia


Qualifiers:


 Anemia type: unspecified type Qualified Code(s): D64.9 - Anemia, unspecified





Condition: Stable


Disposition: HOME, SELF-CARE


Additional Instructions: 


Rest, Ice, Compression, Elevation


Tylenol/ibuprofen as needed


Light stretches daily


Strength exercises as able


Moist heat and massage may help


F/u with your PCP in 3-5 days for a recheck


Keep appointment today with [facility]*





Return to the ED with any worsening symptoms and/or development of fever, 

headache, chest pain, palpitations, syncope, shortness of breath, trouble 

breathing, abdominal pain, n/v/d, muscle weakness/paralysis, numbness/tingling, 

swelling, redness, or other worsening symptoms that are concerning to you.


Forms:  Elevated Blood Pressure


Referrals: 


DIYA PHILIPPE MD [Primary Care Provider] - Follow up in 3-5 days

## 2018-09-23 NOTE — ER DOCUMENT REPORT
ED Medical Screen (RME)





- General


TRAVEL OUTSIDE OF THE [place] IN LAST 30 DAYS: No





<JOSHUA HERNANDEZ - Last Filed: 09/23/18 19:08>





<YAO CRUZ - Last Filed: 09/24/18 07:47>





- General


Chief Complaint: Sickle Cell Crisis


Stated Complaint: KNEE PAIN


Time Seen by Provider: 09/23/18 18:51


Notes: 





Sickle cell patient with known avascular necrosis of the right knee, scheduled 

to meet with orthopedist in [place] in the coming weeks to discuss a total 

knee replacement.  Complaining of pain in the right knee today.  Says he thinks 

he overdid it at a [employer] function.  Got overheated.  He is out of his 

oxycodone 15 mg tablets that he takes for pain for the past 3 days.  His doctor 

has been unavailable to write him prescriptions.  Currently takes folic acid 

and hydroxyurea.  Denies fever or other indications of infection.  Looks well. (

JOSHUA HERNANDEZ)





- Related Data


Allergies/Adverse Reactions: 


 





Coconut * [Coconut] Allergy (Mild, Verified 09/23/18 18:54)


 


transpore tape Allergy (Mild, Uncoded 09/23/18 18:54)


 Hives











Past Medical History





- Social History


Chew tobacco use (# tins/day): No


Frequency of alcohol use: None


Drug Abuse: None


Family history: None


Pulmonary Medical History: Reports: Hx Asthma, Hx Pneumonia


Renal/ Medical History: Denies: Hx Peritoneal Dialysis


Psychiatric Medical History: 


   Denies: Hx Depression


Past Surgical History: Reports: Hx Abdominal Surgery - Gallstones removal, Hx 

Cholecystectomy, Hx Orthopedic Surgery - Fluid drained from right foot, Hx 

Vascular Surgery - Port placement





- Immunizations


Immunizations up to date: Yes


Hx Diphtheria, Pertussis, Tetanus Vaccination: Yes


History of Influenza Vaccine for 10/2017 - 3/2018 Season: Yes


Influenza Administration Date for 10/2017 - 3/2018 Season: 10/01/17





<JOSHUA HERNANDEZ - Last Filed: 09/23/18 19:08>





- Vital signs


Vitals: 





 











Temp Pulse Resp BP Pulse Ox


 


 98.2 F   110 H  20   138/86 H  91 L


 


 09/23/18 18:38  09/23/18 18:38  09/23/18 18:38  09/23/18 18:38  09/23/18 18:38














Course





<JOSHUA HERNANDEZ - Last Filed: 09/23/18 19:08>





- Laboratory


Result Diagrams: 


 09/23/18 19:34





 09/23/18 19:34





<YAO CRUZ - Last Filed: 09/24/18 07:47>





- Re-evaluation


Re-evalutation: 





09/24/18 07:46


Chau Alphonso night APC and at this patient off to me this morning on my previous 

morning APC.  Chau informed me that patient was about ready to be discharged 

after he received his unit of blood.  He informed me that he had written up the 

discharge papers and I which is watching him until he was discharged.  The 

nurse approached me about pain medications for patient prior to discharge.  I 

went back and looked in the chart and patient did not receive anything for 

quite some time so I went ahead and redid his same orders previously been 

hydrated 50 mg IV as well as Dilaudid 2 mg IV.  Upon completion of that she has 

a  and will be discharged home. (YAO CRUZ)





- Vital Signs


Vital signs: 





 











Temp Pulse Resp BP Pulse Ox


 


 98.0 F   85   15   136/96 H  99 


 


 09/24/18 07:21  09/24/18 07:21  09/24/18 07:21  09/24/18 07:21  09/24/18 07:21














- Laboratory


Laboratory results interpreted by me: 





 











  09/23/18 09/23/18 09/23/18





  19:34 19:34 21:08


 


RBC  2.30 L  


 


Hgb  7.7 L  


 


Hct  21.3 L  


 


RDW  32.4 H  


 


Seg Neuts % (Manual)  34 L  


 


Lymphocytes % (Manual)  51 H  


 


Abs Lymphs (Manual)  4.9 H  


 


Retic Count (auto)  17.03 H  


 


Absolute Retic  0.392 H  


 


Chloride   109 H 


 


Total Bilirubin   3.2 H 


 


Direct Bilirubin   1.0 H 


 


AST   73 H 


 


ALT   16 L 


 


Urine Protein    30 H


 


Urine Blood    SMALL H


 


Urine Urobilinogen    2.0 H


 


Crossmatch   














  09/24/18





  00:21


 


RBC 


 


Hgb 


 


Hct 


 


RDW 


 


Seg Neuts % (Manual) 


 


Lymphocytes % (Manual) 


 


Abs Lymphs (Manual) 


 


Retic Count (auto) 


 


Absolute Retic 


 


Chloride 


 


Total Bilirubin 


 


Direct Bilirubin 


 


AST 


 


ALT 


 


Urine Protein 


 


Urine Blood 


 


Urine Urobilinogen 


 


Crossmatch  See Detail














Doctor's Discharge





<JOSHUA HERNANDEZ - Last Filed: 09/23/18 19:08>





<YAO CRUZ - Last Filed: 09/24/18 07:47>





- Discharge


Clinical Impression: 


 Sickle cell crisis, Right knee pain, Anemia





Condition: Stable


Disposition: HOME, SELF-CARE


Additional Instructions: 


Rest, Ice, Compression, Elevation


Tylenol/ibuprofen as needed


Light stretches daily


Strength exercises as able


Moist heat and massage may help


F/u with your PCP in 3-5 days for a recheck


Keep appointment today with [facility]*





Return to the ED with any worsening symptoms and/or development of fever, 

headache, chest pain, palpitations, syncope, shortness of breath, trouble 

breathing, abdominal pain, n/v/d, muscle weakness/paralysis, numbness/tingling, 

swelling, redness, or other worsening symptoms that are concerning to you.


Forms:  Elevated Blood Pressure


Referrals: 


DIYA PHILIPPE MD [Primary Care Provider] - Follow up in 3-5 days

## 2018-09-29 NOTE — ER DOCUMENT REPORT
ED Medical Screen (RME)





- General


Chief Complaint: Knee Pain


Stated Complaint: RIGHT KNEE PAIN


Time Seen by Provider: 09/29/18 20:17


Notes: 





22 years old male with sickle cell disease visits here every week is has an 

right knee pain but sitting very comfortably.


TRAVEL OUTSIDE OF THE [place] IN LAST 30 DAYS: No





- Related Data


Allergies/Adverse Reactions: 


 





Coconut * [Coconut] Allergy (Mild, Verified 09/29/18 20:08)


 


transpore tape Allergy (Mild, Uncoded 09/29/18 20:08)


 Hives











Past Medical History





- Social History


Family history: None


Pulmonary Medical History: Reports: Hx Asthma, Hx Pneumonia


Renal/ Medical History: Denies: Hx Peritoneal Dialysis


Psychiatric Medical History: 


   Denies: Hx Depression


Past Surgical History: Reports: Hx Abdominal Surgery - Gallstones removal, Hx 

Cholecystectomy, Hx Orthopedic Surgery - Fluid drained from right foot, Hx 

Vascular Surgery - Port placement





- Immunizations


Immunizations up to date: Yes


Hx Diphtheria, Pertussis, Tetanus Vaccination: Yes


History of Influenza Vaccine for 10/2017 - 3/2018 Season: Yes


Influenza Administration Date for 10/2017 - 3/2018 Season: 10/01/17





Physical Exam





- Vital signs


Vitals: 





 











Temp Pulse Resp BP Pulse Ox


 


 98.3 F   110 H  18   131/93 H  95 


 


 09/29/18 20:16  09/29/18 20:16  09/29/18 20:16  09/29/18 20:16  09/29/18 20:16














Course





- Vital Signs


Vital signs: 





 











Temp Pulse Resp BP Pulse Ox


 


 98.3 F   110 H  18   131/93 H  95 


 


 09/29/18 20:16  09/29/18 20:16  09/29/18 20:16  09/29/18 20:16  09/29/18 20:16














Doctor's Discharge





- Discharge


Referrals: 


DIYA PHILIPPE MD [Primary Care Provider] - Follow up as needed

## 2018-09-29 NOTE — ER DOCUMENT REPORT
ED Extremity Problem, Lower





- General


Mode of Arrival: Ambulatory


Information source: Patient


TRAVEL OUTSIDE OF THE [place] IN LAST 30 DAYS: No





<YUSEF LEBRON - Last Filed: 09/30/18 06:22>





<MONICA MOMIN - Last Filed: 09/30/18 16:41>





- General


Chief Complaint: Knee Pain


Stated Complaint: RIGHT KNEE PAIN


Time Seen by Provider: 09/29/18 20:17


Notes: 








Patient is a 22-year-old male who presents with chief complaint of right knee 

pain x2 days and possible sickle cell crisis.  Patient states his pain is 

usually located in his right knee.  Patient denies any other symptoms.





 (YUSEF LEBRON)





Pain is rated 7/10, constant and aching in nature in the right knee where the 

patient has known osteonecrosis.  Denies chest pain or shortness of breath. (

MONICA MOMIN)





- Related Data


Allergies/Adverse Reactions: 


 





Coconut * [Coconut] Allergy (Mild, Verified 09/29/18 20:08)


 


transpore tape Allergy (Mild, Uncoded 09/29/18 20:08)


 Hives











Past Medical History





- General


Information source: Patient





- Social History


Smoking Status: Never Smoker


Frequency of alcohol use: None


Drug Abuse: None


Family History: Reviewed & Not Pertinent, Arthritis, DM, Hypertension, Other - 

Sickle cell trait both parents and asthma


Patient has suicidal ideation: No


Patient has homicidal ideation: No


Pulmonary Medical History: Reports: Hx Asthma, Hx Pneumonia


Renal/ Medical History: Denies: Hx Peritoneal Dialysis


Psychiatric Medical History: 


   Denies: Hx Depression


Past Surgical History: Reports: Hx Abdominal Surgery - Gallstones removal, Hx 

Cholecystectomy, Hx Orthopedic Surgery - Fluid drained from right foot, Hx 

Vascular Surgery - Port placement





- Immunizations


Immunizations up to date: Yes


Hx Diphtheria, Pertussis, Tetanus Vaccination: Yes


Hx Pneumococcal Vaccination: 05/16/13





<YUSEF LEBRON - Last Filed: 09/30/18 06:22>





Review of Systems





- Review of Systems


Musculoskeletal: See HPI





<YUSEF LEBRON - Last Filed: 09/30/18 06:22>





<MONICA MOMIN - Last Filed: 09/30/18 16:41>





- Review of Systems


Notes: 





Unless otherwise stated in this report the patient's positive and negative 

responses for review of systems for constitutional, eyes, ENT, cardiovascular, 

respiratory, gastrointestinal, neurological, genitourinary, musculoskeletal, 

and integumentary systems and related systems to the presenting problem are 

either as stated in the HPI or were not pertinent or were negative for the 

symptoms and/or complaints related to the presenting medical problem.  (MONICA MOMIN)





Physical Exam





<YUSEF LEBRON - Last Filed: 09/30/18 06:22>





<MONICA MOMIN - Last Filed: 09/30/18 16:41>





- Vital signs


Vitals: 





 











Temp Pulse Resp BP Pulse Ox


 


 98.3 F   110 H  18   131/93 H  95 


 


 09/29/18 20:16  09/29/18 20:16  09/29/18 20:16  09/29/18 20:16  09/29/18 20:16














- Notes


Notes: 





PHYSICAL EXAMINATION:





GENERAL: Well-appearing, well-nourished and in no acute distress.





HEAD: Atraumatic, normocephalic.





EYES: Pupils equal round and reactive to light, extraocular movements intact, 

sclera anicteric, conjunctiva are normal.





ENT: Nares patent, oropharynx clear without exudates.  Moist mucous membranes.





NECK: Normal range of motion, supple without lymphadenopathy





LUNGS: Breath sounds clear to auscultation bilaterally and equal.  No wheezes 

rales or rhonchi.





HEART: Regular rate and rhythm without murmurs





ABDOMEN: Soft, nontender, nondistended abdomen.  No guarding, no rebound.  No 

masses appreciated.





Musculoskeletal: Normal range of motion, no pitting or edema.  No cyanosis.





NEUROLOGICAL: Cranial nerves grossly intact.  Normal speech, normal gait.  

Normal sensory, motor exams 





PSYCH: Normal mood, normal affect.





SKIN: Warm, Dry, normal turgor, no rashes or lesions noted. (UYSEF LEBRON)





Course





- Laboratory


Result Diagrams: 


 09/29/18 21:18





 09/29/18 20:38





<YUSEF LEBRON - Last Filed: 09/30/18 06:22>





- Laboratory


Result Diagrams: 


 09/29/18 21:18





 09/29/18 20:38





<MONICA MOMIN - Last Filed: 09/30/18 16:41>





- Re-evaluation


Re-evalutation: 





09/29/18 22:47


Multiple attempts to gain IV access have been unsuccessful including ultrasound-

guided IV placement.  Patient offered IM medications, patient declines and 

would like nursing to continue to attempt IV access.  Reticulocyte count 10.





Reticulocyte count 10.17, absolute reticulocyte 0.317.  Total bili 4.0.  

Patient was given several doses of analgesia a as well as IV fluids after IV 

access was obtained.  Patient reports improvement of his symptoms.  Patient 

will be discharged home in stable condition.





 (YUSEF LEBRON)





- Vital Signs


Vital signs: 





 











Temp Pulse Resp BP Pulse Ox


 


 98.3 F   110 H  18   124/80   95 


 


 09/29/18 20:16  09/29/18 20:16  09/29/18 20:16  09/30/18 03:00  09/30/18 03:01














- Laboratory


Laboratory results interpreted by me: 





 











  09/29/18 09/29/18





  20:38 21:18


 


RBC   3.12 L


 


Hgb   10.4 L


 


Hct   29.9 L


 


RDW   28.4 H


 


Retic Count (auto)   10.17 H


 


Absolute Retic   0.317 H


 


Total Bilirubin  4.0 H 


 


Direct Bilirubin  1.4 H 


 


AST  81 H 


 


ALT  8 L 


 


Alkaline Phosphatase  130 H 


 


Total Protein  10.0 H 


 


Albumin  5.3 H 














Discharge





<YUSEF LEBRON - Last Filed: 09/30/18 06:22>





<MONICA MOMIN - Last Filed: 09/30/18 16:41>





- Discharge


Clinical Impression: 


 Sickle cell crisis





Knee pain


Qualifiers:


 Chronicity: acute Laterality: right Qualified Code(s): M25.561 - Pain in right 

knee





Condition: Stable


Disposition: HOME, SELF-CARE


Additional Instructions: 


Sickle Cell Crisis





     You have "sickle cell crisis."  Sickle cell disease is caused by abnormal 

hemoglobin.  This hemoglobin can deform red blood cells into a sickle shape.  

These abnormal blood cells can block blood vessels. This causes the pain of 

sickle cell crisis.


     Sickle cell crisis can occur any time.  But attacks are more likely with 

acute infection, dehydration, or altitude change.  A crisis usually causes pain 

in the legs, back, abdomen, and chest.  Sometimes the pain may ease and return 

later.


     The usual treatment is oxygen, pain medication, IV fluids, and treatment 

of infection.  Attacks may take a couple of days to resolve.


     Return if the pain becomes more severe, or if there are new symptoms.





Referrals: 


DIYA PHILIPPE MD [Primary Care Provider] - Follow up as needed

## 2018-10-03 NOTE — ER DOCUMENT REPORT
ED Medical Screen (RME)





- General


Chief Complaint: Sickle Cell Crisis


Stated Complaint: BACK PAIN


Time Seen by Provider: 10/03/18 18:58


Notes: 





22 years old male with a history of sickle cell disease as well as avascular 

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  Multiple ED 

visit comes with pain syndrome.


TRAVEL OUTSIDE OF THE [place] IN LAST 30 DAYS: No





- Related Data


Allergies/Adverse Reactions: 


 





Coconut * [Coconut] Allergy (Mild, Verified 10/03/18 18:27)


 


transpore tape Allergy (Mild, Uncoded 10/03/18 18:27)


 Hives











Past Medical History





- Social History


Family history: None


Pulmonary Medical History: Reports: Hx Asthma, Hx Pneumonia


Renal/ Medical History: Denies: Hx Peritoneal Dialysis


Psychiatric Medical History: 


   Denies: Hx Depression


Past Surgical History: Reports: Hx Abdominal Surgery - Gallstones removal, Hx 

Cholecystectomy, Hx Orthopedic Surgery - Fluid drained from right foot, Hx 

Vascular Surgery - Port placement





- Immunizations


Immunizations up to date: Yes


Hx Diphtheria, Pertussis, Tetanus Vaccination: Yes


History of Influenza Vaccine for 10/2017 - 3/2018 Season: Yes


Influenza Administration Date for 10/2017 - 3/2018 Season: 10/01/17





Physical Exam





- Vital signs


Vitals: 





 











Temp Pulse Resp BP Pulse Ox


 


 99.2 F   110 H  16   128/92 H  93 


 


 10/03/18 18:32  10/03/18 18:32  10/03/18 18:32  10/03/18 18:32  10/03/18 18:32














Course





- Vital Signs


Vital signs: 





 











Temp Pulse Resp BP Pulse Ox


 


 99.2 F   110 H  16   128/92 H  93 


 


 10/03/18 18:32  10/03/18 18:32  10/03/18 18:32  10/03/18 18:32  10/03/18 18:32














Doctor's Discharge





- Discharge


Referrals: 


DIYA PHILIPPE MD [Primary Care Provider] - Follow up as needed

## 2018-10-03 NOTE — ER DOCUMENT REPORT
ED General





- General


Chief Complaint: Sickle Cell Crisis


Stated Complaint: BACK PAIN


Time Seen by Provider: 10/03/18 18:58


Notes: 


Patient is a 22-year-old male that comes to the emergency department for chief 

complaint of sickle cell pain.  He states that he has 3 out of 5 pain in his 

mid to lower back and 4 out of 5 pain in his right knee area.  He reports these 

is common locations for his sickle cell crisis pain.  He denies injury, fever, 

difficulty breathing, chest pain, abdominal pain, vomiting, headache.  He 

states he took his oxycodone at home but it did not help his pain.  He follows 

with Dr. Cleaning hematology.  Past medical history includes avascular necrosis 

of the distal right femur, asthma, cholecystectomy, he does not currently have 

a port but he has a follow-up for another one to be placed.





TRAVEL OUTSIDE OF THE [place] IN LAST 30 DAYS: No





- Related Data


Allergies/Adverse Reactions: 


 





Coconut * [Coconut] Allergy (Mild, Verified 10/03/18 18:27)


 


transpore tape Allergy (Mild, Uncoded 10/03/18 18:27)


 Hives











Past Medical History





- General


Information source: Patient





- Social History


Smoking Status: Never Smoker


Chew tobacco use (# tins/day): No


Frequency of alcohol use: Rare


Drug Abuse: None


Lives with: Family


Family History: Reviewed & Not Pertinent, Arthritis, DM, Hypertension, Other - 

Sickle cell trait both parents and asthma


Patient has suicidal ideation: No


Patient has homicidal ideation: No


Pulmonary Medical History: Reports: Hx Asthma, Hx Pneumonia


Renal/ Medical History: Denies: Hx Peritoneal Dialysis


Psychiatric Medical History: 


   Denies: Hx Depression


Past Surgical History: Reports: Hx Abdominal Surgery - Gallstones removal, Hx 

Cholecystectomy, Hx Orthopedic Surgery - Fluid drained from right foot, Hx 

Vascular Surgery - Port placement





- Immunizations


Immunizations up to date: Yes


Hx Diphtheria, Pertussis, Tetanus Vaccination: Yes


Hx Pneumococcal Vaccination: 05/16/13





Review of Systems





- Review of Systems


Constitutional: No symptoms reported


EENT: No symptoms reported


Cardiovascular: See HPI


Respiratory: No symptoms reported


Gastrointestinal: No symptoms reported


Genitourinary: No symptoms reported


Male Genitourinary: No symptoms reported


Musculoskeletal: See HPI


Skin: No symptoms reported


Hematologic/Lymphatic: No symptoms reported


Neurological/Psychological: No symptoms reported





Physical Exam





- Vital signs


Vitals: 


 











Temp Pulse Resp BP Pulse Ox


 


 99.2 F   110 H  16   128/92 H  93 


 


 10/03/18 18:32  10/03/18 18:32  10/03/18 18:32  10/03/18 18:32  10/03/18 18:32














- Notes


Notes: 





GENERAL: Alert, interacts well. No acute distress.


HEAD: Normocephalic, atraumatic.


EYES: Pupils equal, round, and reactive to light. Extraocular movements intact.


ENT: Oral mucosa moist, tongue midline.  Unremarkable oral pharyngeal exam.


NECK: Full range of motion. Supple. Trachea midline.


LUNGS: Clear to auscultation bilaterally, no wheezes, rales, or rhonchi. No 

respiratory distress.


HEART: Mildly tachycardic, normal rate, no rhythm.  No murmur.


ABDOMEN: Soft, non-tender. Non-distended. Bowel sounds present in all 4 

quadrants.


EXTREMITIES: Moves all 4 extremities spontaneously. No edema, normal radial and 

dorsalis pedis pulses bilaterally. No cyanosis.


BACK: no cervical, thoracic, lumbar midline tenderness. No saddle anesthesia, 

normal distal neurovascular exam. 


NEUROLOGICAL: Alert and oriented x3. Normal speech. [cranial nerves II through 

XII grossly intact]. 


PSYCH: Normal affect, normal mood.


SKIN: Warm, dry, normal turgor. No rashes or lesions noted.








Course





- Re-evaluation


Re-evalutation: 


Patient does not appear to be in any distress, he is mildly tachycardic, no 

tachypnea, clear lung sounds, no fever.





CBC without significant change from prior, no anemia requiring intervention, no 

leukocytosis or shift.  Bilirubin is not significantly elevated or 

significantly changed from prior.  Reticulocyte count without significant 

change from prior.





Pulse oxygen reading on the monitor appears to show hypoxia, entered the room, 

patient states there is something wrong with the finger device, this was 

removed and another one was placed, now it is reading 95-96% on room air, no 

tachypnea, patient denies shortness of breath.  Patient reporting some 

improvement after pain medications but not resolution of symptoms.  Will 

reevaluate.





10/04/18 22:00


Patient has received multiple doses of medication.  I reevaluated him at bedside

, his heart rate is in the low 100s and in the 80s.  Pulse oxygen saturation is 

normal.  Patient states that he feels great and wants to go home.  Discussed 

possible admission but patient declines, he states that he has no current 

symptoms and he will follow-up with his hematologist.  Discussed return 

precautions in detail, patient states understanding and agreement.





- Vital Signs


Vital signs: 


 











Temp Pulse Resp BP Pulse Ox


 


 99.2 F   110 H  24 H  138/95 H  95 


 


 10/03/18 18:32  10/03/18 18:32  10/04/18 00:05  10/04/18 00:05  10/03/18 22:01














- Laboratory


Result Diagrams: 


 10/03/18 20:18





 10/03/18 20:18


Laboratory results interpreted by me: 


 











  10/03/18 10/03/18 10/03/18





  20:18 20:18 20:48


 


RBC  2.79 L  


 


Hgb  9.4 L  


 


Hct  26.6 L  


 


MCH  33.8 H  


 


RDW  28.8 H  


 


Retic Count (auto)  12.84 H  


 


Absolute Retic  0.358 H  


 


Total Bilirubin   3.4 H 


 


Direct Bilirubin   1.2 H 


 


AST   62 H 


 


ALT   20 L 


 


Alkaline Phosphatase   133 H 


 


Total Protein   8.9 H 


 


Urine Protein    100 H


 


Urine Urobilinogen    4.0 H


 


Urine Ascorbic Acid    40 H














Discharge





- Discharge


Clinical Impression: 


 Sickle cell crisis





Knee pain


Qualifiers:


 Chronicity: acute Laterality: right Qualified Code(s): M25.561 - Pain in right 

knee





Condition: Stable


Disposition: HOME, SELF-CARE


Additional Instructions: 


Follow-up closely with your hematologist for additional evaluation and 

management.


Return if you worsen including severe pain, fever, difficulty breathing, 

vomiting, or any other concerning or worsening symptoms.


Referrals: 


DIYA PHILIPPE MD [Primary Care Provider] - Follow up as needed

## 2018-10-05 NOTE — ER DOCUMENT REPORT
ED General Pain





- General


Mode of Arrival: Ambulatory


Information source: Patient


TRAVEL OUTSIDE OF THE [place] IN LAST 30 DAYS: No





<JOCY THOMAS - Last Filed: 10/06/18 04:58>





<MARYURI KENT - Last Filed: 10/07/18 15:39>





- General


Chief Complaint: Back Pain


Stated Complaint: BACK PAIN


Time Seen by Provider: 10/05/18 22:55


Notes: 


Patient is a 22 year old male with sickle cell disease and a history of acute 

chest syndrome presents to the emergency department complaining of lower back 

pain and right knee pain onset today. Patient states he was at a football game 

when he began to have pain which he attributes to his sickle cell disease. He 

states the pain has gotten progressively worse and his medications are not 

providing any relief. Patient denies any fevers. 





Patient mentions having a scheduled bone marrow transplant on Oct. 22nd, 2018.


 (JOCY THOMAS)





- Related Data


Allergies/Adverse Reactions: 


 





Coconut * [Coconut] Allergy (Mild, Verified 10/03/18 18:27)


 


transpore tape Allergy (Mild, Uncoded 10/03/18 18:27)


 Hives











Past Medical History





- General


Information source: Patient





- Social History


Smoking Status: Never Smoker


Cigarette use (# per day): No


Chew tobacco use (# tins/day): No


Smoking Education Provided: No


Frequency of alcohol use: None


Drug Abuse: None


Family History: Reviewed & Not Pertinent, Arthritis, DM, Hypertension, Other - 

Sickle cell trait both parents and asthma


Pulmonary Medical History: Reports: Hx Asthma, Hx Pneumonia


Past Surgical History: Reports: Hx Abdominal Surgery - Gallstones removal, Hx 

Cholecystectomy, Hx Orthopedic Surgery - Fluid drained from right foot, Hx 

Vascular Surgery - Port placement





- Immunizations


Immunizations up to date: Yes


Hx Diphtheria, Pertussis, Tetanus Vaccination: Yes


Hx Pneumococcal Vaccination: 05/16/13





<JOCY THOMAS - Last Filed: 10/06/18 04:58>





Review of Systems





- Review of Systems


Constitutional: No symptoms reported


EENT: No symptoms reported


Cardiovascular: No symptoms reported


Respiratory: No symptoms reported


Gastrointestinal: No symptoms reported


Genitourinary: No symptoms reported


Male Genitourinary: No symptoms reported


Musculoskeletal: See HPI


Skin: No symptoms reported


Hematologic/Lymphatic: See HPI


Neurological/Psychological: No symptoms reported


-: Yes All other systems reviewed and negative





<JOCY THOMAS - Last Filed: 10/06/18 04:58>





Physical Exam





<JOCY THOMAS - Last Filed: 10/06/18 04:58>





<MARYURI KENT DANA - Last Filed: 10/07/18 15:39>





- Vital signs


Vitals: 


 











Temp Pulse Resp BP Pulse Ox


 


 98.5 F   113 H  16   142/85 H  92 


 


 10/05/18 22:45  10/05/18 22:45  10/05/18 22:45  10/05/18 22:45  10/05/18 22:45














- Notes


Notes: 





GENERAL: Alert, interacts well. No acute distress.


HEAD: Normocephalic, atraumatic.


EYES: Pupils equal, round, and reactive to light. Extraocular movements intact.


ENT: Oral mucosa moist, tongue midline. 


NECK: Full range of motion. Supple. Trachea midline.


LUNGS: Clear to auscultation bilaterally, no wheezes, rales, or rhonchi. No 

respiratory distress.


HEART: Mild tachycardia. No murmurs, gallops, or rubs.


EXTREMITIES: Moves all 4 extremities spontaneously. FROM of right knee, no 

swelling noted.  No edema, radial and dorsalis pedis pulses 2/4 bilaterally. No 

cyanosis.


NEUROLOGICAL: Alert and oriented x3. Normal speech. 


PSYCH: Normal affect, normal mood.


SKIN: Warm, dry, normal turgor. No rashes or lesions noted.


 (MARTHAJOCY BROTHERS)





Course





- Laboratory


Result Diagrams: 


 10/05/18 23:40





 10/06/18 00:59





<JOCY THOMAS - Last Filed: 10/06/18 04:58>





- Laboratory


Result Diagrams: 


 10/05/18 23:40





 10/06/18 00:59





<MARYURI KENT DANA - Last Filed: 10/07/18 15:39>





- Re-evaluation


Re-evalutation: 





10/05/18 23:23


Overall, patient well-appearing in no acute distress in the emergency 

department with history of frequent episodes coming to the ED for pain control.

  Patient is on a recent cough congestion fevers or illnesses.  Will screen 

patient at this time provide IV fluids and pain medication and reassess.


10/06/18 01:57


Patient's labs within normal normal limits are trending within his baseline.  

He is resting comfortably in the emergency department in no distress.  Will be 

discharged at this time with return precautions provided. (MARYURI KENT)





- Vital Signs


Vital signs: 


 











Temp Pulse Resp BP Pulse Ox


 


 98.5 F   113 H  17   132/84 H  93 


 


 10/05/18 22:45  10/05/18 22:45  10/06/18 02:02  10/06/18 02:02  10/06/18 00:00














- Laboratory


Laboratory results interpreted by me: 


 











  10/05/18 10/06/18





  23:40 00:59


 


RBC  2.81 L 


 


Hgb  9.3 L 


 


Hct  26.3 L 


 


RDW  28.1 H 


 


Retic Count (auto)  14.56 H 


 


Absolute Retic  0.409 H 


 


Carbon Dioxide   20 L


 


Total Bilirubin   4.0 H


 


Direct Bilirubin   1.1 H


 


AST   72 H


 


ALT   18 L


 


Alkaline Phosphatase   127 H














Discharge





<JOCY THOMAS - Last Filed: 10/06/18 04:58>





<MARYURI KENT - Last Filed: 10/07/18 15:39>





- Discharge


Clinical Impression: 


 Sickle-cell disease with pain





Condition: Good


Disposition: HOME, SELF-CARE


Instructions:  Sickle Cell Crisis ([facility])


Referrals: 


DIYA PHILIPPE MD [Primary Care Provider] - Follow up as needed


Scribe Attestation: 





10/07/18 15:39


I personally performed the services described in the documentation, reviewed 

and edited the documentation which was dictated to the scribe in my presence, 

and it accurately records my words and actions. (MARYURI KENT)





Scribe Documentation





- Scribe


Written by Malcom:: Malcom Christopher, 10/5/2018 23:14


acting as scribe for :: Duran





<JOCY THOMAS - Last Filed: 10/06/18 04:58>

## 2018-10-05 NOTE — RADIOLOGY REPORT (SQ)
EXAM DESCRIPTION: 



XR CHEST 1 VIEW



COMPLETED DATE/TME:  10/05/2018 23:02



EXAM DESCRIPTION: Single view of the chest



CLINICAL HISTORY: back pain,  hx of sickle cell 



COMPARISON: 9/7/2018



FINDINGS: Single frontal view of the chest.  The

cardiomediastinal silhouette has normal size and contour. No

consolidation, pneumothorax, or pleural effusion.  No displaced

rib fractures identified.  Upper abdominal soft tissues are

unremarkable.



IMPRESSION:



1. No acute pulmonary process identified.

## 2018-10-07 NOTE — RADIOLOGY REPORT (SQ)
Portable chest



HISTORY: Shortness of breath.



FINDINGS: The heart is not enlarged. No consolidation or pleural

effusion. No pulmonary edema or pneumothorax.



IMPRESSION:



No acute disease.

## 2018-10-07 NOTE — ER DOCUMENT REPORT
ED General





- General


Chief Complaint: Knee Pain


Stated Complaint: KNEE PAIN


Time Seen by Provider: 10/07/18 18:48


Mode of Arrival: Ambulatory


Notes: 





Patient is a 22-year-old male with a past medical history of sickle cell anemia 

who presents with an acute pain crisis.  He describes it as a dull, throbbing, 

severe pain the.  This started several hours ago and has been worsening since 

that time.  Nothing improves the pain, movement of the knee worsens the pain.  

He states this feels very similar to previous exacerbations of his sickle cell.

  He has not seen his hematologist regarding today's concerns.  He denies any 

chest pain, shortness of breath, fever or constitutional symptoms.


TRAVEL OUTSIDE OF THE [place] IN LAST 30 DAYS: No





- Related Data


Allergies/Adverse Reactions: 


 





Coconut * [Coconut] Allergy (Mild, Verified 10/03/18 18:27)


 


transpore tape Allergy (Mild, Uncoded 10/03/18 18:27)


 Hives











Past Medical History





- General


Information source: Patient, [facility]





- Social History


Smoking Status: Never Smoker


Frequency of alcohol use: None


Drug Abuse: None


Lives with: Spouse/Significant other


Family History: Reviewed & Not Pertinent, Arthritis, DM, Hypertension, Other - 

Sickle cell trait both parents and asthma


Patient has suicidal ideation: No


Patient has homicidal ideation: No


Pulmonary Medical History: Reports: Hx Asthma, Hx Pneumonia


Renal/ Medical History: Denies: Hx Peritoneal Dialysis


Psychiatric Medical History: 


   Denies: Hx Depression


Past Surgical History: Reports: Hx Abdominal Surgery - Gallstones removal, Hx 

Cholecystectomy, Hx Orthopedic Surgery - Fluid drained from right foot, Hx 

Vascular Surgery - Port placement





- Immunizations


Immunizations up to date: Yes


Hx Diphtheria, Pertussis, Tetanus Vaccination: Yes


Hx Pneumococcal Vaccination: 05/16/13





Review of Systems





- Review of Systems


Notes: 





Constitutional: Negative for fever.


HENT: Negative for sore throat.


Eyes: Negative for visual changes.


Cardiovascular: Negative for chest pain.


Respiratory: Negative for shortness of breath.


Gastrointestinal: Negative for abdominal pain, vomiting or diarrhea.


Genitourinary: Negative for dysuria.


Musculoskeletal: Positive for right knee pain


Skin: Negative for rash.


Neurological: Negative for headaches, weakness or numbness.





10 point ROS negative except as marked above and in HPI.





Physical Exam





- Vital signs


Vitals: 


 











Temp Pulse Resp BP Pulse Ox


 


 99.3 F   120 H  28 H  141/75 H  94 


 


 10/07/18 18:01  10/07/18 18:01  10/07/18 18:01  10/07/18 18:01  10/07/18 18:01











Interpretation: Tachycardic


Notes: 





PHYSICAL EXAMINATION:





GENERAL: Well-appearing, well-nourished and in no acute distress.





HEAD: Atraumatic, normocephalic.





EYES: Pupils equal round and reactive to light, extraocular movements intact, 

sclera anicteric, conjunctiva are normal.





ENT: nares patent, oropharynx clear without exudates.  Moist mucous membranes.





NECK: Normal range of motion, supple without lymphadenopathy





LUNGS: Breath sounds clear to auscultation bilaterally and equal.  No wheezes 

rales or rhonchi.





HEART: Regular rate and rhythm without murmurs





ABDOMEN: Soft, nontender, normoactive bowel sounds.  No guarding, no rebound.  

No masses appreciated.





EXTREMITIES: Normal range of motion, no pitting or edema.  No cyanosis.





NEUROLOGICAL: No focal neurological deficits. Moves all extremities 

spontaneously and on command.





PSYCH: Normal mood, normal affect.





SKIN: Warm, Dry, normal turgor, no rashes or lesions noted.





Course





- Re-evaluation


Re-evalutation: 





10/07/18 20:39


Presentation is most consistent with an uncomplicated sickle cell pain crisis.  

Patient has no evidence of an aplastic crisis on labs.  History and vitals are 

not consistent with acute chest syndrome.  Vitals have remained within normal 

limits here in the emergency department.  Patient's pain has been able to be 

controlled using IV analgesia.  The patient is agreeable to discharge home at 

this time.  I recommended that they follow closely with their primary 

hematologist.  Return precautions have been reviewed and discussed and patient 

has verbalized indications to return to the emergency department.





- Vital Signs


Vital signs: 


 











Temp Pulse Resp BP Pulse Ox


 


 99.3 F   120 H  28 H  141/75 H  94 


 


 10/07/18 18:01  10/07/18 18:01  10/07/18 18:01  10/07/18 18:01  10/07/18 18:01














- Laboratory


Result Diagrams: 


 10/07/18 19:15





 10/07/18 19:15


Laboratory results interpreted by me: 


 











  10/07/18





  19:15


 


RBC  2.71 L


 


Hgb  9.1 L


 


Hct  25.9 L


 


RDW  32.3 H


 


Band Neutrophils %  1 L


 


Retic Count (auto)  18.69 H


 


Absolute Retic  0.507 H














Discharge





- Discharge


Clinical Impression: 


 Sickle cell crisis





Right knee pain


Qualifiers:


 Chronicity: acute Qualified Code(s): M25.561 - Pain in right knee





Condition: Good


Disposition: HOME, SELF-CARE


Additional Instructions: 


You were seen today for sickle cell pain crisis.  Please follow-up with your 

hematologist.  Returning to the ED if you have worsening pain, fever greater 

than 100.4, shortness of breath, persistent vomiting, or any other symptoms 

that are concerning to you.


Referrals: 


DIYA PHILIPPE MD [Primary Care Provider] - Follow up as needed

## 2018-10-07 NOTE — ER DOCUMENT REPORT
ED Medical Screen (RME)





- General


Chief Complaint: Knee Pain


Stated Complaint: KNEE PAIN


Time Seen by Provider: 10/07/18 18:48


Mode of Arrival: Ambulatory


Information source: Patient, [facility] Records


Notes: 





22-year-old male with sickle cell disease presents with complaint of right knee 

pain.  Patient states this is typical of his sickle cell pain flares.  He 

denies any fever, chills, cough, chest pain.








I have greeted and performed a rapid initial assessment of this patient.  A 

comprehensive ED assessment and evaluation of the patient, analysis of test 

results and completion of medical decision making process we will be contacted 

by additional ED providers.











PHYSICAL EXAMINATION:





Vital signs reviewed





GENERAL: Well-appearing, well-nourished and in no acute distress.





LUNGS: No respiratory distress





Musculoskeletal: Normal range of motion





NEUROLOGICAL:  Normal speech, normal gait. 





PSYCH: Normal mood, normal affect.





SKIN: Warm, Dry, normal turgor, no rashes or lesions noted.


TRAVEL OUTSIDE OF THE [place] IN LAST 30 DAYS: No





- Related Data


Allergies/Adverse Reactions: 


 





Coconut * [Coconut] Allergy (Mild, Verified 10/03/18 18:27)


 


transpore tape Allergy (Mild, Uncoded 10/03/18 18:27)


 Hives











Past Medical History





- Social History


Family history: None


Pulmonary Medical History: Reports: Hx Asthma, Hx Pneumonia


Renal/ Medical History: Denies: Hx Peritoneal Dialysis


Psychiatric Medical History: 


   Denies: Hx Depression


Past Surgical History: Reports: Hx Abdominal Surgery - Gallstones removal, Hx 

Cholecystectomy, Hx Orthopedic Surgery - Fluid drained from right foot, Hx 

Vascular Surgery - Port placement





- Immunizations


Immunizations up to date: Yes


Hx Diphtheria, Pertussis, Tetanus Vaccination: Yes


History of Influenza Vaccine for 10/2017 - 3/2018 Season: Yes


Influenza Administration Date for 10/2017 - 3/2018 Season: 10/01/17





Physical Exam





- Vital signs


Vitals: 





 











Temp Pulse Resp BP Pulse Ox


 


 99.3 F   120 H  28 H  141/75 H  94 


 


 10/07/18 18:01  10/07/18 18:01  10/07/18 18:01  10/07/18 18:01  10/07/18 18:01














Course





- Vital Signs


Vital signs: 





 











Temp Pulse Resp BP Pulse Ox


 


 99.3 F   120 H  28 H  141/75 H  94 


 


 10/07/18 18:01  10/07/18 18:01  10/07/18 18:01  10/07/18 18:01  10/07/18 18:01














Doctor's Discharge





- Discharge


Referrals: 


DIYA PHILIPPE MD [Primary Care Provider] - Follow up as needed

## 2018-10-08 NOTE — PDOC H&P
History of Present Illness


Admission Date/PCP: 


  10/08/18 00:13





  DIYA PHILIPPE





Patient complains of: Chest pain


History of Present Illness: 


CARMEN BEGUM is a 22 year old male known to my service with recurrent 

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 he 

presented to the ED with new onset chest pain that started several hours before 

presenting to the ED. He described his pain as severe throbbing and achy pain. 

Patient reported that his home pain management regimen were not effectively 

controlling his pain. He localized pain mainly to his chest region without 

radiation into his shoulder. There is associated difficulty with breathing but 

patient claimed it is usual due to his sickle cell related pain crisis. His 

initial evaluation in the ED was remarkable for tachycardia, tachypnea and 

intermittent episodes of hypoxia. Due to persistence of his pain and associated 

symptoms he was advised hospitalization for further evaluation and management. 

His morbidities include Asthma and current home infusion therapy for bacteremia.





Past Medical History


Pulmonary Medical History: Reports: Asthma, Pneumonia


Psychiatric Medical History: 


   Denies: Depression


Hematology: Reports: Anemia, Sickle Cell Disease, Bleeding Tendencies





Past Surgical History


Past Surgical History: Reports: Cholecystectomy, Orthopedic Surgery - Fluid 

drained from right foot, Vascular Surgery - Port placement





Social History


Lives with: Spouse/Significant other


Smoking Status: Never Smoker


Frequency of Alcohol Use: None


Hx Recreational Drug Use: No


Drugs: None


Hx Prescription Drug Abuse: No





- Advance Directive


Resuscitation Status: Full Code





Family History


Family History: Reviewed & Not Pertinent, Arthritis, DM, Hypertension, Other - 

Sickle cell trait both parents and asthma


Parental Family History Reviewed: Yes


Children Family History Reviewed: Yes


Sibling(s) Family History Reviewed.: Yes





Medication/Allergy


Home Medications: 








Fentanyl [Duragesic 75 Mcg/Hr Transdermal Patch] 1 each TD Q3D 10/08/18 


Folic Acid [Folvite 1 mg Tablet] 1 mg PO DAILY 10/08/18 


Hydroxyurea [Hydrea 500 Mg Capsule] 1,000 mg PO SUTUTHSA@1000 10/08/18 


Hydroxyurea [Hydrea 500 Mg Capsule] 1,500 mg PO MOWEFR@1000 10/08/18 








Allergies/Adverse Reactions: 


 





Coconut * [Coconut] Allergy (Mild, Verified 10/03/18 18:27)


 


transpore tape Allergy (Mild, Uncoded 10/03/18 18:27)


 Hives











Review of Systems


Constitutional: ABSENT: chills, fever(s), headache(s), weight gain, weight loss


Eyes: ABSENT: visual disturbances


Ears: ABSENT: hearing changes


Nose, Mouth, and Throat: ABSENT: as per HPI, headache(s), mouth pain, sore 

throat, vertigo, other


Cardiovascular: PRESENT: chest pain, dyspnea on exertion - related to his 

sickle cell pain crisis


Respiratory: ABSENT: cough, hemoptysis


Gastrointestinal: ABSENT: abdominal pain, constipation, diarrhea, hematemesis, 

hematochezia, nausea, vomiting


Musculoskeletal: PRESENT: back pain - mid back region


Integumentary: ABSENT: rash, wounds


Neurological: ABSENT: abnormal gait, abnormal speech, confusion, dizziness, 

focal weakness, syncope


Psychiatric: ABSENT: anxiety, depression, homidical ideation, suicidal ideation


Endocrine: ABSENT: cold intolerance, heat intolerance, polydipsia, polyuria


Hematologic/Lymphatic: ABSENT: easy bleeding, easy bruising, lymphadenopathy


Allergic/Immunologic: ABSENT: seasonal rhinorrhea





Physical Exam


Vital Signs: 


 











Temp Pulse Resp BP Pulse Ox


 


 98.4 F   92   16   113/66   97 


 


 10/08/18 15:12  10/08/18 15:12  10/08/18 15:12  10/08/18 15:12  10/08/18 19:54








 Intake & Output











 10/07/18 10/08/18 10/09/18





 06:59 06:59 06:59


 


Intake Total  470 1900


 


Output Total  450 2400


 


Balance  20 -500


 


Weight  58 kg 











General appearance: PRESENT: severe distress - from sickle cell pain crisis


Head exam: PRESENT: atraumatic, normocephalic


Eye exam: PRESENT: conjunctiva pink, EOMI, PERRLA.  ABSENT: scleral icterus


Ear exam: PRESENT: normal external ear exam


Mouth exam: PRESENT: moist


Respiratory exam: PRESENT: clear to auscultation brenda


Cardiovascular exam: PRESENT: +S1, +S2, tachycardia.  ABSENT: diastolic murmur, 

gallop, systolic murmur


Pulses: PRESENT: +2 pedal pulses bilateral


Vascular exam: PRESENT: normal capillary refill.  ABSENT: pallor


GI/Abdominal exam: PRESENT: normal bowel sounds, soft.  ABSENT: distended, 

guarding, mass, organolmegaly, rebound, tenderness


Rectal exam: PRESENT: deferred


Extremities exam: ABSENT: pedal edema


Musculoskeletal exam: PRESENT: normal inspection, tenderness - to palpation of 

multiple ribs and sternal region, mid back region and left knee joint region


Neurological exam: PRESENT: alert, awake, oriented to person, oriented to place

, oriented to time, oriented to situation, CN II-XII grossly intact.  ABSENT: 

motor sensory deficit


Psychiatric exam: PRESENT: appropriate affect, normal mood.  ABSENT: homicidal 

ideation, suicidal ideation


Skin exam: PRESENT: dry, intact, warm.  ABSENT: cyanosis, rash





Results


Laboratory Results: 


 











  10/08/18 10/08/18 10/08/18





  00:46 00:46 00:46


 


Magnesium  2.0  


 


Ammonia    14.3


 


Amylase  50  


 


Lipase  61.8  


 


TSH   6.99 H 


 


Free T4   0.91 


 


Urine Color   


 


Urine Appearance   


 


Urine pH   


 


Ur Specific Gravity   


 


Urine Protein   


 


Urine Glucose (UA)   


 


Urine Ketones   


 


Urine Blood   


 


Urine Nitrite   


 


Ur Leukocyte Esterase   


 


Urine WBC (Auto)   


 


Urine RBC (Auto)   














  10/08/18





  08:01


 


Magnesium 


 


Ammonia 


 


Amylase 


 


Lipase 


 


TSH 


 


Free T4 


 


Urine Color  MARTA


 


Urine Appearance  CLEAR


 


Urine pH  6.0


 


Ur Specific Gravity  1.014


 


Urine Protein  30 H


 


Urine Glucose (UA)  NEGATIVE


 


Urine Ketones  NEGATIVE


 


Urine Blood  SMALL H


 


Urine Nitrite  NEGATIVE


 


Ur Leukocyte Esterase  NEGATIVE


 


Urine WBC (Auto)  1


 


Urine RBC (Auto)  0








 











  10/08/18 10/08/18 10/08/18





  00:46 00:46 06:26


 


Creatine Kinase  70   79


 


CK-MB (CK-2)   0.25 


 


Troponin I   0.020 














  10/08/18 10/08/18 10/08/18





  06:26 13:00 13:00


 


Creatine Kinase   73 


 


CK-MB (CK-2)  0.36   0.50


 


Troponin I  0.017   0.017











Impressions: 


 





Chest X-Ray  10/07/18 22:33


IMPRESSION:


 


No acute disease.


 














Assessment & Plan





- Diagnosis


(1) Sickle cell pain crisis


Is this a current diagnosis for this admission?: Yes   


Plan: 


See admitting attending physician orders.








(2) Homozygous sickle cell (SS) disease


Is this a current diagnosis for this admission?: Yes   


Plan: 


See admitting attending physician orders.








(3) Bacteremia of undetermined etiology


Is this a current diagnosis for this admission?: Yes   


Plan: 


See admitting attending physician orders.








(4) Asthma


Qualifiers: 


   Asthma severity: mild   Asthma complication type: unspecified 


Is this a current diagnosis for this admission?: Yes   


Plan: 


See admitting attending physician orders.








- Time


Time Spent: 50 to 70 Minutes


Medications reviewed and adjusted accordingly: Yes


Anticipated discharge: Home


Within: Other





- Inpatient Certification


Based on my medical assessment, after consideration of the patient's 

comorbidities, presenting symptoms, or acuity I expect that the services needed 

warrant INPATIENT care.: Yes


I certify that my determination is in accordance with my understanding of 

Medicare's requirements for reasonable and necessary INPATIENT services [42 CFR 

412.3e].: Yes


Medical Necessity: Need Close Monitoring Due to Risk of Patient Decompensation, 

Need For IV Fluids, Need For Continuous Telemetry Monitoring, Need for Pain 

Control, Need for IV Antibiotics, Risk of Complication if Not Cared For in 

Hospital


Post Hospital Care: D/C Planner Documentation





- Plan Summary


Plan Summary: 





See admitting attending physician orders.

## 2018-10-09 NOTE — PDOC PROGRESS REPORT
Subjective


Progress Note for:: 10/09/18


Subjective:: 





Patient reported resolution of his chest pain. There is persistent of back 

pain. Pain is fairly controlled on current medication management. No fever, 

chills, nausea, abdominal pain, or difficulty with breathing.


Reason For Visit: 


ACUTE CHEST SYNDROME, SICKLE CELL DISEASE








Physical Exam


Vital Signs: 


 











Temp Pulse Resp BP Pulse Ox


 


 98.5 F   93   14   120/62   98 


 


 10/09/18 16:00  10/09/18 16:00  10/09/18 16:00  10/09/18 16:00  10/09/18 16:00








 Intake & Output











 10/08/18 10/09/18 10/10/18





 06:59 06:59 06:59


 


Intake Total 470 2950 572


 


Output Total 450 2400 1450


 


Balance 20 550 -878


 


Weight 58 kg 59 kg 











General appearance: PRESENT: no acute distress, well-developed, well-nourished


Head exam: PRESENT: atraumatic, normocephalic


Eye exam: PRESENT: conjunctiva pink, EOMI, PERRLA.  ABSENT: scleral icterus


Ear exam: PRESENT: normal external ear exam


Mouth exam: PRESENT: moist


Respiratory exam: PRESENT: clear to auscultation brenda


Cardiovascular exam: PRESENT: RRR.  ABSENT: diastolic murmur, rubs, systolic 

murmur


Vascular exam: PRESENT: normal capillary refill.  ABSENT: pallor


GI/Abdominal exam: PRESENT: normal bowel sounds, soft.  ABSENT: distended, 

guarding, mass, organolmegaly, rebound, tenderness


Extremities exam: ABSENT: pedal edema


Musculoskeletal exam: PRESENT: normal inspection, tenderness - mid back region


Neurological exam: PRESENT: alert, awake, oriented to person, oriented to place

, oriented to time, oriented to situation, CN II-XII grossly intact.  ABSENT: 

motor sensory deficit


Psychiatric exam: PRESENT: appropriate affect, normal mood.  ABSENT: homicidal 

ideation, suicidal ideation


Skin exam: PRESENT: dry, intact, warm.  ABSENT: cyanosis, rash





Results


Laboratory Results: 


 





 10/09/18 04:12 





 10/09/18 04:12 





 











  10/09/18 10/09/18





  04:12 04:12


 


WBC  8.7 


 


RBC  2.40 L 


 


Hgb  8.3 L 


 


Hct  22.6 L 


 


MCV  94 


 


MCH  34.4 H 


 


MCHC  36.5 H 


 


RDW  29.9 H 


 


Plt Count  257 


 


Seg Neutrophils %  56.7 


 


Lymphocytes %  26.8 


 


Monocytes %  11.3 


 


Eosinophils %  4.5 


 


Basophils %  0.7 


 


Absolute Neutrophils  4.9 


 


Absolute Lymphocytes  2.3 


 


Absolute Monocytes  1.0 


 


Absolute Eosinophils  0.4 


 


Absolute Basophils  0.1 


 


Sodium   138.7


 


Potassium   4.2


 


Chloride   105


 


Carbon Dioxide   27


 


Anion Gap   7


 


BUN   6 L


 


Creatinine   0.54


 


Est GFR ( Amer)   > 60


 


Est GFR (Non-Af Amer)   > 60


 


Glucose   98


 


Calcium   8.9


 


Phosphorus   4.8 H


 


Total Bilirubin   2.3 H


 


AST   67 H


 


ALT   16 L


 


Alkaline Phosphatase   100


 


Total Protein   6.8


 


Albumin   3.8


 


Triglycerides   175 H


 


Cholesterol   114.00


 


LDL Cholesterol Direct   53


 


VLDL Cholesterol   35.0 H


 


HDL Cholesterol   25 L








 











  10/08/18 10/08/18 10/08/18





  00:46 00:46 06:26


 


Creatine Kinase  70   79


 


CK-MB (CK-2)   0.25 


 


Troponin I   0.020 














  10/08/18 10/08/18 10/08/18





  06:26 13:00 13:00


 


Creatine Kinase   73 


 


CK-MB (CK-2)  0.36   0.50


 


Troponin I  0.017   0.017











Impressions: 


 





Chest X-Ray  10/07/18 22:33


IMPRESSION:


 


No acute disease.


 














Assessment & Plan





- Diagnosis


(1) Sickle cell pain crisis


Is this a current diagnosis for this admission?: Yes   


Plan: 


Continue current medication management.








(2) Homozygous sickle cell (SS) disease


Is this a current diagnosis for this admission?: Yes   


Plan: 


Continue current medication management.








(3) Bacteremia of undetermined etiology


Is this a current diagnosis for this admission?: Yes   


Plan: 


Continue current medication management.








(4) Asthma


Qualifiers: 


   Asthma severity: mild   Asthma complication type: unspecified 


Is this a current diagnosis for this admission?: Yes   


Plan: 


Continue current medication management.








- Time


Time Spent with patient: 25-34 minutes


Medications reviewed and adjusted accordingly: Yes


Anticipated discharge: Home


Within: Other





- Inpatient Certification


Based on my medical assessment, after consideration of the patient's 

comorbidities, presenting symptoms, or acuity I expect that the services needed 

warrant INPATIENT care.: Yes


I certify that my determination is in accordance with my understanding of 

Medicare's requirements for reasonable and necessary INPATIENT services [42 CFR 

412.3e].: Yes


Medical Necessity: Need Close Monitoring Due to Risk of Patient Decompensation, 

Need For IV Fluids, Need For Continuous Telemetry Monitoring, Need for Pain 

Control, Need for IV Antibiotics, Risk of Complication if Not Cared For in 

Hospital


Post Hospital Care: D/C Planner Documentation





- Plan Summary


Plan Summary: 





Continue current medication management. Obtain CBC with diff, CMP in am.

## 2018-10-10 NOTE — PDOC PROGRESS REPORT
Subjective


Progress Note for:: 10/10/18


Subjective:: 





Patient reported some improvement in his back pain and was able to walk on the 

floor short distance today. No fever, chills, nausea, or abdominal pain. No 

chest pain or difficulty with breathing.


Reason For Visit: 


ACUTE CHEST SYNDROME, SICKLE CELL DISEASE








Physical Exam


Vital Signs: 


 











Temp Pulse Resp BP Pulse Ox


 


 98.5 F   94   12   132/90 H  97 


 


 10/10/18 16:00  10/10/18 16:00  10/10/18 16:00  10/10/18 16:00  10/10/18 16:00








 Intake & Output











 10/09/18 10/10/18 10/11/18





 06:59 06:59 06:59


 


Intake Total 2950 3338 1316


 


Output Total 2400 2600 1925


 


Balance 550 738 -609


 


Weight 59 kg 59.9 kg 











Physical Exam: 


General appearance: PRESENT: no acute distress, well-developed, well-nourished


Head exam: PRESENT: atraumatic, normocephalic


Eye exam: PRESENT: conjunctiva pink, EOMI, PERRLA.  ABSENT: scleral icterus


Ear exam: PRESENT: normal external ear exam


Mouth exam: PRESENT: moist


Respiratory exam: PRESENT: clear to auscultation brenda


Cardiovascular exam: PRESENT: RRR.  ABSENT: diastolic murmur, rubs, systolic 

murmur


Vascular exam: ABSENT: pallor


GI/Abdominal exam: PRESENT: normal bowel sounds, soft.  ABSENT: distended, 

guarding, mass, organomegaly, rebound, tenderness


Extremities exam: ABSENT: pedal edema


Musculoskeletal exam: PRESENT: normal inspection, tenderness - mid back region


Neurological exam: PRESENT: alert, awake, oriented to person, oriented to place

, oriented to time, oriented to situation, CN II-XII grossly intact.  ABSENT: 

motor sensory deficit


Psychiatric exam: PRESENT: appropriate affect, normal mood.  ABSENT: homicidal 

ideation, suicidal ideation


Skin exam: PRESENT: dry, intact, warm.  ABSENT: cyanosis, rash





Results


Laboratory Results: 


 





 10/10/18 04:35 





 10/10/18 04:35 





 











  10/10/18 10/10/18





  04:35 04:35


 


WBC  6.5 


 


RBC  2.28 L 


 


Hgb  8.0 L 


 


Hct  21.6 L 


 


MCV  95 


 


MCH  34.9 H 


 


MCHC  36.8 H 


 


RDW  28.3 H 


 


Plt Count  230 


 


Seg Neutrophils %  58.2 


 


Lymphocytes %  25.0 


 


Monocytes %  10.5 


 


Eosinophils %  5.6 


 


Basophils %  0.7 


 


Absolute Neutrophils  3.7 


 


Absolute Lymphocytes  1.6 


 


Absolute Monocytes  0.7 


 


Absolute Eosinophils  0.4 


 


Absolute Basophils  0.0 


 


Sodium   140.6


 


Potassium   4.3


 


Chloride   103


 


Carbon Dioxide   31 H


 


Anion Gap   7


 


BUN   9


 


Creatinine   0.55


 


Est GFR ( Amer)   > 60


 


Est GFR (Non-Af Amer)   > 60


 


Glucose   98


 


Calcium   8.7


 


Total Bilirubin   1.7 H


 


AST   60 H


 


ALT   16 L


 


Alkaline Phosphatase   89


 


Total Protein   6.8


 


Albumin   3.6








 











  10/08/18 10/08/18 10/08/18





  00:46 00:46 06:26


 


Creatine Kinase  70   79


 


CK-MB (CK-2)   0.25 


 


Troponin I   0.020 














  10/08/18 10/08/18 10/08/18





  06:26 13:00 13:00


 


Creatine Kinase   73 


 


CK-MB (CK-2)  0.36   0.50


 


Troponin I  0.017   0.017











Impressions: 


 





Chest X-Ray  10/07/18 22:33


IMPRESSION:


 


No acute disease.


 














Assessment & Plan





- Diagnosis


(1) Sickle cell pain crisis


Is this a current diagnosis for this admission?: Yes   





(2) Homozygous sickle cell (SS) disease


Is this a current diagnosis for this admission?: Yes   





(3) Bacteremia of undetermined etiology


Is this a current diagnosis for this admission?: Yes   





(4) Asthma


Qualifiers: 


   Asthma severity: mild   Asthma complication type: unspecified 


Is this a current diagnosis for this admission?: Yes   





- Time


Time Spent with patient: 25-34 minutes


Medications reviewed and adjusted accordingly: Yes


Anticipated discharge: Home


Within: Other





- Inpatient Certification


Based on my medical assessment, after consideration of the patient's 

comorbidities, presenting symptoms, or acuity I expect that the services needed 

warrant INPATIENT care.: Yes


I certify that my determination is in accordance with my understanding of 

Medicare's requirements for reasonable and necessary INPATIENT services [42 CFR 

412.3e].: Yes


Medical Necessity: Need Close Monitoring Due to Risk of Patient Decompensation, 

Need For IV Fluids, Need For Continuous Telemetry Monitoring, Need for Pain 

Control, Risk of Complication if Not Cared For in Hospital


Post Hospital Care: D/C Planner Documentation





- Plan Summary


Plan Summary: 





Continue current medication management.

## 2018-10-11 NOTE — PDOC PROGRESS REPORT
Subjective


Progress Note for:: 10/11/18


Subjective:: 





Patient continue to make gradual progress with pain management. he was able to 

ambulate on the floor earlier today with minimal mid back pain. Remain on IV 

Dilaudid and Fentanyl patch for pain management.


Reason For Visit: 


ACUTE CHEST SYNDROME, SICKLE CELL DISEASE








Physical Exam


Vital Signs: 


 











Temp Pulse Resp BP Pulse Ox


 


 98.4 F   94   16   109/68   98 


 


 10/11/18 15:03  10/11/18 15:03  10/11/18 15:03  10/11/18 15:03  10/11/18 15:03








 Intake & Output











 10/10/18 10/11/18 10/12/18





 06:59 06:59 06:59


 


Intake Total 3338 3874 1050


 


Output Total 2600 3325 800


 


Balance 738 549 250


 


Weight 59.9 kg 60 kg 











Physical Exam: 





General appearance: PRESENT: no acute distress, well-developed, well-nourished


Head exam: PRESENT: atraumatic, normocephalic


Eye exam: PRESENT: conjunctiva pink, EOMI, PERRLA.  ABSENT: scleral icterus


Ear exam: PRESENT: normal external ear exam


Mouth exam: PRESENT: moist


Respiratory exam: PRESENT: clear to auscultation brenda


Cardiovascular exam: PRESENT: RRR.  ABSENT: diastolic murmur, rubs, systolic 

murmur


Vascular exam: ABSENT: pallor


GI/Abdominal exam: PRESENT: normal bowel sounds, soft.  ABSENT: distended, 

guarding, mass, organomegaly, rebound, tenderness


Extremities exam: ABSENT: pedal edema


Musculoskeletal exam: PRESENT: normal inspection, tenderness - mid back region 

improving.


Neurological exam: PRESENT: alert, awake, oriented to person, oriented to place

, oriented to time, oriented to situation, CN II-XII grossly intact.  ABSENT: 

motor sensory deficit


Psychiatric exam: PRESENT: appropriate affect, normal mood.  ABSENT: homicidal 

ideation, suicidal ideation


Skin exam: PRESENT: dry, intact, warm.  ABSENT: cyanosis, rash





Results


Laboratory Results: 


 





 10/11/18 04:25 





 10/10/18 04:35 





 











  10/11/18





  04:25


 


WBC  5.8


 


RBC  2.35 L


 


Hgb  8.1 L


 


Hct  22.0 L


 


MCV  94


 


MCH  34.6 H


 


MCHC  37.0 H


 


RDW  27.6 H


 


Plt Count  214


 


Seg Neutrophils %  Not Reportable


 


Lymphocytes %  Not Reportable


 


Monocytes %  Not Reportable


 


Eosinophils %  Not Reportable


 


Basophils %  Not Reportable


 


Absolute Neutrophils  Not Reportable


 


Absolute Lymphocytes  Not Reportable


 


Absolute Monocytes  Not Reportable


 


Absolute Eosinophils  Not Reportable


 


Absolute Basophils  Not Reportable








 











  10/08/18 10/08/18 10/08/18





  00:46 00:46 06:26


 


Creatine Kinase  70   79


 


CK-MB (CK-2)   0.25 


 


Troponin I   0.020 














  10/08/18 10/08/18 10/08/18





  06:26 13:00 13:00


 


Creatine Kinase   73 


 


CK-MB (CK-2)  0.36   0.50


 


Troponin I  0.017   0.017











Impressions: 


 





Chest X-Ray  10/07/18 22:33


IMPRESSION:


 


No acute disease.


 














Assessment & Plan





- Diagnosis


(1) Sickle cell pain crisis


Is this a current diagnosis for this admission?: Yes   





(2) Homozygous sickle cell (SS) disease


Is this a current diagnosis for this admission?: Yes   





(3) Bacteremia of undetermined etiology


Is this a current diagnosis for this admission?: Yes   





(4) Asthma


Qualifiers: 


   Asthma severity: mild   Asthma complication type: unspecified 


Is this a current diagnosis for this admission?: Yes   





- Time


Time Spent with patient: 25-34 minutes


Medications reviewed and adjusted accordingly: Yes


Anticipated discharge: Home


Within: Other





- Inpatient Certification


Based on my medical assessment, after consideration of the patient's 

comorbidities, presenting symptoms, or acuity I expect that the services needed 

warrant INPATIENT care.: Yes


I certify that my determination is in accordance with my understanding of 

Medicare's requirements for reasonable and necessary INPATIENT services [42 CFR 

412.3e].: Yes


Medical Necessity: Need Close Monitoring Due to Risk of Patient Decompensation, 

Need For IV Fluids, Need For Continuous Telemetry Monitoring, Need for IV 

Antibiotics, Risk of Complication if Not Cared For in Hospital


Post Hospital Care: D/C Planner Documentation





- Plan Summary


Plan Summary: 





D/C IV Cefazolin coverage. Maintain on all other current management. Possible 

discharge home in next 24 hours if he continues to improve.

## 2018-10-12 NOTE — PDOC DISCHARGE SUMMARY
General





- Admit/Disc Date/PCP


Admission Date/Primary Care Provider: 


  10/08/18 00:13





  DIYA PHILIPPE





Discharge Date: 10/12/18





- Discharge Diagnosis


(1) Sickle cell pain crisis


Is this a current diagnosis for this admission?: Yes   





(2) Homozygous sickle cell (SS) disease


Is this a current diagnosis for this admission?: Yes   





(3) Bacteremia of undetermined etiology


Is this a current diagnosis for this admission?: Yes   





(4) Asthma


Is this a current diagnosis for this admission?: Yes   





- Additional Information


Resuscitation Status: Full Code


Home Medications: 








Fentanyl [Duragesic 75 Mcg/Hr Transdermal Patch] 1 each TD Q3D 10/08/18 


Folic Acid [Folvite 1 mg Tablet] 1 mg PO DAILY 10/08/18 


Hydroxyurea [Hydrea 500 mg Capsule] 1,000 mg PO SUTUTHSA@1000 10/08/18 


Hydroxyurea [Hydrea 500 mg Capsule] 1,500 mg PO MOWEFR@1000 10/08/18 











History of Present Illness


Patient complains of: chest pain


History of Present Illness: 


CARMEN BEGUM is a 22 year old male known to my service with recurrent 

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 he 

presented to the ED with new onset chest pain that started several hours before 

presenting to the ED. He described his pain as severe throbbing and achy pain. 

Patient reported that his home pain management regimen were not effectively 

controlling his pain. He localized pain mainly to his chest region without 

radiation into his shoulder. There is associated difficulty with breathing but 

patient claimed it is usual due to his sickle cell related pain crisis. His 

initial evaluation in the ED was remarkable for tachycardia, tachypnea and 

intermittent episodes of hypoxia. Due to persistence of his pain and associated 

symptoms he was advised hospitalization for further evaluation and management. 

His morbidities include Asthma and current home infusion therapy for bacteremia.





Hospital Course


Hospital Course: 


Patient responded to IV fluid support and pain management with Dilaudid on prn 

basis. His pain was adequately controlled while on admission. He remain on IV 

Cefazolin therapy for bacteremia. He remain afebrile in the last 48 hours. he 

will be discharge home today with follow up appointment with Dr. Cleaning and 

myself as instructed upon discharge.





Physical Exam


Vital Signs: 


 











Temp Pulse Resp BP Pulse Ox


 


 98.4 F   91   18   118/80   99 


 


 10/12/18 12:00  10/12/18 12:00  10/12/18 12:00  10/12/18 12:00  10/12/18 12:00








 Intake & Output











 10/11/18 10/12/18 10/13/18





 06:59 06:59 06:59


 


Intake Total 3874 3190 1000


 


Output Total 3325 2200 


 


Balance 


 


Weight 60 kg 60.1 kg 











Physical Exam: 


General appearance: PRESENT: no acute distress, well-developed, well-nourished


Head exam: PRESENT: atraumatic, normocephalic


Eye exam: PRESENT: conjunctiva pink, EOMI, PERRLA.  ABSENT: scleral icterus


Ear exam: PRESENT: normal external ear exam


Mouth exam: PRESENT: moist


Respiratory exam: PRESENT: clear to auscultation brenda


Cardiovascular exam: PRESENT: RRR.  ABSENT: diastolic murmur, rubs, systolic 

murmur


Vascular exam: ABSENT: pallor


GI/Abdominal exam: PRESENT: normal bowel sounds, soft.  ABSENT: distended, 

guarding, mass, organomegaly, rebound, tenderness


Extremities exam: ABSENT: pedal edema


Musculoskeletal exam: PRESENT: normal inspection, improved mid back tenderness.


Neurological exam: PRESENT: alert, awake, oriented to person, oriented to place

, oriented to time, oriented to situation, CN II-XII grossly intact.  ABSENT: 

motor sensory deficit


Psychiatric exam: PRESENT: appropriate affect, normal mood.  ABSENT: homicidal 

ideation, suicidal ideation


Skin exam: PRESENT: dry, intact, warm.  ABSENT: cyanosis, rash





Results


Laboratory Results: 


 





 10/11/18 04:25 





 10/10/18 04:35 





 











  10/08/18 10/08/18 10/08/18





  00:46 00:46 06:26


 


Creatine Kinase  70   79


 


CK-MB (CK-2)   0.25 


 


Troponin I   0.020 














  10/08/18 10/08/18 10/08/18





  06:26 13:00 13:00


 


Creatine Kinase   73 


 


CK-MB (CK-2)  0.36   0.50


 


Troponin I  0.017   0.017











Impressions: 


 





Chest X-Ray  10/07/18 22:33


IMPRESSION:


 


No acute disease.


 














Qualifiers





- *


PATIENT BEING DISCHARGED WITH ANY OF THE FOLLOWING DIAGNOSIS: No





Plan


Discharge Plan: 





D/C home today. Follow up with Dr. Cleaning and myself as informed upon 

discharge.

## 2018-10-13 NOTE — ER DOCUMENT REPORT
ED Medical Screen (RME)





- General


Chief Complaint: Sickle Cell Crisis


Stated Complaint: ABDOMINAL PAINS


Time Seen by Provider: 10/13/18 23:40


Mode of Arrival: Ambulatory


Information source: Patient


Notes: 





22-year-old male presented ED for complaint of back pain across the lower back 

for 2 days.  He states this is his typical sickle cell pain.  He states that 

when he called the doctor's office 2 days ago that he said "office was already 

closed and then when he called again they stated that  Dr. Philippe was on 

vacation.  Patient is alert and oriented respirations regular and unlabored 

speaking in full sentences walks with a even steady gait.  Patient states he 

has a history of asthma and sickle cell anemia.  He states he does not smoke or 

do any drugs but he does rarely drink maybe once a year.











I have greeted and performed a rapid initial assessment of this patient.  A 

comprehensive ED assessment and evaluation of the patient, analysis of test 

results and completion of medical decision making process will be conducted by 

an additional ED providers.


TRAVEL OUTSIDE OF THE [place] IN LAST 30 DAYS: No





- Related Data


Allergies/Adverse Reactions: 


 





Coconut * [Coconut] Allergy (Mild, Verified 10/03/18 18:27)


 


transpore tape Allergy (Mild, Uncoded 10/03/18 18:27)


 Hives











Past Medical History





- Social History


Family history: None


Pulmonary Medical History: Reports: Hx Asthma, Hx Pneumonia


Renal/ Medical History: Denies: Hx Peritoneal Dialysis


Psychiatric Medical History: 


   Denies: Hx Depression


Past Surgical History: Reports: Hx Abdominal Surgery - Gallstones removal, Hx 

Cholecystectomy, Hx Orthopedic Surgery - Fluid drained from right foot, Hx 

Vascular Surgery - Port placement





- Immunizations


Immunizations up to date: Yes


Hx Diphtheria, Pertussis, Tetanus Vaccination: Yes


History of Influenza Vaccine for 10/2017 - 3/2018 Season: Yes


Influenza Administration Date for 10/2017 - 3/2018 Season: 10/01/17





Physical Exam





- Vital signs


Vitals: 





 











Temp Pulse Resp BP Pulse Ox


 


 99.2 F   103 H  16   143/81 H  94 


 


 10/13/18 22:58  10/13/18 22:58  10/13/18 22:58  10/13/18 22:58  10/13/18 22:58














Course





- Vital Signs


Vital signs: 





 











Temp Pulse Resp BP Pulse Ox


 


 99.2 F   103 H  16   143/81 H  94 


 


 10/13/18 22:58  10/13/18 22:58  10/13/18 22:58  10/13/18 22:58  10/13/18 22:58














Doctor's Discharge





- Discharge


Referrals: 


DIYA PHILIPPE MD [Primary Care Provider] - Follow up as needed

## 2018-10-14 NOTE — ER DOCUMENT REPORT
ED General





- General


Chief Complaint: Sickle Cell Crisis


Stated Complaint: ABDOMINAL PAINS


Time Seen by Provider: 10/13/18 23:40


Mode of Arrival: Ambulatory


Information source: Patient


Notes: 





Patient is a 22-year-old male who presents with chief complaint of sickle cell 

crisis.  Patient reports pain to his bilateral knees for 2 days now.  Patient 

denies any chest pain or cough.  Patient has not had any fever.  Patient states 

this feels like his usual sickle cell crisis.





TRAVEL OUTSIDE OF THE [place] IN LAST 30 DAYS: No





- Related Data


Allergies/Adverse Reactions: 


 





Coconut * [Coconut] Allergy (Mild, Verified 10/03/18 18:27)


 


transpore tape Allergy (Mild, Uncoded 10/03/18 18:27)


 Hives











Past Medical History





- General


Information source: Patient





- Social History


Smoking Status: Never Smoker


Frequency of alcohol use: None


Drug Abuse: None


Family History: Reviewed & Not Pertinent, Arthritis, DM, Hypertension, Other - 

Sickle cell trait both parents and asthma


Patient has suicidal ideation: No


Patient has homicidal ideation: No


Pulmonary Medical History: Reports: Hx Asthma, Hx Pneumonia


Renal/ Medical History: Denies: Hx Peritoneal Dialysis


Psychiatric Medical History: 


   Denies: Hx Depression


Past Surgical History: Reports: Hx Abdominal Surgery - Gallstones removal, Hx 

Cholecystectomy, Hx Orthopedic Surgery - Fluid drained from right foot, Hx 

Vascular Surgery - Port placement





- Immunizations


Immunizations up to date: Yes


Hx Diphtheria, Pertussis, Tetanus Vaccination: Yes


Hx Pneumococcal Vaccination: 05/16/13





Review of Systems





- Review of Systems


Musculoskeletal: See HPI


-: Yes All other systems reviewed and negative





Physical Exam





- Vital signs


Vitals: 


 











Temp Pulse Resp BP Pulse Ox


 


 99.2 F   103 H  16   143/81 H  94 


 


 10/13/18 22:58  10/13/18 22:58  10/13/18 22:58  10/13/18 22:58  10/13/18 22:58














- Notes


Notes: 





PHYSICAL EXAMINATION:





GENERAL: Well-appearing, well-nourished and in no acute distress.





HEAD: Atraumatic, normocephalic.





EYES: Pupils equal round and reactive to light, extraocular movements intact, 

sclera anicteric, conjunctiva are normal.





ENT: Nares patent, oropharynx clear without exudates.  Moist mucous membranes.





NECK: Normal range of motion, supple without lymphadenopathy





LUNGS: Breath sounds clear to auscultation bilaterally and equal.  No wheezes 

rales or rhonchi.





HEART: Regular rate and rhythm without murmurs





ABDOMEN: Soft, nontender, nondistended abdomen.  No guarding, no rebound.  No 

masses appreciated.





Musculoskeletal: Normal range of motion, no pitting or edema.  No cyanosis.





NEUROLOGICAL: Cranial nerves grossly intact.  Normal speech, normal gait.  

Normal sensory, motor exams 





PSYCH: Normal mood, normal affect.





SKIN: Warm, Dry, normal turgor, no rashes or lesions noted.





Course





- Re-evaluation


Re-evalutation: 





Patient is alert, oriented and nontoxic in appearance.  Patient is very well-

known to our facility and presents frequently with episodes of sickle cell 

crisis.  Reticulocyte count is 9.09.  Absolute reticulocytes 0.243.  Total bili 

3.6.  Direct bili 0.8.  Urinalysis with trace leukocyte esterase.  All labs 

otherwise unremarkable.  Patient was given IV fluids and multiple rounds of IV 

Dilaudid and Benadryl.  Patient's pain was significantly improved after several 

hours.  Patient discharged home in stable condition.





- Vital Signs


Vital signs: 


 











Temp Pulse Resp BP Pulse Ox


 


 97.6 F   116 H  17   131/81 H  94 


 


 10/14/18 06:07  10/14/18 06:07  10/14/18 06:07  10/14/18 06:07  10/14/18 06:07














- Laboratory


Result Diagrams: 


 10/14/18 00:40





 10/14/18 01:26


Laboratory results interpreted by me: 


 











  10/14/18 10/14/18 10/14/18





  00:40 01:26 03:18


 


RBC  2.67 L  


 


Hgb  8.9 L  


 


Hct  25.0 L  


 


MCH  33.5 H  


 


RDW  27.6 H  


 


Monocytes % (Manual)  1 L  


 


Retic Count (auto)  9.09 H  


 


Absolute Retic  0.243 H  


 


Chloride   110 H 


 


Glucose   118 H 


 


Total Bilirubin   3.6 H 


 


Direct Bilirubin   0.8 H 


 


ALT   16 L 


 


Urine Protein    30 H


 


Urine Blood    SMALL H


 


Urine Urobilinogen    4.0 H


 


Ur Leukocyte Esterase    TRACE H














Discharge





- Discharge


Clinical Impression: 


 Sickle cell crisis





Condition: Stable


Disposition: HOME, SELF-CARE


Additional Instructions: 


Sickle Cell Crisis





     You have "sickle cell crisis."  Sickle cell disease is caused by abnormal 

hemoglobin.  This hemoglobin can deform red blood cells into a sickle shape.  

These abnormal blood cells can block blood vessels. This causes the pain of 

sickle cell crisis.


     Sickle cell crisis can occur any time.  But attacks are more likely with 

acute infection, dehydration, or altitude change.  A crisis usually causes pain 

in the legs, back, abdomen, and chest.  Sometimes the pain may ease and return 

later.


     The usual treatment is oxygen, pain medication, IV fluids, and treatment 

of infection.  Attacks may take a couple of days to resolve.


     Return if the pain becomes more severe, or if there are new symptoms.





Referrals: 


DIYA PHILIPPE MD [Primary Care Provider] - Follow up as needed

## 2018-10-15 NOTE — ER DOCUMENT REPORT
ED Medical Screen (RME)





- General


Chief Complaint: Sickle Cell Crisis- R knee pain


Stated Complaint: RIGHT KNEE PAIN


Time Seen by Provider: 10/15/18 19:54


Notes: 





Patient is a 22-year-old male presenting to the emergency department 

complaining of right knee pain.  Patient has sickle cell anemia.  States he has 

avascular necrosis in the right knee and is waiting on a knee transplant 

patient states pain increases upon change in weather.  Patient denies trauma to 

right knee.  Patient states pain has increased over the last 2 days and patient 

states he took 15 mg of oxycodone at 1630 which did not help.  Patient denies 

any URI symptoms, fever, chest pain, shortness of breath, abdominal pain.





Past medical history: Sickle cell anemia, asthma


Medications: Hydroxyurea, folic acid, Advair, oxycodone


Allergies: Coconut, tape





Physical exam: Physical exam limited due to patient having pants on and 

unwilling to take his pants off.  Patient has decreased range of motion to the 

right knee secondary to pain.  No pain in right hip or right foot.





I have greeted and performed a rapid initial assessment of this patient.  A 

comprehensive ED assessment and evaluation of the patient, analysis of test 

results and completion of the medical decision making process will be conducted 

by additional ED providers.


TRAVEL OUTSIDE OF THE [place] IN LAST 30 DAYS: No





- Related Data


Allergies/Adverse Reactions: 


 





Coconut * [Coconut] Allergy (Mild, Verified 10/03/18 18:27)


 


transpore tape Allergy (Mild, Uncoded 10/03/18 18:27)


 Hives











Past Medical History





- Social History


Family history: None


Pulmonary Medical History: Reports: Hx Asthma, Hx Pneumonia


Renal/ Medical History: Denies: Hx Peritoneal Dialysis


Psychiatric Medical History: 


   Denies: Hx Depression


Past Surgical History: Reports: Hx Abdominal Surgery - Gallstones removal, Hx 

Cholecystectomy, Hx Orthopedic Surgery - Fluid drained from right foot, Hx 

Vascular Surgery - Port placement





- Immunizations


Immunizations up to date: Yes


Hx Diphtheria, Pertussis, Tetanus Vaccination: Yes


History of Influenza Vaccine for 10/2017 - 3/2018 Season: Yes


Influenza Administration Date for 10/2017 - 3/2018 Season: 10/01/17





Physical Exam





- Vital signs


Vitals: 





 











Temp Pulse Resp BP Pulse Ox


 


 99.0 F   116 H  20   145/92 H  95 


 


 10/15/18 18:15  10/15/18 18:15  10/15/18 18:15  10/15/18 18:15  10/15/18 18:15














Course





- Vital Signs


Vital signs: 





 











Temp Pulse Resp BP Pulse Ox


 


 99.0 F   116 H  20   145/92 H  95 


 


 10/15/18 18:15  10/15/18 18:15  10/15/18 18:15  10/15/18 18:15  10/15/18 18:15














Doctor's Discharge





- Discharge


Referrals: 


DIYA PHILIPPE MD [Primary Care Provider] - Follow up as needed

## 2018-10-15 NOTE — RADIOLOGY REPORT (SQ)
EXAM DESCRIPTION: 



XR KNEE 4 OR MORE VIEWS



COMPLETED DATE/TME:  10/15/2018 21:24



CLINICAL HISTORY: 



22 years, Male, Pain



COMPARISON:

EXAM DESCRIPTION: 







CLINICAL HISTORY: 



Pain



COMPARISON: 



None



FINDINGS: 



4 view(s) submitted. There is diffuse heterogeneous bone marrow

attenuation, likely related to known sickle cell anemia. No

pathologic fracture. No knee joint effusion.. Metastases are not

excluded. No dislocation.



IMPRESSION: Diffuse bone marrow abnormality likely related to

sickle cell anemia.



No acute fracture or dislocation.





NUMBER OF VIEWS:





TECHNIQUE:





LIMITATIONS:

None.



FINDINGS:







IMPRESSION:





 



 2011 [employer]- All Rights Reserved

## 2018-10-16 NOTE — ER DOCUMENT REPORT
Doctor's Note


Notes: 





Patient seen and examined, need for peripheral access, patient is often a 

difficult stick for nursing staff,


PROCEDURE: Patient was prepped with ChloraPrep and alcohol, tourniquet placed


Ultrasound-guided peripheral IV after identifying a vessel under ultrasound, 

was placed in the patient's right upper arm, good blood return, labs obtained, 

tourniquet removed, and good flush, patient tolerated well.

## 2018-10-16 NOTE — ER DOCUMENT REPORT
ED General





- General


Chief Complaint: Chest Pain


Stated Complaint: RIGHT KNEE PAIN


Time Seen by Provider: 10/16/18 14:32


Mode of Arrival: Ambulatory


Information source: Patient


Notes: 





22-year-old male presents to ED for complaint of sickle cell pain to the right 

knee and chest.  He was here last night for the same.  He called today because 

he is scheduled for a bone marrow transplant next week and he called his doctor 

and they stated that they wanted to blood work x-ray and to ensure that he was 

not having any problems before driving to [facility] for his transplant.  Patient is 

alert and oriented respirations regular and unlabored speaking in full 

sentences walks with a even gait.


TRAVEL OUTSIDE OF THE [place] IN LAST 30 DAYS: No





- HPI


Onset: Other - chronic


Onset/Duration: Persistent


Quality of pain: Sharp


Severity: Moderate


Pain Level: 4


Associated symptoms: Chest pain, Other - Right knee pain


Exacerbated by: Denies


Relieved by: Denies


Similar symptoms previously: Yes


Recently seen / treated by doctor: Yes





- Related Data


Allergies/Adverse Reactions: 


 





Coconut * [Coconut] Allergy (Mild, Verified 10/03/18 18:27)


 


transpore tape Allergy (Mild, Uncoded 10/03/18 18:27)


 Hives











Past Medical History





- General


Information source: Patient





- Social History


Smoking Status: Never Smoker


Cigarette use (# per day): No


Chew tobacco use (# tins/day): No


Smoking Education Provided: No


Frequency of alcohol use: None


Drug Abuse: None


Lives with: Family


Family History: Reviewed & Not Pertinent, Arthritis, DM, Hypertension, Other - 

Sickle cell trait both parents and asthma


Patient has suicidal ideation: No


Patient has homicidal ideation: No





- Past Medical History


Cardiac Medical History: Reports: None


Pulmonary Medical History: Reports: Hx Asthma, Hx Pneumonia


EENT Medical History: Reports: None


Neurological Medical History: Reports: None


Endocrine Medical History: Reports: None


Renal/ Medical History: Reports: None


Malignancy Medical History: Reports None


GI Medical History: Reports: None


Musculoskeletal Medical History: Reports Hx Musculoskeletal Deformity - Pain to 

multiple areas mostly right knee due to sickle cell anemia


Skin Medical History: Reports None


Psychiatric Medical History: Reports: None


Traumatic Medical History: Reports: None


Infectious Medical History: Reports: None


Past Surgical History: Reports: Hx Abdominal Surgery - Gallstones removal, Hx 

Cholecystectomy, Hx Orthopedic Surgery - Fluid drained from right foot, Hx 

Vascular Surgery - Port placement





- Immunizations


Immunizations up to date: Yes


Hx Diphtheria, Pertussis, Tetanus Vaccination: Yes


Hx Pneumococcal Vaccination: 05/16/13





Review of Systems





- Review of Systems


Constitutional: No symptoms reported


EENT: No symptoms reported


Cardiovascular: Chest pain - Due to sickle cell


Respiratory: No symptoms reported


Gastrointestinal: No symptoms reported


Genitourinary: No symptoms reported


Male Genitourinary: No symptoms reported


Musculoskeletal: Other - Right knee pain due to sickle cell


Skin: No symptoms reported


Hematologic/Lymphatic: No symptoms reported


Neurological/Psychological: No symptoms reported


-: Yes All other systems reviewed and negative





Physical Exam





- Vital signs


Vitals: 


 











Temp Pulse Resp BP Pulse Ox


 


 99.2 F   113 H  20   128/72 H  97 


 


 10/16/18 13:53  10/16/18 13:53  10/16/18 13:53  10/16/18 13:53  10/16/18 13:53











Interpretation: Normal





- General


General appearance: Appears well, Alert





- HEENT


Head: Normocephalic, Atraumatic


Eyes: Normal


Pupils: PERRL





- Respiratory


Respiratory status: No respiratory distress


Chest status: Tender - Chest tenderness due to sickle cell


Breath sounds: Normal


Chest palpation: Normal





- Cardiovascular


Rhythm: Regular


Heart sounds: Normal auscultation


Murmur: No





- Abdominal


Inspection: Normal


Distension: No distension


Bowel sounds: Normal


Tenderness: Nontender


Organomegaly: No organomegaly





- Back


Back: Normal, Nontender





- Extremities


General upper extremity: Normal inspection, Nontender, Normal color, Normal ROM

, Normal temperature


General lower extremity: Normal color, Normal ROM, Normal temperature, Normal 

weight bearing.  No: Shira's sign


Knee: Tender - Right knee, Pain with ROM, Patellar tendon intact, Tender joint 

line.  No: Abrasion, Deformity, Dislocation, Drawer's test instability, 

Ecchymosis, Instability, Joint effusion, Laceration, Laxity with valgus stress, 

Popliteal fossa tender, Unable to bear weight





- Neurological


Neuro grossly intact: Yes


Cognition: Normal


Orientation: AAOx4


[place] Coma Scale Eye Opening: Spontaneous


[place] Coma Scale Verbal: Oriented


[place] Coma Scale Motor: Obeys Commands


[place] Coma Scale Total: 15


Speech: Normal


Motor strength normal: LUE, RUE, LLE, RLE


Sensory: Normal





- Psychological


Associated symptoms: Normal affect, Normal mood





- Skin


Skin Temperature: Warm


Skin Moisture: Dry


Skin Color: Normal





Course





- Re-evaluation


Re-evalutation: 





10/16/18 20:12


Labs and x-ray discussed with patient.  Patient was treated with IV fluids 

Dilaudid I half milligram x2.  A written report of all the labs and x-ray given 

to patient to follow-up with his hematologist.  He states he is going to have 

bone marrow transplant November 1.  States is post to go to [facility] get 

chemotherapy on 24 October and go before that and get a port inserted.  Patient 

states that [facility] told him to come to the emergency room to get checked 

out because he was having chest pain with his sickle cell crisis.  Patient has 

chest pain frequently with his sickle cell crisis.  Patient was discharged home 

with all lab work reports chest x-ray report and a copy of his EKGs.





- Vital Signs


Vital signs: 


 











Temp Pulse Resp BP Pulse Ox


 


 98.4 F   100   20   131/79 H  94 


 


 10/16/18 17:23  10/16/18 17:23  10/16/18 17:23  10/16/18 17:23  10/16/18 17:23














- Laboratory


Result Diagrams: 


 10/16/18 15:05





 10/16/18 16:10


Laboratory results interpreted by me: 


 











  10/16/18 10/16/18 10/16/18





  15:05 15:05 16:10


 


RBC  2.54 L  


 


Hgb  8.9 L  


 


Hct  24.8 L  


 


MCV  98 H  


 


MCH  35.1 H  


 


RDW  34.0 H  


 


Retic Count (auto)  14.74 H  


 


Absolute Retic  0.375 H  


 


Chloride    109 H


 


BUN    6 L


 


Total Bilirubin    3.6 H


 


Direct Bilirubin    0.9 H


 


AST    61 H


 


ALT    20 L


 


Urine Protein   100 H 


 


Urine Blood   SMALL H 


 


Urine Urobilinogen   4.0 H 














- Diagnostic Test


Radiology reviewed: Image reviewed, Reports reviewed





Discharge





- Discharge


Clinical Impression: 


 Sickle cell pain crisis





Chest pain


Qualifiers:


 Chest pain type: other chest pain Qualified Code(s): R07.89 - Other chest pain





Right knee pain


Qualifiers:


 Chronicity: chronic Qualified Code(s): M25.561 - Pain in right knee





Condition: Stable


Disposition: HOME, SELF-CARE


Additional Instructions: 


Sickle Cell Crisis





     You have "sickle cell crisis."  Sickle cell disease is caused by abnormal 

hemoglobin.  This hemoglobin can deform red blood cells into a sickle shape.  

These abnormal blood cells can block blood vessels. This causes the pain of 

sickle cell crisis.


     Sickle cell crisis can occur any time.  But attacks are more likely with 

acute infection, dehydration, or altitude change.  A crisis usually causes pain 

in the legs, back, abdomen, and chest.  Sometimes the pain may ease and return 

later.


     The usual treatment is oxygen, pain medication, IV fluids, and treatment 

of infection.  Attacks may take a couple of days to resolve.


     Return if the pain becomes more severe, or if there are new symptoms.


Ibuprofen





     Ibuprofen is an excellent, safe drug for pain control.  In addition, it 

has potent antiinflammatory effects which are beneficial, especially in the 

treatment of injuries, arthritis, or tendonitis. It's best to take ibuprofen 

with food.  Persons with ulcer disease or allergy to aspirin should notify 

their physician of this before taking ibuprofen.


     Take the medication exactly as prescribed.  Don't take additional doses 

unless instructed to do so by your doctor.  If you develop wheezing, shortness 

of breath, hives, faintness, stomach pain, vomiting, or dark black stools, 

return for re-evaluation at once.





Pain Medication Injection





     You have received an injection of a pain medication.  You should 

experience significant pain relief within 45 minutes.  This drug is a narcotic -

- it will impair your judgement, slow your reaction time and make you sleepy (

as well as relieve your pain).  Narcotics also can cause nausea.


     You should not drive, work with machinery, or perform any task requiring 

mental alertness until all effects of the medication are gone -- six to eight 

hours.  Do not take any alcohol, or sedatives, and do not take any other 

medication without checking with your physician.





I have given you a copy of all your lab results and chest x-ray result.  I have 

also given you a copy of the last week's lab results to take to your doctor at 

[facility].  You states you are going to [facility] for a port insertion and 

then on the 24th you will be getting chemo and on November 1 you will be 

getting a bone marrow transplant.  Please take all this with you to these 

appointments so that you are doctor can see how you have been doing.  Please 

follow-up with your local primary doctor for any tenured pain medication until 

you go to [facility].





FOLLOW-UP CARE:


If you have been referred to a physician for follow-up care, call the physician

s office for an appointment as you were instructed or within the next two days.

  If you experience worsening or a significant change in your symptoms, notify 

the physician immediately or return to the Emergency Department at any time for 

re-evaluation.


Forms:  Elevated Blood Pressure


Referrals: 


DIYA PHILIPPE MD [Primary Care Provider] - Follow up as needed

## 2018-10-16 NOTE — ER DOCUMENT REPORT
ED General Pain





- General


Chief Complaint: Sickle Cell Crisis- R knee pain


Stated Complaint: RIGHT KNEE PAIN


Time Seen by Provider: 10/15/18 19:54


Mode of Arrival: Ambulatory


Information source: Patient


Notes: 





Patient complained of right knee pain.  Said he has been taking his pain 

medicine at home without relief.  Patient is demanding Dilaudid in the 

emergency room.


TRAVEL OUTSIDE OF THE [place] IN LAST 30 DAYS: No





- HPI


Onset: Yesterday


Onset/Duration: Gradual


Quality of pain: Sharp


Severity: Severe


Pain Level: 4


Context: Joint pain


Typical of prior episodes of painful crisis: Yes


Genotype: SS


Associated symptoms: None


Exacerbated by: Denies


Relieved by: Denies


Similar symptoms previously: Yes


Recently seen / treated by doctor: No





- Related Data


Allergies/Adverse Reactions: 


 





Coconut * [Coconut] Allergy (Mild, Verified 10/03/18 18:27)


 


transpore tape Allergy (Mild, Uncoded 10/03/18 18:27)


 Hives











Past Medical History





- Social History


Smoking Status: Never Smoker


Family History: Reviewed & Not Pertinent, Arthritis, DM, Hypertension, Other - 

Sickle cell trait both parents and asthma


Patient has suicidal ideation: No


Patient has homicidal ideation: No


Pulmonary Medical History: Reports: Hx Asthma, Hx Pneumonia


Renal/ Medical History: Denies: Hx Peritoneal Dialysis


Psychiatric Medical History: 


   Denies: Hx Depression


Past Surgical History: Reports: Hx Abdominal Surgery - Gallstones removal, Hx 

Cholecystectomy, Hx Orthopedic Surgery - Fluid drained from right foot, Hx 

Vascular Surgery - Port placement





- Immunizations


Immunizations up to date: Yes


Hx Diphtheria, Pertussis, Tetanus Vaccination: Yes


Hx Pneumococcal Vaccination: 05/16/13





Review of Systems





- Review of Systems


Constitutional: No symptoms reported


EENT: No symptoms reported


Cardiovascular: No symptoms reported


Respiratory: No symptoms reported


Gastrointestinal: No symptoms reported


Genitourinary: No symptoms reported


Male Genitourinary: No symptoms reported


Musculoskeletal: Other - Right Knee pain


Skin: No symptoms reported


Hematologic/Lymphatic: No symptoms reported


Neurological/Psychological: No symptoms reported


-: Yes All other systems reviewed and negative





Physical Exam





- Vital signs


Vitals: 


 











Temp Pulse Resp BP Pulse Ox


 


 99.0 F   116 H  20   145/92 H  95 


 


 10/15/18 18:15  10/15/18 18:15  10/15/18 18:15  10/15/18 18:15  10/15/18 18:15











Interpretation: Normal





- General


General appearance: Appears well, Alert





- HEENT


Head: Normocephalic, Atraumatic


Eyes: Normal


Pupils: PERRL





- Respiratory


Respiratory status: No respiratory distress


Chest status: Nontender


Breath sounds: Normal


Chest palpation: Normal





- Cardiovascular


Rhythm: Regular


Heart sounds: Normal auscultation


Murmur: No





- Abdominal


Inspection: Normal


Distension: No distension


Bowel sounds: Normal


Tenderness: Nontender


Organomegaly: No organomegaly





- Back


Back: Normal, Nontender





- Extremities


General upper extremity: Normal inspection, Nontender, Normal color, Normal ROM

, Normal temperature


General lower extremity: Normal inspection, Nontender, Normal color, Normal ROM

, Normal temperature, Normal weight bearing.  No: Shira's sign





- Neurological


Neuro grossly intact: Yes


Cognition: Normal


Orientation: AAOx4


[place] Coma Scale Eye Opening: Spontaneous


[place] Coma Scale Verbal: Oriented


[place] Coma Scale Motor: Obeys Commands


[place] Coma Scale Total: 15


Speech: Normal


Motor strength normal: LUE, RUE, LLE, RLE


Sensory: Normal





- Psychological


Associated symptoms: Normal affect, Normal mood





- Skin


Skin Temperature: Warm


Skin Moisture: Dry


Skin Color: Normal





Course





- Vital Signs


Vital signs: 


 











Temp Pulse Resp BP Pulse Ox


 


 99.0 F   116 H  17   123/81   94 


 


 10/15/18 18:15  10/15/18 18:15  10/16/18 01:01  10/16/18 01:01  10/16/18 00:06














- Laboratory


Result Diagrams: 


 10/15/18 20:58





 10/15/18 20:58


Laboratory results interpreted by me: 


 











  10/15/18 10/15/18





  20:58 20:58


 


RBC  2.57 L 


 


Hgb  8.8 L 


 


Hct  25.0 L 


 


MCH  34.2 H 


 


RDW  32.9 H 


 


Retic Count (auto)  15.99 H 


 


Absolute Retic  0.411 H 


 


Chloride   108 H


 


Total Bilirubin   3.7 H


 


Direct Bilirubin   0.8 H


 


AST   80 H


 


ALT   19 L


 


Total Protein   8.5 H














- Diagnostic Test


Radiology reviewed: Image reviewed, Reports reviewed





- Transfer of Care


Notes: 





10/16/18 04:05


Right knee pain.


Sickle cell pain crisis.





Critical Care Note





- Critical Care Note


Total time excluding time spent on procedures (mins): 45





Discharge





- Discharge


Clinical Impression: 


 Sickle cell pain crisis





Right knee pain


Qualifiers:


 Chronicity: chronic Qualified Code(s): M25.561 - Pain in right knee





Condition: Stable


Disposition: HOME, SELF-CARE


Instructions:  Sickle Cell Crisis ([facility])


Additional Instructions: 


Please follow up with your regular doctor tomorrow morning.


Return to the ED if your condition worsens.


Referrals: 


DIYA PHILIPPE MD [Primary Care Provider] - Follow up as needed

## 2018-10-16 NOTE — RADIOLOGY REPORT (SQ)
EXAM DESCRIPTION:  CHEST 2 VIEWS



COMPLETED DATE/TIME:  10/16/2018 3:50 pm



REASON FOR STUDY:  chest pain



COMPARISON:  9/7/2018



EXAM PARAMETERS:  NUMBER OF VIEWS: two views

TECHNIQUE: Digital Frontal and Lateral radiographic views of the chest acquired.

RADIATION DOSE: NA

LIMITATIONS: none



FINDINGS:  LUNGS AND PLEURA: No opacities, masses or pneumothorax. No pleural effusion.

MEDIASTINUM AND HILAR STRUCTURES: No masses or contour abnormalities.

HEART AND VASCULAR STRUCTURES: Heart normal size.  No evidence for failure.

BONES: Unchanged in appearance.

HARDWARE: None in the chest.

OTHER: No other significant finding.



IMPRESSION:  NO ACUTE RADIOGRAPHIC FINDING IN THE CHEST.



TECHNICAL DOCUMENTATION:  JOB ID:  [id number]

 2011 [employer]- All Rights Reserved



Reading location - IP/workstation name: [email or web address]

## 2018-10-16 NOTE — EKG REPORT
SEVERITY:- BORDERLINE ECG -

SINUS TACHYCARDIA

BORDERLINE T WAVE ABNORMALITIES

:

Confirmed by: Storm Bosch MD 16-Oct-2018 18:25:58

## 2018-10-20 NOTE — ER DOCUMENT REPORT
ED General





- General


Chief Complaint: Knee Pain


Stated Complaint: KNEE PAIN


Time Seen by Provider: 10/20/18 18:47


Notes: 





Patient is a 22-year-old female with a past medical history of sickle cell 

anemia who presents with a sickle cell crisis.  He states the pain is located 

in his right knee and is a throbbing, dull, constant pain.  He has tried 

oxycodone at home with minimal improvement.  Nothing worsens the pain.  States 

this is identical to previous exacerbations that he has had in the past.  He 

denies any fever, shortness of breath, vomiting.  He has not contacted his 

general doctor regarding today's concerns.


TRAVEL OUTSIDE OF THE [place] IN LAST 30 DAYS: No





- Related Data


Allergies/Adverse Reactions: 


 





Coconut * [Coconut] Allergy (Mild, Verified 10/03/18 18:27)


 


transpore tape Allergy (Mild, Uncoded 10/03/18 18:27)


 Hives











Past Medical History





- General


Information source: Patient





- Social History


Smoking Status: Never Smoker


Chew tobacco use (# tins/day): No


Frequency of alcohol use: None


Drug Abuse: None


Lives with: Spouse/Significant other


Family History: Reviewed & Not Pertinent, Arthritis, DM, Hypertension, Other - 

Sickle cell trait both parents and asthma


Patient has suicidal ideation: No


Patient has homicidal ideation: No


Pulmonary Medical History: Reports: Hx Asthma, Hx Pneumonia


Renal/ Medical History: Denies: Hx Peritoneal Dialysis


Musculoskeletal Medical History: Reports Hx Musculoskeletal Deformity - Pain to 

multiple areas mostly right knee due to sickle cell anemia


Past Surgical History: Reports: Hx Abdominal Surgery - Gallstones removal, Hx 

Cholecystectomy, Hx Orthopedic Surgery - Fluid drained from right foot, Hx 

Vascular Surgery - Port placement





- Immunizations


Immunizations up to date: Yes


Hx Diphtheria, Pertussis, Tetanus Vaccination: Yes


Hx Pneumococcal Vaccination: 05/16/13





Review of Systems





- Review of Systems


Notes: 





Constitutional: Negative for fever.


HENT: Negative for sore throat.


Eyes: Negative for visual changes.


Cardiovascular: Negative for chest pain.


Respiratory: Negative for shortness of breath.


Gastrointestinal: Negative for abdominal pain, vomiting or diarrhea.


Genitourinary: Negative for dysuria.


Musculoskeletal: Positive for right knee pain


Skin: Negative for rash.


Neurological: Negative for headaches, weakness or numbness.





10 point ROS negative except as marked above and in HPI.





Physical Exam





- Vital signs


Vitals: 


 











Temp Pulse Resp BP Pulse Ox


 


 98.7 F   111 H  24 H  131/85 H  95 


 


 10/20/18 18:19  10/20/18 18:19  10/20/18 18:19  10/20/18 18:19  10/20/18 18:19











Interpretation: Tachycardic


Notes: 





PHYSICAL EXAMINATION:





GENERAL: Well-appearing, well-nourished and in no acute distress.





HEAD: Atraumatic, normocephalic.





EYES: Pupils equal round and reactive to light, extraocular movements intact, 

sclera anicteric, conjunctiva are normal.





ENT: nares patent, oropharynx clear without exudates.  Moist mucous membranes.





NECK: Normal range of motion, supple without lymphadenopathy





LUNGS: Breath sounds clear to auscultation bilaterally and equal.  No wheezes 

rales or rhonchi.





HEART: Regular rate and rhythm without murmurs





ABDOMEN: Soft, nontender, normoactive bowel sounds.  No guarding, no rebound.  

No masses appreciated.





EXTREMITIES: Normal range of motion, no pitting or edema.  No cyanosis.





NEUROLOGICAL: No focal neurological deficits. Moves all extremities 

spontaneously and on command.





PSYCH: Normal mood, normal affect.





SKIN: Warm, Dry, normal turgor, no rashes or lesions noted.





Course





- Re-evaluation


Re-evalutation: 





10/20/18 20:44


Presentation is most consistent with an uncomplicated sickle cell pain crisis.  

Patient has no evidence of an aplastic crisis on labs.  History and vitals are 

not consistent with acute chest syndrome.  Vitals have remained within normal 

limits here in the emergency department.  Patient's pain has been able to be 

controlled using IV analgesia.  The patient is agreeable to discharge home at 

this time.  I recommended that they follow closely with their primary 

hematologist.  Return precautions have been reviewed and discussed and patient 

has verbalized indications to return to the emergency department.





- Vital Signs


Vital signs: 


 











Temp Pulse Resp BP Pulse Ox


 


 97.9 F   129 H  24 H  133/79 H  93 


 


 10/20/18 23:19  10/20/18 23:19  10/20/18 18:19  10/20/18 23:19  10/20/18 23:19














- Laboratory


Result Diagrams: 


 10/20/18 19:40





 10/20/18 20:30


Laboratory results interpreted by me: 


 











  10/20/18





  19:40


 


RBC  2.73 L


 


Hgb  9.9 L


 


Hct  27.5 L


 


MCV  100 H


 


MCH  36.4 H


 


MCHC  36.2 H


 


RDW  31.0 H


 


Band Neutrophils %  2 L


 


Monocytes % (Manual)  2 L


 


Retic Count (auto)  14.94 H


 


Absolute Retic  0.409 H














Discharge





- Discharge


Clinical Impression: 


 Sickle cell crisis





Right knee pain


Qualifiers:


 Chronicity: chronic Qualified Code(s): M25.561 - Pain in right knee





Sickle cell anemia


Qualifiers:


 Sickle-cell associated disorders: with unspecified crisis Qualified Code(s): 

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





Condition: Good


Disposition: HOME, SELF-CARE


Additional Instructions: 


You were seen today for sickle cell pain crisis.  Please follow-up with your 

hematologist.  Returning to the ED if you have worsening pain, fever greater 

than 100.4, shortness of breath, persistent vomiting, or any other symptoms 

that are concerning to you.


Referrals: 


DIYA PHILIPPE MD [Primary Care Provider] - Follow up as needed

## 2018-10-20 NOTE — ER DOCUMENT REPORT
ED Medical Screen (RME)





- General


Chief Complaint: Knee Pain


Stated Complaint: KNEE PAIN


Time Seen by Provider: 10/20/18 18:47


TRAVEL OUTSIDE OF THE [place] IN LAST 30 DAYS: No





- HPI


Notes: 





10/20/18 18:49


Knee pain consistent with sickle cell crisis in the past coming in his legs 

pain medication 15 mg of oxycodone at 1400 hrs. no improvement of pain states 

normally it starts off with 1.5 mg of Dilaudid followed by Benadryl.


10/20/18 18:49


Denies any other symptoms





- Related Data


Allergies/Adverse Reactions: 


 





Coconut * [Coconut] Allergy (Mild, Verified 10/03/18 18:27)


 


transpore tape Allergy (Mild, Uncoded 10/03/18 18:27)


 Hives











Past Medical History





- Social History


Chew tobacco use (# tins/day): No


Drug Abuse: None


Family history: None


Pulmonary Medical History: Reports: Hx Asthma, Hx Pneumonia


Renal/ Medical History: Denies: Hx Peritoneal Dialysis


Musculoskeltal Medical History: Reports Hx Musculoskeletal Deformity - Pain to 

multiple areas mostly right knee due to sickle cell anemia


Past Surgical History: Reports: Hx Abdominal Surgery - Gallstones removal, Hx 

Cholecystectomy, Hx Orthopedic Surgery - Fluid drained from right foot, Hx 

Vascular Surgery - Port placement





- Immunizations


Immunizations up to date: Yes


Hx Diphtheria, Pertussis, Tetanus Vaccination: Yes


History of Influenza Vaccine for 10/2017 - 3/2018 Season: Yes


Influenza Administration Date for 10/2017 - 3/2018 Season: 10/01/17





Review of Systems





- Review of Systems


Musculoskeletal: Other - Knee pain





Physical Exam





- Vital signs


Vitals: 





 











Temp Pulse Resp BP Pulse Ox


 


 98.7 F   111 H  24 H  131/85 H  95 


 


 10/20/18 18:19  10/20/18 18:19  10/20/18 18:19  10/20/18 18:19  10/20/18 18:19














- Respiratory


Respiratory status: No respiratory distress


Chest status: Nontender


Breath sounds: Normal


Chest palpation: Normal





- Cardiovascular


Rhythm: Regular, Tachycardia





Course





- Vital Signs


Vital signs: 





 











Temp Pulse Resp BP Pulse Ox


 


 98.7 F   111 H  24 H  131/85 H  95 


 


 10/20/18 18:19  10/20/18 18:19  10/20/18 18:19  10/20/18 18:19  10/20/18 18:19














Doctor's Discharge





- Discharge


Referrals: 


DIYA PHILIPPE MD [Primary Care Provider] - Follow up as needed

## 2019-02-16 NOTE — ER DOCUMENT REPORT
ED Medical Screen (RME)





- General


Chief Complaint: Arm Problem


Stated Complaint: LEFT ARM PAIN


Time Seen by Provider: 02/16/19 16:50


Primary Care Provider: 


DIYA PHILIPPE MD [Primary Care Provider] - Follow up as needed


Mode of Arrival: Ambulatory


Information source: Patient


Notes: 





This is a pleasant 23-year-old gentleman who presents with concerns for swelling

of the left arm.  There is been no fever, chest pain or shortness of breath.  

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  

He is currently getting immunosuppressants via a left upper extremity PICC line.

 Patient had gone to the [place] with his fiance and did have his arm around his

fiance.  After the movies, the fianc noticed that his arm was swollen.  His 

wife reiterates that there was swelling of the arm.  They had called the bone 

marrow transplant nurse who told them to come the emergency room.  


TRAVEL OUTSIDE OF THE [place] IN LAST 30 DAYS: No





- HPI


Onset: Just prior to arrival


Onset/Duration: Sudden


Quality of pain: No pain


Severity: None


Pain Level: Denies


Associated Symptoms: denies: Chest pain, Shortness of breath


Exacerbated by: Denies


Relieved by: Denies


Similar symptoms previously: No


Recently seen / treated by doctor: No





- Related Data


Smoking: Non-smoker


Frequency of alcohol use: None


Drug Abuse: None


Allergies/Adverse Reactions: 


                                        





Coconut * [Coconut] Allergy (Mild, Verified 10/03/18 18:27)


   


apixaban [From Eliquis] Allergy (Verified 02/16/19 16:34)


   


rivaroxaban [From Xarelto] Allergy (Verified 02/16/19 16:34)


   


transpore tape Allergy (Mild, Uncoded 10/03/18 18:27)


   Hives











Past Medical History





- General


Information source: Patient





- Social History


Cigarette use (# per day): No


Chew tobacco use (# tins/day): No


Frequency of alcohol use: None


Drug Abuse: None


Lives with: Family


Family history: None


Pulmonary Medical History: Reports: Hx Asthma, Hx Pneumonia


Renal/ Medical History: Denies: Hx Peritoneal Dialysis


Malignancy Medical History: Reports Other - Sickle cell disease status post bone

marrow transplant


Musculoskeltal Medical History: Reports Hx Musculoskeletal Deformity - Pain to 

multiple areas mostly right knee due to sickle cell anemia


Past Surgical History: Reports: Hx Abdominal Surgery - Gallstones removal, Hx 

Cholecystectomy, Hx Orthopedic Surgery - Fluid drained from right foot, Hx 

Vascular Surgery - Port placement





- Immunizations


Immunizations up to date: Yes


Hx Diphtheria, Pertussis, Tetanus Vaccination: Yes


History of Influenza Vaccine for 10/2017 - 3/2018 Season: Yes


Influenza Administration Date for 10/2017 - 3/2018 Season: 10/01/17





Review of Systems





- Review of Systems


Constitutional: denies: Chills, Fever


EENT: No symptoms reported


Cardiovascular: denies: Chest pain, Palpitations, Heart racing


Respiratory: denies: Cough, Short of breath, Wheezing


Gastrointestinal: No symptoms reported


Genitourinary: No symptoms reported


Musculoskeletal: See HPI


Skin: See HPI





Physical Exam





- Vital signs


Vitals: 


                                        











Temp Pulse Resp BP Pulse Ox


 


 99.2 F   121 H  14   120/80   100 


 


 02/16/19 16:38  02/16/19 16:38  02/16/19 16:38  02/16/19 16:38  02/16/19 16:38











Notes: 





Physical exam:


 


GENERAL: 23-year-old man, alert and oriented x3, no acute distress





HEAD: Atraumatic, normocephalic.





EYES: Pupils equal round and reactive to light, extraocular movements intact, 

sclera anicteric, conjunctiva are normal.





ENT: TMs normal, nares patent, oropharynx clear without exudates.  Moist mucous 

membranes.





NECK: Normal range of motion, supple without obvious mass or JVD.





LUNGS: Breath sounds clear to auscultation bilaterally and equal.  No wheezes 

rales or rhonchi.





HEART: Regular rate and rhythm without murmurs, rubs or gallops.





ABDOMEN: Soft, normoactive bowel sounds.  No tenderness to palpation.  No 

guarding, no rebound.  No masses appreciated.





EXTREMITIES: Patient has a PICC line in the left upper extremity.  Distal pulses

good.  There is no swelling of the extremity.  There is no erythema or warmth.  

Patient has no tenderness around the PICC line.  As per the patient's mother who

is at the bedside, the swelling is gone down.





NEUROLOGICAL: Cranial nerves II through XII grossly intact.  Normal speech, 

moving all extremities.





PSYCH: Normal mood, normal affect.





SKIN: Warm, Dry, normal turgor, no rashes or lesions noted.  The PICC line site 

is dry and intact and there is no discharge, there is no overlying cellulitis or

tenderness.





Course





- Re-evaluation


Re-evalutation: 





02/16/19 17:14


I did speak to Dr. Conner at the transplant center in [place].  I think when the 

patient had his arm around his fiance it caused some venous backflow in the arm

causing it to be swelling.  And since that time from when they left the movies 

till now, the swelling is gone down.  He has had no chest pain or shortness of 

breath.  There is no discernible swelling at this point.  It is unlikely that 

there is a blood clot.  I did discuss with Dr. Conner and he is fine with 

following up with the patient tomorrow.  In the meantime, I told the patient and

his mother that if there is any further swelling or any concerns, to return for 

repeat evaluation (i.e. upper extremity ultrasound).





- Vital Signs


Vital signs: 


                                        











Temp Pulse Resp BP Pulse Ox


 


 99.2 F   121 H  14   120/80   100 


 


 02/16/19 16:38  02/16/19 16:38  02/16/19 16:38  02/16/19 16:38  02/16/19 16:38














Doctor's Discharge





- Discharge


Clinical Impression: 


 Left arm swelling transient





Condition: Stable


Disposition: HOME, SELF-CARE


Additional Instructions: 


Follow-up with Dr. Conner tomorrow.


If you have any concerns regarding swelling, fever, redness or any chest pain or

shortness of breath: Return to the emergency room.


Referrals: 


DIYA PHILIPPE MD [Primary Care Provider] - Follow up as needed

## 2019-10-12 NOTE — EKG REPORT
SEVERITY:- BORDERLINE ECG -

SINUS TACHYCARDIA

LA ABNORMALITY

:

Confirmed by: Storm Bosch MD 12-Oct-2019 21:23:21

## 2019-10-12 NOTE — ER DOCUMENT REPORT
ED Medical Screen (RME)





- General


Chief Complaint: Chest Pain


Stated Complaint: CHEST PAIN


Primary Care Provider: 


DIYA PHILIPPE MD [Primary Care Provider] - Follow up as needed


TRAVEL OUTSIDE OF THE [place] IN LAST 30 DAYS: No





- HPI


Notes: 





10/12/19 20:26


Patient is a 23-year-old male well-known to this emergency department with a 

history of sickle cell status post bone marrow transplant who presents 

complaining of sternal chest pain that started today.  Patient states that it is

worse with deep breathing.  He has had issues like this in the past.  Patient 

believes any is having another sickle crisis.  He has not had any fever, cough, 

or shortness of breath otherwise.  Pain does not radiate.





I have treated and performed a rapid initial assessment of this patient.  A 

comprehensive ED assessment and evaluation of the patient, analysis of test 

results and completion of medical decision making process will be conducted by 

additional ED providers.





PHYSICAL EXAMINATION:





GENERAL: Well-appearing, well-nourished and in no acute distress.  A&Ox4.  

Answers questions appropriately.


Lungs: CTAB


Cardiac: RRR





- Related Data


Allergies/Adverse Reactions: 


                                        





Coconut * [Coconut] Allergy (Mild, Verified 10/03/18 18:27)


   


apixaban [From Eliquis] Allergy (Verified 02/16/19 16:34)


   


rivaroxaban [From Xarelto] Allergy (Verified 02/16/19 16:34)


   


transpore tape Allergy (Mild, Uncoded 10/03/18 18:27)


   Hives











Past Medical History





- Social History


Family history: None


Pulmonary Medical History: Reports: Hx Asthma, Hx Pneumonia


Renal/ Medical History: Denies: Hx Peritoneal Dialysis


Musculoskeltal Medical History: Reports Hx Musculoskeletal Deformity - Pain to 

multiple areas mostly right knee due to sickle cell anemia


Past Surgical History: Reports: Hx Abdominal Surgery - Gallstones removal, Hx 

Cholecystectomy, Hx Orthopedic Surgery - Fluid drained from right foot, Hx 

Vascular Surgery - Port placement





- Immunizations


Immunizations up to date: Yes


Hx Diphtheria, Pertussis, Tetanus Vaccination: Yes





Physical Exam





- Vital signs


Vitals: 





                                        











Temp Pulse BP Pulse Ox


 


 99.1 F   115 H  126/86 H  98 


 


 10/12/19 20:21  10/12/19 20:21  10/12/19 20:21  10/12/19 20:21














Course





- Vital Signs


Vital signs: 





                                        











Temp Pulse Resp BP Pulse Ox


 


 99.1 F   115 H     126/86 H  98 


 


 10/12/19 20:21  10/12/19 20:21     10/12/19 20:21  10/12/19 20:21














Doctor's Discharge





- Discharge


Referrals: 


DIYA PHILIPPE MD [Primary Care Provider] - Follow up as needed

## 2019-10-13 NOTE — ER DOCUMENT REPORT
ED General Pain





- General


Chief Complaint: Sickle Cell Crisis


Stated Complaint: CHEST PAIN


Time Seen by Provider: 10/12/19 21:00


Primary Care Provider: 


DIYA PHILIPPE MD [Primary Care Provider] - Follow up as needed


Mode of Arrival: Ambulatory


Information source: Patient


Notes: 





Patient is a 23-year-old male who is well-known to our facility presenting to 

the emergency department with chief complaint of chest pain that began this 

morning.  Patient reports history of sickle cell disease, states that he has had

a bone marrow transplant done approximately 8 months ago which has significantly

helped his symptoms.  Patient reports is the first time he has been here in 

almost a year.  He reports that sharp stabbing feeling in the middle of his 

chest that comes and goes.  He denies any shortness of breath.  He denies any 

other pain or complaints.





TRAVEL OUTSIDE OF THE [place] IN LAST 30 DAYS: No





- Related Data


Allergies/Adverse Reactions: 


                                        





Coconut * [Coconut] Allergy (Mild, Verified 10/03/18 18:27)


   


apixaban [From Eliquis] Allergy (Verified 02/16/19 16:34)


   


rivaroxaban [From Xarelto] Allergy (Verified 02/16/19 16:34)


   


transpore tape Allergy (Mild, Uncoded 10/03/18 18:27)


   Hives











Past Medical History





- General


Information source: Patient





- Social History


Smoking Status: Never Smoker


Family History: Reviewed & Not Pertinent, Arthritis, DM, Hypertension, Other - 

Sickle cell trait both parents and asthma


Patient has suicidal ideation: No


Patient has homicidal ideation: No


Pulmonary Medical History: Reports: Hx Asthma, Hx Pneumonia


Renal/ Medical History: Denies: Hx Peritoneal Dialysis


Musculoskeletal Medical History: Reports Hx Musculoskeletal Deformity - Pain to 

multiple areas mostly right knee due to sickle cell anemia


Past Surgical History: Reports: Hx Abdominal Surgery - Gallstones removal, Hx 

Cholecystectomy, Hx Orthopedic Surgery - Fluid drained from right foot, Hx 

Vascular Surgery - Port placement





- Immunizations


Immunizations up to date: Yes


Hx Diphtheria, Pertussis, Tetanus Vaccination: Yes


Hx Pneumococcal Vaccination: 05/16/13





Review of Systems





- Review of Systems


Constitutional: No symptoms reported


EENT: No symptoms reported


Cardiovascular: Chest pain


Respiratory: No symptoms reported


Gastrointestinal: No symptoms reported


Genitourinary: No symptoms reported


Male Genitourinary: No symptoms reported


Musculoskeletal: No symptoms reported


Skin: No symptoms reported


Hematologic/Lymphatic: No symptoms reported


Neurological/Psychological: No symptoms reported





Physical Exam





- Vital signs


Vitals: 


                                        











Temp Pulse BP Pulse Ox


 


 99.1 F   115 H  126/86 H  98 


 


 10/12/19 20:21  10/12/19 20:21  10/12/19 20:21  10/12/19 20:21














- Notes


Notes: 





PHYSICAL EXAMINATION:





GENERAL: Well-appearing, well-nourished and in no acute distress.





HEAD: Atraumatic, normocephalic.





EYES: Pupils equal round and reactive to light, extraocular movements intact, 

sclera anicteric, conjunctiva are normal.





ENT: Nares patent, oropharynx clear without exudates.  Moist mucous membranes.





NECK: Normal range of motion, supple without lymphadenopathy





LUNGS: Breath sounds clear to auscultation bilaterally and equal.  No wheezes 

rales or rhonchi.





HEART: Regular rate and rhythm without murmurs





ABDOMEN: Soft, nontender, nondistended abdomen.  No guarding, no rebound.  No 

masses appreciated.





Musculoskeletal: Normal range of motion, no pitting or edema.  No cyanosis.





NEUROLOGICAL: Cranial nerves grossly intact.  Normal speech, normal gait.  

Normal sensory, motor exams 





PSYCH: Normal mood, normal affect.





SKIN: Warm, Dry, normal turgor, no rashes or lesions noted.





Course





- Re-evaluation


Re-evalutation: 








Laboratory











  10/12/19 10/12/19 10/12/19





  20:50 20:50 20:50


 


WBC  6.4  


 


RBC  4.24 L  


 


Hgb  15.0  


 


Hct  44.5  


 


MCV  105 H  


 


MCH  35.4 H  


 


MCHC  33.7  


 


RDW  18.7 H  


 


Plt Count  315  


 


Lymph % (Auto)  Not Reportable  


 


Mono % (Auto)  Not Reportable  


 


Eos % (Auto)  Not Reportable  


 


Baso % (Auto)  Not Reportable  


 


Reticulocyte #  0.055  


 


Absolute Neuts (auto)  Not Reportable  


 


Absolute Lymphs (auto)  Not Reportable  


 


Absolute Monos (auto)  Not Reportable  


 


Absolute Eos (auto)  Not Reportable  


 


Absolute Basos (auto)  Not Reportable  


 


Total Counted  100  


 


Seg Neutrophils %  Not Reportable  


 


Seg Neuts % (Manual)  51  


 


Band Neutrophils %  2 L  


 


Lymphocytes % (Manual)  34  


 


Monocytes % (Manual)  9  


 


Eosinophils % (Manual)  4  


 


Basophils % (Manual)  0  


 


Abs Neuts (Manual)  3.4  


 


Abs Lymphs (Manual)  2.2  


 


Abs Monocytes (Manual)  0.6  


 


Absolute Eos (Manual)  0.3  


 


Abs Basophils (Manual)  0.0  


 


Platelet Comment  ADEQUATE  


 


Anisocytosis  1+  


 


Macrocytosis  2+  


 


Retic Count (auto)  1.29  


 


Sodium   Cancelled 


 


Potassium   Cancelled 


 


Chloride   Cancelled 


 


Carbon Dioxide   Cancelled 


 


Anion Gap   Cancelled 


 


BUN   Cancelled 


 


Creatinine   Cancelled 


 


Est GFR ( Amer)   Cancelled 


 


Est GFR (Non-Af Amer)   Cancelled 


 


Est GFR (MDRD) Non-Af   Cancelled 


 


Glucose   Cancelled 


 


Calcium   Cancelled 


 


Total Bilirubin   Cancelled 


 


Direct Bilirubin   Cancelled 


 


Neonat Total Bilirubin   Cancelled 


 


Neonat Direct Bilirubin   Cancelled 


 


Neonat Indirect Bili   Cancelled 


 


AST   Cancelled 


 


ALT   Cancelled 


 


Alkaline Phosphatase   Cancelled 


 


Troponin I    Cancelled


 


Total Protein   Cancelled 


 


Albumin   Cancelled 


 


EGFR    Cancelled 














  10/12/19 10/12/19 10/13/19





  21:35 21:35 00:55


 


WBC   


 


RBC   


 


Hgb   


 


Hct   


 


MCV   


 


MCH   


 


MCHC   


 


RDW   


 


Plt Count   


 


Lymph % (Auto)   


 


Mono % (Auto)   


 


Eos % (Auto)   


 


Baso % (Auto)   


 


Reticulocyte #   


 


Absolute Neuts (auto)   


 


Absolute Lymphs (auto)   


 


Absolute Monos (auto)   


 


Absolute Eos (auto)   


 


Absolute Basos (auto)   


 


Total Counted   


 


Seg Neutrophils %   


 


Seg Neuts % (Manual)   


 


Band Neutrophils %   


 


Lymphocytes % (Manual)   


 


Monocytes % (Manual)   


 


Eosinophils % (Manual)   


 


Basophils % (Manual)   


 


Abs Neuts (Manual)   


 


Abs Lymphs (Manual)   


 


Abs Monocytes (Manual)   


 


Absolute Eos (Manual)   


 


Abs Basophils (Manual)   


 


Platelet Comment   


 


Anisocytosis   


 


Macrocytosis   


 


Retic Count (auto)   


 


Sodium   140.1 


 


Potassium   4.9 


 


Chloride   104 


 


Carbon Dioxide   27 


 


Anion Gap   9 


 


BUN   16 


 


Creatinine   0.99 


 


Est GFR ( Amer)   > 60 


 


Est GFR (Non-Af Amer)   


 


Est GFR (MDRD) Non-Af   > 60 


 


Glucose   106 


 


Calcium   9.5 


 


Total Bilirubin   0.6 


 


Direct Bilirubin   0.3 


 


Neonat Total Bilirubin   Not Reportable 


 


Neonat Direct Bilirubin   Not Reportable 


 


Neonat Indirect Bili   Not Reportable 


 


AST   55 


 


ALT   35 


 


Alkaline Phosphatase   183 H 


 


Troponin I  0.023   0.018


 


Total Protein   8.0 


 


Albumin   4.8 


 


EGFR    











                                        





Chest X-Ray  10/12/19 20:21


IMPRESSION:


 


No evidence of acute cardiopulmonary disease.


 








Labs as recorded above.  Patient did have mildly elevated troponin of 0.023.  He

was held over for repeat which did come down to 0.018.  His reticulocyte count 

was within normal limits.  He did not have any acute findings on his chest x-

ray.  He overall appears well and states his pain has improved since being in 

the emergency department.  His EKG showed a sinus rhythm, normal axis, no ST 

segment elevations or depressions to suggest ischemia and this was unchanged 

from previous EKG on file.  His case was discussed with finding attending 

physician, Dr. Lance who agrees patient can be discharged home since his 

troponin has come down and he is asymptomatic.  ED return precautions were 

discussed.





The patient's emergency department workup and current diagnosis were explained 

to the patient and or family.  Follow-up instructions were provided.  

Medications if prescribed were discussed. Instructions for when to return to the

emergency department including specific worrisome symptoms were discussed with 

the patient and/or family.








- Vital Signs


Vital signs: 


                                        











Temp Pulse Resp BP Pulse Ox


 


 97.9 F   115 H  17   119/53 L  98 


 


 10/13/19 02:28  10/12/19 20:21  10/13/19 02:00  10/13/19 02:28  10/13/19 02:28














- Laboratory


Result Diagrams: 


                                 10/12/19 20:50





                                 10/12/19 21:35


Laboratory results interpreted by me: 


                                        











  10/12/19 10/12/19





  20:50 21:35


 


RBC  4.24 L 


 


MCV  105 H 


 


MCH  35.4 H 


 


RDW  18.7 H 


 


Band Neutrophils %  2 L 


 


Alkaline Phosphatase   183 H














Discharge





- Discharge


Clinical Impression: 


Chest pain


Qualifiers:


 Chest pain type: unspecified Qualified Code(s): R07.9 - Chest pain, unspecified





Condition: Stable


Disposition: HOME, SELF-CARE


Additional Instructions: 


Chest Pain of Unclear Cause





   The exact cause of your chest pain isn't clear. Fortunately, there is no 

evidence of a dangerous medical condition.  Further testing may be required to 

find the source of the pain.


   Most often, we find that this pain is coming from the chest wall -- the 

muscles or rib joints in the chest.  But chest pain can come from the lung and 

lung lining, the esophagus, the heart valves or heart lining, and even the 

stomach or gallbladder.


   Rest.  Eat lightly until the pain is gone.  We may prescribe medicine for 

pain and inflammation.


   You should call the physician immediately if the pain radiates to the 

shoulder, jaw or arms; if you start to run a fever or develop a cough; or if you

develop shortness of breath, or other new or alarming symptoms.





Please continue taking all medications as prescribed.


Follow-up with Dr. Gonzalez, call him Monday morning to schedule an 

appointment.








Referrals: 


DIYA PHILIPPE MD [Primary Care Provider] - Follow up as needed

## 2019-10-18 NOTE — ER DOCUMENT REPORT
ED Medical Screen (RME)





- General


Chief Complaint: Knee Pain


Stated Complaint: RIGHT KNEE PAIN


Time Seen by Provider: 10/18/19 17:40


Primary Care Provider: 


DIYA PHILIPPE MD [Primary Care Provider] - Follow up as needed


Mode of Arrival: Ambulatory


Information source: Patient


Notes: 





23-year-old male presents to ED for complaint of right knee pain.  He has a 

sickle cell patient.  He does frequently have pain in the right knee from his 

sickle cell.  Patient is alert oriented respirations regular and unlabored.  He 

states he has been taking his Percocet for his knee pain.




















I have greeted and performed a rapid initial assessment of this patient.  A 

comprehensive ED assessment and evaluation of the patient, analysis of test 

results and completion of medical decision making process will be conducted by 

an additional ED providers.


TRAVEL OUTSIDE OF THE [place] IN LAST 30 DAYS: No





- Related Data


Allergies/Adverse Reactions: 


                                        





Coconut * [Coconut] Allergy (Mild, Verified 10/03/18 18:27)


   


apixaban [From Eliquis] Allergy (Verified 02/16/19 16:34)


   


rivaroxaban [From Xarelto] Allergy (Verified 02/16/19 16:34)


   


transpore tape Allergy (Mild, Uncoded 10/03/18 18:27)


   Hives








Home Medications: acyclovire.  oxycodone.  gabapentin.  sirolinus.  fluticasone





Past Medical History





- Social History


Frequency of alcohol use: None


Drug Abuse: None


Family history: None


Pulmonary Medical History: Reports: Hx Asthma, Hx Pneumonia


Renal/ Medical History: Denies: Hx Peritoneal Dialysis


Musculoskeltal Medical History: Reports Hx Musculoskeletal Deformity - Pain to 

multiple areas mostly right knee due to sickle cell anemia


Past Surgical History: Reports: Hx Abdominal Surgery - Gallstones removal, Hx 

Cholecystectomy, Hx Orthopedic Surgery - Fluid drained from right foot, Hx 

Vascular Surgery - Port placement





- Immunizations


Immunizations up to date: Yes


Hx Diphtheria, Pertussis, Tetanus Vaccination: Yes





Physical Exam





- Vital signs


Vitals: 





                                        











Temp Pulse Resp BP Pulse Ox


 


 98.4 F   110 H  18   128/90 H  98 


 


 10/18/19 17:27  10/18/19 17:27  10/18/19 17:27  10/18/19 17:27  10/18/19 17:27














Course





- Vital Signs


Vital signs: 





                                        











Temp Pulse Resp BP Pulse Ox


 


 98.4 F   110 H  18   128/90 H  98 


 


 10/18/19 17:27  10/18/19 17:27  10/18/19 17:27  10/18/19 17:27  10/18/19 17:27














Doctor's Discharge





- Discharge


Referrals: 


DIYA PHILIPPE MD [Primary Care Provider] - Follow up as needed

## 2019-10-18 NOTE — ER DOCUMENT REPORT
ED General





- General


Chief Complaint: Knee Pain


Stated Complaint: RIGHT KNEE PAIN


Time Seen by Provider: 10/18/19 17:40


Primary Care Provider: 


DIYA PHILIPPE MD [Primary Care Provider] - Follow up as needed


Mode of Arrival: Ambulatory


Notes: 





Patient is a 23-year-old male with past medical history of sickle cell disease 

presenting with complaints of right knee pain.  Patient reports this is where he

usually has pain with a sickle cell crisis.  He is very well-known to our 

facility.  He did have a bone marrow transfusion done approximately 6 to 9 

months ago and has only recently been having issues with his pain.  Patient 

denies any chest pain.





TRAVEL OUTSIDE OF THE [place] IN LAST 30 DAYS: No





- Related Data


Allergies/Adverse Reactions: 


                                        





Coconut * [Coconut] Allergy (Mild, Verified 10/03/18 18:27)


   


apixaban [From Eliquis] Allergy (Verified 02/16/19 16:34)


   


rivaroxaban [From Xarelto] Allergy (Verified 02/16/19 16:34)


   


transpore tape Allergy (Mild, Uncoded 10/03/18 18:27)


   Hives








Home Medications: acyclovire.  oxycodone.  gabapentin.  sirolinus.  fluticasone





Past Medical History





- General


Information source: Patient





- Social History


Smoking Status: Never Smoker


Frequency of alcohol use: None


Drug Abuse: None


Family History: Reviewed & Not Pertinent, Arthritis, DM, Hypertension, Other - 

Sickle cell trait both parents and asthma


Patient has suicidal ideation: No


Patient has homicidal ideation: No


Pulmonary Medical History: Reports: Hx Asthma, Hx Pneumonia


Renal/ Medical History: Denies: Hx Peritoneal Dialysis


Musculoskeletal Medical History: Reports Hx Musculoskeletal Deformity - Pain to 

multiple areas mostly right knee due to sickle cell anemia


Past Surgical History: Reports: Hx Abdominal Surgery - Gallstones removal, Hx 

Cholecystectomy, Hx Orthopedic Surgery - Fluid drained from right foot, Hx 

Vascular Surgery - Port placement





- Immunizations


Immunizations up to date: Yes


Hx Diphtheria, Pertussis, Tetanus Vaccination: Yes


Hx Pneumococcal Vaccination: 05/16/13





Review of Systems





- Review of Systems


Constitutional: No symptoms reported


EENT: No symptoms reported


Cardiovascular: No symptoms reported


Respiratory: No symptoms reported


Gastrointestinal: No symptoms reported


Genitourinary: No symptoms reported


Male Genitourinary: No symptoms reported


Musculoskeletal: See HPI


Skin: No symptoms reported


Hematologic/Lymphatic: No symptoms reported


Neurological/Psychological: No symptoms reported





Physical Exam





- Vital signs


Vitals: 


                                        











Temp Pulse Resp BP Pulse Ox


 


 98.4 F   110 H  18   128/90 H  98 


 


 10/18/19 17:27  10/18/19 17:27  10/18/19 17:27  10/18/19 17:27  10/18/19 17:27














- Notes


Notes: 





PHYSICAL EXAMINATION:





GENERAL: Well-appearing, well-nourished and in no acute distress.





HEAD: Atraumatic, normocephalic.





EYES: Pupils equal round and reactive to light, extraocular movements intact, 

sclera anicteric, conjunctiva are normal.





ENT: Nares patent, oropharynx clear without exudates.  Moist mucous membranes.





NECK: Normal range of motion, supple without lymphadenopathy





LUNGS: Breath sounds clear to auscultation bilaterally and equal.  No wheezes 

rales or rhonchi.





HEART: Regular rate and rhythm without murmurs





ABDOMEN: Soft, nontender, nondistended abdomen.  No guarding, no rebound.  No 

masses appreciated.





Musculoskeletal: Normal range of motion, no pitting or edema.  No cyanosis.





NEUROLOGICAL: Cranial nerves grossly intact.  Normal speech, normal gait.  

Normal sensory, motor exams 





PSYCH: Normal mood, normal affect.





SKIN: Warm, Dry, normal turgor, no rashes or lesions noted.





Course





- Re-evaluation


Re-evalutation: 





Patient given IV fluid hydration as well as pain medications here in the 

emergency department and reports that he feels much better.  His labs are as 

outlined below.  Hemoglobin is within normal limits, reticulocyte count is also 

normal.  Patient reports he feels well enough to go home, will discharge home 

plate.  Patient will follow up with his primary care provider.








Laboratory











  10/18/19 10/19/19 10/19/19





  18:31 03:15 03:15


 


WBC   8.1 


 


RBC   4.27 L 


 


Hgb   14.8 


 


Hct   44.2 


 


MCV   104 H 


 


MCH   34.7 H 


 


MCHC   33.5 


 


RDW   17.8 H 


 


Plt Count   276 


 


Lymph % (Auto)   25.4 


 


Mono % (Auto)   8.5 


 


Eos % (Auto)   3.9 


 


Baso % (Auto)   1.3 


 


Reticulocyte #   0.051 


 


Absolute Neuts (auto)   4.9 


 


Absolute Lymphs (auto)   2.1 


 


Absolute Monos (auto)   0.7 


 


Absolute Eos (auto)   0.3 


 


Absolute Basos (auto)   0.1 


 


Seg Neutrophils %   60.9 


 


Platelet Comment   ADEQUATE 


 


Target Cells   SLIGHT 


 


Schistocytes   SLIGHT 


 


Retic Count (auto)   1.20 


 


Sodium    Cancelled


 


Potassium    Cancelled


 


Chloride    Cancelled


 


Carbon Dioxide    Cancelled


 


Anion Gap    Cancelled


 


BUN    Cancelled


 


Creatinine    Cancelled


 


Est GFR ( Amer)    Cancelled


 


Est GFR (Non-Af Amer)    Cancelled


 


Est GFR (MDRD) Non-Af    Cancelled


 


Glucose    Cancelled


 


Calcium    Cancelled


 


Total Bilirubin    Cancelled


 


Direct Bilirubin    Cancelled


 


Neonat Total Bilirubin    Cancelled


 


Neonat Direct Bilirubin    Cancelled


 


Neonat Indirect Bili    Cancelled


 


AST    Cancelled


 


ALT    Cancelled


 


Alkaline Phosphatase    Cancelled


 


Total Protein    Cancelled


 


Albumin    Cancelled


 


EGFR     Cancelled


 


Urine Color  YELLOW  


 


Urine Appearance  SLIGHTLY-CLOUDY  


 


Urine pH  7.0  


 


Ur Specific Gravity  1.014  


 


Urine Protein  30 H  


 


Urine Glucose (UA)  NEGATIVE  


 


Urine Ketones  NEGATIVE  


 


Urine Blood  NEGATIVE  


 


Urine Nitrite (Reflex)  NEGATIVE  


 


Urine Bilirubin  NEGATIVE  


 


Urine Urobilinogen  NEGATIVE  


 


Leukocyte Esterase Rfl  NEGATIVE  


 


Urine RBC (Auto)  1  


 


Urine WBC (Reflex)  1  


 


Urine Mucus (Auto)  RARE  


 


Urine Ascorbic Acid  40 H  














  10/19/19





  04:03


 


WBC 


 


RBC 


 


Hgb 


 


Hct 


 


MCV 


 


MCH 


 


MCHC 


 


RDW 


 


Plt Count 


 


Lymph % (Auto) 


 


Mono % (Auto) 


 


Eos % (Auto) 


 


Baso % (Auto) 


 


Reticulocyte # 


 


Absolute Neuts (auto) 


 


Absolute Lymphs (auto) 


 


Absolute Monos (auto) 


 


Absolute Eos (auto) 


 


Absolute Basos (auto) 


 


Seg Neutrophils % 


 


Platelet Comment 


 


Target Cells 


 


Schistocytes 


 


Retic Count (auto) 


 


Sodium  140.4


 


Potassium  4.0


 


Chloride  107


 


Carbon Dioxide  22


 


Anion Gap  11


 


BUN  12


 


Creatinine  0.81


 


Est GFR ( Amer)  > 60


 


Est GFR (Non-Af Amer) 


 


Est GFR (MDRD) Non-Af  > 60


 


Glucose  111 H


 


Calcium  9.3


 


Total Bilirubin  0.5


 


Direct Bilirubin  0.1


 


Neonat Total Bilirubin  Not Reportable


 


Neonat Direct Bilirubin  Not Reportable


 


Neonat Indirect Bili  Not Reportable


 


AST  42


 


ALT  26


 


Alkaline Phosphatase  164 H


 


Total Protein  7.5


 


Albumin  4.6


 


EGFR African American 


 


Urine Color 


 


Urine Appearance 


 


Urine pH 


 


Ur Specific Gravity 


 


Urine Protein 


 


Urine Glucose (UA) 


 


Urine Ketones 


 


Urine Blood 


 


Urine Nitrite (Reflex) 


 


Urine Bilirubin 


 


Urine Urobilinogen 


 


Leukocyte Esterase Rfl 


 


Urine RBC (Auto) 


 


Urine WBC (Reflex) 


 


Urine Mucus (Auto) 


 


Urine Ascorbic Acid 








The patient's emergency department workup and current diagnosis were explained 

to the patient and or family.  Follow-up instructions were provided.  

Medications if prescribed were discussed. Instructions for when to return to the

emergency department including specific worrisome symptoms were discussed with 

the patient and/or family.








- Vital Signs


Vital signs: 


                                        











Temp Pulse Resp BP Pulse Ox


 


 98.4 F   72   14   148/74 H  98 


 


 10/19/19 06:22  10/19/19 06:22  10/19/19 06:22  10/19/19 06:22  10/18/19 17:27














- Laboratory


Result Diagrams: 


                                 10/19/19 03:15





                                 10/19/19 04:03


Laboratory results interpreted by me: 


                                        











  10/18/19 10/19/19 10/19/19





  18:31 03:15 04:03


 


RBC   4.27 L 


 


MCV   104 H 


 


MCH   34.7 H 


 


RDW   17.8 H 


 


Glucose    111 H


 


Alkaline Phosphatase    164 H


 


Urine Protein  30 H  


 


Urine Ascorbic Acid  40 H  














Discharge





- Discharge


Clinical Impression: 


Knee pain


Qualifiers:


 Chronicity: acute Laterality: unspecified laterality Qualified Code(s): M25.569

- Pain in unspecified knee





Sickle cell disease


Qualifiers:


 Sickle-cell associated disorders: with unspecified crisis Qualified Code(s): 

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





Condition: Stable


Disposition: HOME, SELF-CARE


Additional Instructions: 


You were seen in the emergency department today for knee pain.  Please follow-up

with Dr. Philippe as scheduled.





Referrals: 


DIYA PHILIPPE MD [Primary Care Provider] - Follow up as needed

## 2019-10-21 NOTE — ER DOCUMENT REPORT
ED Medical Screen (RME)





- General


Chief Complaint: Knee Pain


Stated Complaint: SICKLE CELL RIGHT KNEE PAIN


Time Seen by Provider: 10/21/19 21:19


Primary Care Provider: 


DIYA PHILIPPE MD [Primary Care Provider] - Follow up as needed


Mode of Arrival: Ambulatory


Information source: Patient


Notes: 





23-year-old male presents to ED for complaint of right knee pain.  He has sickle

cell anemia and this is where his sickle cell pain is usually found.  He was 

seen in the emergency room on Friday for the same pain.  He has not been able to

follow-up with his primary care yet as his appointment is on Wednesday.  He 

states his pain is back again due to the coldness outside.  He states usually 

when it cold the pain gets worse.  She is alert oriented respirations regular 

and unlabored speaking in full sentences.  




















I have greeted and performed a rapid initial assessment of this patient.  A 

comprehensive ED assessment and evaluation of the patient, analysis of test 

results and completion of medical decision making process will be conducted by 

an additional ED providers.


TRAVEL OUTSIDE OF THE [place] IN LAST 30 DAYS: No





- Related Data


Allergies/Adverse Reactions: 


                                        





Coconut * [Coconut] Allergy (Mild, Verified 10/03/18 18:27)


   


apixaban [From Eliquis] Allergy (Verified 02/16/19 16:34)


   


rivaroxaban [From Xarelto] Allergy (Verified 02/16/19 16:34)


   


transpore tape Allergy (Mild, Uncoded 10/03/18 18:27)


   Hives











Past Medical History





- Social History


Family history: None


Pulmonary Medical History: Reports: Hx Asthma, Hx Pneumonia


Renal/ Medical History: Denies: Hx Peritoneal Dialysis


Musculoskeltal Medical History: Reports Hx Musculoskeletal Deformity - Pain to 

multiple areas mostly right knee due to sickle cell anemia


Past Surgical History: Reports: Hx Abdominal Surgery - Gallstones removal, Hx 

Cholecystectomy, Hx Orthopedic Surgery - Fluid drained from right foot, Hx 

Vascular Surgery - Port placement





- Immunizations


Immunizations up to date: Yes


Hx Diphtheria, Pertussis, Tetanus Vaccination: Yes





Physical Exam





- Vital signs


Vitals: 





                                        











Temp Pulse Resp BP Pulse Ox


 


 98.6 F   121 H  17   146/104 H  100 


 


 10/21/19 21:00  10/21/19 21:00  10/21/19 21:00  10/21/19 21:00  10/21/19 21:00














Course





- Vital Signs


Vital signs: 





                                        











Temp Pulse Resp BP Pulse Ox


 


 98.6 F   121 H  17   146/104 H  100 


 


 10/21/19 21:00  10/21/19 21:00  10/21/19 21:00  10/21/19 21:00  10/21/19 21:00














Doctor's Discharge





- Discharge


Referrals: 


DIYA PHILIPPE MD [Primary Care Provider] - Follow up as needed

## 2019-10-21 NOTE — ER DOCUMENT REPORT
ED General





- General


Chief Complaint: Sickle Cell Crisis


Stated Complaint: SICKLE CELL RIGHT KNEE PAIN


Time Seen by Provider: 10/21/19 21:19


Primary Care Provider: 


DIYA PHILIPPE MD [Primary Care Provider] - Follow up in 3-5 days


Mode of Arrival: Ambulatory


TRAVEL OUTSIDE OF THE [place] IN LAST 30 DAYS: No





- HPI


Notes: 





Patient is a 23-year-old male with history of sickle cell status post 

transfusion about 6 to 8 months ago who presents complaining of right knee pain 

which is where his usual cytopenias during a sickle crisis.  Patient states that

he was here 2 days ago and had treatment which worked well for him, but the pain

started up again today.  Patient states that it is not as severe as some the 

other flareups that he has had, but wanted to come in and get things taken care 

of quickly.  He is set to see [facility] again on Thursday.  He is otherwise eating 

and drinking without difficulty.  He is urinating normally and having normal 

bowel movements.  No recent illness.  Denies any headache, fever, neck pain, 

URI, sore throat, chest pain, palpitations, syncope, cough, shortness of breath,

wheeze, dyspnea, abdominal pain, nausea/vomiting/diarrhea, urinary retention, 

dysuria, hematuria, or rash.





- Related Data


Allergies/Adverse Reactions: 


                                        





Coconut * [Coconut] Allergy (Mild, Verified 10/21/19 21:25)


   


apixaban [From Eliquis] Allergy (Verified 10/21/19 21:25)


   


rivaroxaban [From Xarelto] Allergy (Verified 10/21/19 21:25)


   


transpore tape Allergy (Mild, Uncoded 10/21/19 21:25)


   Hives











Past Medical History





- General


Information source: Patient





- Social History


Smoking Status: Never Smoker


Chew tobacco use (# tins/day): No


Frequency of alcohol use: None


Drug Abuse: None


Family History: Reviewed & Not Pertinent, Arthritis, DM, Hypertension, Other - 

Sickle cell trait both parents and asthma


Patient has suicidal ideation: No


Patient has homicidal ideation: No


Pulmonary Medical History: Reports: Hx Asthma, Hx Pneumonia


Renal/ Medical History: Denies: Hx Peritoneal Dialysis


Musculoskeletal Medical History: Reports Hx Musculoskeletal Deformity - Pain to 

multiple areas mostly right knee due to sickle cell anemia


Past Surgical History: Reports: Hx Abdominal Surgery - Gallstones removal, Hx 

Cholecystectomy, Hx Orthopedic Surgery - Fluid drained from right foot, Hx 

Vascular Surgery - Port placement





- Immunizations


Immunizations up to date: Yes


Hx Diphtheria, Pertussis, Tetanus Vaccination: Yes


Hx Pneumococcal Vaccination: 05/16/13





Review of Systems





- Review of Systems


-: Yes All other systems reviewed and negative





Physical Exam





- Vital signs


Vitals: 


                                        











Temp Pulse Resp BP Pulse Ox


 


 98.6 F   121 H  17   146/104 H  100 


 


 10/21/19 21:00  10/21/19 21:00  10/21/19 21:00  10/21/19 21:00  10/21/19 21:00














- Notes


Notes: 





PHYSICAL EXAMINATION:





GENERAL: Well-appearing, well-nourished and in no acute distress.





HEAD: Atraumatic, normocephalic.





EYES: Pupils equal round and reactive to light, extraocular movements intact, 

sclera anicteric, conjunctiva are normal.





ENT:   Nares patent and without discharge.  oropharynx clear without exudates.  

No tonsilar hypertrophy or erythema.  Moist mucous membranes. 





NECK: Normal range of motion, supple without lymphadenopathy





LUNGS: Breath sounds clear to auscultation bilaterally and equal.  No wheezes 

rales or rhonchi.





HEART: Regular rate and rhythm without murmurs, rubs, gallops.





ABDOMEN: Soft, nontender, nondistended abdomen.  No guarding, no rebound.   

Normal bowel sounds present.  No CVA tenderness bilaterally.





Musculoskeletal: Rt knee:  + mild anterior tenderness.  No ecchymosis, effusion,

erythema, warmth, swelling, or deformity noted.  No ecchymosis.  FROM to 

passive/active. Strength 5+/5. N/V intact distal.





Extremities:  No cyanosis, clubbing, or edema b/l.  Peripheral pulses 2+.  

Capillary refill less than 3 seconds.





NEUROLOGICAL: Cranial nerves grossly intact.  Normal speech, normal gait.  

Normal sensory, motor exams 





PSYCH: Normal mood, normal affect.





SKIN: Warm, Dry, normal turgor, no rashes or lesions noted.





Course





- Re-evaluation


Re-evalutation: 





10/22/19 


Patient is an afebrile, well-hydrated, 23-year-old male who presents to the ED 

with mild sickle cell crisis with pain to his Rt knee.  Vitals are acceptable. 

He has no significant tachycardia, tachypnea, or hypoxia.  PE is otherwise 

unremarkable for any neurovascular compromise, obvious tendon/ligament rupture, 

obvious fracture/dislocation, septic joint.  CBC, reticulocyte count were 

acceptable at this time. CMP hemolyzed x3 and pt does not want to wait any 

longer as he is feeling better.  He is tolerating p.o. without any difficulties.

 Patient states that he is feeling much better and would like to go home.  He 

does have an appointment scheduled with a specialist on Thursday.  No other labs

or imaging warranted at this time based on H&P.  Low suspicion for any acute 

chest syndrome, sepsis, meningitis, severe dehydration.  Conservative measures 

otherwise for symptoms.  Recheck with your PCM in 3-5 days as well.  Return to 

the ED with any worsening/concerning symptoms otherwise as reviewed discharge.  

Patient is in agreement.








- Vital Signs


Vital signs: 


                                        











Temp Pulse Resp BP Pulse Ox


 


 98.6 F   121 H  19   146/104 H  98 


 


 10/21/19 21:00  10/21/19 21:00  10/22/19 01:00  10/21/19 21:00  10/22/19 01:00














- Laboratory


Result Diagrams: 


                                 10/21/19 22:10





                                 10/21/19 22:10


Laboratory results interpreted by me: 


                                        











  10/21/19





  22:10


 


RBC  4.22 L


 


MCV  104 H


 


MCH  35.1 H


 


RDW  17.4 H














Discharge





- Discharge


Clinical Impression: 


 Sickle cell crisis





Right knee pain


Qualifiers:


 Chronicity: acute Qualified Code(s): M25.561 - Pain in right knee





Condition: Stable


Disposition: HOME, SELF-CARE


Additional Instructions: 


Rest, Ice, Compression, Elevation


Tylenol/ibuprofen as needed


Light stretches daily


Strength exercises as able


Moist heat and massage may help


F/u with your PCP in 3-5 days for a recheck


Keep appointment with your [facility] specialist this Thursday





Return to the ED with any worsening symptoms and/or development of fever, 

headache, chest pain, palpitations, syncope, shortness of breath, trouble 

breathing, abdominal pain, n/v/d, muscle weakness/paralysis, numbness/tingling, 

swelling, redness, or other worsening symptoms that are concerning to you.


Forms:  Elevated Blood Pressure


Referrals: 


DIYA PHILIPPE MD [Primary Care Provider] - Follow up in 3-5 days

## 2019-10-28 NOTE — ER DOCUMENT REPORT
ED General





- General


Chief Complaint: Sickle Cell Crisis


Stated Complaint: SICKLE CELL PAIN


Time Seen by Provider: 10/28/19 22:27


Primary Care Provider: 


DIYA PHILIPPE MD [Primary Care Provider] - Follow up as needed


Notes: 


Patient is a 24-year-old male that has a history of sickle cell anemia that 

comes to the emergency department for chief complaint of generalized body aches 

but especially throbbing pain to the right knee.  He states that at this time a 

year he tends to get a lot of flareups, this is actually the typical location of

his pain.  He denies injury to the knee, fever/chills, nausea/vomiting, abd

ominal pain, chest pain, difficulty breathing.  He states he did complete 

transplant procedure at [facility] where he follows, he states that he has actually 

been doing a lot better and they told him he is improving.





TRAVEL OUTSIDE OF THE [place] IN LAST 30 DAYS: No





- Related Data


Allergies/Adverse Reactions: 


                                        





Coconut * [Coconut] Allergy (Mild, Verified 10/21/19 21:25)


   


apixaban [From Eliquis] Allergy (Verified 10/21/19 21:25)


   


rivaroxaban [From Xarelto] Allergy (Verified 10/21/19 21:25)


   


transpore tape Allergy (Mild, Uncoded 10/21/19 21:25)


   Hives








Home Medications: Oxycodone 10mg.  inhaler





Past Medical History





- General


Information source: Patient





- Social History


Smoking Status: Never Smoker


Frequency of alcohol use: None


Drug Abuse: None


Lives with: Family


Family History: Reviewed & Not Pertinent, Arthritis, DM, Hypertension, Other - 

Sickle cell trait both parents and asthma


Patient has suicidal ideation: No


Patient has homicidal ideation: No


Pulmonary Medical History: Reports: Hx Asthma, Hx Pneumonia


Renal/ Medical History: Denies: Hx Peritoneal Dialysis


Musculoskeletal Medical History: Reports Hx Musculoskeletal Deformity - Pain to 

multiple areas mostly right knee due to sickle cell anemia


Past Surgical History: Reports: Hx Abdominal Surgery - Gallstones removal, Hx 

Cholecystectomy, Hx Orthopedic Surgery - Fluid drained from right foot, Hx 

Vascular Surgery - Port placementx2 (failed)





- Immunizations


Immunizations up to date: Yes


Hx Diphtheria, Pertussis, Tetanus Vaccination: Yes


Hx Pneumococcal Vaccination: 05/16/13





Review of Systems





- Review of Systems


Constitutional: See HPI


EENT: No symptoms reported


Cardiovascular: No symptoms reported


Respiratory: No symptoms reported


Gastrointestinal: No symptoms reported


Genitourinary: No symptoms reported


Male Genitourinary: No symptoms reported


Musculoskeletal: See HPI


Skin: No symptoms reported


Hematologic/Lymphatic: No symptoms reported


Neurological/Psychological: No symptoms reported





Physical Exam





- Vital signs


Vitals: 


                                        











Temp Pulse Resp BP Pulse Ox


 


 98.1 F   107 H  18   129/87 H  97 


 


 10/28/19 20:56  10/28/19 20:56  10/28/19 20:56  10/28/19 20:56  10/28/19 20:56














- Notes


Notes: 





GENERAL: Alert, interacts well. No acute distress.


HEAD: Normocephalic, atraumatic.


EYES: Pupils equal, round, and reactive to light. Extraocular movements intact.


ENT: Oral mucosa moist, tongue midline. Oropharynx unremarkable. Airway patent. 


LUNGS: Clear to auscultation bilaterally, no wheezes, rales, or rhonchi. No 

respiratory distress.


HEART: Borderline tachycardic, normal rhythm.  No murmur


ABDOMEN: Soft, non-tender. Non-distended. Bowel sounds present in all 4 

quadrants.


GENITOURINARY: Deferred


EXTREMITIES: Moves all 4 extremities spontaneously. No edema, normal radial and 

dorsalis pedis pulses bilaterally. No cyanosis.


BACK: no cervical, thoracic, lumbar midline tenderness. No saddle anesthesia, 

normal distal neurovascular exam. Moves all extremities in full range of motion.


NEUROLOGICAL: Alert and oriented x3. Normal speech. Cranial nerves II through 

XII grossly intact. 


PSYCH: Normal affect, normal mood.


SKIN: Warm, dry, normal turgor. No rashes or lesions noted.





Course





- Re-evaluation


Re-evalutation: 


Patient is borderline tachycardic, however he is very well-appearing on exam.  

He is talkative.  He complains of pain generally and mainly in his knee.  This 

is not new for him.  CBC, chemistry, reticulocytes, indirect bilirubin 

unremarkable.





I reevaluated patient after treatment of symptoms, he states he feels 

significantly improved.  He states he is still not as bad as he ever got 

previously.  He states he would like an additional dose and to be discharged 

home to follow-up with his hematologist.  Discussed return precautions including

chest pain, fever, shortness of breath, etc.  Patient states understanding and 

agreement.  Stable at time of discharge.





- Vital Signs


Vital signs: 


                                        











Temp Pulse Resp BP Pulse Ox


 


 97.5 F   101 H  17   139/82 H  95 


 


 10/29/19 01:54  10/29/19 01:54  10/29/19 01:54  10/29/19 01:54  10/29/19 01:54














- Laboratory


Result Diagrams: 


                                 10/28/19 21:50





                                 10/28/19 22:38


Laboratory results interpreted by me: 


                                        











  10/28/19 10/28/19





  21:50 22:38


 


MCV  106 H 


 


MCH  35.4 H 


 


RDW  17.5 H 


 


Chloride   108 H


 


Glucose   154 H


 


AST   67 H


 


Alkaline Phosphatase   153 H














Discharge





- Discharge


Clinical Impression: 


 Body aches





Knee pain


Qualifiers:


 Chronicity: unspecified Laterality: right Qualified Code(s): M25.561 - Pain in 

right knee





Sickle cell anemia


Qualifiers:


 Sickle-cell associated disorders: with unspecified crisis Qualified Code(s): 

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





Condition: Stable


Disposition: HOME, SELF-CARE


Additional Instructions: 


Your evaluation is reassuring today.  Follow-up with your hematologist for 

additional management.


Return if you worsen including severe worsening pain, vomiting, chest pain, 

difficulty breathing, fever, or any other concerning symptoms.


Referrals: 


DIYA PHILIPPE MD [Primary Care Provider] - Follow up as needed

## 2019-11-02 ENCOUNTER — HOSPITAL ENCOUNTER (EMERGENCY)
Dept: HOSPITAL 62 - ER | Age: 24
LOS: 1 days | Discharge: HOME | End: 2019-11-03
Payer: MEDICAID

## 2019-11-02 DIAGNOSIS — D57.819: ICD-10-CM

## 2019-11-02 DIAGNOSIS — M25.561: Primary | ICD-10-CM

## 2019-11-02 DIAGNOSIS — Z90.49: ICD-10-CM

## 2019-11-02 DIAGNOSIS — G89.4: ICD-10-CM

## 2019-11-02 LAB
ABSOLUTE LYMPHOCYTES# (MANUAL): 3.8 10^3/UL (ref 0.5–4.7)
ABSOLUTE MONOCYTES # (MANUAL): 0.5 10^3/UL (ref 0.1–1.4)
ABSOLUTE RETICS #: 0.04 10^6/UL (ref 0.03–0.12)
ADD MANUAL DIFF: YES
ALBUMIN SERPL-MCNC: 5.1 G/DL (ref 3.5–5)
ALP SERPL-CCNC: 143 U/L (ref 38–126)
ANION GAP SERPL CALC-SCNC: 12 MMOL/L (ref 5–19)
ANISOCYTOSIS BLD QL SMEAR: (no result)
AST SERPL-CCNC: 34 U/L (ref 17–59)
BASOPHILS NFR BLD MANUAL: 0 % (ref 0–2)
BILIRUB DIRECT SERPL-MCNC: 0.2 MG/DL (ref 0–0.4)
BILIRUB SERPL-MCNC: 0.5 MG/DL (ref 0.2–1.3)
BUN SERPL-MCNC: 18 MG/DL (ref 7–20)
CALCIUM: 9.7 MG/DL (ref 8.4–10.2)
CHLORIDE SERPL-SCNC: 106 MMOL/L (ref 98–107)
CO2 SERPL-SCNC: 26 MMOL/L (ref 22–30)
EOSINOPHIL NFR BLD MANUAL: 1 % (ref 0–6)
ERYTHROCYTE [DISTWIDTH] IN BLOOD BY AUTOMATED COUNT: 16.8 % (ref 11.5–14)
GLUCOSE SERPL-MCNC: 94 MG/DL (ref 75–110)
HCT VFR BLD CALC: 45.3 % (ref 37.9–51)
HGB BLD-MCNC: 15.7 G/DL (ref 13.5–17)
MACROCYTES BLD QL SMEAR: (no result)
MCH RBC QN AUTO: 35.9 PG (ref 27–33.4)
MCHC RBC AUTO-ENTMCNC: 34.6 G/DL (ref 32–36)
MCV RBC AUTO: 104 FL (ref 80–97)
MONOCYTES % (MANUAL): 7 % (ref 3–13)
PLATELET # BLD: 243 10^3/UL (ref 150–450)
PLATELET COMMENT: ADEQUATE
POTASSIUM SERPL-SCNC: 4.3 MMOL/L (ref 3.6–5)
PROT SERPL-MCNC: 8.3 G/DL (ref 6.3–8.2)
RBC # BLD AUTO: 4.36 10^6/UL (ref 4.35–5.55)
RETICULOCYTE COUNT (AUTO): 0.86 % (ref 0.66–2.85)
SEGMENTED NEUTROPHILS % (MAN): 39 % (ref 42–78)
TOTAL CELLS COUNTED BLD: 100
VARIANT LYMPHS NFR BLD MANUAL: 53 % (ref 13–45)
WBC # BLD AUTO: 7.2 10^3/UL (ref 4–10.5)

## 2019-11-02 PROCEDURE — 85045 AUTOMATED RETICULOCYTE COUNT: CPT

## 2019-11-02 PROCEDURE — 73564 X-RAY EXAM KNEE 4 OR MORE: CPT

## 2019-11-02 PROCEDURE — 85025 COMPLETE CBC W/AUTO DIFF WBC: CPT

## 2019-11-02 PROCEDURE — 96374 THER/PROPH/DIAG INJ IV PUSH: CPT

## 2019-11-02 PROCEDURE — 99284 EMERGENCY DEPT VISIT MOD MDM: CPT

## 2019-11-02 PROCEDURE — 96361 HYDRATE IV INFUSION ADD-ON: CPT

## 2019-11-02 PROCEDURE — 36415 COLL VENOUS BLD VENIPUNCTURE: CPT

## 2019-11-02 PROCEDURE — 96375 TX/PRO/DX INJ NEW DRUG ADDON: CPT

## 2019-11-02 PROCEDURE — 80053 COMPREHEN METABOLIC PANEL: CPT

## 2019-11-02 NOTE — ER DOCUMENT REPORT
ED Medical Screen (RME)





- General


Chief Complaint: Knee Pain


Stated Complaint: RIGHT KNEE PAIN -REPORTS SICKLE CELL


Time Seen by Provider: 11/02/19 21:25


Primary Care Provider: 


DIYA PHILIPPE MD [Primary Care Provider] - Follow up as needed


Mode of Arrival: Ambulatory


Information source: Patient


Notes: 





24-year-old male presents to ED for complaint of right knee pain.  He frequently

has right knee pain from sickle cell anemia.  He states this is where he usually

gets his pain.  He is alert oriented respirations regular nonlabored speaking in

full sentences.











I have greeted and performed a rapid initial assessment of this patient.  A 

comprehensive ED assessment and evaluation of the patient, analysis of test 

results and completion of medical decision making process will be conducted by 

an additional ED providers.


TRAVEL OUTSIDE OF THE [place] IN LAST 30 DAYS: No





- Related Data


Allergies/Adverse Reactions: 


                                        





Coconut * [Coconut] Allergy (Mild, Verified 10/21/19 21:25)


   


apixaban [From Eliquis] Allergy (Verified 10/21/19 21:25)


   


rivaroxaban [From Xarelto] Allergy (Verified 10/21/19 21:25)


   


transpore tape Allergy (Mild, Uncoded 10/21/19 21:25)


   Hives











Past Medical History





- Social History


Family history: None


Pulmonary Medical History: Reports: Hx Asthma, Hx Pneumonia


Renal/ Medical History: Denies: Hx Peritoneal Dialysis


Musculoskeltal Medical History: Reports Hx Musculoskeletal Deformity - Pain to 

multiple areas mostly right knee due to sickle cell anemia


Past Surgical History: Reports: Hx Abdominal Surgery - Gallstones removal, Hx 

Cholecystectomy, Hx Orthopedic Surgery - Fluid drained from right foot, Hx 

Vascular Surgery - Port placementx2 (failed)





- Immunizations


Immunizations up to date: Yes


Hx Diphtheria, Pertussis, Tetanus Vaccination: Yes





Doctor's Discharge





- Discharge


Referrals: 


DIYA PHILIPPE MD [Primary Care Provider] - Follow up as needed

## 2019-11-03 VITALS — DIASTOLIC BLOOD PRESSURE: 84 MMHG | SYSTOLIC BLOOD PRESSURE: 140 MMHG

## 2019-11-03 NOTE — ER DOCUMENT REPORT
ED Extremity Problem, Lower





- General


Chief Complaint: Knee Pain


Stated Complaint: RIGHT KNEE PAIN -REPORTS SICKLE CELL


Time Seen by Provider: 11/02/19 21:25


Primary Care Provider: 


DIYA PHILIPPE MD [Primary Care Provider] - Follow up as needed


Mode of Arrival: Ambulatory


TRAVEL OUTSIDE OF THE [place] IN LAST 30 DAYS: No





- Related Data


Allergies/Adverse Reactions: 


                                        





Coconut * [Coconut] Allergy (Mild, Verified 10/21/19 21:25)


   


apixaban [From Eliquis] Allergy (Verified 10/21/19 21:25)


   


rivaroxaban [From Xarelto] Allergy (Verified 10/21/19 21:25)


   


transpore tape Allergy (Mild, Uncoded 10/21/19 21:25)


   Hives











Past Medical History





- General


Information source: Patient





- Social History


Smoking Status: Never Smoker


Chew tobacco use (# tins/day): No


Frequency of alcohol use: Occasional


Drug Abuse: None


Family History: Reviewed & Not Pertinent, Arthritis, DM, Hypertension, Other - 

Sickle cell trait both parents and asthma


Patient has suicidal ideation: No


Patient has homicidal ideation: No


Pulmonary Medical History: Reports: Hx Asthma, Hx Pneumonia


Renal/ Medical History: Denies: Hx Peritoneal Dialysis


Musculoskeletal Medical History: Reports Hx Musculoskeletal Deformity - Pain to 

multiple areas mostly right knee due to sickle cell anemia


Past Surgical History: Reports: Hx Abdominal Surgery - Gallstones removal, Hx 

Cholecystectomy, Hx Orthopedic Surgery - Fluid drained from right foot, Hx 

Vascular Surgery - Port placementx2 (failed)





- Immunizations


Immunizations up to date: Yes


Hx Diphtheria, Pertussis, Tetanus Vaccination: Yes


Hx Pneumococcal Vaccination: 05/16/13





Physical Exam





- Vital signs


Vitals: 


                                        











Temp Pulse Resp BP Pulse Ox


 


 98.7 F   107 H  18   143/97 H  97 


 


 11/02/19 21:23  11/02/19 21:23  11/02/19 21:23  11/02/19 21:23  11/02/19 21:23














Course





- Vital Signs


Vital signs: 


                                        











Temp Pulse Resp BP Pulse Ox


 


 98.0 F   101 H  20   148/98 H  95 


 


 11/02/19 23:23  11/02/19 23:23  11/02/19 23:23  11/02/19 23:23  11/02/19 23:23














- Laboratory


Result Diagrams: 


                                 11/02/19 21:35





                                 11/02/19 21:35


Laboratory results interpreted by me: 


                                        











  11/02/19 11/02/19





  21:35 21:35


 


MCV  104 H 


 


MCH  35.9 H 


 


RDW  16.8 H 


 


Seg Neuts % (Manual)  39 L 


 


Lymphocytes % (Manual)  53 H 


 


Alkaline Phosphatase   143 H


 


Total Protein   8.3 H


 


Albumin   5.1 H














Discharge





- Discharge


Clinical Impression: 


 Chronic pain disorder, Sickle cell anemia with pain





Recurrent knee pain


Qualifiers:


 Laterality: right Qualified Code(s): M25.561 - Pain in right knee





Condition: Good


Disposition: HOME, SELF-CARE


Instructions:  Oral Narcotic Medication ([facility]), Ice & Elevation ([facility])


Additional Instructions: 


I would recommend that you follow-up with your primary care doctor to increase 

your chronic pain medications.  It is not appropriate to continue to return to 

the emergency department for IV Dilaudid.  I would also recommend that you use 

NSAIDs such as high-dose 800 mg ibuprofen every 6-8 hours as needed for pain.  I

would also recommend that you increase your fluid intake to at least 8 x 8 ounce

glasses of water daily.  If you notice fever, unable to walk, or any other 

concerning symptoms, return to the ED.


Referrals: 


DIYA PHILIPPE MD [Primary Care Provider] - Follow up as needed

## 2019-11-03 NOTE — RADIOLOGY REPORT (SQ)
EXAM DESCRIPTION: 



XR KNEE 4 OR MORE VIEWS



COMPLETED DATE/TME:  11/03/2019 01:08



CLINICAL HISTORY: 



24 years, Male, hx of sickle cell disease, hx of avascular

necrosi



COMPARISON:

10/15/2018 right knee



NUMBER OF VIEWS:

4



TECHNIQUE:

4 views right knee



LIMITATIONS:

None.



FINDINGS:



Redemonstrated is a diffusely heterogeneous appearance to the

bone marrow consistent with underlying sickle cell disease.

Negative for acute fracture or dislocation. No radiographic

evidence for joint effusion



IMPRESSION:



Abnormal marrow signal as before. No acute osseous abnormality

 



copyright 2011 [employer]- All Rights Reserved

## 2019-11-05 ENCOUNTER — HOSPITAL ENCOUNTER (EMERGENCY)
Dept: HOSPITAL 62 - ER | Age: 24
Discharge: HOME | End: 2019-11-05
Payer: MEDICAID

## 2019-11-05 VITALS — SYSTOLIC BLOOD PRESSURE: 163 MMHG | DIASTOLIC BLOOD PRESSURE: 111 MMHG

## 2019-11-05 DIAGNOSIS — M25.512: ICD-10-CM

## 2019-11-05 DIAGNOSIS — J45.909: ICD-10-CM

## 2019-11-05 DIAGNOSIS — D57.00: Primary | ICD-10-CM

## 2019-11-05 DIAGNOSIS — R07.9: ICD-10-CM

## 2019-11-05 DIAGNOSIS — Z88.8: ICD-10-CM

## 2019-11-05 DIAGNOSIS — M25.561: ICD-10-CM

## 2019-11-05 LAB
ABSOLUTE RETICS #: 0.06 10^6/UL (ref 0.03–0.12)
ADD MANUAL DIFF: (no result)
ALBUMIN SERPL-MCNC: 4.9 G/DL (ref 3.5–5)
ALP SERPL-CCNC: 133 U/L (ref 38–126)
ANION GAP SERPL CALC-SCNC: 12 MMOL/L (ref 5–19)
ANISOCYTOSIS BLD QL SMEAR: (no result)
AST SERPL-CCNC: 37 U/L (ref 17–59)
BASOPHILS # BLD AUTO: 0.1 10^3/UL (ref 0–0.2)
BASOPHILS NFR BLD AUTO: 1.8 % (ref 0–2)
BILIRUB DIRECT SERPL-MCNC: 0.2 MG/DL (ref 0–0.4)
BILIRUB SERPL-MCNC: 0.4 MG/DL (ref 0.2–1.3)
BUN SERPL-MCNC: 15 MG/DL (ref 7–20)
CALCIUM: 9.7 MG/DL (ref 8.4–10.2)
CHLORIDE SERPL-SCNC: 107 MMOL/L (ref 98–107)
CO2 SERPL-SCNC: 25 MMOL/L (ref 22–30)
EOSINOPHIL # BLD AUTO: 0.2 10^3/UL (ref 0–0.6)
EOSINOPHIL NFR BLD AUTO: 2.6 % (ref 0–6)
ERYTHROCYTE [DISTWIDTH] IN BLOOD BY AUTOMATED COUNT: 16.6 % (ref 11.5–14)
GLUCOSE SERPL-MCNC: 121 MG/DL (ref 75–110)
HCT VFR BLD CALC: 43.8 % (ref 37.9–51)
HGB BLD-MCNC: 15.4 G/DL (ref 13.5–17)
LYMPHOCYTES # BLD AUTO: 2.1 10^3/UL (ref 0.5–4.7)
LYMPHOCYTES NFR BLD AUTO: 34.7 % (ref 13–45)
MACROCYTES BLD QL SMEAR: (no result)
MCH RBC QN AUTO: 36.2 PG (ref 27–33.4)
MCHC RBC AUTO-ENTMCNC: 35.1 G/DL (ref 32–36)
MCV RBC AUTO: 103 FL (ref 80–97)
MONOCYTES # BLD AUTO: 0.6 10^3/UL (ref 0.1–1.4)
MONOCYTES NFR BLD AUTO: 9.7 % (ref 3–13)
NEUTROPHILS # BLD AUTO: 3.2 10^3/UL (ref 1.7–8.2)
NEUTS SEG NFR BLD AUTO: 51.2 % (ref 42–78)
OVALOCYTES BLD QL SMEAR: SLIGHT
PLATELET # BLD: 240 10^3/UL (ref 150–450)
PLATELET COMMENT: ADEQUATE
PLATELET LARGE: PRESENT
POTASSIUM SERPL-SCNC: 4.1 MMOL/L (ref 3.6–5)
PROT SERPL-MCNC: 7.8 G/DL (ref 6.3–8.2)
RBC # BLD AUTO: 4.26 10^6/UL (ref 4.35–5.55)
RETICULOCYTE COUNT (AUTO): 1.31 % (ref 0.66–2.85)
TARGETS BLD QL SMEAR: (no result)
TOTAL CELLS COUNTED % (AUTO): 100 %
WBC # BLD AUTO: 6.2 10^3/UL (ref 4–10.5)

## 2019-11-05 PROCEDURE — 36415 COLL VENOUS BLD VENIPUNCTURE: CPT

## 2019-11-05 PROCEDURE — 99283 EMERGENCY DEPT VISIT LOW MDM: CPT

## 2019-11-05 PROCEDURE — 80053 COMPREHEN METABOLIC PANEL: CPT

## 2019-11-05 PROCEDURE — 96376 TX/PRO/DX INJ SAME DRUG ADON: CPT

## 2019-11-05 PROCEDURE — 96361 HYDRATE IV INFUSION ADD-ON: CPT

## 2019-11-05 PROCEDURE — 96375 TX/PRO/DX INJ NEW DRUG ADDON: CPT

## 2019-11-05 PROCEDURE — 85045 AUTOMATED RETICULOCYTE COUNT: CPT

## 2019-11-05 PROCEDURE — 85025 COMPLETE CBC W/AUTO DIFF WBC: CPT

## 2019-11-05 PROCEDURE — 96374 THER/PROPH/DIAG INJ IV PUSH: CPT

## 2019-11-05 NOTE — ER DOCUMENT REPORT
HPI





- HPI


Time Seen by Provider: 11/05/19 17:35





- REPRODUCTIVE


Reproductive: DENIES: Pregnant:





Past Medical History





- Social History


Family History: Reviewed & Not Pertinent, Arthritis, DM, Hypertension, Other - 

Sickle cell trait both parents and asthma


Pulmonary Medical History: Reports: Hx Asthma, Hx Pneumonia


Renal/ Medical History: Denies: Hx Peritoneal Dialysis


Musculoskeletal Medical History: Reports Hx Musculoskeletal Deformity - Pain to 

multiple areas mostly right knee due to sickle cell anemia


Past Surgical History: Reports: Hx Abdominal Surgery - Gallstones removal, Hx 

Cholecystectomy, Hx Orthopedic Surgery - Fluid drained from right foot, Hx 

Vascular Surgery - Port placementx2 (failed)





- Immunizations


Immunizations up to date: Yes


Hx Diphtheria, Pertussis, Tetanus Vaccination: Yes


Hx Pneumococcal Vaccination: 05/16/13





[facility] Provider Document





- INFECTION CONTROL


TRAVEL OUTSIDE OF THE [place] IN LAST 30 DAYS: No





Course





- Vital Signs


Vital signs: 


                                        











Temp Pulse Resp BP Pulse Ox


 


 98.4 F   114 H  20   136/92 H  95 


 


 11/05/19 17:24  11/05/19 17:24  11/05/19 17:24  11/05/19 17:24  11/05/19 17:24














Discharge





- Discharge


Referrals: 


DIYA PHILIPPE MD [Primary Care Provider] - Follow up as needed

## 2019-11-05 NOTE — ER DOCUMENT REPORT
ED General





<BENSON RILEY IV - Last Filed: 11/25/19 00:57>





- General


Mode of Arrival: Ambulatory


TRAVEL OUTSIDE OF THE [place] IN LAST 30 DAYS: No





<SOPHIA FLORIAN ASAF - Last Filed: 12/05/19 01:44>





- General


Chief Complaint: Sickle Cell Crisis


Stated Complaint: RIGHT KNEE PAIN


Time Seen by Provider: 11/05/19 17:35


Primary Care Provider: 


DIYA PHILIPPE MD [Primary Care Provider] - Follow up as needed





- HPI


Notes: 





24-year-old male with history of sickle cell anemia presents complaining of 

left-sided chest pain and left shoulder pain.  Patient states his symptoms are 

similar to his prior sickle cell pain crisis episodes.  Patient denies fever, 

chills, dyspnea.  Patient states his pain is usually helped with Dilaudid and he

is usually also given Benadryl to help with the itching from the Dilaudid.  

Patient states movement of his left upper extremity worsens the pain and nothing

alleviates the pain. (BENSON RILEY IV)





- Related Data


Allergies/Adverse Reactions: 


                                        





Coconut * [Coconut] Allergy (Mild, Verified 11/19/19 15:58)


   


apixaban [From Eliquis] Allergy (Verified 11/19/19 15:58)


   


rivaroxaban [From Xarelto] Allergy (Verified 11/19/19 15:58)


   


transpore tape Allergy (Mild, Uncoded 11/19/19 15:58)


   Hives











Past Medical History





- General


Information source: Patient





- Social History


Smoking Status: Never Smoker


Family History: Reviewed & Not Pertinent





<BENSON RILEY IV - Last Filed: 11/25/19 00:57>





- General


Information source: Patient





- Social History


Smoking Status: Unknown if Ever Smoked


Family History: Reviewed & Not Pertinent, Arthritis, DM, Hypertension, Other - 

Sickle cell trait both parents and asthma


Patient has suicidal ideation: No


Patient has homicidal ideation: No


Pulmonary Medical History: Reports: Hx Asthma, Hx Pneumonia


Renal/ Medical History: Denies: Hx Peritoneal Dialysis


Musculoskeletal Medical History: Reports Hx Musculoskeletal Deformity - Pain to 

multiple areas mostly right knee due to sickle cell anemia


Past Surgical History: Reports: Hx Abdominal Surgery - Gallstones removal, Hx 

Cholecystectomy, Hx Orthopedic Surgery - Fluid drained from right foot, Hx 

Vascular Surgery - Port placementx2 (failed)





- Immunizations


Immunizations up to date: Yes


Hx Diphtheria, Pertussis, Tetanus Vaccination: Yes


Hx Pneumococcal Vaccination: 05/16/13





<SOPHIA FLORIAN - Last Filed: 12/05/19 01:44>


Other: 





History of sickle cell anemia (BENSON RILEY IV)





Review of Systems





- Review of Systems


Constitutional: No symptoms reported


EENT: No symptoms reported


Cardiovascular: Chest pain


Respiratory: No symptoms reported


Gastrointestinal: No symptoms reported


Genitourinary: No symptoms reported


Male Genitourinary: No symptoms reported


Musculoskeletal: Joint pain


Skin: No symptoms reported


Hematologic/Lymphatic: No symptoms reported


Neurological/Psychological: No symptoms reported


-: Yes All other systems reviewed and negative





<BENSON RILEY IV - Last Filed: 11/25/19 00:57>





Physical Exam





<BENSON RILEY IV - Last Filed: 11/25/19 00:57>





- Vital signs


Vitals: 


                                        











Temp Pulse Resp BP Pulse Ox


 


 98.4 F   114 H  20   136/92 H  95 


 


 11/05/19 17:24  11/05/19 17:24  11/05/19 17:24  11/05/19 17:24  11/05/19 17:24














- Notes


Notes: 





PHYSICAL EXAMINATION:


 


GENERAL: Well-appearing, well-nourished and in no acute distress.


 


HEAD: Atraumatic, normocephalic.


 


EYES: Pupils equal round and reactive to light, extraocular movements intact, 

sclera anicteric, conjunctiva are normal.


 


ENT: nares patent, oropharynx clear without exudates.  Moist mucous membranes.


 


NECK: Normal range of motion, supple without lymphadenopathy


 


LUNGS: Breath sounds clear to auscultation bilaterally and equal.  No wheezes 

rales or rhonchi.


 


HEART: Regular rate and rhythm without murmurs


 


ABDOMEN: Soft, nontender, normoactive bowel sounds.  No guarding, no rebound.  

No masses appreciated.


 


EXTREMITIES: Normal range of motion, no pitting or edema.  No cyanosis.


 


NEUROLOGICAL: No focal neurological deficits. Moves all extremities 

spontaneously and on command.


 


PSYCH: Normal mood, normal affect.


 


SKIN: Warm, Dry, normal turgor, no rashes or lesions noted. (BENSON RILEY IV)





Course





- Laboratory


Result Diagrams: 


                                 11/05/19 18:15





                                 11/05/19 18:15





<BENSON RILEY IV - Last Filed: 11/25/19 00:57>





- Laboratory


Result Diagrams: 


                                 11/05/19 18:15





                                 11/05/19 18:15





<SOPHIA FLORIAN - Last Filed: 12/05/19 01:44>





- Vital Signs


Vital signs: 


                                        











Temp Pulse Resp BP Pulse Ox


 


 98.4 F   119 H  18   163/111 H  97 


 


 11/05/19 21:15  11/05/19 21:15  11/05/19 21:15  11/05/19 21:15  11/05/19 21:15














- Laboratory


Laboratory results interpreted by me: 


                                        











  11/05/19 11/05/19





  18:15 18:15


 


RBC  4.26 L 


 


MCV  103 H 


 


MCH  36.2 H 


 


RDW  16.6 H 


 


Glucose   121 H


 


Alkaline Phosphatase   133 H














Discharge





<BENSON RILEY IV - Last Filed: 11/25/19 00:57>





<SOPHIA FLORIAN - Last Filed: 12/05/19 01:44>





- Discharge


Clinical Impression: 


 Sickle cell pain crisis





Condition: Good


Disposition: HOME, SELF-CARE


Additional Instructions: 


Please call your regular provider who follows for sickle cell and alert them of 

your presentation to the ER today for pain crisis.  You reported being back to 

baseline after 2 IV doses here in the ED.  Please return or seek care earlier if

you develop any fevers chills sweats nausea vomiting shortness of breath or 

other concerns.


Referrals: 


DIYA PHILIPPE MD [Primary Care Provider] - Follow up as needed

## 2019-11-05 NOTE — ER DOCUMENT REPORT
ED Medical Screen (RME)





- General


Chief Complaint: Sickle Cell Crisis


Stated Complaint: RIGHT KNEE PAIN


Time Seen by Provider: 11/05/19 17:35


Primary Care Provider: 


DIYA PHILIPPE MD [Primary Care Provider] - Follow up as needed


Mode of Arrival: Ambulatory


Information source: Patient


TRAVEL OUTSIDE OF THE [place] IN LAST 30 DAYS: No





- HPI


Notes: 





11/05/19 17:49


24-year-old male with a medical history of sickle cell crisis presents to 

emergency room with right knee pain which is typically how he presents prior to 

a sickle cell crisis.  Patient has had several flareups throughout the year.  He

is typically seen at [facility] ED. denies any fevers or chills.  Patient 

states he is having right knee pain, this usually occurs prior to a sickle cell 

crisis.  Patient has a history of avascular necrosis related to sickle cell 

crisis.  Denies fevers, chills,  chest pain,palpitations,  shortness of breath, 

dyspnea, nausea, vomiting, diarrhea, abdominal pain, speech changes, LH, 

dizziness, syncope, headaches, wheezing, ST, URI, neck pain, weakness, bowel or 

bladder dysfunction, saddle anesthesia,  rash. 





ROS:


Other than noted above, the 12 point review of systems was reviewed with the 

patient and were negative, all pertinent findings are included in the HPI.





PHYSICAL EXAMINATION:





Vital signs reviewed. 





GENERAL: Well-appearing, well-nourished and in no acute distress.





HEAD: Atraumatic, normocephalic.





NECK: Normal range of motion





CV: Heart regular rate and rhythm





LUNGS: No respiratory distress





ABD: generalized abd pain





Musculoskeletal: Normal range of motion. tenderness to right knee on palpation 





NEUROLOGICAL:  Normal speech





PSYCH: Normal mood, normal affect.





MDM:


Patient seen and examined for rapid initial assessment. Vital signs reviewed.  A

comprehensive ED assessment and evaluation of the patient, analysis of test 

results and completion of the medical decision making process will be conducted 

by additional ED providers.





*Note is created using voice recognition software and may contain spelling, 

syntax or grammatical errors.  














- Related Data


Allergies/Adverse Reactions: 


                                        





Coconut * [Coconut] Allergy (Mild, Verified 10/21/19 21:25)


   


apixaban [From Eliquis] Allergy (Verified 10/21/19 21:25)


   


rivaroxaban [From Xarelto] Allergy (Verified 10/21/19 21:25)


   


transpore tape Allergy (Mild, Uncoded 10/21/19 21:25)


   Hives











Past Medical History





- Social History


Family history: None


Pulmonary Medical History: Reports: Hx Asthma, Hx Pneumonia


Renal/ Medical History: Denies: Hx Peritoneal Dialysis


Musculoskeltal Medical History: Reports Hx Musculoskeletal Deformity - Pain to 

multiple areas mostly right knee due to sickle cell anemia


Past Surgical History: Reports: Hx Abdominal Surgery - Gallstones removal, Hx 

Cholecystectomy, Hx Orthopedic Surgery - Fluid drained from right foot, Hx 

Vascular Surgery - Port placementx2 (failed)





- Immunizations


Immunizations up to date: Yes


Hx Diphtheria, Pertussis, Tetanus Vaccination: Yes





Physical Exam





- Vital signs


Vitals: 





                                        











Temp Pulse Resp BP Pulse Ox


 


 98.4 F   114 H  20   136/92 H  95 


 


 11/05/19 17:24  11/05/19 17:24  11/05/19 17:24  11/05/19 17:24  11/05/19 17:24














Course





- Vital Signs


Vital signs: 





                                        











Temp Pulse Resp BP Pulse Ox


 


 98.4 F   114 H  20   136/92 H  95 


 


 11/05/19 17:24  11/05/19 17:24  11/05/19 17:24  11/05/19 17:24  11/05/19 17:24














Doctor's Discharge





- Discharge


Referrals: 


DIYA PHILIPPE MD [Primary Care Provider] - Follow up as needed

## 2019-11-15 ENCOUNTER — HOSPITAL ENCOUNTER (EMERGENCY)
Dept: HOSPITAL 62 - ER | Age: 24
Discharge: HOME | End: 2019-11-15
Payer: MEDICAID

## 2019-11-15 VITALS — SYSTOLIC BLOOD PRESSURE: 144 MMHG | DIASTOLIC BLOOD PRESSURE: 86 MMHG

## 2019-11-15 DIAGNOSIS — Z91.018: ICD-10-CM

## 2019-11-15 DIAGNOSIS — Z79.891: ICD-10-CM

## 2019-11-15 DIAGNOSIS — M25.561: ICD-10-CM

## 2019-11-15 DIAGNOSIS — J45.909: ICD-10-CM

## 2019-11-15 DIAGNOSIS — D57.00: Primary | ICD-10-CM

## 2019-11-15 DIAGNOSIS — Z88.8: ICD-10-CM

## 2019-11-15 LAB
ABSOLUTE LYMPHOCYTES# (MANUAL): 1.9 10^3/UL (ref 0.5–4.7)
ABSOLUTE MONOCYTES # (MANUAL): 0.6 10^3/UL (ref 0.1–1.4)
ABSOLUTE RETICS #: 0.04 10^6/UL (ref 0.03–0.12)
ADD MANUAL DIFF: YES
ALBUMIN SERPL-MCNC: 5.1 G/DL (ref 3.5–5)
ALP SERPL-CCNC: 167 U/L (ref 38–126)
ANION GAP SERPL CALC-SCNC: 12 MMOL/L (ref 5–19)
ANISOCYTOSIS BLD QL SMEAR: SLIGHT
AST SERPL-CCNC: 56 U/L (ref 17–59)
BASOPHILS NFR BLD MANUAL: 2 % (ref 0–2)
BILIRUB DIRECT SERPL-MCNC: 0.1 MG/DL (ref 0–0.4)
BILIRUB SERPL-MCNC: 0.5 MG/DL (ref 0.2–1.3)
BUN SERPL-MCNC: 21 MG/DL (ref 7–20)
CALCIUM: 10.1 MG/DL (ref 8.4–10.2)
CHLORIDE SERPL-SCNC: 104 MMOL/L (ref 98–107)
CO2 SERPL-SCNC: 27 MMOL/L (ref 22–30)
EOSINOPHIL NFR BLD MANUAL: 4 % (ref 0–6)
ERYTHROCYTE [DISTWIDTH] IN BLOOD BY AUTOMATED COUNT: 15.4 % (ref 11.5–14)
GLUCOSE SERPL-MCNC: 118 MG/DL (ref 75–110)
HCT VFR BLD CALC: 47.7 % (ref 37.9–51)
HGB BLD-MCNC: 16.3 G/DL (ref 13.5–17)
MACROCYTES BLD QL SMEAR: (no result)
MCH RBC QN AUTO: 34.8 PG (ref 27–33.4)
MCHC RBC AUTO-ENTMCNC: 34.1 G/DL (ref 32–36)
MCV RBC AUTO: 102 FL (ref 80–97)
MONOCYTES % (MANUAL): 9 % (ref 3–13)
NEUTS BAND NFR BLD MANUAL: 2 % (ref 3–5)
PLATELET # BLD: 203 10^3/UL (ref 150–450)
PLATELET COMMENT: ADEQUATE
POTASSIUM SERPL-SCNC: 4.6 MMOL/L (ref 3.6–5)
PROT SERPL-MCNC: 8.5 G/DL (ref 6.3–8.2)
RBC # BLD AUTO: 4.67 10^6/UL (ref 4.35–5.55)
RETICULOCYTE COUNT (AUTO): 0.8 % (ref 0.66–2.85)
SEGMENTED NEUTROPHILS % (MAN): 52 % (ref 42–78)
TOTAL CELLS COUNTED BLD: 100
VARIANT LYMPHS NFR BLD MANUAL: 31 % (ref 13–45)
WBC # BLD AUTO: 6.2 10^3/UL (ref 4–10.5)

## 2019-11-15 PROCEDURE — 96374 THER/PROPH/DIAG INJ IV PUSH: CPT

## 2019-11-15 PROCEDURE — 96375 TX/PRO/DX INJ NEW DRUG ADDON: CPT

## 2019-11-15 PROCEDURE — 85025 COMPLETE CBC W/AUTO DIFF WBC: CPT

## 2019-11-15 PROCEDURE — 85045 AUTOMATED RETICULOCYTE COUNT: CPT

## 2019-11-15 PROCEDURE — 80053 COMPREHEN METABOLIC PANEL: CPT

## 2019-11-15 PROCEDURE — 96376 TX/PRO/DX INJ SAME DRUG ADON: CPT

## 2019-11-15 PROCEDURE — 36415 COLL VENOUS BLD VENIPUNCTURE: CPT

## 2019-11-15 PROCEDURE — 99283 EMERGENCY DEPT VISIT LOW MDM: CPT

## 2019-11-15 PROCEDURE — 96361 HYDRATE IV INFUSION ADD-ON: CPT

## 2019-11-15 NOTE — ER DOCUMENT REPORT
ED Medical Screen (RME)





- General


Chief Complaint: Sickle Cell Crisis


Stated Complaint: KNEE PAIN


Time Seen by Provider: 11/15/19 14:30


Primary Care Provider: 


DIYA PHILIPPE MD [Primary Care Provider] - Follow up as needed


Notes: 





24-year-old male with sickle cell disease presents to the emergency department 

and sickle cell crisis.  Patient states that he is having some right knee pain 

which is his usual spot.  He said it is worse in the winter and he last had a 

flareup about a week and half ago.  He said his usual regimen is Dilaudid 2 mg 

IV with Benadryl 25 mg IV, IV fluids.  Patient denies chest pain, patient is not

hypoxic.





Exam: Well-appearing no acute distress, patient does have tenderness to 

palpation over the right knee.





I have greeted and performed a rapid initial assessment of this patient.  A 

comprehensive ED assessment and evaluation of the patient, analysis of test 

results and completion of medical decision making process will be conducted by 

an additional ED providers.


TRAVEL OUTSIDE OF THE [place] IN LAST 30 DAYS: No





- Related Data


Allergies/Adverse Reactions: 


                                        





Coconut * [Coconut] Allergy (Mild, Verified 11/15/19 14:13)


   


apixaban [From Eliquis] Allergy (Verified 11/15/19 14:13)


   


rivaroxaban [From Xarelto] Allergy (Verified 11/15/19 14:13)


   


transpore tape Allergy (Mild, Uncoded 11/15/19 14:13)


   Hives








Home Medications: sickle cell





Past Medical History





- Social History


Chew tobacco use (# tins/day): No


Frequency of alcohol use: Rare


Drug Abuse: None


Family history: None


Pulmonary Medical History: Reports: Hx Asthma, Hx Pneumonia


Renal/ Medical History: Denies: Hx Peritoneal Dialysis


Musculoskeltal Medical History: Reports Hx Musculoskeletal Deformity - Pain to m

ultiple areas mostly right knee due to sickle cell anemia


Past Surgical History: Reports: Hx Abdominal Surgery - Gallstones removal, Hx 

Cholecystectomy, Hx Orthopedic Surgery - Fluid drained from right foot, Hx 

Vascular Surgery - Port placementx2 (failed)





- Immunizations


Immunizations up to date: Yes


Hx Diphtheria, Pertussis, Tetanus Vaccination: Yes





Physical Exam





- Vital signs


Vitals: 





                                        











Temp Pulse Resp BP Pulse Ox


 


 98.1 F   117 H  18   138/91 H  97 


 


 11/15/19 13:25  11/15/19 13:25  11/15/19 13:25  11/15/19 13:25  11/15/19 13:25














Course





- Vital Signs


Vital signs: 





                                        











Temp Pulse Resp BP Pulse Ox


 


 98.1 F   117 H  18   138/91 H  97 


 


 11/15/19 13:25  11/15/19 13:25  11/15/19 13:25  11/15/19 13:25  11/15/19 13:25














Doctor's Discharge





- Discharge


Referrals: 


DIYA PHILIPPE MD [Primary Care Provider] - Follow up as needed

## 2019-11-15 NOTE — ER DOCUMENT REPORT
ED General





- General


Chief Complaint: Sickle Cell Crisis


Stated Complaint: KNEE PAIN


Time Seen by Provider: 11/15/19 14:30


Primary Care Provider: 


DIYA PHILIPPE MD [Primary Care Provider] - Follow up as needed


TRAVEL OUTSIDE OF THE [place] IN LAST 30 DAYS: No





- HPI


Notes: 





Patient is a 24-year-old male who presents the emergency department for 

evaluation of right knee pain.  He has a history of sickle cell anemia.  He 

states this is normally where he has his flares.  He states his last flare was 

about 10 to 12 days ago.  He denies any associated fever.  No chest pain.  No s

hortness of breath.  No other associated symptoms.  He states the oxycodone that

he takes at home is not helpful.





- Related Data


Allergies/Adverse Reactions: 


                                        





Coconut * [Coconut] Allergy (Mild, Verified 11/15/19 14:13)


   


apixaban [From Eliquis] Allergy (Verified 11/15/19 14:13)


   


rivaroxaban [From Xarelto] Allergy (Verified 11/15/19 14:13)


   


transpore tape Allergy (Mild, Uncoded 11/15/19 14:13)


   Hives








Home Medications: List reviewed, please see chart





Past Medical History





- General


Information source: Patient





- Social History


Smoking Status: Never Smoker


Chew tobacco use (# tins/day): No


Frequency of alcohol use: Rare


Drug Abuse: None


Family History: Reviewed & Not Pertinent, Arthritis, DM, Hypertension, Other - 

Sickle cell trait both parents and asthma


Patient has suicidal ideation: No


Patient has homicidal ideation: No





- Medical History


Medical History: Other - Sickle cell anemia


Pulmonary Medical History: Reports: Hx Asthma, Hx Pneumonia


Renal/ Medical History: Denies: Hx Peritoneal Dialysis


Musculoskeletal Medical History: Reports Hx Musculoskeletal Deformity - Pain to 

multiple areas mostly right knee due to sickle cell anemia


Past Surgical History: Reports: Hx Abdominal Surgery - Gallstones removal, Hx 

Cholecystectomy, Hx Orthopedic Surgery - Fluid drained from right foot, Hx 

Vascular Surgery - Port placementx2 (failed)





- Immunizations


Immunizations up to date: Yes


Hx Diphtheria, Pertussis, Tetanus Vaccination: Yes


Hx Pneumococcal Vaccination: 05/16/13





Review of Systems





- Review of Systems


Constitutional: No symptoms reported


EENT: No symptoms reported


Cardiovascular: No symptoms reported


Respiratory: No symptoms reported


Gastrointestinal: No symptoms reported


Genitourinary: No symptoms reported


Musculoskeletal: See HPI


Skin: No symptoms reported


Neurological/Psychological: No symptoms reported





Physical Exam





- Vital signs


Vitals: 





                                        











Temp Pulse Resp BP Pulse Ox


 


 98.1 F   117 H  18   138/91 H  97 


 


 11/15/19 13:25  11/15/19 13:25  11/15/19 13:25  11/15/19 13:25  11/15/19 13:25














- Notes


Notes: 





Vital signs reviewed, please refer to chart. Head is normocephalic, atraumatic. 

Pupils equal round, reactive to light.  Neck is supple without meningismus.  

Heart is regular rate and rhythm.  Lungs are clear to auscultation bilaterally. 

Abdomen is soft, nontender, normoactive bowel sounds throughout.  Extremities 

without cyanosis, clubbing. Posterior calves are nontender.  Examination of the 

right knee reveals no obvious deformity.  No associated calor, erythema, edema. 

He has full range of motion although this does elicit some pain.  Negative 

Lachman's.  Neurovascularly intact distally.  Skin is warm and dry.





Course





- Re-evaluation


Re-evalutation: 





11/15/19 19:09


Patient presents to the emergency department for evaluation.  This is typical of

his sickle cell.  He was given a total of 3 mg of Dilaudid, 2 L of fluid, and 

Toradol 30 mill grams IV.  He is feeling significantly improved.  His laboratory

investigations showed no significant abnormalities given his baseline.  We will 

send him home to follow-up with primary care, return to the ED with worsening.





- Vital Signs


Vital signs: 





                                        











Temp Pulse Resp BP Pulse Ox


 


 98.1 F   117 H  18   138/91 H  97 


 


 11/15/19 13:25  11/15/19 13:25  11/15/19 13:25  11/15/19 13:25  11/15/19 13:25














- Laboratory


Result Diagrams: 


                                 11/15/19 16:46





                                 11/15/19 16:46


Laboratory results interpreted by me: 





                                        











  11/15/19 11/15/19





  16:46 16:46


 


MCV  102 H 


 


MCH  34.8 H 


 


RDW  15.4 H 


 


Band Neutrophils %  2 L 


 


BUN   21 H


 


Glucose   118 H


 


Alkaline Phosphatase   167 H


 


Total Protein   8.5 H


 


Albumin   5.1 H














Discharge





- Discharge


Clinical Impression: 


 Sickle cell pain crisis





Condition: Stable


Disposition: HOME, SELF-CARE


Instructions:  Sickle Cell Crisis ([facility])


Additional Instructions: 


Rest, stay well-hydrated.  Continue her medications as prescribed.  Return to 

the ED with worsening or new concerning symptoms of any sort.


Referrals: 


DIYA PHILIPPE MD [Primary Care Provider] - Follow up as needed

## 2019-11-19 ENCOUNTER — HOSPITAL ENCOUNTER (EMERGENCY)
Dept: HOSPITAL 62 - ER | Age: 24
Discharge: HOME | End: 2019-11-19
Payer: MEDICAID

## 2019-11-19 VITALS — SYSTOLIC BLOOD PRESSURE: 132 MMHG | DIASTOLIC BLOOD PRESSURE: 94 MMHG

## 2019-11-19 DIAGNOSIS — R07.89: ICD-10-CM

## 2019-11-19 DIAGNOSIS — D57.00: Primary | ICD-10-CM

## 2019-11-19 DIAGNOSIS — Z88.8: ICD-10-CM

## 2019-11-19 DIAGNOSIS — Z91.018: ICD-10-CM

## 2019-11-19 DIAGNOSIS — J45.909: ICD-10-CM

## 2019-11-19 LAB
ABSOLUTE LYMPHOCYTES# (MANUAL): 1.8 10^3/UL (ref 0.5–4.7)
ABSOLUTE MONOCYTES # (MANUAL): 0.5 10^3/UL (ref 0.1–1.4)
ABSOLUTE RETICS #: 0.04 10^6/UL (ref 0.03–0.12)
ADD MANUAL DIFF: YES
ALBUMIN SERPL-MCNC: 5.7 G/DL (ref 3.5–5)
ALP SERPL-CCNC: 154 U/L (ref 38–126)
ANION GAP SERPL CALC-SCNC: 15 MMOL/L (ref 5–19)
ANISOCYTOSIS BLD QL SMEAR: SLIGHT
AST SERPL-CCNC: 44 U/L (ref 17–59)
BASOPHILS NFR BLD MANUAL: 0 % (ref 0–2)
BILIRUB DIRECT SERPL-MCNC: 0.2 MG/DL (ref 0–0.4)
BILIRUB SERPL-MCNC: 0.7 MG/DL (ref 0.2–1.3)
BUN SERPL-MCNC: 19 MG/DL (ref 7–20)
CALCIUM: 10.5 MG/DL (ref 8.4–10.2)
CHLORIDE SERPL-SCNC: 107 MMOL/L (ref 98–107)
CO2 SERPL-SCNC: 25 MMOL/L (ref 22–30)
EOSINOPHIL NFR BLD MANUAL: 4 % (ref 0–6)
ERYTHROCYTE [DISTWIDTH] IN BLOOD BY AUTOMATED COUNT: 15.7 % (ref 11.5–14)
GLUCOSE SERPL-MCNC: 104 MG/DL (ref 75–110)
HCT VFR BLD CALC: 51.1 % (ref 37.9–51)
HGB BLD-MCNC: 17.4 G/DL (ref 13.5–17)
MACROCYTES BLD QL SMEAR: (no result)
MCH RBC QN AUTO: 34.7 PG (ref 27–33.4)
MCHC RBC AUTO-ENTMCNC: 34.2 G/DL (ref 32–36)
MCV RBC AUTO: 102 FL (ref 80–97)
MONOCYTES % (MANUAL): 9 % (ref 3–13)
NRBC BLD AUTO-RTO: 1 /100 WBC
PLATELET # BLD: 220 10^3/UL (ref 150–450)
PLATELET COMMENT: ADEQUATE
POTASSIUM SERPL-SCNC: 4.5 MMOL/L (ref 3.6–5)
PROT SERPL-MCNC: 9.6 G/DL (ref 6.3–8.2)
RBC # BLD AUTO: 5.02 10^6/UL (ref 4.35–5.55)
RETICULOCYTE COUNT (AUTO): 0.7 % (ref 0.66–2.85)
SEGMENTED NEUTROPHILS % (MAN): 55 % (ref 42–78)
TARGETS BLD QL SMEAR: (no result)
TOTAL CELLS COUNTED BLD: 100
VARIANT LYMPHS NFR BLD MANUAL: 30 % (ref 13–45)
WBC # BLD AUTO: 5.6 10^3/UL (ref 4–10.5)

## 2019-11-19 PROCEDURE — 96376 TX/PRO/DX INJ SAME DRUG ADON: CPT

## 2019-11-19 PROCEDURE — 96375 TX/PRO/DX INJ NEW DRUG ADDON: CPT

## 2019-11-19 PROCEDURE — 80053 COMPREHEN METABOLIC PANEL: CPT

## 2019-11-19 PROCEDURE — 93005 ELECTROCARDIOGRAM TRACING: CPT

## 2019-11-19 PROCEDURE — 96361 HYDRATE IV INFUSION ADD-ON: CPT

## 2019-11-19 PROCEDURE — 71045 X-RAY EXAM CHEST 1 VIEW: CPT

## 2019-11-19 PROCEDURE — 84484 ASSAY OF TROPONIN QUANT: CPT

## 2019-11-19 PROCEDURE — 99285 EMERGENCY DEPT VISIT HI MDM: CPT

## 2019-11-19 PROCEDURE — 96374 THER/PROPH/DIAG INJ IV PUSH: CPT

## 2019-11-19 PROCEDURE — 93010 ELECTROCARDIOGRAM REPORT: CPT

## 2019-11-19 PROCEDURE — 36415 COLL VENOUS BLD VENIPUNCTURE: CPT

## 2019-11-19 PROCEDURE — 85045 AUTOMATED RETICULOCYTE COUNT: CPT

## 2019-11-19 PROCEDURE — 85025 COMPLETE CBC W/AUTO DIFF WBC: CPT

## 2019-11-19 RX ADMIN — HYDROMORPHONE HYDROCHLORIDE PRN MG: 2 INJECTION INTRAMUSCULAR; INTRAVENOUS; SUBCUTANEOUS at 17:09

## 2019-11-19 RX ADMIN — HYDROMORPHONE HYDROCHLORIDE PRN MG: 2 INJECTION INTRAMUSCULAR; INTRAVENOUS; SUBCUTANEOUS at 18:28

## 2019-11-19 NOTE — RADIOLOGY REPORT (SQ)
EXAM DESCRIPTION:  CHEST SINGLE VIEW



COMPLETED DATE/TIME:  11/19/2019 4:15 pm



REASON FOR STUDY:  chest pain; sickle cell



COMPARISON:  10/12/2019



EXAM PARAMETERS:  NUMBER OF VIEWS: One view.

TECHNIQUE: Single frontal radiographic view of the chest acquired.

RADIATION DOSE: NA

LIMITATIONS: None.



FINDINGS:  LUNGS AND PLEURA: No opacities, masses or pneumothorax.  Unchanged blunting of the right c
ostophrenic angle, likely scarring.  No significant pleural effusion.  Stable chronic interstitial ch
anges.

MEDIASTINUM AND HILAR STRUCTURES: No masses.  Contour normal.

HEART AND VASCULAR STRUCTURES: Heart normal in size.  Normal vasculature.

BONES: No acute findings.  Sclerosis of the humeral heads compatible with avascular necrosis, stable.


HARDWARE: None in the chest.

OTHER: Prior cholecystectomy.



IMPRESSION:  Stable sequelae of sickle cell without evidence of acute cardiopulmonary process.



TECHNICAL DOCUMENTATION:  JOB ID:  [id number]

 2011 [employer]- All Rights Reserved



Reading location - IP/workstation name: [email or web address]

## 2019-11-19 NOTE — ER DOCUMENT REPORT
ED General





- General


Chief Complaint: Chest Pain


Stated Complaint: CHEST PAIN


Time Seen by Provider: 11/19/19 15:56


Primary Care Provider: 


DIYA PHILIPPE MD [Primary Care Provider] - Follow up as needed


TRAVEL OUTSIDE OF THE [place] IN LAST 30 DAYS: No





- HPI


Notes: 





Patient is a 24-year-old male with a history of sickle cell status post 

transfusion about 7 to 8 months ago who presents complaining of chest wall pain 

that started earlier today.  Patient states he is scheduled to see [facility] again 

next week.  Patient states that the pain does not radiate.  He has not had any 

recent illness.  He is able to eat and drink without difficulty.  He is 

otherwise urinating normally and having normal bowel movements.  Denies any 

headache, fever, head injury, neck pain, URI, sore throat, palpitations, 

syncope, cough, shortness of breath, wheeze, dyspnea, abdominal pain, 

nausea/vomiting/diarrhea, urinary retention, dysuria, hematuria, or rash.














- Related Data


Allergies/Adverse Reactions: 


                                        





Coconut * [Coconut] Allergy (Mild, Verified 11/19/19 15:58)


   


apixaban [From Eliquis] Allergy (Verified 11/19/19 15:58)


   


rivaroxaban [From Xarelto] Allergy (Verified 11/19/19 15:58)


   


transpore tape Allergy (Mild, Uncoded 11/19/19 15:58)


   Hives











Past Medical History





- Social History


Smoking Status: Never Smoker


Family History: Reviewed & Not Pertinent, Arthritis, DM, Hypertension, Other - 

Sickle cell trait both parents and asthma


Patient has suicidal ideation: No


Patient has homicidal ideation: No


Pulmonary Medical History: Reports: Hx Asthma, Hx Pneumonia


Renal/ Medical History: Denies: Hx Peritoneal Dialysis


Musculoskeletal Medical History: Reports Hx Musculoskeletal Deformity - Pain to 

multiple areas mostly right knee due to sickle cell anemia


Past Surgical History: Reports: Hx Abdominal Surgery - Gallstones removal, Hx 

Cholecystectomy, Hx Orthopedic Surgery - Fluid drained from right foot, Hx 

Vascular Surgery - Port placementx2 (failed)





- Immunizations


Immunizations up to date: Yes


Hx Diphtheria, Pertussis, Tetanus Vaccination: Yes


Hx Pneumococcal Vaccination: 05/16/13





Review of Systems





- Review of Systems


-: Yes All other systems reviewed and negative





Physical Exam





- Vital signs


Vitals: 


                                        











Temp Pulse Resp BP Pulse Ox


 


 98.8 F   100   18   132/94 H  100 


 


 11/19/19 15:57  11/19/19 15:57  11/19/19 15:57  11/19/19 15:57  11/19/19 15:57














- Notes


Notes: 





PHYSICAL EXAMINATION:





GENERAL: Well-appearing, well-nourished and in no acute distress.





HEAD: Atraumatic, normocephalic.





EYES: Pupils equal round and reactive to light, extraocular movements intact, 

sclera anicteric, conjunctiva are normal.





ENT:  Nares patent and without discharge.  oropharynx clear without exudates.  

No tonsilar hypertrophy or erythema.  Moist mucous membranes. 





NECK: Normal range of motion, supple without lymphadenopathy





Chest:  + reproducible moderate tenderness to palpation of the sternal area and 

reproducible with arm extension/abduction.  





LUNGS: Breath sounds clear to auscultation bilaterally and equal.  No wheezes 

rales or rhonchi.





HEART: Regular rate and rhythm without murmurs, rubs, gallops.





ABDOMEN: Soft, nontender, nondistended abdomen.  No guarding, no rebound.  

Normal bowel sounds present.  No CVA tenderness bilaterally.





Musculoskeletal: FROM to passive/active. Strength 5+/5. Shira neg.  No asymmetry

to LE's.





Extremities:  No cyanosis, clubbing, or edema b/l.  Peripheral pulses 2+.  

Capillary refill less than 3 seconds.





NEUROLOGICAL: Normal speech, normal gait.  





PSYCH: Normal mood, normal affect.





SKIN: Warm, Dry, normal turgor, no rashes or lesions noted.





Course





- Re-evaluation


Re-evalutation: 





11/19/19 19:27


Patient is an afebrile, well-hydrated, 24-year-old male who presents to the ED 

with mild sickle cell crisis with pain to his chest wall.  Vitals are 

acceptable. He has no significant tachycardia, tachypnea, or hypoxia.  PE is 

otherwise unremarkable aside from reproducible tenderness with palp and ROM, 

since resolved.  CBC, reticulocyte count, CMP, Trop, EKG, CXR.  He is tolerating

p.o. without any difficulties.  Patient states that he is feeling much better 

and would like to go home.  He does have an appointment scheduled with a 

specialist next week.  No other labs or imaging warranted at this time based on 

H&P.  Low suspicion for any acute chest syndrome, sepsis, meningitis, severe 

dehydration.  Conservative measures otherwise for symptoms.  Recheck with your 

PCM in 3-5 days as well.  Return to the ED with any worsening/concerning 

symptoms otherwise as reviewed discharge.  Patient is in agreement.





- Vital Signs


Vital signs: 


                                        











Temp Pulse Resp BP Pulse Ox


 


 98.8 F   100   18   132/94 H  100 


 


 11/19/19 15:57  11/19/19 15:57  11/19/19 15:57  11/19/19 15:57  11/19/19 15:57














- Laboratory


Result Diagrams: 


                                 11/19/19 16:47





                                 11/19/19 16:47


Laboratory results interpreted by me: 


                                        











  11/19/19 11/19/19





  16:47 16:47


 


Hgb  17.4 H 


 


Hct  51.1 H 


 


MCV  102 H 


 


MCH  34.7 H 


 


RDW  15.7 H 


 


Sodium   146.8 H


 


Calcium   10.5 H


 


Alkaline Phosphatase   154 H


 


Total Protein   9.6 H


 


Albumin   5.7 H














Discharge





- Discharge


Clinical Impression: 


 Sickle cell crisis, Chest wall pain





Condition: Stable


Disposition: HOME, SELF-CARE


Instructions:  Chest Wall Pain ([facility])


Additional Instructions: 


Maintain adequate fluid and food intake


Take home medications as directed


Healthy diet


ice, heat as needed


Tylenol/motrin


Monitor blood pressure daily and keep a log


Monitor symptoms for any acute changes


Recheck with your PCM in 3-5 days


Keep consult with specialist next week


Return to the ED with any worsening symptoms and/or development of fever, 

headache, chest pain, palpitations, syncope, shortness of breath, trouble rich

athing, abdominal pain, n/v/d, blood in stool/urine, loss of control of 

bowel/bladder, urinary retention, muscle weakness/paralysis, numbness/tingling, 

or other worsening symptoms that are concerning to you.


Forms:  Elevated Blood Pressure


Referrals: 


DIYA PHILIPPE MD [Primary Care Provider] - Follow up as needed

## 2019-11-19 NOTE — ER DOCUMENT REPORT
ED Medical Screen (RME)





- General


Chief Complaint: Chest Pain


Stated Complaint: CHEST PAIN


Time Seen by Provider: 11/19/19 15:56


Primary Care Provider: 


DIYA PHILIPPE MD [Primary Care Provider] - Follow up as needed


Notes: 





Patient is a 24-year-old male who presents the emergency department with a chief

complaint of chest pain.  Patient states that he is a sickle cell patient and 

patient states that it feels like he is got cramps in his chest.  Patient is 

also receiving bone marrow transplants.  Patient states that he also has asthma.

 Patient states that he has not taken his pain medications today. Due to the 

chest pain, he decided to come to the emergency department to be evaluated.  

Patient also has a history of hypertension.  He currently takes carvedilol and 

amlodipine.





Exam: S1, S2.





I have greeted and performed a rapid initial assessment of this patient.  A 

comprehensive ED assessment and evaluation of the patient, analysis of test 

results and completion of medical decision making process will be conducted by 

an additional ED providers.


TRAVEL OUTSIDE OF THE [place] IN LAST 30 DAYS: No





- Related Data


Allergies/Adverse Reactions: 


                                        





Coconut * [Coconut] Allergy (Mild, Verified 11/19/19 15:58)


   


apixaban [From Eliquis] Allergy (Verified 11/19/19 15:58)


   


rivaroxaban [From Xarelto] Allergy (Verified 11/19/19 15:58)


   


transpore tape Allergy (Mild, Uncoded 11/19/19 15:58)


   Hives











Past Medical History





- Social History


Family history: None


Pulmonary Medical History: Reports: Hx Asthma, Hx Pneumonia


Renal/ Medical History: Denies: Hx Peritoneal Dialysis


Musculoskeltal Medical History: Reports Hx Musculoskeletal Deformity - Pain to 

multiple areas mostly right knee due to sickle cell anemia


Past Surgical History: Reports: Hx Abdominal Surgery - Gallstones removal, Hx 

Cholecystectomy, Hx Orthopedic Surgery - Fluid drained from right foot, Hx 

Vascular Surgery - Port placementx2 (failed)





- Immunizations


Immunizations up to date: Yes


Hx Diphtheria, Pertussis, Tetanus Vaccination: Yes





Physical Exam





- Vital signs


Vitals: 





                                        











Temp Pulse Resp BP Pulse Ox


 


 98.8 F   100   18   132/94 H  100 


 


 11/19/19 15:57  11/19/19 15:57  11/19/19 15:57  11/19/19 15:57  11/19/19 15:57














Course





- Vital Signs


Vital signs: 





                                        











Temp Pulse Resp BP Pulse Ox


 


 98.8 F   100   18   132/94 H  100 


 


 11/19/19 15:57  11/19/19 15:57  11/19/19 15:57  11/19/19 15:57  11/19/19 15:57














Doctor's Discharge





- Discharge


Referrals: 


DIYA PHILIPPE MD [Primary Care Provider] - Follow up as needed

## 2019-11-24 ENCOUNTER — HOSPITAL ENCOUNTER (EMERGENCY)
Dept: HOSPITAL 62 - ER | Age: 24
LOS: 1 days | Discharge: HOME | End: 2019-11-25
Payer: MEDICAID

## 2019-11-24 DIAGNOSIS — D57.00: Primary | ICD-10-CM

## 2019-11-24 DIAGNOSIS — M25.512: ICD-10-CM

## 2019-11-24 DIAGNOSIS — Z88.6: ICD-10-CM

## 2019-11-24 DIAGNOSIS — Z90.49: ICD-10-CM

## 2019-11-24 PROCEDURE — 85045 AUTOMATED RETICULOCYTE COUNT: CPT

## 2019-11-24 PROCEDURE — 93005 ELECTROCARDIOGRAM TRACING: CPT

## 2019-11-24 PROCEDURE — 93010 ELECTROCARDIOGRAM REPORT: CPT

## 2019-11-24 PROCEDURE — 80053 COMPREHEN METABOLIC PANEL: CPT

## 2019-11-24 PROCEDURE — 36415 COLL VENOUS BLD VENIPUNCTURE: CPT

## 2019-11-24 PROCEDURE — 85025 COMPLETE CBC W/AUTO DIFF WBC: CPT

## 2019-11-24 PROCEDURE — 84484 ASSAY OF TROPONIN QUANT: CPT

## 2019-11-25 VITALS — DIASTOLIC BLOOD PRESSURE: 87 MMHG | SYSTOLIC BLOOD PRESSURE: 129 MMHG

## 2019-11-25 LAB
ABSOLUTE LYMPHOCYTES# (MANUAL): 2.1 10^3/UL (ref 0.5–4.7)
ABSOLUTE MONOCYTES # (MANUAL): 0.5 10^3/UL (ref 0.1–1.4)
ABSOLUTE RETICS #: 0.05 10^6/UL (ref 0.03–0.12)
ADD MANUAL DIFF: YES
ALBUMIN SERPL-MCNC: 4.3 G/DL (ref 3.5–5)
ALP SERPL-CCNC: 123 U/L (ref 38–126)
ANION GAP SERPL CALC-SCNC: 11 MMOL/L (ref 5–19)
ANISOCYTOSIS BLD QL SMEAR: (no result)
AST SERPL-CCNC: 40 U/L (ref 17–59)
BASOPHILS NFR BLD MANUAL: 0 % (ref 0–2)
BILIRUB DIRECT SERPL-MCNC: 0.1 MG/DL (ref 0–0.4)
BILIRUB SERPL-MCNC: 0.3 MG/DL (ref 0.2–1.3)
BUN SERPL-MCNC: 26 MG/DL (ref 7–20)
BURR CELLS BLD QL SMEAR: (no result)
CALCIUM: 9.7 MG/DL (ref 8.4–10.2)
CHLORIDE SERPL-SCNC: 108 MMOL/L (ref 98–107)
CO2 SERPL-SCNC: 24 MMOL/L (ref 22–30)
DACRYOCYTES BLD QL SMEAR: SLIGHT
EOSINOPHIL NFR BLD MANUAL: 5 % (ref 0–6)
ERYTHROCYTE [DISTWIDTH] IN BLOOD BY AUTOMATED COUNT: 16.1 % (ref 11.5–14)
GLUCOSE SERPL-MCNC: 96 MG/DL (ref 75–110)
HCT VFR BLD CALC: 40.5 % (ref 37.9–51)
HGB BLD-MCNC: 13.8 G/DL (ref 13.5–17)
MCH RBC QN AUTO: 34.1 PG (ref 27–33.4)
MCHC RBC AUTO-ENTMCNC: 34 G/DL (ref 32–36)
MCV RBC AUTO: 100 FL (ref 80–97)
MONOCYTES % (MANUAL): 9 % (ref 3–13)
OVALOCYTES BLD QL SMEAR: (no result)
PLATELET # BLD: 214 10^3/UL (ref 150–450)
PLATELET COMMENT: ADEQUATE
POIKILOCYTOSIS BLD QL SMEAR: (no result)
POTASSIUM SERPL-SCNC: 4.3 MMOL/L (ref 3.6–5)
PROT SERPL-MCNC: 7.1 G/DL (ref 6.3–8.2)
RBC # BLD AUTO: 4.04 10^6/UL (ref 4.35–5.55)
RETICULOCYTE COUNT (AUTO): 1.33 % (ref 0.66–2.85)
SCHISTOCYTES BLD QL SMEAR: SLIGHT
SEGMENTED NEUTROPHILS % (MAN): 51 % (ref 42–78)
TARGETS BLD QL SMEAR: SLIGHT
TOTAL CELLS COUNTED BLD: 100
TOXIC GRANULES BLD QL SMEAR: (no result)
VARIANT LYMPHS NFR BLD MANUAL: 31 % (ref 13–45)
WBC # BLD AUTO: 6.1 10^3/UL (ref 4–10.5)
WBC TOXIC VACUOLES BLD QL SMEAR: PRESENT

## 2019-11-25 NOTE — ER DOCUMENT REPORT
ED General





- General


Chief Complaint: Sickle Cell Crisis


Stated Complaint: LEFT SHOULDER PAIN/CHEST PAIN


Time Seen by Provider: 19 00:52


Primary Care Provider: 


DIYA PHILIPPE MD [Primary Care Provider] - Follow up as needed


TRAVEL OUTSIDE OF THE [place] IN LAST 30 DAYS: No





- HPI


Notes: 





24-year-old male with a history of sickle cell anemia presents complaining of 

left shoulder and chest pain.  Patient states movement makes the pain worse.  

Patient states pain is similar to his prior sickle cell pain crisis episodes.  

Patient denies fever or shortness of breath.





- Related Data


Allergies/Adverse Reactions: 


                                        





Coconut * [Coconut] Allergy (Mild, Verified 19 15:58)


   


apixaban [From Eliquis] Allergy (Verified 19 15:58)


   


rivaroxaban [From Xarelto] Allergy (Verified 19 15:58)


   


transpore tape Allergy (Mild, Uncoded 19 15:58)


   Hives








Home Medications: acyclovair 400 mg bid                               prednisone

10 mg.  gabapentin 300 mg tid                              rapaune 1.5 ml.  

carvedilol 25 mg q12h                   cymbalta 60 mg qday.  narcan prn        

                        oxycodone 10 mg prn.  fentanyl 50 mcg patch





Past Medical History





- General


Information source: Patient





- Social History


Smoking Status: Never Smoker


Frequency of alcohol use: None


Drug Abuse: None


Family History: Reviewed & Not Pertinent, Arthritis, DM, Hypertension, Other - 

Sickle cell trait both parents and asthma


Patient has suicidal ideation: No


Patient has homicidal ideation: No


Pulmonary Medical History: Reports: Hx Asthma, Hx Pneumonia


Renal/ Medical History: Denies: Hx Peritoneal Dialysis


Musculoskeletal Medical History: Reports Hx Musculoskeletal Deformity - Pain to 

multiple areas mostly right knee due to sickle cell anemia


Past Surgical History: Reports: Hx Abdominal Surgery - Gallstones removal, Hx 

Cholecystectomy, Hx Orthopedic Surgery - Fluid drained from right foot, Hx 

Vascular Surgery - Port placementx2 (failed)





- Immunizations


Immunizations up to date: Yes


Hx Diphtheria, Pertussis, Tetanus Vaccination: Yes


Hx Pneumococcal Vaccination: 13





Review of Systems





- Review of Systems


Constitutional: No symptoms reported


EENT: No symptoms reported


Cardiovascular: See HPI


Respiratory: No symptoms reported


Gastrointestinal: No symptoms reported


Genitourinary: No symptoms reported


Male Genitourinary: No symptoms reported


Musculoskeletal: Joint pain


Skin: No symptoms reported


Hematologic/Lymphatic: No symptoms reported


Neurological/Psychological: No symptoms reported


-: Yes All other systems reviewed and negative





Physical Exam





- Vital signs


Vitals: 


                                        











Temp Pulse Resp BP Pulse Ox


 


 98.3 F   99   20   124/82   96 


 


 19 22:44  19 22:44  19 22:44  19 22:44  19 22:44














- Notes


Notes: 





PHYSICAL EXAMINATION:


 


GENERAL: Well-appearing, well-nourished and in no acute distress.


 


HEAD: Atraumatic, normocephalic.


 


EYES: Pupils equal round and reactive to light, extraocular movements intact, 

sclera anicteric, conjunctiva are normal.


 


ENT: nares patent, oropharynx clear without exudates.  Moist mucous membranes.


 


NECK: Normal range of motion, supple without lymphadenopathy


 


LUNGS: Breath sounds clear to auscultation bilaterally and equal.  No wheezes 

rales or rhonchi.


 


HEART: Regular rate and rhythm without murmurs


 


ABDOMEN: Soft, nontender, normoactive bowel sounds.  No guarding, no rebound.  

No masses appreciated.


 


EXTREMITIES: Normal range of motion, no pitting or edema.  No cyanosis.


 


NEUROLOGICAL: No focal neurological deficits. Moves all extremities 

spontaneously and on command.


 


PSYCH: Normal mood, normal affect.


 


SKIN: Warm, Dry, normal turgor, no rashes or lesions noted.





Course





- Re-evaluation


Re-evalutation: 





19 03:00


Patient states his pain is improved.





- Vital Signs


Vital signs: 


                                        











Temp Pulse Resp BP Pulse Ox


 


 98.3 F   99   23 H  135/93 H  100 


 


 19 22:44  19 22:44  19 01:01  19 01:00  19 01:01














- Laboratory


Result Diagrams: 


                                 19 00:51





                                 19 00:51


Laboratory results interpreted by me: 


                                        











  19





  00:51 00:51


 


RBC  4.04 L 


 


MCV  100 H 


 


MCH  34.1 H 


 


RDW  16.1 H 


 


Chloride   108 H


 


BUN   26 H














- EKG Interpretation by Me


Additional EKG results interpreted by me: 





19 01:04


EKG performed 2019 at 2319 hrs. was interpreted by this MD.  Findings 

normal sinus rhythm, rate 93, normal axis, P waves preceding QRS complexes, QRS 

complexes appear narrow, there is no ST elevation or depression to suggest acute

myocardial injury.  Impression normal sinus rhythm without acute findings.





Discharge





- Discharge


Clinical Impression: 


 Sickle cell pain crisis





Condition: Good


Disposition: HOME, SELF-CARE


Instructions:  Sickle Cell Crisis ([facility])


Additional Instructions: 


Return to the Emergency Department without delay if any worse.











HOME CARE INSTRUCTIONS & INFORMATION:  Thank you for choosing us for your 

medical needs. We hope you're satisfied with the care you received.  After you 

leave, you must properly care for your problem and, at the same time, observe 

its progress.  Any condition can change.  Some illnesses can change rapidly over

hours or days.  If your condition worsens, return to the Emergency Department or

see your physician promptly.





ABOUT YOUR X-RAYS AND EKG'S:   If you had an EKG or X-rays taken, they have been

read by the Emergency Physician. The X-rays and EKG's will also be read by a 

Radiologist or Cardiologist within 24 hours.  If discrepancies are noted, you 

will be notified by telephone.  Please be certain the ED has a correct telephone

number & address where you can be reached.  Also, realize that some fractures or

abnormalities do not show up on initial X-rays.  If your symptoms continue, see 

your physician.





ABOUT YOUR LABORATORY TEST:   If you had laboratory tests, the results have been

reviewed by the Emergency Physician.  Some test results (for example cultures) 

may not be available for several days.  You will be contacted if any test result

shows you need additional treatment.  Please be certain the ED has a correct 

telephone number and address where you can be reached.





ABOUT YOUR MEDICATIONS:  You will receive instructions on how to take your 

medicine on the prescription label you receive.  Additional information may be 

provided by the Pharmacy.  If you have questions afterwards, call the ED for 

clarification or further instructions.  Some prescribed medications may cause 

drowsiness.  Do not perform tasks such as driving a car or operating machinery 

without consulting your Pharmacist.  If you feel you need a refill of pain 

medication, your condition will need re-evaluation.  Please do not call for a 

refill of any medication.





ABOUT YOUR SIGNATURE:   Signature of this document acknowledges to followin. Understanding that you received emergency treatment and that you may be 

released before al medical problems are known or treated. Please be certain the 

ED has a correct phone number & address where you can be reached.


   2. Acknowledgement that you will arrange for follow-up care as recommended.


   3. Authorization for the Emergency Physician to provide information to your 

follow-up Physician in order to maximize your care.





AT ANY TIME, IF YOUR SYMPTOMS CHANGE SIGNIFICANTLY OR WORSEN OR YOU DEVELOP NEW 

SYMPTOMS, RETURN TO THE EMERGENCY DEPARTMENT IMMEDIATELY FOR RE-EVALUATION.





OUR GOAL IS TO PROVIDE EXCELLENT MEDICAL CARE!





WE HOPE THAT WE HAVE MET YOUR EXPECTATIONS DURING YOUR EMERGENCY DEPARTMENT 

VISIT AND THAT YOU FEEL YOU HAVE RECEIVED EXCELLENT CARE!











Referrals: 


DIYA PHILIPPE MD [Primary Care Provider] - Follow up as needed

## 2019-12-02 ENCOUNTER — HOSPITAL ENCOUNTER (EMERGENCY)
Dept: HOSPITAL 62 - ER | Age: 24
Discharge: HOME | End: 2019-12-02
Payer: MEDICAID

## 2019-12-02 VITALS — DIASTOLIC BLOOD PRESSURE: 97 MMHG | SYSTOLIC BLOOD PRESSURE: 158 MMHG

## 2019-12-02 DIAGNOSIS — R51: Primary | ICD-10-CM

## 2019-12-02 DIAGNOSIS — J45.909: ICD-10-CM

## 2019-12-02 PROCEDURE — 96374 THER/PROPH/DIAG INJ IV PUSH: CPT

## 2019-12-02 PROCEDURE — 71046 X-RAY EXAM CHEST 2 VIEWS: CPT

## 2019-12-02 PROCEDURE — 99284 EMERGENCY DEPT VISIT MOD MDM: CPT

## 2019-12-02 PROCEDURE — 96375 TX/PRO/DX INJ NEW DRUG ADDON: CPT

## 2019-12-02 PROCEDURE — 96361 HYDRATE IV INFUSION ADD-ON: CPT

## 2019-12-02 NOTE — ER DOCUMENT REPORT
ED General





- General


Chief Complaint: Headache


Stated Complaint: HEADACHE


Time Seen by Provider: 12/02/19 14:39


Primary Care Provider: 


DIYA PHILIPPE MD [ACTIVE STAFF] - Follow up as needed


Notes: 





24 year old male arrives with complaints of a headache which he feels is on the 

verge of becoming a migraine similar to what he may get when a crisis starts.  

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  


TRAVEL OUTSIDE OF THE [place] IN LAST 30 DAYS: No





- HPI


Onset: This morning


Onset/Duration: Gradual


Quality of pain: Achy, Throbbing


Severity: Mild


Associated symptoms: None


Exacerbated by: Denies





- Related Data


Allergies/Adverse Reactions: 


                                        





Coconut * [Coconut] Allergy (Mild, Verified 11/19/19 15:58)


   


apixaban [From Eliquis] Allergy (Verified 11/19/19 15:58)


   


rivaroxaban [From Xarelto] Allergy (Verified 11/19/19 15:58)


   


transpore tape Allergy (Mild, Uncoded 11/19/19 15:58)


   Hives








Home Medications: Oxycodone, Fentanyl, Hydrourea





Past Medical History





- Social History


Smoking Status: Never Smoker


Family History: Reviewed & Not Pertinent, Arthritis, DM, Hypertension, Other - 

Sickle cell trait both parents and asthma


Patient has suicidal ideation: No


Patient has homicidal ideation: No


Pulmonary Medical History: Reports: Hx Asthma, Hx Pneumonia


Renal/ Medical History: Denies: Hx Peritoneal Dialysis


Musculoskeletal Medical History: Reports Hx Musculoskeletal Deformity - Pain to 

multiple areas mostly right knee due to sickle cell anemia


Past Surgical History: Reports: Hx Abdominal Surgery - Gallstones removal, Hx 

Cholecystectomy, Hx Orthopedic Surgery - Fluid drained from right foot, Hx 

Vascular Surgery - Port placementx2 (failed)





- Immunizations


Immunizations up to date: Yes


Hx Diphtheria, Pertussis, Tetanus Vaccination: Yes


Hx Pneumococcal Vaccination: 05/16/13





Review of Systems





- Review of Systems


Constitutional: No symptoms reported


EENT: No symptoms reported


Cardiovascular: No symptoms reported


Respiratory: No symptoms reported


Gastrointestinal: No symptoms reported


Genitourinary: No symptoms reported


Male Genitourinary: No symptoms reported


Musculoskeletal: No symptoms reported


Skin: No symptoms reported


Hematologic/Lymphatic: No symptoms reported


Neurological/Psychological: No symptoms reported





Physical Exam





- Vital signs


Vitals: 


                                        











Temp Pulse Resp BP Pulse Ox


 


 98.4 F   115 H  20   139/94 H  96 


 


 12/02/19 14:21  12/02/19 14:21  12/02/19 14:21  12/02/19 14:21  12/02/19 14:21











Interpretation: Normal





- General


General appearance: Appears well, Alert





- HEENT


Head: Normocephalic, Atraumatic


Eyes: Normal


Pupils: PERRL





- Respiratory


Respiratory status: No respiratory distress


Chest status: Nontender


Breath sounds: Normal


Chest palpation: Normal





- Cardiovascular


Rhythm: Regular


Heart sounds: Normal auscultation


Murmur: No





- Abdominal


Inspection: Normal


Distension: No distension


Bowel sounds: Normal


Tenderness: Nontender


Organomegaly: No organomegaly





- Back


Back: Normal, Nontender





- Extremities


General upper extremity: Normal inspection, Nontender, Normal color, Normal ROM,

Normal temperature


General lower extremity: Normal inspection, Nontender, Normal color, Normal ROM,

Normal temperature, Normal weight bearing.  No: Shira's sign





- Neurological


Neuro grossly intact: Yes


Cognition: Normal


Orientation: AAOx4


[place] Coma Scale Eye Opening: Spontaneous


[place] Coma Scale Verbal: Oriented


[place] Coma Scale Motor: Obeys Commands


[place] Coma Scale Total: 15


Speech: Normal


Motor strength normal: LUE, RUE, LLE, RLE


Sensory: Normal





- Psychological


Associated symptoms: Normal affect, Normal mood





- Skin


Skin Temperature: Warm


Skin Moisture: Dry


Skin Color: Normal





Course





- Re-evaluation


Re-evalutation: 





12/02/19 16:49


MDM 24 year old male with sickle cell disease is here with headache but not 

concerning signs along with this.  He actually told me to leave the room a few 

times as he was busy talking to his mother on his cell phone.  He denies fever 

or chills and no tick bite or flu like symptoms or rash or CO exposure.  This 

seems likely to be a tension type headache.  He has his next follow up at [facility] 

tomorrow.  Additionally he just had blood work done this past week and I see no 

reason to repeat it at this point.  





- Vital Signs


Vital signs: 


                                        











Temp Pulse Resp BP Pulse Ox


 


 98.4 F   115 H  20   139/94 H  96 


 


 12/02/19 14:41  12/02/19 14:41  12/02/19 14:41  12/02/19 14:41  12/02/19 14:41














Discharge





- Discharge


Clinical Impression: 


Headache


Qualifiers:


 Headache type: unspecified Headache chronicity pattern: acute headache 

Intractability: not intractable Qualified Code(s): R51 - Headache





Sickle cell anemia


Qualifiers:


 Sickle-cell associated disorders: without crisis Qualified Code(s): D57.1 - 

Sickle-cell disease without crisis





Condition: Good


Disposition: HOME, SELF-CARE


Instructions:  Use of Diphenhydramine, Pain Medication Injection ([facility]), Reglan 

([facility])


Additional Instructions: 


Keep your follow up at [facility] tomorrow.  Rest, fluids, medicines as directed.  

Please return here for any problems or any concerns. 


Referrals: 


DIYA PHILIPPE MD [ACTIVE STAFF] - Follow up as needed

## 2019-12-02 NOTE — RADIOLOGY REPORT ([facility])
EXAM DESCRIPTION:  CHEST 2 VIEWS



COMPLETED DATE/TIME:  12/2/2019 3:29 pm



REASON FOR STUDY:  sickle cell



COMPARISON:  11/19/2019



EXAM PARAMETERS:  NUMBER OF VIEWS: two views

TECHNIQUE: Digital Frontal and Lateral radiographic views of the chest acquired.

RADIATION DOSE: NA

LIMITATIONS: none



FINDINGS:  LUNGS AND PLEURA: Coarse interstitial opacities, stable.  Possible trace right effusion ve
rsus pleural thickening.  Opacities, masses or pneumothorax. No pleural effusion.

MEDIASTINUM AND HILAR STRUCTURES: No masses or contour abnormalities.

HEART AND VASCULAR STRUCTURES: Heart normal size.  No evidence for failure.

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

HARDWARE: Prior cholecystectomy.

OTHER: No other significant finding.



IMPRESSION:  No evidence of focal consolidation or other acute cardiopulmonary process.  Stable seque
lae of sickle cell.



TECHNICAL DOCUMENTATION:  JOB ID:  [id number]

 2011 [employer]- All Rights Reserved



Reading location - IP/workstation name: [email or web address]

## 2019-12-02 NOTE — ER DOCUMENT REPORT
ED Medical Screen (RME)





- General


Chief Complaint: Headache


Stated Complaint: HEADACHE


Time Seen by Provider: 12/02/19 14:39


Primary Care Provider: 


DIYA PHILIPPE MD [Primary Care Provider] - Follow up as needed


Notes: 





Patient is a 24-year-old male with a history of sickle cell migraines who 

presents emergency department with chief complaint of low back pain and 

headache.  Patient reports she is having a frontal headache.  Patient reports 

this does feel like his typical migraine.  He reports he did attempt to take his

fentanyl patch and oxycodone 15 mg without relief.  Patient reports his last 

dose of oxycodone was 9 AM this morning.  Patient reports nausea without 

vomiting.  Patient denies chest pain or shortness of breath.  Patient also 

complains of low back pain.  Patient reports he does have back pain with his 

sickle cell crises.  Denies fever.


TRAVEL OUTSIDE OF THE [place] IN LAST 30 DAYS: No





- Related Data


Allergies/Adverse Reactions: 


                                        





Coconut * [Coconut] Allergy (Mild, Verified 11/19/19 15:58)


   


apixaban [From Eliquis] Allergy (Verified 11/19/19 15:58)


   


rivaroxaban [From Xarelto] Allergy (Verified 11/19/19 15:58)


   


transpore tape Allergy (Mild, Uncoded 11/19/19 15:58)


   Hives








Home Medications: Oxycodone, Fentanyl, Hydrourea





Past Medical History





- Social History


Family history: None


Pulmonary Medical History: Reports: Hx Asthma, Hx Pneumonia


Renal/ Medical History: Denies: Hx Peritoneal Dialysis


Musculoskeltal Medical History: Reports Hx Musculoskeletal Deformity - Pain to 

multiple areas mostly right knee due to sickle cell anemia


Past Surgical History: Reports: Hx Abdominal Surgery - Gallstones removal, Hx 

Cholecystectomy, Hx Orthopedic Surgery - Fluid drained from right foot, Hx 

Vascular Surgery - Port placementx2 (failed)





- Immunizations


Immunizations up to date: Yes


Hx Diphtheria, Pertussis, Tetanus Vaccination: Yes





Physical Exam





- Vital signs


Vitals: 





                                        











Temp Pulse Resp BP Pulse Ox


 


 98.4 F   115 H  20   139/94 H  96 


 


 12/02/19 14:21  12/02/19 14:21  12/02/19 14:21  12/02/19 14:21  12/02/19 14:21














- Respiratory


Respiratory status: No respiratory distress


Chest status: Nontender


Breath sounds: Normal


Chest palpation: Normal





Course





- Re-evaluation


Re-evalutation: 





12/02/19 14:50


I have greeted and performed a rapid initial assessment of this patient.  A 

comprehensive ED assessment and evaluation of the patient, analysis of test 

results and completion of the medical decision making process will be conducted 

by additional ED providers.





- Vital Signs


Vital signs: 





                                        











Temp Pulse Resp BP Pulse Ox


 


 98.4 F   115 H  20   139/94 H  96 


 


 12/02/19 14:41  12/02/19 14:41  12/02/19 14:41  12/02/19 14:41  12/02/19 14:41














Doctor's Discharge





- Discharge


Referrals: 


DIYA PHILIPPE MD [Primary Care Provider] - Follow up as needed

## 2019-12-05 ENCOUNTER — HOSPITAL ENCOUNTER (EMERGENCY)
Dept: HOSPITAL 62 - ER | Age: 24
LOS: 1 days | Discharge: HOME | End: 2019-12-06
Payer: MEDICAID

## 2019-12-05 DIAGNOSIS — J45.909: ICD-10-CM

## 2019-12-05 DIAGNOSIS — Z91.018: ICD-10-CM

## 2019-12-05 DIAGNOSIS — M25.561: ICD-10-CM

## 2019-12-05 DIAGNOSIS — R00.0: ICD-10-CM

## 2019-12-05 DIAGNOSIS — Z79.899: ICD-10-CM

## 2019-12-05 DIAGNOSIS — D57.00: Primary | ICD-10-CM

## 2019-12-05 DIAGNOSIS — Z88.8: ICD-10-CM

## 2019-12-05 LAB
ABSOLUTE RETICS #: 0.06 10^6/UL (ref 0.03–0.12)
ADD MANUAL DIFF: NO
ALBUMIN SERPL-MCNC: 5.3 G/DL (ref 3.5–5)
ALP SERPL-CCNC: 143 U/L (ref 38–126)
ANION GAP SERPL CALC-SCNC: 14 MMOL/L (ref 5–19)
AST SERPL-CCNC: 39 U/L (ref 17–59)
BASOPHILS # BLD AUTO: 0.1 10^3/UL (ref 0–0.2)
BASOPHILS NFR BLD AUTO: 1.7 % (ref 0–2)
BILIRUB DIRECT SERPL-MCNC: 0.2 MG/DL (ref 0–0.4)
BILIRUB SERPL-MCNC: 0.7 MG/DL (ref 0.2–1.3)
BUN SERPL-MCNC: 20 MG/DL (ref 7–20)
CALCIUM: 10.4 MG/DL (ref 8.4–10.2)
CHLORIDE SERPL-SCNC: 104 MMOL/L (ref 98–107)
CO2 SERPL-SCNC: 24 MMOL/L (ref 22–30)
EOSINOPHIL # BLD AUTO: 0.2 10^3/UL (ref 0–0.6)
EOSINOPHIL NFR BLD AUTO: 3.2 % (ref 0–6)
ERYTHROCYTE [DISTWIDTH] IN BLOOD BY AUTOMATED COUNT: 15.9 % (ref 11.5–14)
GLUCOSE SERPL-MCNC: 128 MG/DL (ref 75–110)
HCT VFR BLD CALC: 50.8 % (ref 37.9–51)
HGB BLD-MCNC: 17.3 G/DL (ref 13.5–17)
LYMPHOCYTES # BLD AUTO: 1.5 10^3/UL (ref 0.5–4.7)
LYMPHOCYTES NFR BLD AUTO: 19.7 % (ref 13–45)
MCH RBC QN AUTO: 34.1 PG (ref 27–33.4)
MCHC RBC AUTO-ENTMCNC: 34.1 G/DL (ref 32–36)
MCV RBC AUTO: 100 FL (ref 80–97)
MONOCYTES # BLD AUTO: 0.6 10^3/UL (ref 0.1–1.4)
MONOCYTES NFR BLD AUTO: 7.9 % (ref 3–13)
NEUTROPHILS # BLD AUTO: 5.2 10^3/UL (ref 1.7–8.2)
NEUTS SEG NFR BLD AUTO: 67.5 % (ref 42–78)
PLATELET # BLD: 279 10^3/UL (ref 150–450)
POTASSIUM SERPL-SCNC: 4.6 MMOL/L (ref 3.6–5)
PROT SERPL-MCNC: 8.6 G/DL (ref 6.3–8.2)
RBC # BLD AUTO: 5.07 10^6/UL (ref 4.35–5.55)
RETICULOCYTE COUNT (AUTO): 1.09 % (ref 0.66–2.85)
TOTAL CELLS COUNTED % (AUTO): 100 %
WBC # BLD AUTO: 7.8 10^3/UL (ref 4–10.5)

## 2019-12-05 PROCEDURE — 85045 AUTOMATED RETICULOCYTE COUNT: CPT

## 2019-12-05 PROCEDURE — 80053 COMPREHEN METABOLIC PANEL: CPT

## 2019-12-05 PROCEDURE — 36415 COLL VENOUS BLD VENIPUNCTURE: CPT

## 2019-12-05 PROCEDURE — 85025 COMPLETE CBC W/AUTO DIFF WBC: CPT

## 2019-12-05 PROCEDURE — 96374 THER/PROPH/DIAG INJ IV PUSH: CPT

## 2019-12-05 PROCEDURE — 96361 HYDRATE IV INFUSION ADD-ON: CPT

## 2019-12-05 PROCEDURE — 96376 TX/PRO/DX INJ SAME DRUG ADON: CPT

## 2019-12-05 PROCEDURE — 99283 EMERGENCY DEPT VISIT LOW MDM: CPT

## 2019-12-05 PROCEDURE — 96375 TX/PRO/DX INJ NEW DRUG ADDON: CPT

## 2019-12-05 NOTE — ER DOCUMENT REPORT
ED Medical Screen (RME)





- General


Chief Complaint: Sickle Cell Crisis


Stated Complaint: RIGHT KNEE PAIN


Time Seen by Provider: 12/05/19 19:59


Primary Care Provider: 


JOSAFAT YBARRA MD [Primary Care Provider] - Follow up as needed


Mode of Arrival: Ambulatory


TRAVEL OUTSIDE OF THE [place] IN LAST 30 DAYS: No





- HPI


Notes: 





12/05/19 20:02


24-year-old male presents to the ED for exacerbation of his sickle cell crisis. 

Patient states he always presents typically with right knee pain which is the q.

that he is having sickle cell crisis exacerbation.  Denies any fevers or chills.

  patient is well-known to [place] [facility]. denies any chest pain shortness of breath 

nausea vomiting diarrhea





I have greeted and performed a rapid initial assessment of this patient.  A 

comprehensive ED assessment and evaluation of the patient, analysis of test 

results and completion of the medical decision making process will be conducted 

by additional ED providers.





PHYSICAL EXAMINATION:





GENERAL: Well-appearing, well-nourished and in no acute distress.





HEAD: Atraumatic, normocephalic.





EYES: Pupils equal round extraocular movements intact,  conjunctiva are normal.





ENT: Nares patent





NECK: Normal range of motion





LUNGS: No respiratory distress





Musculoskeletal: Normal range of motion





NEUROLOGICAL:  Normal speech, normal gait. 





PSYCH: Normal mood, normal affect.





SKIN: Warm, Dry, normal turgor, no rashes or lesions noted.





- Related Data


Allergies/Adverse Reactions: 


                                        





Coconut * [Coconut] Allergy (Mild, Verified 11/19/19 15:58)


   


apixaban [From Eliquis] Allergy (Verified 11/19/19 15:58)


   


rivaroxaban [From Xarelto] Allergy (Verified 11/19/19 15:58)


   


transpore tape Allergy (Mild, Uncoded 11/19/19 15:58)


   Hives








Home Medications: oxycodone 15 mg q4 as needed.  hydrox urea.  folic acid





Past Medical History





- Social History


Chew tobacco use (# tins/day): No


Drug Abuse: None


Family history: None


Pulmonary Medical History: Reports: Hx Asthma, Hx Pneumonia


Renal/ Medical History: Denies: Hx Peritoneal Dialysis


Musculoskeltal Medical History: Reports Hx Musculoskeletal Deformity - Pain to 

multiple areas mostly right knee due to sickle cell anemia


Past Surgical History: Reports: Hx Abdominal Surgery - Gallstones removal, Hx 

Cholecystectomy, Hx Orthopedic Surgery - Fluid drained from right foot, Hx 

Vascular Surgery - Port placementx2 (failed)





- Immunizations


Immunizations up to date: Yes


Hx Diphtheria, Pertussis, Tetanus Vaccination: Yes





Physical Exam





- Vital signs


Vitals: 





                                        











Temp Pulse Resp BP Pulse Ox


 


 99.4 F   126 H  18   138/101 H  95 


 


 12/05/19 19:21  12/05/19 19:21  12/05/19 19:21  12/05/19 19:21  12/05/19 19:21














Course





- Vital Signs


Vital signs: 





                                        











Temp Pulse Resp BP Pulse Ox


 


 99.4 F   126 H  18   138/101 H  95 


 


 12/05/19 19:57  12/05/19 19:57  12/05/19 19:57  12/05/19 19:57  12/05/19 19:57














Doctor's Discharge





- Discharge


Referrals: 


JOSAFAT YBARRA MD [Primary Care Provider] - Follow up as needed

## 2019-12-06 VITALS — SYSTOLIC BLOOD PRESSURE: 148 MMHG | DIASTOLIC BLOOD PRESSURE: 80 MMHG

## 2019-12-06 NOTE — ER DOCUMENT REPORT
ED General





- General


Chief Complaint: Sickle Cell Crisis


Stated Complaint: RIGHT KNEE PAIN


Time Seen by Provider: 12/05/19 19:59


Primary Care Provider: 


JOSAFAT YBARRA MD [NO LOCAL MD] - Follow up as needed


Mode of Arrival: Ambulatory


Notes: 


Patient is a 24-year-old male that comes emergency department for chief 

complaint of right knee pain.  He has a history of sickle cell anemia.  He sta

linda this is especially been noticeable over the past 2 days.  He states that 

this is been bothering him intermittently since the weather turned cold.  He 

denies injury, fever, shortness of breath, chest pain, headache, or any other 

complaints.  He takes oxycodone for pain as needed, follows with Dr. Cleaning 

hematologist.





TRAVEL OUTSIDE OF THE [place] IN LAST 30 DAYS: No





- Related Data


Allergies/Adverse Reactions: 


                                        





Coconut * [Coconut] Allergy (Mild, Verified 11/19/19 15:58)


   


apixaban [From Eliquis] Allergy (Verified 11/19/19 15:58)


   


rivaroxaban [From Xarelto] Allergy (Verified 11/19/19 15:58)


   


transpore tape Allergy (Mild, Uncoded 11/19/19 15:58)


   Hives








Home Medications: oxycodone 15 mg q4 as needed.  hydrox urea.  folic acid





Past Medical History





- General


Information source: Patient





- Social History


Smoking Status: Never Smoker


Chew tobacco use (# tins/day): No


Drug Abuse: None


Lives with: Family


Family History: Reviewed & Not Pertinent, Arthritis, DM, Hypertension, Other - 

Sickle cell trait both parents and asthma


Patient has suicidal ideation: No


Patient has homicidal ideation: No


Pulmonary Medical History: Reports: Hx Asthma, Hx Pneumonia


Renal/ Medical History: Denies: Hx Peritoneal Dialysis


Musculoskeletal Medical History: Reports Hx Musculoskeletal Deformity - Pain to 

multiple areas mostly right knee due to sickle cell anemia


Past Surgical History: Reports: Hx Abdominal Surgery - Gallstones removal, Hx 

Cholecystectomy, Hx Orthopedic Surgery - Fluid drained from right foot, Hx 

Vascular Surgery - Port placementx2 (failed)





- Immunizations


Immunizations up to date: Yes


Hx Diphtheria, Pertussis, Tetanus Vaccination: Yes


Hx Pneumococcal Vaccination: 05/16/13





Review of Systems





- Review of Systems


Constitutional: No symptoms reported


EENT: No symptoms reported


Cardiovascular: No symptoms reported


Respiratory: No symptoms reported


Gastrointestinal: No symptoms reported


Genitourinary: No symptoms reported


Male Genitourinary: No symptoms reported


Musculoskeletal: See HPI


Skin: No symptoms reported


Hematologic/Lymphatic: No symptoms reported


Neurological/Psychological: No symptoms reported





Physical Exam





- Vital signs


Vitals: 


                                        











Temp Pulse BP Pulse Ox


 


 99.4 F   122 H  138/101 H  95 


 


 12/05/19 19:19  12/05/19 19:19  12/05/19 19:19  12/05/19 19:19














- Notes


Notes: 





GENERAL: Alert, interacts well. No acute distress.


HEAD: Normocephalic, atraumatic.


EYES: Pupils equal, round, and reactive to light. Extraocular movements intact.


ENT: Oral mucosa moist, tongue midline. Oropharynx unremarkable. Airway patent. 


NECK: Full range of motion. Supple. Trachea midline.


LUNGS: Clear to auscultation bilaterally, no wheezes, rales, or rhonchi. No 

respiratory distress.


HEART: Tachycardia, normal rhythm, no murmur


ABDOMEN: Soft, non-tender. Non-distended. Bowel sounds present in all 4 

quadrants.


GENITOURINARY: Deferred


EXTREMITIES: Minimal tenderness with palpation over the right knee generally, no

noted swelling, erythema.  Normal lower examinee exam otherwise with normal 

range of motion of the knee, normal distal neurovascular exam.  No edema, normal

radial and dorsalis pedis pulses bilaterally. No cyanosis.


BACK: no cervical, thoracic, lumbar midline tenderness. No saddle anesthesia. 

Moves all extremities in full range of motion.


NEUROLOGICAL: Alert and oriented x3. Normal speech. Cranial nerves II through 

XII grossly intact. 


PSYCH: Normal affect, normal mood.


SKIN: Warm, dry, normal turgor. No rashes or lesions noted.





Course





- Re-evaluation


Re-evalutation: 


Patient is not in distress on my initial evaluation, he has generalized pain 

over the knee but there is no erythema, swelling, or loss of range of motion.  

He is somewhat tachycardic.  No shortness of breath, clear lungs, no chest pain,

no fever.  Examination otherwise unremarkable.





CBC, chemistry unremarkable, reticulocyte count and bilirubin are unremarkable. 

After IV fluids, medications, tachycardia resolved.  I reevaluated patient and 

discussed his situation with his sickle cell pain, he states he feels much 

better after treatments and he is ready to leave.  He has no current complaints.

 He states he will follow-up with his hematologist Dr. Cleaning and he will come 

back if he worsens, this was discussed in detail.  Patient states appreciation 

and agreement.  Smiling and well-appearing at time of discharge.





- Vital Signs


Vital signs: 


                                        











Temp Pulse Resp BP Pulse Ox


 


 98.0 F   102 H  18   148/80 H  100 


 


 12/06/19 03:54  12/06/19 03:54  12/06/19 03:54  12/06/19 03:54  12/06/19 03:54














- Laboratory


Result Diagrams: 


                                 12/05/19 20:42





                                 12/05/19 20:42


Laboratory results interpreted by me: 


                                        











  12/05/19 12/05/19





  20:42 20:42


 


Hgb  17.3 H 


 


MCV  100 H 


 


MCH  34.1 H 


 


RDW  15.9 H 


 


Glucose   128 H


 


Calcium   10.4 H


 


Alkaline Phosphatase   143 H


 


Total Protein   8.6 H


 


Albumin   5.3 H














Discharge





- Discharge


Clinical Impression: 


 Sickle cell pain crisis





Right knee pain


Qualifiers:


 Chronicity: unspecified Qualified Code(s): M25.561 - Pain in right knee





Condition: Stable


Disposition: HOME, SELF-CARE


Additional Instructions: 


Follow closely with your hematologist for additional evaluation and management.


Come back if you are worse including severe worsening pain, fever, shortness of 

breath, chest pain, or any other concerning symptoms.


Referrals: 


JOSAFAT YBARRA MD [NO LOCAL MD] - Follow up as needed

## 2019-12-10 ENCOUNTER — HOSPITAL ENCOUNTER (EMERGENCY)
Dept: HOSPITAL 62 - ER | Age: 24
Discharge: HOME | End: 2019-12-10
Payer: MEDICAID

## 2019-12-10 VITALS — DIASTOLIC BLOOD PRESSURE: 95 MMHG | SYSTOLIC BLOOD PRESSURE: 127 MMHG

## 2019-12-10 DIAGNOSIS — Z79.899: ICD-10-CM

## 2019-12-10 DIAGNOSIS — D57.00: ICD-10-CM

## 2019-12-10 DIAGNOSIS — E86.0: ICD-10-CM

## 2019-12-10 DIAGNOSIS — R51: ICD-10-CM

## 2019-12-10 DIAGNOSIS — M25.561: Primary | ICD-10-CM

## 2019-12-10 DIAGNOSIS — M25.532: ICD-10-CM

## 2019-12-10 DIAGNOSIS — R94.4: ICD-10-CM

## 2019-12-10 LAB
ABSOLUTE RETICS #: 0.07 10^6/UL (ref 0.03–0.12)
ADD MANUAL DIFF: NO
ALBUMIN SERPL-MCNC: 5 G/DL (ref 3.5–5)
ALP SERPL-CCNC: 132 U/L (ref 38–126)
ANION GAP SERPL CALC-SCNC: 14 MMOL/L (ref 5–19)
AST SERPL-CCNC: 36 U/L (ref 17–59)
BASOPHILS # BLD AUTO: 0.2 10^3/UL (ref 0–0.2)
BASOPHILS NFR BLD AUTO: 1.6 % (ref 0–2)
BILIRUB DIRECT SERPL-MCNC: 0.3 MG/DL (ref 0–0.4)
BILIRUB SERPL-MCNC: 0.7 MG/DL (ref 0.2–1.3)
BUN SERPL-MCNC: 22 MG/DL (ref 7–20)
CALCIUM: 9.8 MG/DL (ref 8.4–10.2)
CHLORIDE SERPL-SCNC: 105 MMOL/L (ref 98–107)
CO2 SERPL-SCNC: 23 MMOL/L (ref 22–30)
EOSINOPHIL # BLD AUTO: 0.1 10^3/UL (ref 0–0.6)
EOSINOPHIL NFR BLD AUTO: 0.8 % (ref 0–6)
ERYTHROCYTE [DISTWIDTH] IN BLOOD BY AUTOMATED COUNT: 16.2 % (ref 11.5–14)
GLUCOSE SERPL-MCNC: 104 MG/DL (ref 75–110)
HCT VFR BLD CALC: 46.5 % (ref 37.9–51)
HGB BLD-MCNC: 15.9 G/DL (ref 13.5–17)
LYMPHOCYTES # BLD AUTO: 1.9 10^3/UL (ref 0.5–4.7)
LYMPHOCYTES NFR BLD AUTO: 18.3 % (ref 13–45)
MCH RBC QN AUTO: 34.1 PG (ref 27–33.4)
MCHC RBC AUTO-ENTMCNC: 34.2 G/DL (ref 32–36)
MCV RBC AUTO: 100 FL (ref 80–97)
MONOCYTES # BLD AUTO: 0.9 10^3/UL (ref 0.1–1.4)
MONOCYTES NFR BLD AUTO: 8.8 % (ref 3–13)
NEUTROPHILS # BLD AUTO: 7.2 10^3/UL (ref 1.7–8.2)
NEUTS SEG NFR BLD AUTO: 70.5 % (ref 42–78)
PLATELET # BLD: 259 10^3/UL (ref 150–450)
POTASSIUM SERPL-SCNC: 4.6 MMOL/L (ref 3.6–5)
PROT SERPL-MCNC: 8.2 G/DL (ref 6.3–8.2)
RBC # BLD AUTO: 4.66 10^6/UL (ref 4.35–5.55)
RETICULOCYTE COUNT (AUTO): 1.44 % (ref 0.66–2.85)
TOTAL CELLS COUNTED % (AUTO): 100 %
WBC # BLD AUTO: 10.2 10^3/UL (ref 4–10.5)

## 2019-12-10 PROCEDURE — 96374 THER/PROPH/DIAG INJ IV PUSH: CPT

## 2019-12-10 PROCEDURE — 96361 HYDRATE IV INFUSION ADD-ON: CPT

## 2019-12-10 PROCEDURE — 99284 EMERGENCY DEPT VISIT MOD MDM: CPT

## 2019-12-10 PROCEDURE — 85025 COMPLETE CBC W/AUTO DIFF WBC: CPT

## 2019-12-10 PROCEDURE — 85045 AUTOMATED RETICULOCYTE COUNT: CPT

## 2019-12-10 PROCEDURE — 80053 COMPREHEN METABOLIC PANEL: CPT

## 2019-12-10 PROCEDURE — 96376 TX/PRO/DX INJ SAME DRUG ADON: CPT

## 2019-12-10 PROCEDURE — 36415 COLL VENOUS BLD VENIPUNCTURE: CPT

## 2019-12-10 PROCEDURE — 96375 TX/PRO/DX INJ NEW DRUG ADDON: CPT

## 2019-12-10 NOTE — ER DOCUMENT REPORT
ED General





- General


Chief Complaint: Knee Pain


Stated Complaint: RIGHT KNEE PAIN, HEADACHE


Time Seen by Provider: 12/10/19 16:37


Primary Care Provider: 


DIYA PHILIPPE MD [ACTIVE STAFF] - Follow up as needed


TRAVEL OUTSIDE OF THE [place] IN LAST 30 DAYS: No





- HPI


Notes: 





Patient is a 24-year-old male with a known history of sickle cell anemia who 

presents emergency department for evaluation of right knee pain, left wrist 

pain, and headache.  He states that his right knee pain usually is precipitated 

by sickle cell crisis.  He states is been ongoing for a few days.  He presents 

pain at a 4 out of 5.  He also states he has new pain in his left wrist.  He 

also has a headache, which she states could be from dehydration.  Has been 

taking his medications as prescribed.  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 

of the right knee.  He denies any fevers, chest pain, shortness of breath.  He 

follows with Dr. Cleaning.





- Related Data


Allergies/Adverse Reactions: 


                                        





Coconut * [Coconut] Allergy (Mild, Verified 12/10/19 16:40)


   


apixaban [From Eliquis] Allergy (Verified 12/10/19 16:40)


   


rivaroxaban [From Xarelto] Allergy (Verified 12/10/19 16:40)


   


transpore tape Allergy (Mild, Uncoded 12/10/19 16:40)


   Hives











Past Medical History





- General


Information source: Patient





- Social History


Smoking Status: Never Smoker


Chew tobacco use (# tins/day): No


Frequency of alcohol use: None


Family History: Reviewed & Not Pertinent, Arthritis, DM, Hypertension, Other - 

Sickle cell trait both parents and asthma


Patient has suicidal ideation: No


Patient has homicidal ideation: No





- Medical History


Medical History: Other - Sickle cell anemia


Pulmonary Medical History: Reports: Hx Asthma, Hx Pneumonia


Renal/ Medical History: Denies: Hx Peritoneal Dialysis


Musculoskeletal Medical History: Reports Hx Musculoskeletal Deformity - Pain to 

multiple areas mostly right knee due to sickle cell anemia


Past Surgical History: Reports: Hx Abdominal Surgery - Gallstones removal, Hx 

Cholecystectomy, Hx Orthopedic Surgery - Fluid drained from right foot, Hx 

Vascular Surgery - Port placementx2 (failed)





- Immunizations


Immunizations up to date: Yes


Hx Diphtheria, Pertussis, Tetanus Vaccination: Yes


Hx Pneumococcal Vaccination: 05/16/13





Review of Systems





- Review of Systems


Constitutional: No symptoms reported


EENT: No symptoms reported


Cardiovascular: No symptoms reported


Respiratory: No symptoms reported


Gastrointestinal: No symptoms reported


Genitourinary: No symptoms reported


Musculoskeletal: See HPI


Skin: No symptoms reported


Neurological/Psychological: No symptoms reported





Physical Exam





- Vital signs


Vitals: 


                                        











Temp Pulse Resp BP Pulse Ox


 


 98.0 F   104 H  20   125/85   96 


 


 12/10/19 16:20  12/10/19 16:20  12/10/19 16:20  12/10/19 16:20  12/10/19 16:20














- Notes


Notes: 





Vital signs reviewed, please refer to chart. Head is normocephalic, atraumatic. 

Pupils equal round, reactive to light.  Neck is supple without meningismus.  

Heart is regular rate and rhythm.  Lungs are clear to auscultation bilaterally. 

Abdomen is soft, nontender, normoactive bowel sounds throughout.  Extremities 

without cyanosis, clubbing. Posterior calves are nontender.  Peripheral pulses 

are equal.  Skin is warm and dry.  Patient is awake, alert, neurological exam is

nonfocal.  Examination of the right knee yields no obvious deformity.  Full 

range of motion.  Palpation over the patella and the medial aspect of the tibia 

does elicit some pain.  Neurovascularly intact distally.  Negative Lachman and 

drawer.  Examination of the left wrist feels no obvious deformity.  Full range 

of motion.  No associated calor, erythema, edema.  Neurovascular intact 

distally.  No bony tenderness noted.





Course





- Re-evaluation


Re-evalutation: 





12/10/19 20:53


Patient presents emergency department for evaluation.  He has a sickle cell 

anemia history, he is worried about being a crisis.  His reticulocyte count is 

normal, his hemoglobin is normal.  He is medicated with a total of 2-1/2 mg of 

Dilaudid, 75 mg of Benadryl while he is here.  His pain is improved.  He does 

have a mildly elevated creatinine above his baseline, I do suspect that was 

secondary to dehydration.  He was given 2 L of fluid as well.  He is told that 

he needs to maintain hydration as well as possible, is encouraged to follow-up 

closely with Dr. Cleaning.  He was amenable to this plan was discharged.





- Vital Signs


Vital signs: 


                                        











Temp Pulse Resp BP Pulse Ox


 


 98.2 F   100   20   136/97 H  97 


 


 12/10/19 19:57  12/10/19 19:57  12/10/19 19:57  12/10/19 19:57  12/10/19 19:57














- Laboratory


Result Diagrams: 


                                 12/10/19 17:23





                                 12/10/19 17:23


Laboratory results interpreted by me: 


                                        











  12/10/19 12/10/19





  17:23 17:23


 


MCV  100 H 


 


MCH  34.1 H 


 


RDW  16.2 H 


 


BUN   22 H


 


Creatinine   1.39 H


 


Alkaline Phosphatase   132 H














Discharge





- Discharge


Clinical Impression: 


 Right knee pain, Sickle cell pain crisis, Left wrist pain, Abnormal renal 

function test, Dehydration





Condition: Stable


Disposition: HOME, SELF-CARE


Instructions:  Dehydration ([facility]), Sickle Cell Crisis ([facility])


Additional Instructions: 


Rest, stay well-hydrated.  Follow-up with Dr. Cleaning and primary care this 

week.  Continue your home medications as prescribed.  Return to the ED with 

worsening or new concerning symptoms of any sort.


Referrals: 


DIYA PHILIPPE MD [ACTIVE STAFF] - Follow up as needed

## 2019-12-10 NOTE — ER DOCUMENT REPORT
ED Medical Screen (RME)





- General


Chief Complaint: Knee Pain


Stated Complaint: RIGHT KNEE PAIN, HEADACHE


Time Seen by Provider: 12/10/19 16:37


Primary Care Provider: 


DIYA PHILIPPE MD [Primary Care Provider] - Follow up as needed


Information source: Patient


TRAVEL OUTSIDE OF THE [place] IN LAST 30 DAYS: No





- HPI


Notes: 





12/10/19 16:40


24-year-old male presents to the emergency room for chief complaint of right 

knee pain which usually precipitates prior to having a sickle cell crisis.  He 

does have a history of sickle cell anemia.  He states this has been more notable

over the last couple of days with a intermittent headache.  He states he has 

been bothered more with the weather change with his sickle cell anemia.  Denies 

any injury fever, shortness of breath, chest pain.  Patient states he thinks he 

might be a little dehydrated.  He does take oxycodone for his pain and is 

followed by Dr. Cleaning, hematologist








I have greeted and performed a rapid initial assessment of this patient.  A 

comprehensive ED assessment and evaluation of the patient, analysis of test 

results and completion of the medical decision making process will be conducted 

by additional ED providers.





PHYSICAL EXAMINATION:





GENERAL: Well-appearing, well-nourished and in no acute distress.





HEAD: Atraumatic, normocephalic.





EYES: Pupils equal round extraocular movements intact,  conjunctiva are normal.





ENT: Nares patent





NECK: Normal range of motion





LUNGS: No respiratory distress





Musculoskeletal: Normal range of motion





NEUROLOGICAL:  Normal speech, normal gait. 





PSYCH: Normal mood, normal affect.





SKIN: Warm, Dry, normal turgor, no rashes or lesions noted.





- Related Data


Allergies/Adverse Reactions: 


                                        





Coconut * [Coconut] Allergy (Mild, Verified 11/19/19 15:58)


   


apixaban [From Eliquis] Allergy (Verified 11/19/19 15:58)


   


rivaroxaban [From Xarelto] Allergy (Verified 11/19/19 15:58)


   


transpore tape Allergy (Mild, Uncoded 11/19/19 15:58)


   Hives











Past Medical History





- Social History


Family history: None


Pulmonary Medical History: Reports: Hx Asthma, Hx Pneumonia


Renal/ Medical History: Denies: Hx Peritoneal Dialysis


Musculoskeltal Medical History: Reports Hx Musculoskeletal Deformity - Pain to 

multiple areas mostly right knee due to sickle cell anemia


Past Surgical History: Reports: Hx Abdominal Surgery - Gallstones removal, Hx 

Cholecystectomy, Hx Orthopedic Surgery - Fluid drained from right foot, Hx 

Vascular Surgery - Port placementx2 (failed)





- Immunizations


Immunizations up to date: Yes


Hx Diphtheria, Pertussis, Tetanus Vaccination: Yes





Physical Exam





- Vital signs


Vitals: 





                                        











Temp Pulse Resp BP Pulse Ox


 


 98.0 F   104 H  20   125/85   96 


 


 12/10/19 16:20  12/10/19 16:20  12/10/19 16:20  12/10/19 16:20  12/10/19 16:20














Course





- Vital Signs


Vital signs: 





                                        











Temp Pulse Resp BP Pulse Ox


 


 98.0 F   104 H  20   125/85   96 


 


 12/10/19 16:20  12/10/19 16:20  12/10/19 16:20  12/10/19 16:20  12/10/19 16:20














Doctor's Discharge





- Discharge


Referrals: 


DIYA PHILIPPE MD [Primary Care Provider] - Follow up as needed

## 2019-12-23 ENCOUNTER — HOSPITAL ENCOUNTER (EMERGENCY)
Dept: HOSPITAL 62 - ER | Age: 24
LOS: 1 days | Discharge: HOME | End: 2019-12-24
Payer: MEDICAID

## 2019-12-23 DIAGNOSIS — D57.00: ICD-10-CM

## 2019-12-23 DIAGNOSIS — Z79.899: ICD-10-CM

## 2019-12-23 DIAGNOSIS — G43.909: Primary | ICD-10-CM

## 2019-12-23 DIAGNOSIS — H10.13: ICD-10-CM

## 2019-12-23 DIAGNOSIS — J45.901: ICD-10-CM

## 2019-12-23 LAB
ABSOLUTE LYMPHOCYTES# (MANUAL): 1.7 10^3/UL (ref 0.5–4.7)
ABSOLUTE MONOCYTES # (MANUAL): 0.9 10^3/UL (ref 0.1–1.4)
ABSOLUTE RETICS #: 0.08 10^6/UL (ref 0.03–0.12)
ADD MANUAL DIFF: YES
ALBUMIN SERPL-MCNC: 5.6 G/DL (ref 3.5–5)
ALP SERPL-CCNC: 142 U/L (ref 38–126)
ANION GAP SERPL CALC-SCNC: 14 MMOL/L (ref 5–19)
ANISOCYTOSIS BLD QL SMEAR: (no result)
APPEARANCE UR: CLEAR
APTT PPP: (no result) S
AST SERPL-CCNC: 87 U/L (ref 17–59)
BASOPHILS NFR BLD MANUAL: 0 % (ref 0–2)
BILIRUB DIRECT SERPL-MCNC: 0.2 MG/DL (ref 0–0.4)
BILIRUB SERPL-MCNC: 0.6 MG/DL (ref 0.2–1.3)
BILIRUB UR QL STRIP: NEGATIVE
BUN SERPL-MCNC: 13 MG/DL (ref 7–20)
BURR CELLS BLD QL SMEAR: SLIGHT
CALCIUM: 10 MG/DL (ref 8.4–10.2)
CHLORIDE SERPL-SCNC: 101 MMOL/L (ref 98–107)
CO2 SERPL-SCNC: 31 MMOL/L (ref 22–30)
DACRYOCYTES BLD QL SMEAR: SLIGHT
EOSINOPHIL NFR BLD MANUAL: 3 % (ref 0–6)
ERYTHROCYTE [DISTWIDTH] IN BLOOD BY AUTOMATED COUNT: 16.6 % (ref 11.5–14)
GLUCOSE SERPL-MCNC: 97 MG/DL (ref 75–110)
GLUCOSE UR STRIP-MCNC: NEGATIVE MG/DL
HCT VFR BLD CALC: 52.8 % (ref 37.9–51)
HGB BLD-MCNC: 17.9 G/DL (ref 13.5–17)
KETONES UR STRIP-MCNC: NEGATIVE MG/DL
MACROCYTES BLD QL SMEAR: (no result)
MCH RBC QN AUTO: 34.2 PG (ref 27–33.4)
MCHC RBC AUTO-ENTMCNC: 34 G/DL (ref 32–36)
MCV RBC AUTO: 101 FL (ref 80–97)
MONOCYTES % (MANUAL): 16 % (ref 3–13)
NITRITE UR QL STRIP: NEGATIVE
PH UR STRIP: 7 [PH] (ref 5–9)
PLATELET # BLD: 280 10^3/UL (ref 150–450)
PLATELET COMMENT: ADEQUATE
POLYCHROMASIA BLD QL SMEAR: SLIGHT
POTASSIUM SERPL-SCNC: 4.4 MMOL/L (ref 3.6–5)
PROT SERPL-MCNC: 9.2 G/DL (ref 6.3–8.2)
PROT UR STRIP-MCNC: NEGATIVE MG/DL
RBC # BLD AUTO: 5.24 10^6/UL (ref 4.35–5.55)
RETICULOCYTE COUNT (AUTO): 1.54 % (ref 0.66–2.85)
SEGMENTED NEUTROPHILS % (MAN): 51 % (ref 42–78)
SP GR UR STRIP: 1.01
TOTAL CELLS COUNTED BLD: 100
TOXIC GRANULES BLD QL SMEAR: SLIGHT
UROBILINOGEN UR-MCNC: NEGATIVE MG/DL (ref ?–2)
VARIANT LYMPHS NFR BLD MANUAL: 30 % (ref 13–45)
WBC # BLD AUTO: 5.5 10^3/UL (ref 4–10.5)
WBC TOXIC VACUOLES BLD QL SMEAR: PRESENT

## 2019-12-23 PROCEDURE — 83615 LACTATE (LD) (LDH) ENZYME: CPT

## 2019-12-23 PROCEDURE — 96376 TX/PRO/DX INJ SAME DRUG ADON: CPT

## 2019-12-23 PROCEDURE — 80053 COMPREHEN METABOLIC PANEL: CPT

## 2019-12-23 PROCEDURE — 36415 COLL VENOUS BLD VENIPUNCTURE: CPT

## 2019-12-23 PROCEDURE — 85045 AUTOMATED RETICULOCYTE COUNT: CPT

## 2019-12-23 PROCEDURE — 71046 X-RAY EXAM CHEST 2 VIEWS: CPT

## 2019-12-23 PROCEDURE — 80307 DRUG TEST PRSMV CHEM ANLYZR: CPT

## 2019-12-23 PROCEDURE — 96374 THER/PROPH/DIAG INJ IV PUSH: CPT

## 2019-12-23 PROCEDURE — 99284 EMERGENCY DEPT VISIT MOD MDM: CPT

## 2019-12-23 PROCEDURE — 94640 AIRWAY INHALATION TREATMENT: CPT

## 2019-12-23 PROCEDURE — 96361 HYDRATE IV INFUSION ADD-ON: CPT

## 2019-12-23 PROCEDURE — 93005 ELECTROCARDIOGRAM TRACING: CPT

## 2019-12-23 PROCEDURE — 93010 ELECTROCARDIOGRAM REPORT: CPT

## 2019-12-23 PROCEDURE — 84484 ASSAY OF TROPONIN QUANT: CPT

## 2019-12-23 PROCEDURE — 81001 URINALYSIS AUTO W/SCOPE: CPT

## 2019-12-23 PROCEDURE — 85025 COMPLETE CBC W/AUTO DIFF WBC: CPT

## 2019-12-23 PROCEDURE — 96375 TX/PRO/DX INJ NEW DRUG ADDON: CPT

## 2019-12-23 RX ADMIN — ALBUTEROL SULFATE SCH MG: 2.5 SOLUTION RESPIRATORY (INHALATION) at 22:36

## 2019-12-23 RX ADMIN — ALBUTEROL SULFATE SCH MG: 2.5 SOLUTION RESPIRATORY (INHALATION) at 22:56

## 2019-12-23 NOTE — ER DOCUMENT REPORT
ED General





- General


Chief Complaint: Sickle Cell Crisis


Stated Complaint: HEADACHE


Time Seen by Provider: 12/23/19 20:54


Primary Care Provider: 


JOSAFAT YBARRA MD [Primary Care Provider] - Follow up as needed


TRAVEL OUTSIDE OF THE [place] IN LAST 30 DAYS: No





- HPI


Notes: 





Radha Solorzano is a 24-year-old male with a history of sickle cell anemia and 

migraine headaches as well as asthma presenting now with multiple complaints.  

The patient was just discharged from the hospital by Dr. Funez 3 days ago 

after admission for migraine headache and sickle cell vaso-occlusive crisis.  He

had an MRI/MRA of brain while he was in the hospital which was read as normal by

radiology.  He also has had a bad bilateral conjunctivitis and says that his 

eyes are still red and irritated.  We note that he did not fill the prescription

for eyedrops provided by his doctor at the time of discharge.  He is also 

supposed to be on a fentanyl patch for pain management and says he has not used 

this at all since he left the hospital.  He is complaining of left-sided 

headache unaccompanied by any neurologic deficit or fever as well as lower back 

and left posterior rib pain.  When we started discussing his condition he says 

IV fentanyl "never works" and that they usually administer Dilaudid and Benadryl

IV.  He denies sputum production at this time.  He does report some increased 

wheezes over the last 24 hours.








- Related Data


Allergies/Adverse Reactions: 


                                        





Coconut * [Coconut] Allergy (Mild, Verified 12/10/19 16:40)


   


apixaban [From Eliquis] Allergy (Verified 12/10/19 16:40)


   


rivaroxaban [From Xarelto] Allergy (Verified 12/10/19 16:40)


   


transpore tape Allergy (Mild, Uncoded 12/10/19 16:40)


   Hives











Past Medical History





- General


Information source: Patient, Relative - Hello how you





- Social History


Smoking Status: Never Smoker


Frequency of alcohol use: Rare


Family History: Reviewed & Not Pertinent, Arthritis, DM, Hypertension, Other - 

Sickle cell trait both parents and asthma


Patient has suicidal ideation: No


Patient has homicidal ideation: No


Pulmonary Medical History: Reports: Hx Asthma, Hx Pneumonia


Renal/ Medical History: Denies: Hx Peritoneal Dialysis


Musculoskeletal Medical History: Reports Hx Musculoskeletal Deformity - Pain to 

multiple areas mostly right knee due to sickle cell anemia


Past Surgical History: Reports: Hx Abdominal Surgery - Gallstones removal, Hx 

Cholecystectomy, Hx Orthopedic Surgery - Fluid drained from right foot, Hx 

Vascular Surgery - Port placementx2 (failed)





- Immunizations


Immunizations up to date: Yes


Hx Diphtheria, Pertussis, Tetanus Vaccination: Yes


Hx Pneumococcal Vaccination: 05/16/13





Review of Systems





- Review of Systems


Notes: 





Constitutional: Negative for fever.


HENT: Negative for sore throat.


Eyes: As per HPI.


Cardiovascular: As per HPI.


Respiratory: As per HPI.


Gastrointestinal: Negative for abdominal pain, vomiting or diarrhea.


Genitourinary: Negative for dysuria.


Musculoskeletal: As per HPI.


Skin: Negative for rash.


Neurological: Negative for headaches, weakness or numbness.





10 point ROS negative except as marked above and in HPI.








Physical Exam





- Vital signs


Vitals: 


                                        











Temp Pulse Resp BP Pulse Ox


 


 98.2 F   98   18   141/103 H  94 


 


 12/23/19 20:15  12/23/19 20:15  12/23/19 20:15  12/23/19 20:15  12/23/19 20:15














- Notes


Notes: 











GENERAL: Well-developed well-nourished appearing in mild discomfort.





SKIN: Good turgor no rashes.





HEAD: Normocephalic atraumatic.





EYES: PERRLA.  EOMI.  Conjunctivae injected bilaterally without discharge.





EARS: CANALS AND TMS CLEAR.





NOSE: CLEAR.





MOUTH: Moist mucosa.  Good dentition.  No stridor or edema.  No drooling.





NECK: Supple.  No masses or thyromegaly.  No adenopathy.  Carotids 2+ without 

bruits.  No JVD.





BACK: Symmetrical without mild diffuse tenderness.





CHEST: Mild diffuse anterior chest wall tenderness.  Respirations unlabored.  

Scattered faint wheezes bilaterally





HEART: Regular rhythm.  No murmur gallop or rub.





ABDOMEN: Soft nontender without masses, organomegaly or rebound.  Bowel sounds 

normally active.  No bruits.





GENITALIA: Deferred.





EXTREMITIES: No edema.  No calf tenderness.  Cap refill less than 1.5 seconds.  

Dorsalis pedis and posterior tibial pulses 3+ and symmetrical.





NEUROLOGICAL: GCS 15.  Alert and oriented x3.  Normal gait.  Fluent speech.  

Cranial nerves II through XII intact.  Sensorimotor and cerebellar normal.  

Normal tone.





PSYCHIATRIC: Appropriate affect.





Course





- Re-evaluation


Re-evalutation: 





12/23/19 23:24


I will obtain a urine drug screen.  Patient is started on a dose of IV Dilaudid 

and Benadryl.  He was already given IV steroids by PA prior to my evaluation.  

We are giving DuoNeb treatment now.  His lungs at this point however sound 

clear.


12/24/19 00:52


Patient's labs are not greatly different from his baseline.  His chest x-ray per

radiology shows no focal infiltrates although he does have some chronic 

increased interstitial markings.  His wheezes have cleared after a single 

nebulizer treatment here.  Currently rating his pain at 4 over 10 after 1 dose 

of Dilaudid.





Situation is discussed with patient and his wife.  He would like to get 1 more 

dose of pain medicine and then go home and I think this is appropriate.





- Vital Signs


Vital signs: 


                                        











Temp Pulse Resp BP Pulse Ox


 


 98.2 F   98   18   150/94 H  100 


 


 12/23/19 20:53  12/23/19 20:53  12/23/19 22:59  12/23/19 22:59  12/23/19 22:59














- Laboratory


Result Diagrams: 


                                 12/23/19 22:50





                                 12/23/19 22:50


Laboratory results interpreted by me: 


                                        











  12/23/19 12/23/19 12/23/19





  22:50 22:50 22:50


 


Hgb  17.9 H  


 


Hct  52.8 H  


 


MCV  101 H  


 


MCH  34.2 H  


 


RDW  16.6 H  


 


Monocytes % (Manual)  16 H  


 


Sodium   146.1 H 


 


Carbon Dioxide   31 H 


 


AST   87 H 


 


Alkaline Phosphatase   142 H 


 


Lactate Dehydrogenase    346 H


 


Total Protein   9.2 H 


 


Albumin   5.6 H 














- Diagnostic Test


Radiology reviewed: Reports reviewed - No focal infiltrates with some chronic 

increased interstitial markings unchanged from prior film per radiologist





Discharge





- Discharge


Clinical Impression: 


 Vaso-occlusive sickle cell crisis, Asthma exacerbation, Allergic 

conjunctivitis, bilateral, Sickle cell pain crisis





Migraine headache


Qualifiers:


 Migraine type: unspecified Status migrainosus presence: without status 

migrainosus Intractability: not intractable Qualified Code(s): G43.909 - 

Migraine, unspecified, not intractable, without status migrainosus





Condition: Stable


Disposition: HOME, SELF-CARE


Additional Instructions: 


Sickle Cell Crisis





     You have "sickle cell crisis."  Sickle cell disease is caused by abnormal 

hemoglobin.  This hemoglobin can deform red blood cells into a sickle shape.  

These abnormal blood cells can block blood vessels. This causes the pain of 

sickle cell crisis.


     Sickle cell crisis can occur any time.  But attacks are more likely with 

acute infection, dehydration, or altitude change.  A crisis usually causes pain 

in the legs, back, abdomen, and chest.  Sometimes the pain may ease and return 

later.


     The usual treatment is oxygen, pain medication, IV fluids, and treatment of

infection.  Attacks may take a couple of days to resolve.


     Return if the pain becomes more severe, or if there are new symptoms.


Migraine Headache





     The physician feels that your symptoms are due to a migraine attack.  

Migraines are caused by changes in the blood vessels of the head.  Arteries go 

into spasm, often causing warning symptoms that a headache may begin soon.  As 

the spasm goes away, the vessels dilate and throb, causing the pounding pain of 

a migraine headache. Migraines often cause nausea and vomiting.


     The treatment of headaches varies with severity and cause of pain. Not all 

headaches need pain shots -- in fact, there is evidence that using narcotics for

headaches may make them worse in the long run.  The physician will determine the

therapy that's in your best interest for this particular headache.


     Medications are available that may prevent migraines, or stop them as they 

first occur.  If one medication is not helpful, try another.  If migraines are 

frequent, be patient -- follow the doctor's recommendations.


     Call the physician if you are worsening, or if new symptoms arise.





Fill prescriptions you were previously prescribed and continue treatment as 

outlined by your physician.  See your primary care doctor within the next 1 

week.  Return here as needed for new or worsening symptoms.


Referrals: 


JOSAFAT YBARRA MD [Primary Care Provider] - Follow up as needed

## 2019-12-23 NOTE — ER DOCUMENT REPORT
ED Medical Screen (RME)





- General


Chief Complaint: Sickle Cell Crisis


Stated Complaint: HEADACHE


Time Seen by Provider: 12/23/19 20:54


Primary Care Provider: 


JOSAFAT YBARRA MD [Primary Care Provider] - Follow up as needed


Information source: Patient


Notes: 





Patient presents complaining of left-sided headache and chest pain.  Patient 

with wheezing that he states started this evening.  Patient states chest pain 

radiates through to the back.  Patient has a history of sickle cell disease and 

is status post 2 bone marrow transplants.  Patient also has a history of asthma.

 Patient was just discharged from the hospital 3 days ago for migraine and 

sickle cell crisis.  Patient additionally is complaining of drainage from eyes 

bilaterally.





I have greeted and performed a rapid initial assessment of this patient.  A 

comprehensive ED assessment and evaluation of the patient, analysis of test 

results and completion of the medical decision making process will be conducted 

by additional ED providers.


TRAVEL OUTSIDE OF THE [place] IN LAST 30 DAYS: No





- Related Data


Allergies/Adverse Reactions: 


                                        





Coconut * [Coconut] Allergy (Mild, Verified 12/10/19 16:40)


   


apixaban [From Eliquis] Allergy (Verified 12/10/19 16:40)


   


rivaroxaban [From Xarelto] Allergy (Verified 12/10/19 16:40)


   


transpore tape Allergy (Mild, Uncoded 12/10/19 16:40)


   Hives











Past Medical History





- Social History


Frequency of alcohol use: Rare


Family history: None


Pulmonary Medical History: Reports: Hx Asthma, Hx Pneumonia


Renal/ Medical History: Denies: Hx Peritoneal Dialysis


Musculoskeltal Medical History: Reports Hx Musculoskeletal Deformity - Pain to 

multiple areas mostly right knee due to sickle cell anemia


Past Surgical History: Reports: Hx Abdominal Surgery - Gallstones removal, Hx 

Cholecystectomy, Hx Orthopedic Surgery - Fluid drained from right foot, Hx 

Vascular Surgery - Port placementx2 (failed)





- Immunizations


Immunizations up to date: Yes


Hx Diphtheria, Pertussis, Tetanus Vaccination: Yes





Physical Exam





- Vital signs


Vitals: 





                                        











Temp Pulse Resp BP Pulse Ox


 


 98.2 F   98   18   141/103 H  94 


 


 12/23/19 20:15  12/23/19 20:15  12/23/19 20:15  12/23/19 20:15  12/23/19 20:15














- Respiratory


Respiratory status: No respiratory distress


Chest status: Tender


Breath sounds: Nonproductive cough, Wheezing





Course





- Vital Signs


Vital signs: 





                                        











Temp Pulse Resp BP Pulse Ox


 


 98.2 F   98   18   141/103 H  94 


 


 12/23/19 20:53  12/23/19 20:53  12/23/19 20:53  12/23/19 20:53  12/23/19 20:53














Doctor's Discharge





- Discharge


Referrals: 


JOSAFAT YBARRA MD [Primary Care Provider] - Follow up as needed

## 2019-12-23 NOTE — RADIOLOGY REPORT (SQ)
EXAM DESCRIPTION: 



XR CHEST 2 VIEWS



COMPLETED DATE/TME:  12/23/2019 20:57



CLINICAL HISTORY: 



24 years, Male, cp, wheezing



Comparison: December 16, 2019



FINDINGS:

No focal lung consolidation. Diffuse prominence of the pulmonary

interstitium throughout both lungs.

Trace right pleural effusion versus pleural thickening. Right

hemidiaphragm remains elevated.

Cardiac and mediastinal silhouette is unremarkable.

Chronic changes from sickle cell disease including H shaped

thoracic vertebral body and diffuse sclerosis.

Soft tissues are unremarkable. 



IMPRESSION:

Diffuse prominent of the pulmonary interstitium is unchanged.

There are no focal pulmonary opacities.

## 2019-12-24 ENCOUNTER — HOSPITAL ENCOUNTER (EMERGENCY)
Dept: HOSPITAL 62 - ER | Age: 24
LOS: 1 days | Discharge: HOME | End: 2019-12-25
Payer: MEDICAID

## 2019-12-24 VITALS — SYSTOLIC BLOOD PRESSURE: 150 MMHG | DIASTOLIC BLOOD PRESSURE: 87 MMHG

## 2019-12-24 DIAGNOSIS — M54.5: ICD-10-CM

## 2019-12-24 DIAGNOSIS — D57.00: ICD-10-CM

## 2019-12-24 DIAGNOSIS — Z90.49: ICD-10-CM

## 2019-12-24 DIAGNOSIS — R51: Primary | ICD-10-CM

## 2019-12-24 LAB
ABSOLUTE RETICS #: 0.07 10^6/UL (ref 0.03–0.12)
BARBITURATES UR QL SCN: NEGATIVE
ERYTHROCYTE [DISTWIDTH] IN BLOOD BY AUTOMATED COUNT: 17.1 % (ref 11.5–14)
HCT VFR BLD CALC: 44.5 % (ref 37.9–51)
MCH RBC QN AUTO: 34.1 PG (ref 27–33.4)
MCHC RBC AUTO-ENTMCNC: 33.7 G/DL (ref 32–36)
MCV RBC AUTO: 101 FL (ref 80–97)
METHADONE UR QL SCN: NEGATIVE
PCP UR QL SCN: NEGATIVE
PLATELET # BLD: 237 10^3/UL (ref 150–450)
RBC # BLD AUTO: 4.4 10^6/UL (ref 4.35–5.55)
RETICULOCYTE COUNT (AUTO): 1.49 % (ref 0.66–2.85)
URINE AMPHETAMINES SCREEN: NEGATIVE
URINE BENZODIAZEPINES SCREEN: NEGATIVE
URINE COCAINE SCREEN: NEGATIVE
URINE MARIJUANA (THC) SCREEN: NEGATIVE

## 2019-12-24 PROCEDURE — 96361 HYDRATE IV INFUSION ADD-ON: CPT

## 2019-12-24 PROCEDURE — 96374 THER/PROPH/DIAG INJ IV PUSH: CPT

## 2019-12-24 PROCEDURE — 36415 COLL VENOUS BLD VENIPUNCTURE: CPT

## 2019-12-24 PROCEDURE — 80053 COMPREHEN METABOLIC PANEL: CPT

## 2019-12-24 PROCEDURE — 96375 TX/PRO/DX INJ NEW DRUG ADDON: CPT

## 2019-12-24 PROCEDURE — 99284 EMERGENCY DEPT VISIT MOD MDM: CPT

## 2019-12-24 PROCEDURE — 85025 COMPLETE CBC W/AUTO DIFF WBC: CPT

## 2019-12-24 PROCEDURE — 85045 AUTOMATED RETICULOCYTE COUNT: CPT

## 2019-12-24 PROCEDURE — 96376 TX/PRO/DX INJ SAME DRUG ADON: CPT

## 2019-12-24 PROCEDURE — 70450 CT HEAD/BRAIN W/O DYE: CPT

## 2019-12-24 PROCEDURE — 81001 URINALYSIS AUTO W/SCOPE: CPT

## 2019-12-24 NOTE — ER DOCUMENT REPORT
ED Medical Screen (RME)





- General


Chief Complaint: Headache


Stated Complaint: BACK AND HEAD PAIN


Time Seen by Provider: 12/24/19 20:12


Primary Care Provider: 


JOSAFAT YBARRA MD [Primary Care Provider] - Follow up as needed


TRAVEL OUTSIDE OF THE [place] IN LAST 30 DAYS: No





- HPI


Notes: 





12/24/19 20:18


24-year-old male to the emergency department with complaints of back pain and h

eadache.  He states that that his headache started 6 days ago and has been 

progressively getting worse.  He states he has a history of migraines that 

typically occur on the left side but he has a new symptom that began early this 

morning of blurred vision.  He states he is never had blurred vision with his 

migraines.  He admits to some photophobia and nausea.  He also reports back 

pain.  He states that his back pain is been going on for 4 days and it feels 

much like his sickle cell pain crisis.  He typically takes folic acid, 

hydroxyurea, 15 mg of Oxy daily.  His last transfusion was last month and he was

admitted for this.  Currently in transition for his hematologist but he will be 

starting with Dr. Cuadra in January.








Performed brief medical screening exam on the patient and determined that he 

will need further management and evaluation by main side provider.  I have 

placed initial orders to help expedite his care.





- Related Data


Allergies/Adverse Reactions: 


                                        





Coconut * [Coconut] Allergy (Mild, Verified 12/10/19 16:40)


   


apixaban [From Eliquis] Allergy (Verified 12/10/19 16:40)


   


rivaroxaban [From Xarelto] Allergy (Verified 12/10/19 16:40)


   


transpore tape Allergy (Mild, Uncoded 12/10/19 16:40)


   Hives











Past Medical History





- Social History


Frequency of alcohol use: Occasional


Family history: None


Pulmonary Medical History: Reports: Hx Asthma, Hx Pneumonia


Renal/ Medical History: Denies: Hx Peritoneal Dialysis


Musculoskeltal Medical History: Reports Hx Musculoskeletal Deformity - Pain to 

multiple areas mostly right knee due to sickle cell anemia


Past Surgical History: Reports: Hx Abdominal Surgery - Gallstones removal, Hx 

Cholecystectomy, Hx Orthopedic Surgery - Fluid drained from right foot, Hx 

Vascular Surgery - Port placementx2 (failed)





- Immunizations


Immunizations up to date: Yes


Hx Diphtheria, Pertussis, Tetanus Vaccination: Yes





Physical Exam





- Vital signs


Vitals: 





                                        











Temp Pulse Resp BP Pulse Ox


 


 98.7 F   120 H  16   146/104 H  95 


 


 12/24/19 19:36  12/24/19 19:36  12/24/19 19:36  12/24/19 19:36  12/24/19 19:36














Course





- Vital Signs


Vital signs: 





                                        











Temp Pulse Resp BP Pulse Ox


 


 98.7 F   115 H  16   138/98 H  100 


 


 12/24/19 19:36  12/24/19 20:09  12/24/19 19:36  12/24/19 20:09  12/24/19 20:09














Doctor's Discharge





- Discharge


Referrals: 


JOSAFAT YBARRA MD [Primary Care Provider] - Follow up as needed

## 2019-12-24 NOTE — RADIOLOGY REPORT (SQ)
EXAM DESCRIPTION: 



CT HEAD WITHOUT IV CONTRAST



COMPLETED DATE/TME:  12/24/2019 20:15



CLINICAL HISTORY: 



headache, blurry vision, different from reg HA



COMPARISON: 



None Available.



TECHNIQUE: 



Contiguous axial images of the brain were obtained without the

administration of intravenous contrast. This exam was performed

according to our departmental dose-optimization program, which

includes automated exposure control, adjustment of the mA and/or

kV according to patient size and/or use of iterative

reconstruction technique.



FINDINGS: 



There is no acute intracranial hemorrhage or mass effect.

Ventricular system is within normal limits. There is adequate

gray-white matter differentiation. There is no skull fracture.

The visualized paranasal sinuses and mastoid air cells are within

normal limits.



IMPRESSION: 



No acute intracranial abnormalities.

## 2019-12-24 NOTE — ER DOCUMENT REPORT
ED General





- General


Chief Complaint: Headache


Stated Complaint: BACK AND HEAD PAIN


Time Seen by Provider: 12/24/19 20:12


Primary Care Provider: 


JOSAFAT YBARRA MD [Primary Care Provider] - Follow up as needed


Notes: 


Patient is a 24-year-old male that comes emergency department for chief 

complaint of sickle cell pain crisis.  He states that he hurts in his lower back

and also on the left side of his head with a throbbing headache.  He does report

some blurry vision in the left eye but he denies visual loss, he denies focal 

numbness or weakness.  He reports nausea but denies vomiting.  He reports 

photophobia.  He states he had a negative MRI of his brain on 12/17/2019 when he

was admitted here for sickle cell pain.  He states he was seen yesterday and 

felt better before he went home but went home and started having a bad headache 

again.  He states this particular headache is been present for about 3 days.  He

denies head injury, neck pain, fever, abdominal pain, chest pain, shortness of 

breath.


TRAVEL OUTSIDE OF THE [place] IN LAST 30 DAYS: No





- Related Data


Allergies/Adverse Reactions: 


                                        





Coconut * [Coconut] Allergy (Mild, Verified 12/10/19 16:40)


   


apixaban [From Eliquis] Allergy (Verified 12/10/19 16:40)


   


rivaroxaban [From Xarelto] Allergy (Verified 12/10/19 16:40)


   


transpore tape Allergy (Mild, Uncoded 12/10/19 16:40)


   Hives











Past Medical History





- General


Information source: Patient, Relative





- Social History


Smoking Status: Never Smoker


Frequency of alcohol use: Occasional


Lives with: Family


Family History: Reviewed & Not Pertinent, Arthritis, DM, Hypertension, Other - 

Sickle cell trait both parents and asthma


Patient has suicidal ideation: No


Patient has homicidal ideation: No


Pulmonary Medical History: Reports: Hx Asthma, Hx Pneumonia


Renal/ Medical History: Denies: Hx Peritoneal Dialysis


Musculoskeletal Medical History: Reports Hx Musculoskeletal Deformity - Pain to 

multiple areas mostly right knee due to sickle cell anemia


Past Surgical History: Reports: Hx Abdominal Surgery - Gallstones removal, Hx 

Cholecystectomy, Hx Orthopedic Surgery - Fluid drained from right foot, Hx V

ascular Surgery - Port placementx2 (failed)





- Immunizations


Immunizations up to date: Yes


Hx Diphtheria, Pertussis, Tetanus Vaccination: Yes


Hx Pneumococcal Vaccination: 05/16/13





Review of Systems





- Review of Systems


Constitutional: See HPI


EENT: See HPI


Cardiovascular: No symptoms reported


Respiratory: No symptoms reported


Gastrointestinal: No symptoms reported


Genitourinary: No symptoms reported


Male Genitourinary: No symptoms reported


Musculoskeletal: See HPI


Skin: No symptoms reported


Hematologic/Lymphatic: No symptoms reported


Neurological/Psychological: See HPI





Physical Exam





- Vital signs


Vitals: 


                                        











Temp Pulse Resp BP Pulse Ox


 


 98.7 F   120 H  16   146/104 H  95 


 


 12/24/19 19:36  12/24/19 19:36  12/24/19 19:36  12/24/19 19:36  12/24/19 19:36














- Notes


Notes: 





GENERAL: Alert, interacts well. No acute distress.


HEAD: Normocephalic, atraumatic.


EYES: Pupils equal, round, and reactive to light. Extraocular movements intact. 

No photophobia noted.


ENT: Oral mucosa moist, tongue midline. Oropharynx unremarkable. Airway patent. 


NECK: Full range of motion. Supple. Trachea midline.  No nuchal rigidity


LUNGS: Clear to auscultation bilaterally, no wheezes, rales, or rhonchi. No 

respiratory distress.


HEART: Borderline tachycardia, normal rhythm, no murmur


ABDOMEN: Soft, non-tender. Non-distended. 


EXTREMITIES: Moves all 4 extremities spontaneously. No edema, normal radial and 

dorsalis pedis pulses bilaterally. No cyanosis.


BACK: no cervical, thoracic, lumbar midline tenderness. No saddle anesthesia, 

normal distal neurovascular exam. Moves all extremities in full range of motion.


NEUROLOGICAL: Alert and oriented x3. Normal speech. Cranial nerves II through XI

I grossly intact. 


PSYCH: Normal affect, normal mood.


SKIN: Warm, dry, normal turgor. No rashes or lesions noted.





Course





- Re-evaluation


Re-evalutation: 


Initially patient was somewhat tachycardic, this improved with IV fluids and the

rapy.  He is actually very well-appearing on exam, normal neurological exam, no 

visual deficits, no signs of distress.  No fever.  No complaints of chest pain 

or shortness of breath.  No nuchal rigidity.  Patient had a negative CAT scan of

the head in triage tonight, had a negative MRI of the brain a couple of weeks 

ago.





CBC shows mild leukocytosis, no bandemia, no significant anemia.  Reticulocytes 

unremarkable.  Indirect bilirubin unremarkable.  Chemistry unremarkable.  

Overall evaluation is reassuring.





Patient requesting medication again, he was remedicated, on reevaluation patient

is alert, interactive, changing his child's diaper at bedside, is quite well-

appearing.  He states he would like to be admitted to the hospital.  I will 

speak to the hospitalist.





I spoke with Dr. Gonzalez, on-call for patient's primary care provider Dr. Funez, he states he feels like patient does not meet admission criteria.  I 

discussed with patient.  I discussed with Dr. Eason who saw the patient 

yesterday.  Patient is asking if he can have another dose of medication and he 

will go home and follow-up with his primary care provider.  He states that he is

fine with this plan and he will return if he worsens in any way.  I feel this is

appropriate based on his evaluation and well appearance, Dr. Eason states 

agreement with this plan.  Discussed return precautions, patient states unde

rstanding and agreement.  Stable at time of discharge.





- Vital Signs


Vital signs: 


                                        











Temp Pulse Resp BP Pulse Ox


 


 97.8 F   107 H  18   165/88 H  97 


 


 12/25/19 04:39  12/25/19 04:39  12/25/19 04:39  12/25/19 04:39  12/25/19 04:39














- Laboratory


Result Diagrams: 


                                 12/24/19 23:35





                                 12/24/19 23:35


Laboratory results interpreted by me: 


                                        











  12/24/19





  23:35


 


WBC  11.9 H D


 


MCV  101 H


 


MCH  34.1 H


 


RDW  17.1 H


 


Band Neutrophils %  1 L


 


Monocytes % (Manual)  14 H


 


Abs Neuts (Manual)  8.3 H


 


Abs Monocytes (Manual)  1.7 H














Discharge





- Discharge


Clinical Impression: 


Headache


Qualifiers:


 Headache type: unspecified Headache chronicity pattern: acute headache 

Intractability: not intractable Qualified Code(s): R51 - Headache





Back pain


Qualifiers:


 Back pain location: low back pain Chronicity: acute Back pain laterality: 

bilateral Sciatica presence: without sciatica Qualified Code(s): M54.5 - Low 

back pain





Sickle cell anemia


Qualifiers:


 Sickle-cell associated disorders: with unspecified crisis Qualified Code(s): 

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





Condition: Stable


Disposition: HOME, SELF-CARE


Additional Instructions: 


Your evaluation tonight is reassuring.


Continue rest, hydration, and pain medication.  Follow-up with your primary 

provider for additional management.  Return if you worsen including vomiting, 

fever, difficulty breathing, severe worsening pain, or any other concerning or 

worsening symptoms.





Referrals: 


JOSAFAT YBARRA MD [Primary Care Provider] - Follow up as needed

## 2019-12-25 VITALS — SYSTOLIC BLOOD PRESSURE: 165 MMHG | DIASTOLIC BLOOD PRESSURE: 88 MMHG

## 2019-12-25 LAB
ABSOLUTE LYMPHOCYTES# (MANUAL): 1.9 10^3/UL (ref 0.5–4.7)
ABSOLUTE MONOCYTES # (MANUAL): 1.7 10^3/UL (ref 0.1–1.4)
ADD MANUAL DIFF: YES
ALBUMIN SERPL-MCNC: 4.4 G/DL (ref 3.5–5)
ALP SERPL-CCNC: 119 U/L (ref 38–126)
ANION GAP SERPL CALC-SCNC: 11 MMOL/L (ref 5–19)
ANISOCYTOSIS BLD QL SMEAR: (no result)
APPEARANCE UR: CLEAR
APTT PPP: YELLOW S
AST SERPL-CCNC: 41 U/L (ref 17–59)
BASOPHILS NFR BLD MANUAL: 0 % (ref 0–2)
BILIRUB DIRECT SERPL-MCNC: 0.1 MG/DL (ref 0–0.4)
BILIRUB SERPL-MCNC: 0.5 MG/DL (ref 0.2–1.3)
BILIRUB UR QL STRIP: NEGATIVE
BUN SERPL-MCNC: 16 MG/DL (ref 7–20)
BURR CELLS BLD QL SMEAR: SLIGHT
CALCIUM: 9.8 MG/DL (ref 8.4–10.2)
CHLORIDE SERPL-SCNC: 104 MMOL/L (ref 98–107)
CO2 SERPL-SCNC: 27 MMOL/L (ref 22–30)
DACRYOCYTES BLD QL SMEAR: SLIGHT
EOSINOPHIL NFR BLD MANUAL: 0 % (ref 0–6)
GLUCOSE SERPL-MCNC: 100 MG/DL (ref 75–110)
GLUCOSE UR STRIP-MCNC: NEGATIVE MG/DL
HGB BLD-MCNC: 15 G/DL (ref 13.5–17)
KETONES UR STRIP-MCNC: NEGATIVE MG/DL
MACROCYTES BLD QL SMEAR: (no result)
MONOCYTES % (MANUAL): 14 % (ref 3–13)
NEUTS BAND NFR BLD MANUAL: 1 % (ref 3–5)
NITRITE UR QL STRIP: NEGATIVE
OVALOCYTES BLD QL SMEAR: (no result)
PH UR STRIP: 8 [PH] (ref 5–9)
PLATELET COMMENT: ADEQUATE
POIKILOCYTOSIS BLD QL SMEAR: (no result)
POTASSIUM SERPL-SCNC: 4.1 MMOL/L (ref 3.6–5)
PROT SERPL-MCNC: 7.3 G/DL (ref 6.3–8.2)
PROT UR STRIP-MCNC: NEGATIVE MG/DL
SCHISTOCYTES BLD QL SMEAR: SLIGHT
SEGMENTED NEUTROPHILS % (MAN): 69 % (ref 42–78)
SP GR UR STRIP: 1.01
TARGETS BLD QL SMEAR: SLIGHT
TOTAL CELLS COUNTED BLD: 100
TOXIC GRANULES BLD QL SMEAR: (no result)
UROBILINOGEN UR-MCNC: NEGATIVE MG/DL (ref ?–2)
VARIANT LYMPHS NFR BLD MANUAL: 16 % (ref 13–45)
WBC # BLD AUTO: 11.9 10^3/UL (ref 4–10.5)

## 2019-12-26 ENCOUNTER — HOSPITAL ENCOUNTER (EMERGENCY)
Dept: HOSPITAL 62 - ER | Age: 24
LOS: 1 days | Discharge: HOME | End: 2019-12-27
Payer: MEDICAID
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
## 2019-12-26 DIAGNOSIS — M54.5: ICD-10-CM

## 2019-12-26 DIAGNOSIS — R51: ICD-10-CM

## 2019-12-26 DIAGNOSIS — Z90.49: ICD-10-CM
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
## 2019-12-26 DIAGNOSIS — D57.00: Primary | ICD-10-CM

## 2019-12-26 LAB
ABSOLUTE LYMPHOCYTES# (MANUAL): 1.9 10^3/UL (ref 0.5–4.7)
ABSOLUTE MONOCYTES # (MANUAL): 0.6 10^3/UL (ref 0.1–1.4)
ABSOLUTE RETICS #: 0.04 10^6/UL (ref 0.03–0.12)
ADD MANUAL DIFF: YES
ALBUMIN SERPL-MCNC: 4.7 G/DL (ref 3.5–5)
ALP SERPL-CCNC: 106 U/L (ref 38–126)
ANION GAP SERPL CALC-SCNC: 11 MMOL/L (ref 5–19)
ANISOCYTOSIS BLD QL SMEAR: (no result)
APPEARANCE UR: CLEAR
APTT PPP: YELLOW S
AST SERPL-CCNC: 36 U/L (ref 17–59)
BASOPHILS NFR BLD MANUAL: 0 % (ref 0–2)
BILIRUB DIRECT SERPL-MCNC: 0.3 MG/DL (ref 0–0.4)
BILIRUB SERPL-MCNC: 0.6 MG/DL (ref 0.2–1.3)
BILIRUB UR QL STRIP: NEGATIVE
BUN SERPL-MCNC: 18 MG/DL (ref 7–20)
CALCIUM: 9.4 MG/DL (ref 8.4–10.2)
CHLORIDE SERPL-SCNC: 104 MMOL/L (ref 98–107)
CO2 SERPL-SCNC: 27 MMOL/L (ref 22–30)
EOSINOPHIL NFR BLD MANUAL: 4 % (ref 0–6)
ERYTHROCYTE [DISTWIDTH] IN BLOOD BY AUTOMATED COUNT: 17.2 % (ref 11.5–14)
GLUCOSE SERPL-MCNC: 111 MG/DL (ref 75–110)
GLUCOSE UR STRIP-MCNC: NEGATIVE MG/DL
HCT VFR BLD CALC: 44.7 % (ref 37.9–51)
HGB BLD-MCNC: 15.2 G/DL (ref 13.5–17)
KETONES UR STRIP-MCNC: NEGATIVE MG/DL
MACROCYTES BLD QL SMEAR: (no result)
MCH RBC QN AUTO: 34.4 PG (ref 27–33.4)
MCHC RBC AUTO-ENTMCNC: 34 G/DL (ref 32–36)
MCV RBC AUTO: 101 FL (ref 80–97)
MONOCYTES % (MANUAL): 9 % (ref 3–13)
NITRITE UR QL STRIP: NEGATIVE
PH UR STRIP: 6 [PH] (ref 5–9)
PLATELET # BLD: 148 10^3/UL (ref 150–450)
PLATELET COMMENT: (no result)
POTASSIUM SERPL-SCNC: 3.8 MMOL/L (ref 3.6–5)
PROT SERPL-MCNC: 7.6 G/DL (ref 6.3–8.2)
PROT UR STRIP-MCNC: NEGATIVE MG/DL
RBC # BLD AUTO: 4.42 10^6/UL (ref 4.35–5.55)
RETICULOCYTE COUNT (AUTO): 0.98 % (ref 0.66–2.85)
SEGMENTED NEUTROPHILS % (MAN): 60 % (ref 42–78)
SP GR UR STRIP: 1.01
TOTAL CELLS COUNTED BLD: 100
UROBILINOGEN UR-MCNC: NEGATIVE MG/DL (ref ?–2)
VARIANT LYMPHS NFR BLD MANUAL: 27 % (ref 13–45)
WBC # BLD AUTO: 6.9 10^3/UL (ref 4–10.5)

## 2019-12-26 PROCEDURE — 99284 EMERGENCY DEPT VISIT MOD MDM: CPT

## 2019-12-26 PROCEDURE — 96376 TX/PRO/DX INJ SAME DRUG ADON: CPT

## 2019-12-26 PROCEDURE — 96361 HYDRATE IV INFUSION ADD-ON: CPT

## 2019-12-26 PROCEDURE — 81001 URINALYSIS AUTO W/SCOPE: CPT

## 2019-12-26 PROCEDURE — 80053 COMPREHEN METABOLIC PANEL: CPT

## 2019-12-26 PROCEDURE — 70450 CT HEAD/BRAIN W/O DYE: CPT

## 2019-12-26 PROCEDURE — 96374 THER/PROPH/DIAG INJ IV PUSH: CPT

## 2019-12-26 PROCEDURE — 85045 AUTOMATED RETICULOCYTE COUNT: CPT

## 2019-12-26 PROCEDURE — 96375 TX/PRO/DX INJ NEW DRUG ADDON: CPT

## 2019-12-26 PROCEDURE — 36415 COLL VENOUS BLD VENIPUNCTURE: CPT

## 2019-12-26 PROCEDURE — 85025 COMPLETE CBC W/AUTO DIFF WBC: CPT

## 2019-12-26 NOTE — RADIOLOGY REPORT (SQ)
EXAM DESCRIPTION: 



CT HEAD WITHOUT IV CONTRAST



COMPLETED DATE/TME:  12/26/2019 20:05



CLINICAL HISTORY: 



24 years, Male, severe headache, sickle cell

 



EXAM DESCRIPTION: 







CLINICAL HISTORY: 



severe headache, sickle cell



COMPARISON: 



None Available



TECHNIQUE: 



Contiguous axial CT images of the head were obtained.



Coronal and sagittal reconstructions were created from the axial

data.



This exam was performed according to our departmental

dose-optimization program, which includes automated exposure

control, adjustment of the mA and/or kV according to patient size

and/or use of iterative reconstruction technique.



FINDINGS:  



There is no evidence of acute mass, mass effect, midline shift or

hemorrhage. The ventricles and extra-axial CSF spaces are

unremarkable. The brain parenchyma appears normal for the

patient's age. No acute abnormalities of the bones is seen.



IMPRESSION: 



No acute intracranial abnormality.

## 2019-12-27 VITALS — SYSTOLIC BLOOD PRESSURE: 152 MMHG | DIASTOLIC BLOOD PRESSURE: 102 MMHG

## 2019-12-27 RX ADMIN — HYDROMORPHONE HYDROCHLORIDE PRN MG: 2 INJECTION INTRAMUSCULAR; INTRAVENOUS; SUBCUTANEOUS at 02:09

## 2019-12-27 RX ADMIN — HYDROMORPHONE HYDROCHLORIDE PRN MG: 2 INJECTION INTRAMUSCULAR; INTRAVENOUS; SUBCUTANEOUS at 04:17

## 2019-12-28 ENCOUNTER — HOSPITAL ENCOUNTER (EMERGENCY)
Dept: HOSPITAL 62 - ER | Age: 24
LOS: 1 days | Discharge: ANOTHER HOSPITAL | End: 2019-12-29
Payer: MEDICAID

## 2019-12-28 DIAGNOSIS — J45.909: ICD-10-CM

## 2019-12-28 DIAGNOSIS — M54.5: ICD-10-CM

## 2019-12-28 DIAGNOSIS — R51: Primary | ICD-10-CM

## 2019-12-28 DIAGNOSIS — H53.8: ICD-10-CM

## 2019-12-28 DIAGNOSIS — M54.9: ICD-10-CM

## 2019-12-28 LAB
ABSOLUTE LYMPHOCYTES# (MANUAL): 1.6 10^3/UL (ref 0.5–4.7)
ABSOLUTE MONOCYTES # (MANUAL): 0.6 10^3/UL (ref 0.1–1.4)
ABSOLUTE RETICS #: 0.06 10^6/UL (ref 0.03–0.12)
ADD MANUAL DIFF: YES
ALBUMIN SERPL-MCNC: 5.4 G/DL (ref 3.5–5)
ALP SERPL-CCNC: 138 U/L (ref 38–126)
ANION GAP SERPL CALC-SCNC: 14 MMOL/L (ref 5–19)
ANISOCYTOSIS BLD QL SMEAR: (no result)
APPEARANCE UR: CLEAR
APTT PPP: (no result) S
AST SERPL-CCNC: 52 U/L (ref 17–59)
BASOPHILS NFR BLD MANUAL: 0 % (ref 0–2)
BILIRUB DIRECT SERPL-MCNC: 0.3 MG/DL (ref 0–0.4)
BILIRUB SERPL-MCNC: 0.8 MG/DL (ref 0.2–1.3)
BILIRUB UR QL STRIP: NEGATIVE
BUN SERPL-MCNC: 22 MG/DL (ref 7–20)
CALCIUM: 10.1 MG/DL (ref 8.4–10.2)
CHLORIDE SERPL-SCNC: 103 MMOL/L (ref 98–107)
CO2 SERPL-SCNC: 27 MMOL/L (ref 22–30)
DACRYOCYTES BLD QL SMEAR: (no result)
EOSINOPHIL NFR BLD MANUAL: 5 % (ref 0–6)
ERYTHROCYTE [DISTWIDTH] IN BLOOD BY AUTOMATED COUNT: 17.3 % (ref 11.5–14)
GLUCOSE SERPL-MCNC: 80 MG/DL (ref 75–110)
GLUCOSE UR STRIP-MCNC: NEGATIVE MG/DL
HCT VFR BLD CALC: 48.8 % (ref 37.9–51)
HGB BLD-MCNC: 16.5 G/DL (ref 13.5–17)
HOWELL-JOLLY BOD BLD QL SMEAR: PRESENT
KETONES UR STRIP-MCNC: NEGATIVE MG/DL
MCH RBC QN AUTO: 34.3 PG (ref 27–33.4)
MCHC RBC AUTO-ENTMCNC: 33.7 G/DL (ref 32–36)
MCV RBC AUTO: 102 FL (ref 80–97)
MONOCYTES % (MANUAL): 8 % (ref 3–13)
OVALOCYTES BLD QL SMEAR: (no result)
PH UR STRIP: 7 [PH] (ref 5–9)
PLATELET # BLD: 234 10^3/UL (ref 150–450)
PLATELET COMMENT: ADEQUATE
POIKILOCYTOSIS BLD QL SMEAR: (no result)
POTASSIUM SERPL-SCNC: 4.4 MMOL/L (ref 3.6–5)
PROT SERPL-MCNC: 8.9 G/DL (ref 6.3–8.2)
PROT UR STRIP-MCNC: NEGATIVE MG/DL
RBC # BLD AUTO: 4.8 10^6/UL (ref 4.35–5.55)
RETICULOCYTE COUNT (AUTO): 1.29 % (ref 0.66–2.85)
SEGMENTED NEUTROPHILS % (MAN): 64 % (ref 42–78)
SP GR UR STRIP: 1.01
TARGETS BLD QL SMEAR: SLIGHT
TOTAL CELLS COUNTED BLD: 100
TOXIC GRANULES BLD QL SMEAR: (no result)
UROBILINOGEN UR-MCNC: NEGATIVE MG/DL (ref ?–2)
VARIANT LYMPHS NFR BLD MANUAL: 22 % (ref 13–45)
WBC # BLD AUTO: 7 10^3/UL (ref 4–10.5)

## 2019-12-28 PROCEDURE — 96374 THER/PROPH/DIAG INJ IV PUSH: CPT

## 2019-12-28 PROCEDURE — 36415 COLL VENOUS BLD VENIPUNCTURE: CPT

## 2019-12-28 PROCEDURE — 85045 AUTOMATED RETICULOCYTE COUNT: CPT

## 2019-12-28 PROCEDURE — 96376 TX/PRO/DX INJ SAME DRUG ADON: CPT

## 2019-12-28 PROCEDURE — 81001 URINALYSIS AUTO W/SCOPE: CPT

## 2019-12-28 PROCEDURE — 96375 TX/PRO/DX INJ NEW DRUG ADDON: CPT

## 2019-12-28 PROCEDURE — 96361 HYDRATE IV INFUSION ADD-ON: CPT

## 2019-12-28 PROCEDURE — 99284 EMERGENCY DEPT VISIT MOD MDM: CPT

## 2019-12-28 PROCEDURE — 80053 COMPREHEN METABOLIC PANEL: CPT

## 2019-12-28 PROCEDURE — 85025 COMPLETE CBC W/AUTO DIFF WBC: CPT

## 2019-12-28 NOTE — ER DOCUMENT REPORT
ED Medical Screen (RME)





- General


Chief Complaint: Headache


Stated Complaint: BACK PAIN, HEAD PAIN


Time Seen by Provider: 12/28/19 17:08


Primary Care Provider: 


JOSAFAT YBARRA MD [Primary Care Provider] - Follow up as needed


TRAVEL OUTSIDE OF THE [place] IN LAST 30 DAYS: No





- HPI


Notes: 





12/28/19 17:10


Patient is a 24-year-old male with a history of sickle cell well-known to the 

emergency department who presents per the request of his [facility] provider to get a 

work-up performed and then to have the attending give him/her a call.  They did 

prefer him to drive to [facility], but patient has been having a headache and back 

pain for the past 3 days and cannot 'drive that far feeling this way.'  No fever

or chest pain.





I have treated and performed a rapid initial assessment of this patient.  A 

comprehensive ED assessment and evaluation of the patient, analysis of test 

results and completion of medical decision making process will be conducted by 

additional ED providers.





PHYSICAL EXAMINATION:





GENERAL: Well-appearing, well-nourished and in no acute distress.  A&Ox4.  

Answers questions appropriately.


Neuro: Cranial nerves grossly intact.


Eyes: PERRLA, EOMI bilaterally.





- Related Data


Allergies/Adverse Reactions: 


                                        





Coconut * [Coconut] Allergy (Mild, Verified 12/28/19 17:07)


   


apixaban [From Eliquis] Allergy (Verified 12/28/19 17:07)


   


rivaroxaban [From Xarelto] Allergy (Verified 12/28/19 17:07)


   


transpore tape Allergy (Mild, Uncoded 12/28/19 17:07)


   Hives











Past Medical History





- Social History


Family history: None


Pulmonary Medical History: Reports: Hx Asthma, Hx Pneumonia


Renal/ Medical History: Denies: Hx Peritoneal Dialysis


Musculoskeltal Medical History: Reports Hx Musculoskeletal Deformity - Pain to 

multiple areas mostly right knee due to sickle cell anemia


Past Surgical History: Reports: Hx Abdominal Surgery - Gallstones removal, Hx 

Cholecystectomy, Hx Orthopedic Surgery - Fluid drained from right foot, Hx 

Vascular Surgery - Port placementx2 (failed)





- Immunizations


Immunizations up to date: Yes


Hx Diphtheria, Pertussis, Tetanus Vaccination: Yes





Physical Exam





- Vital signs


Vitals: 





                                        











Temp Pulse Resp BP Pulse Ox


 


 98.8 F   120 H  20   140/98 H  97 


 


 12/28/19 17:03  12/28/19 17:03  12/28/19 17:03  12/28/19 17:03  12/28/19 17:03














Course





- Vital Signs


Vital signs: 





                                        











Temp Pulse Resp BP Pulse Ox


 


 98.8 F   120 H  20   140/98 H  97 


 


 12/28/19 17:03  12/28/19 17:03  12/28/19 17:03  12/28/19 17:03  12/28/19 17:03














Doctor's Discharge





- Discharge


Referrals: 


JOSAFAT YBARRA MD [Primary Care Provider] - Follow up as needed

## 2019-12-28 NOTE — ER DOCUMENT REPORT
ED General





- General


TRAVEL OUTSIDE OF THE [place] IN LAST 30 DAYS: No





- Related Data


Home Medications: see medications list





<MARYURI KENT - Last Filed: 12/28/19 22:17>





<CECILIO TO - Last Filed: 12/29/19 00:04>





- General


Chief Complaint: Back Pain


Stated Complaint: BACK PAIN, HEAD PAIN


Time Seen by Provider: 12/28/19 17:08





- HPI


Notes: 





Patient well-known to this emergency department history of sickle cell disease 

presents with 1 week of headache.  He was evaluated twice this week has had 2 CT

of his head.  He states that today he called his oncologist at [facility] and they 

wanted him evaluated at this emergency department and then to be called.  He 

denies any recent fevers chest pain shortness of breath or dysuria.  He does 

have lower back pain similar to when he has pain from his sickle flareups.  No 

nausea or vomiting.  He states of the last 2 days he has been having blurred 

vision in his left eye and absence of any trauma.  There are sensory deficits of

his upper or lower extremities or face. (MARYURI KENT)





- Related Data


Allergies/Adverse Reactions: 


                                        





Coconut * [Coconut] Allergy (Mild, Verified 12/28/19 17:07)


   


apixaban [From Eliquis] Allergy (Verified 12/28/19 17:07)


   


rivaroxaban [From Xarelto] Allergy (Verified 12/28/19 17:07)


   


transpore tape Allergy (Mild, Uncoded 12/28/19 17:07)


   Hives











Past Medical History





- Social History


Smoking Status: Never Smoker


Chew tobacco use (# tins/day): No


Frequency of alcohol use: None


Drug Abuse: None


Family History: Reviewed & Not Pertinent, Arthritis, DM, Hypertension, Other - 

Sickle cell trait both parents and asthma


Patient has suicidal ideation: No


Patient has homicidal ideation: No


Pulmonary Medical History: Reports: Hx Asthma, Hx Pneumonia


Renal/ Medical History: Denies: Hx Peritoneal Dialysis


Musculoskeletal Medical History: Reports Hx Musculoskeletal Deformity - Pain to 

multiple areas mostly right knee due to sickle cell anemia


Past Surgical History: Reports: Hx Abdominal Surgery - Gallstones removal, Hx 

Cholecystectomy, Hx Orthopedic Surgery - Fluid drained from right foot, Hx 

Vascular Surgery - Port placementx2 (failed)





- Immunizations


Immunizations up to date: Yes


Hx Diphtheria, Pertussis, Tetanus Vaccination: Yes


Hx Pneumococcal Vaccination: 05/16/13





<MARYURI KENT - Last Filed: 12/28/19 22:17>





Review of Systems





- Review of Systems


Constitutional: No symptoms reported


EENT: No symptoms reported


Cardiovascular: No symptoms reported


Respiratory: No symptoms reported


Gastrointestinal: No symptoms reported


Genitourinary: No symptoms reported


Male Genitourinary: No symptoms reported


Musculoskeletal: See HPI


Skin: No symptoms reported


Hematologic/Lymphatic: No symptoms reported


Neurological/Psychological: See HPI





<MARYURI KENT - Last Filed: 12/28/19 22:17>





Physical Exam





- General


General appearance: Appears well, Alert





- HEENT


Head: Normocephalic, Atraumatic


Eyes: Normal


Conjunctiva: Normal


Cornea: Normal


Extraocular movements intact: Yes


Pupils: PERRL





- Respiratory


Respiratory status: No respiratory distress


Chest status: Nontender


Breath sounds: Normal


Chest palpation: Normal





- Cardiovascular


Rhythm: Regular


Heart sounds: Normal auscultation


Murmur: No





- Abdominal


Inspection: Normal


Distension: No distension


Bowel sounds: Normal





- Back


Back: Normal, Nontender.  No: Vertebra tenderness





- Extremities


General upper extremity: Normal inspection, Normal strength


General lower extremity: Normal inspection, Normal strength





- Neurological


Neuro grossly intact: Yes


Cognition: Normal


Orientation: AAOx4


Cranial nerves: Normal


Cerebellar coordination: Normal





<MARYURI KENT - Last Filed: 12/28/19 22:17>





- Vital signs


Vitals: 


                                        











Temp Pulse Resp BP Pulse Ox


 


 98.8 F   120 H  20   140/98 H  97 


 


 12/28/19 17:03  12/28/19 17:03  12/28/19 17:03  12/28/19 17:03  12/28/19 17:03














Course





- Laboratory


Result Diagrams: 


                                 12/28/19 18:35





                                 12/28/19 18:35





<MARYURI KENT - Last Filed: 12/28/19 22:17>





- Laboratory


Result Diagrams: 


                                 12/28/19 18:35





                                 12/28/19 18:35





<CECILIO TO - Last Filed: 12/29/19 00:04>





- Re-evaluation


Re-evalutation: 





12/28/19 19:04


Well-appearing in no acute distress at this time third visit in the last several

days for similar complaints.  Will run basic labs and call his oncologist per 

his request.  Medications provided rate of note, he has had 2 normal CTs of his 

head do not feel the third is warranted at this time.  He has no motor or 

neurologic deficits other than stating that he has blurred vision in his left 

eye that started approximately 2 days ago sudden onset.


12/28/19 21:46


Patient states that his blurred vision started 2 days ago upon chart review he 

has history of blurred vision on December 17 with an MRI performed showing no 

evidence of acute disease.  Attempting to contact [facility] per the patient's request

at this time.


12/28/19 22:03


Spoke to Dr. Ramey who is on-call physician for the patient's hematologist 

Dr. Verdin.  She is aware of this patient history.  I explained that in 

December 17 and an MRI with [facility] showing no evidence of acute disease.  He stated

to me that his headache started 2 days ago but is documented that he had a 

headache as far back as December 17.  He does not have any fevers this does not 

appear infectious in nature.  He has had 3 CTs since that time as well.  Do not 

feel another CT is warranted with normal neurologic exam.  After further 

discussion Dr. Gillespie recommended transferring patient ER to ER to [facility] for 

further evaluation and admittance to her service for further evaluation due to 

longevity of his headache


 (MARYURI KENT)





- Vital Signs


Vital signs: 


                                        











Temp Pulse Resp BP Pulse Ox


 


 98.8 F   120 H  20   140/98 H  97 


 


 12/28/19 17:03  12/28/19 17:03  12/28/19 17:03  12/28/19 17:03  12/28/19 17:03














- Laboratory


Laboratory results interpreted by me: 


                                        











  12/28/19 12/28/19 12/28/19





  18:30 18:35 18:35


 


MCV   102 H 


 


MCH   34.3 H 


 


RDW   17.3 H 


 


BUN    22 H


 


Alkaline Phosphatase    138 H


 


Total Protein    8.9 H


 


Albumin    5.4 H


 


Urine Ascorbic Acid  20 H  














Discharge





- Discharge


Admitting Provider: Karoline





<MARYURI KENT - Last Filed: 12/28/19 22:17>





<CECILIO TO - Last Filed: 12/29/19 00:04>





- Discharge


Clinical Impression: 


Headache


Qualifiers:


 Headache type: unspecified Headache chronicity pattern: unspecified pattern 

Intractability: not intractable Qualified Code(s): R51 - Headache





Back pain


Qualifiers:


 Back pain location: back pain in unspecified location Chronicity: unspecified 

Back pain laterality: midline Qualified Code(s): M54.89 - Other dorsalgia





Condition: Good


Disposition: [facility]


Additional Instructions: 


Please follow-up with ophthalmologist for an eye exam due to your blurred 

vision.  If your symptoms continue please follow-up with your hematologist have 

a medicine review as prednisone can cause headaches and this is a common site 

reaction of prednisone.


Prescriptions: 


Prochlorperazine Maleate [Compazine 10 mg Tablet] 10 mg PO ASDIR PRN #10 tablet


 PRN Reason:

## 2019-12-29 VITALS — SYSTOLIC BLOOD PRESSURE: 150 MMHG | DIASTOLIC BLOOD PRESSURE: 93 MMHG

## 2020-01-10 ENCOUNTER — HOSPITAL ENCOUNTER (EMERGENCY)
Dept: HOSPITAL 62 - ER | Age: 25
Discharge: HOME | End: 2020-01-10
Payer: MEDICAID

## 2020-01-10 VITALS — SYSTOLIC BLOOD PRESSURE: 148 MMHG | DIASTOLIC BLOOD PRESSURE: 84 MMHG

## 2020-01-10 DIAGNOSIS — Z91.018: ICD-10-CM

## 2020-01-10 DIAGNOSIS — D57.00: ICD-10-CM

## 2020-01-10 DIAGNOSIS — J45.909: ICD-10-CM

## 2020-01-10 DIAGNOSIS — Z88.8: ICD-10-CM

## 2020-01-10 DIAGNOSIS — M25.561: ICD-10-CM

## 2020-01-10 DIAGNOSIS — M87.9: Primary | ICD-10-CM

## 2020-01-10 DIAGNOSIS — Z98.890: ICD-10-CM

## 2020-01-10 DIAGNOSIS — G89.29: ICD-10-CM

## 2020-01-10 LAB
ABSOLUTE RETICS #: 0.03 10^6/UL (ref 0.03–0.12)
ADD MANUAL DIFF: NO
ALBUMIN SERPL-MCNC: 5.2 G/DL (ref 3.5–5)
ALP SERPL-CCNC: 141 U/L (ref 38–126)
ANION GAP SERPL CALC-SCNC: 16 MMOL/L (ref 5–19)
AST SERPL-CCNC: 47 U/L (ref 17–59)
BASOPHILS # BLD AUTO: 0.1 10^3/UL (ref 0–0.2)
BASOPHILS NFR BLD AUTO: 1.3 % (ref 0–2)
BILIRUB DIRECT SERPL-MCNC: 0.3 MG/DL (ref 0–0.4)
BILIRUB SERPL-MCNC: 0.5 MG/DL (ref 0.2–1.3)
BUN SERPL-MCNC: 17 MG/DL (ref 7–20)
CALCIUM: 10.1 MG/DL (ref 8.4–10.2)
CHLORIDE SERPL-SCNC: 107 MMOL/L (ref 98–107)
CO2 SERPL-SCNC: 21 MMOL/L (ref 22–30)
EOSINOPHIL # BLD AUTO: 0.3 10^3/UL (ref 0–0.6)
EOSINOPHIL NFR BLD AUTO: 2.9 % (ref 0–6)
ERYTHROCYTE [DISTWIDTH] IN BLOOD BY AUTOMATED COUNT: 16.6 % (ref 11.5–14)
GLUCOSE SERPL-MCNC: 156 MG/DL (ref 75–110)
HCT VFR BLD CALC: 49 % (ref 37.9–51)
HGB BLD-MCNC: 16.8 G/DL (ref 13.5–17)
LYMPHOCYTES # BLD AUTO: 2.3 10^3/UL (ref 0.5–4.7)
LYMPHOCYTES NFR BLD AUTO: 26.7 % (ref 13–45)
MCH RBC QN AUTO: 34.2 PG (ref 27–33.4)
MCHC RBC AUTO-ENTMCNC: 34.2 G/DL (ref 32–36)
MCV RBC AUTO: 100 FL (ref 80–97)
MONOCYTES # BLD AUTO: 0.9 10^3/UL (ref 0.1–1.4)
MONOCYTES NFR BLD AUTO: 10.9 % (ref 3–13)
NEUTROPHILS # BLD AUTO: 5 10^3/UL (ref 1.7–8.2)
NEUTS SEG NFR BLD AUTO: 58.2 % (ref 42–78)
PLATELET # BLD: 278 10^3/UL (ref 150–450)
POTASSIUM SERPL-SCNC: 4.1 MMOL/L (ref 3.6–5)
PROT SERPL-MCNC: 8.4 G/DL (ref 6.3–8.2)
RBC # BLD AUTO: 4.91 10^6/UL (ref 4.35–5.55)
RETICULOCYTE COUNT (AUTO): 0.6 % (ref 0.66–2.85)
TOTAL CELLS COUNTED % (AUTO): 100 %
WBC # BLD AUTO: 8.6 10^3/UL (ref 4–10.5)

## 2020-01-10 PROCEDURE — 36415 COLL VENOUS BLD VENIPUNCTURE: CPT

## 2020-01-10 PROCEDURE — 96361 HYDRATE IV INFUSION ADD-ON: CPT

## 2020-01-10 PROCEDURE — 99283 EMERGENCY DEPT VISIT LOW MDM: CPT

## 2020-01-10 PROCEDURE — 85045 AUTOMATED RETICULOCYTE COUNT: CPT

## 2020-01-10 PROCEDURE — 96375 TX/PRO/DX INJ NEW DRUG ADDON: CPT

## 2020-01-10 PROCEDURE — 96374 THER/PROPH/DIAG INJ IV PUSH: CPT

## 2020-01-10 PROCEDURE — 85025 COMPLETE CBC W/AUTO DIFF WBC: CPT

## 2020-01-10 PROCEDURE — 80053 COMPREHEN METABOLIC PANEL: CPT

## 2020-01-10 RX ADMIN — SODIUM CHLORIDE PRN MLS/HR: 9 INJECTION, SOLUTION INTRAVENOUS at 16:00

## 2020-01-10 RX ADMIN — HYDROMORPHONE HYDROCHLORIDE PRN MG: 2 INJECTION INTRAMUSCULAR; INTRAVENOUS; SUBCUTANEOUS at 16:43

## 2020-01-10 RX ADMIN — HYDROMORPHONE HYDROCHLORIDE PRN MG: 2 INJECTION INTRAMUSCULAR; INTRAVENOUS; SUBCUTANEOUS at 14:59

## 2020-01-10 RX ADMIN — SODIUM CHLORIDE PRN MLS/HR: 9 INJECTION, SOLUTION INTRAVENOUS at 14:52

## 2020-01-10 NOTE — ER DOCUMENT REPORT
ED Extremity Problem, Lower





- General


Chief Complaint: Knee Pain


Stated Complaint: RIGHT KNEE PAIN


Time Seen by Provider: 01/10/20 14:06


Primary Care Provider: 


DIYA PHILIPPE MD [Primary Care Provider] - Follow up tomorrow


Mode of Arrival: Ambulatory


Information source: Patient


Notes: 





Patient presents complaining of right knee pain that started yesterday.  Patient

reports history of sickle cell disease although has been treated with a bone 

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  Patient states he does have 

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  Patient denies any trauma to the knee.  No 

fever.  Patient denies any chest pain back pain headache or any other body aches

at this time.


TRAVEL OUTSIDE OF THE [place] IN LAST 30 DAYS: No





- HPI


Patient complains to provider of: Pain.  No: Injury, Swelling


Location: Knee


Occurred: Yesterday


Quality of pain: Achy


Pain Level: 4


Context: denies: Fell


Recent injury: No


Exacerbated by: Movement, Walking





- Related Data


Allergies/Adverse Reactions: 


                                        





Coconut * [Coconut] Allergy (Mild, Verified 12/28/19 17:07)


   


apixaban [From Eliquis] Allergy (Verified 12/28/19 17:07)


   


rivaroxaban [From Xarelto] Allergy (Verified 12/28/19 17:07)


   


transpore tape Allergy (Mild, Uncoded 12/28/19 17:07)


   Hives











Past Medical History





- General


Information source: Patient





- Social History


Smoking Status: Never Smoker


Frequency of alcohol use: None


Drug Abuse: None


Occupation: None


Lives with: Family


Family History: Reviewed & Not Pertinent, Arthritis, DM, Hypertension, Other - 

Sickle cell trait both parents and asthma


Patient has suicidal ideation: No


Patient has homicidal ideation: No





- Medical History


Medical History: Other - Sickle cell disease


Pulmonary Medical History: Reports: Hx Asthma, Hx Pneumonia


Renal/ Medical History: Denies: Hx Peritoneal Dialysis


Musculoskeletal Medical History: Reports Hx Musculoskeletal Deformity - Pain to 

multiple areas mostly right knee due to sickle cell anemia


Past Surgical History: Reports: Hx Abdominal Surgery - Gallstones removal, Hx 

Cholecystectomy, Hx Orthopedic Surgery - Fluid drained from right foot, Hx 

Vascular Surgery - Port placementx2 (failed)





- Immunizations


Immunizations up to date: Yes


Hx Diphtheria, Pertussis, Tetanus Vaccination: Yes


Hx Pneumococcal Vaccination: 05/16/13





Review of Systems





- Review of Systems


Constitutional: No symptoms reported.  denies: Fever, Recent illness


EENT: No symptoms reported


Cardiovascular: No symptoms reported.  denies: Chest pain, Dizziness, 

Lightheaded


Respiratory: No symptoms reported.  denies: Cough, Short of breath


Gastrointestinal: No symptoms reported, Abdominal pain.  denies: Nausea, 

Vomiting


Genitourinary: No symptoms reported


Male Genitourinary: No symptoms reported


Musculoskeletal: Joint pain - Right knee joint tenderness.  denies: Back pain


Skin: No symptoms reported


Hematologic/Lymphatic: No symptoms reported


Neurological/Psychological: No symptoms reported.  denies: Headaches





Physical Exam





- Vital signs


Vitals: 


                                        











Temp Pulse Resp BP Pulse Ox


 


 98.6 F   144 H  18   138/82 H  100 


 


 01/10/20 13:56  01/10/20 13:56  01/10/20 13:56  01/10/20 13:56  01/10/20 13:56














- General


General appearance: Appears well, Alert


In distress: None





- HEENT


Head: Normocephalic, Atraumatic


Eyes: Normal


Conjunctiva: Normal


Nasal: Normal


Mouth/Lips: Normal


Mucous membranes: Normal


Pharynx: Normal


Neck: Normal, Supple.  No: Lymphadenopathy





- Respiratory


Respiratory status: No respiratory distress


Chest status: Nontender


Breath sounds: Normal.  No: Rales, Rhonchi, Stridor, Wheezing


Chest palpation: Normal





- Cardiovascular


Rhythm: Tachycardia


Heart sounds: S1 appreciated, S2 appreciated


Murmur: No





- Abdominal


Inspection: Normal


Tenderness: Nontender





- Back


Back: Normal, Nontender.  No: CVA tenderness





- Extremities


General upper extremity: Normal inspection, Normal ROM


General lower extremity: Normal inspection, Tender - right knee, Normal color, 

Normal ROM.  No: Edema


Hip: Normal, Nontender


Thigh: Normal, Nontender


Knee: Tender - right knee joint tenderness, no effusion, no laxity with varus or

valgus maneuvers, no calar, Pain with ROM, Patellar tendon intact.  No: 

Abrasion, Deformity, Dislocation, Ecchymosis, Instability, Joint effusion, 

Laxity with valgus stress, Laxity with varus stress


Calf: Normal, Nontender


Ankle: Normal, Nontender





- Neurological


Neuro grossly intact: Yes


Cognition: Normal


Orientation: AAOx4


[place] Coma Scale Eye Opening: Spontaneous


[place] Coma Scale Verbal: Oriented


[place] Coma Scale Motor: Obeys Commands


[place] Coma Scale Total: 15





- Psychological


Associated symptoms: Normal affect, Normal mood





- Skin


Skin Temperature: Warm


Skin Moisture: Dry


Skin Color: Normal





Course





- Re-evaluation


Re-evalutation: 





01/10/20 17:47


Patient reports pain is improved and is manageable.  Patient feels he can manage

his pain symptoms at home.  Patient's diagnostic tests unremarkable at this 

time.  Patient stable for discharge.





- Vital Signs


Vital signs: 


                                        











Temp Pulse Resp BP Pulse Ox


 


 98.2 F   117 H  18   148/84 H  100 


 


 01/10/20 16:49  01/10/20 16:49  01/10/20 16:49  01/10/20 16:49  01/10/20 16:49














- Laboratory


Result Diagrams: 


                                 01/10/20 14:30





                                 01/10/20 14:30


Laboratory results interpreted by me: 


                                        











  01/10/20 01/10/20





  14:30 14:30


 


MCV  100 H 


 


MCH  34.2 H 


 


RDW  16.6 H 


 


Retic Count (auto)  0.60 L 


 


Carbon Dioxide   21 L


 


Glucose   156 H


 


Alkaline Phosphatase   141 H


 


Total Protein   8.4 H


 


Albumin   5.2 H











01/10/20 20:07





                               Labs- Entire Visit











  01/10/20 01/10/20





  14:30 14:30


 


WBC  8.6 


 


RBC  4.91 


 


Hgb  16.8 


 


Hct  49.0 


 


MCV  100 H 


 


MCH  34.2 H 


 


MCHC  34.2 


 


RDW  16.6 H 


 


Plt Count  278 


 


Lymph % (Auto)  26.7 


 


Mono % (Auto)  10.9 


 


Eos % (Auto)  2.9 


 


Baso % (Auto)  1.3 


 


Reticulocyte #  0.029 


 


Absolute Neuts (auto)  5.0 


 


Absolute Lymphs (auto)  2.3 


 


Absolute Monos (auto)  0.9 


 


Absolute Eos (auto)  0.3 


 


Absolute Basos (auto)  0.1 


 


Seg Neutrophils %  58.2 


 


Retic Count (auto)  0.60 L 


 


Sodium   143.7


 


Potassium   4.1


 


Chloride   107


 


Carbon Dioxide   21 L


 


Anion Gap   16


 


BUN   17


 


Creatinine   0.90


 


Est GFR ( Amer)   > 60


 


Est GFR (MDRD) Non-Af   > 60


 


Glucose   156 H


 


Calcium   10.1


 


Total Bilirubin   0.5


 


Direct Bilirubin   0.3


 


Neonat Total Bilirubin   Not Reportable


 


Neonat Direct Bilirubin   Not Reportable


 


Neonat Indirect Bili   Not Reportable


 


AST   47


 


ALT   35


 


Alkaline Phosphatase   141 H


 


Total Protein   8.4 H


 


Albumin   5.2 H














Discharge





- Discharge


Clinical Impression: 


Right knee pain


Qualifiers:


 Chronicity: chronic Qualified Code(s): M25.561 - Pain in right knee





Sickle cell disease


Qualifiers:


 Sickle-cell associated disorders: with unspecified crisis Qualified Code(s): 

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





Condition: Stable


Disposition: HOME, SELF-CARE


Instructions:  Intravenous (IV) Fluids ([facility]), Pain Medication Injection ([facility])


Additional Instructions: 


Return immediately for any new or worsening symptoms





Followup with your primary care provider, call tomorrow to make a followup 

appointment








Referrals: 


DIYA PHILIPPE MD [Primary Care Provider] - Follow up tomorrow

## 2020-01-10 NOTE — ER DOCUMENT REPORT
ED Medical Screen (RME)





- General


Chief Complaint: Knee Pain


Stated Complaint: RIGHT KNEE PAIN


Time Seen by Provider: 01/10/20 14:06


TRAVEL OUTSIDE OF THE [place] IN LAST 30 DAYS: No





- HPI


Notes: 





01/10/20 14:08


Patient is a 24-year-old male well-known emergency department presents for 

possible sickle cell crisis and right knee pain that began today.  No fever, 

chest pain, or headache.





I have treated and performed a rapid initial assessment of this patient.  A 

comprehensive ED assessment and evaluation of the patient, analysis of test 

results and completion of medical decision making process will be conducted by 

additional ED providers.





PHYSICAL EXAMINATION:





GENERAL: Well-appearing, well-nourished and in no acute distress.  A&Ox4.  

Answers questions appropriately.





- Related Data


Allergies/Adverse Reactions: 


                                        





Coconut * [Coconut] Allergy (Mild, Verified 12/28/19 17:07)


   


apixaban [From Eliquis] Allergy (Verified 12/28/19 17:07)


   


rivaroxaban [From Xarelto] Allergy (Verified 12/28/19 17:07)


   


transpore tape Allergy (Mild, Uncoded 12/28/19 17:07)


   Hives











Past Medical History





- Social History


Frequency of alcohol use: None


Drug Abuse: None


Family history: None


Pulmonary Medical History: Reports: Hx Asthma, Hx Pneumonia


Renal/ Medical History: Denies: Hx Peritoneal Dialysis


Musculoskeltal Medical History: Reports Hx Musculoskeletal Deformity - Pain to 

multiple areas mostly right knee due to sickle cell anemia


Past Surgical History: Reports: Hx Abdominal Surgery - Gallstones removal, Hx 

Cholecystectomy, Hx Orthopedic Surgery - Fluid drained from right foot, Hx 

Vascular Surgery - Port placementx2 (failed)





- Immunizations


Immunizations up to date: Yes


Hx Diphtheria, Pertussis, Tetanus Vaccination: Yes





Physical Exam





- Vital signs


Vitals: 





                                        











Temp Pulse Resp BP Pulse Ox


 


 98.6 F   144 H  18   138/82 H  100 


 


 01/10/20 13:56  01/10/20 13:56  01/10/20 13:56  01/10/20 13:56  01/10/20 13:56














Course





- Vital Signs


Vital signs: 





                                        











Temp Pulse Resp BP Pulse Ox


 


 98.6 F   144 H  18   138/82 H  100 


 


 01/10/20 13:56  01/10/20 13:56  01/10/20 13:56  01/10/20 13:56  01/10/20 13:56

## 2020-01-11 ENCOUNTER — HOSPITAL ENCOUNTER (EMERGENCY)
Dept: HOSPITAL 62 - ER | Age: 25
LOS: 1 days | Discharge: HOME | End: 2020-01-12
Payer: MEDICAID

## 2020-01-11 DIAGNOSIS — Z79.899: ICD-10-CM

## 2020-01-11 DIAGNOSIS — Z88.8: ICD-10-CM

## 2020-01-11 DIAGNOSIS — M25.561: Primary | ICD-10-CM

## 2020-01-11 DIAGNOSIS — D57.00: ICD-10-CM

## 2020-01-11 LAB
ABSOLUTE RETICS #: 0.03 10^6/UL (ref 0.03–0.12)
ADD MANUAL DIFF: NO
ALBUMIN SERPL-MCNC: 4 G/DL (ref 3.5–5)
ALP SERPL-CCNC: 102 U/L (ref 38–126)
ANION GAP SERPL CALC-SCNC: 11 MMOL/L (ref 5–19)
AST SERPL-CCNC: 32 U/L (ref 17–59)
BASOPHILS # BLD AUTO: 0.1 10^3/UL (ref 0–0.2)
BASOPHILS NFR BLD AUTO: 1.3 % (ref 0–2)
BILIRUB DIRECT SERPL-MCNC: 0.2 MG/DL (ref 0–0.4)
BILIRUB SERPL-MCNC: 0.4 MG/DL (ref 0.2–1.3)
BUN SERPL-MCNC: 19 MG/DL (ref 7–20)
CALCIUM: 9.3 MG/DL (ref 8.4–10.2)
CHLORIDE SERPL-SCNC: 108 MMOL/L (ref 98–107)
CO2 SERPL-SCNC: 23 MMOL/L (ref 22–30)
EOSINOPHIL # BLD AUTO: 0.3 10^3/UL (ref 0–0.6)
EOSINOPHIL NFR BLD AUTO: 4.5 % (ref 0–6)
ERYTHROCYTE [DISTWIDTH] IN BLOOD BY AUTOMATED COUNT: 16.7 % (ref 11.5–14)
GLUCOSE SERPL-MCNC: 98 MG/DL (ref 75–110)
HCT VFR BLD CALC: 38.7 % (ref 37.9–51)
HGB BLD-MCNC: 13.3 G/DL (ref 13.5–17)
LYMPHOCYTES # BLD AUTO: 1.8 10^3/UL (ref 0.5–4.7)
LYMPHOCYTES NFR BLD AUTO: 25.4 % (ref 13–45)
MCH RBC QN AUTO: 34 PG (ref 27–33.4)
MCHC RBC AUTO-ENTMCNC: 34.3 G/DL (ref 32–36)
MCV RBC AUTO: 99 FL (ref 80–97)
MONOCYTES # BLD AUTO: 0.8 10^3/UL (ref 0.1–1.4)
MONOCYTES NFR BLD AUTO: 10.6 % (ref 3–13)
NEUTROPHILS # BLD AUTO: 4.2 10^3/UL (ref 1.7–8.2)
NEUTS SEG NFR BLD AUTO: 58.2 % (ref 42–78)
PLATELET # BLD: 239 10^3/UL (ref 150–450)
POTASSIUM SERPL-SCNC: 3.7 MMOL/L (ref 3.6–5)
PROT SERPL-MCNC: 6.8 G/DL (ref 6.3–8.2)
RBC # BLD AUTO: 3.91 10^6/UL (ref 4.35–5.55)
RETICULOCYTE COUNT (AUTO): 0.85 % (ref 0.66–2.85)
TOTAL CELLS COUNTED % (AUTO): 100 %
WBC # BLD AUTO: 7.2 10^3/UL (ref 4–10.5)

## 2020-01-11 PROCEDURE — 96361 HYDRATE IV INFUSION ADD-ON: CPT

## 2020-01-11 PROCEDURE — 96375 TX/PRO/DX INJ NEW DRUG ADDON: CPT

## 2020-01-11 PROCEDURE — 99283 EMERGENCY DEPT VISIT LOW MDM: CPT

## 2020-01-11 PROCEDURE — 80053 COMPREHEN METABOLIC PANEL: CPT

## 2020-01-11 PROCEDURE — 36415 COLL VENOUS BLD VENIPUNCTURE: CPT

## 2020-01-11 PROCEDURE — 96376 TX/PRO/DX INJ SAME DRUG ADON: CPT

## 2020-01-11 PROCEDURE — 85025 COMPLETE CBC W/AUTO DIFF WBC: CPT

## 2020-01-11 PROCEDURE — 96374 THER/PROPH/DIAG INJ IV PUSH: CPT

## 2020-01-11 PROCEDURE — 85045 AUTOMATED RETICULOCYTE COUNT: CPT

## 2020-01-11 NOTE — ER DOCUMENT REPORT
ED Medical Screen (RME)





- General


Chief Complaint: Knee Pain


Stated Complaint: RIGHT KNEE PAIN


Time Seen by Provider: 01/11/20 22:01


Primary Care Provider: 


DIYA PHILIPPE MD [Primary Care Provider] - Follow up as needed


Mode of Arrival: Ambulatory


Information source: Patient


Notes: 





24-year-old male patient with sickle cell presenting to the emergency department

with right knee pain.  Patient typically presents to the emergency department 

with right knee pain when he has a sickle cell crisis.  Patient denies any chest

pain or shortness of breath.  Lung sounds are clear and equal bilaterally.





I have greeted and performed a rapid initial assessment of this patient.  A 

comprehensive ED assessment and evaluation of the patient, analysis of test 

results and completion of the medical decision making process will be conducted 

by additional ED providers.  I have specifically instructed the patient or 

family members with the patient to immediately return to any nursing staff 

should anything change in the patient's condition or with their chief complaint.





TRAVEL OUTSIDE OF THE [place] IN LAST 30 DAYS: No





- Related Data


Allergies/Adverse Reactions: 


                                        





Coconut * [Coconut] Allergy (Mild, Verified 12/28/19 17:07)


   


apixaban [From Eliquis] Allergy (Verified 12/28/19 17:07)


   


rivaroxaban [From Xarelto] Allergy (Verified 12/28/19 17:07)


   


transpore tape Allergy (Mild, Uncoded 12/28/19 17:07)


   Hives








Home Medications: 10mg oxycodone





Past Medical History





- Social History


Family history: None


Pulmonary Medical History: Reports: Hx Asthma, Hx Pneumonia


Renal/ Medical History: Denies: Hx Peritoneal Dialysis


Musculoskeltal Medical History: Reports Hx Musculoskeletal Deformity - Pain to 

multiple areas mostly right knee due to sickle cell anemia


Past Surgical History: Reports: Hx Abdominal Surgery - Gallstones removal, Hx 

Cholecystectomy, Hx Orthopedic Surgery - Fluid drained from right foot, Hx Vasc

ular Surgery - Port placementx2 (failed)





- Immunizations


Immunizations up to date: Yes


Hx Diphtheria, Pertussis, Tetanus Vaccination: Yes





Physical Exam





- Vital signs


Vitals: 





                                        











Temp Pulse Resp BP Pulse Ox


 


 98.5 F   120 H  18   136/75 H  100 


 


 01/11/20 21:59  01/11/20 21:59  01/11/20 21:59  01/11/20 21:59  01/11/20 21:59














Course





- Vital Signs


Vital signs: 





                                        











Temp Pulse Resp BP Pulse Ox


 


 98.5 F   120 H  18   136/75 H  100 


 


 01/11/20 21:59  01/11/20 21:59  01/11/20 21:59  01/11/20 21:59  01/11/20 21:59














Doctor's Discharge





- Discharge


Referrals: 


DIYA PHILIPPE MD [Primary Care Provider] - Follow up as needed

## 2020-01-12 VITALS — SYSTOLIC BLOOD PRESSURE: 137 MMHG | DIASTOLIC BLOOD PRESSURE: 97 MMHG

## 2020-01-12 NOTE — ER DOCUMENT REPORT
Entered by WADE CALDERON SCRIBE  [id number] 





Acting as scribe for:BENSON RILEY IV, MD





ED General





- General


Chief Complaint: Knee Pain


Stated Complaint: RIGHT KNEE PAIN


Time Seen by Provider: 20 22:01


Primary Care Provider: 


DIYA PHILIPPE MD [Primary Care Provider] - Follow up as needed


Mode of Arrival: Ambulatory


Information source: Patient


Notes: 





Patient is a 24 year old male patient that presents to the emergency department 

today with complaints of right knee pain. Patient has sickle cell disease and he

states that he always has right knee pain when his sickle cell flares. 





Patient does not have a hematologist that he follows as he reports that Dr. Mcdonald's office was bought by [place] about x2 months ago and he has "not 

found anyone else yet". 


TRAVEL OUTSIDE OF THE [place] IN LAST 30 DAYS: No





- Related Data


Allergies/Adverse Reactions: 


                                        





Coconut * [Coconut] Allergy (Mild, Verified 19 17:07)


   


apixaban [From Eliquis] Allergy (Verified 19 17:07)


   


rivaroxaban [From Xarelto] Allergy (Verified 19 17:07)


   


transpore tape Allergy (Mild, Uncoded 19 17:07)


   Hives








Home Medications: 10mg oxycodone





Past Medical History





- General


Information source: Patient





- Social History


Smoking Status: Never Smoker


Cigarette use (# per day): No


Frequency of alcohol use: None


Drug Abuse: None


Lives with: Family


Family History: Reviewed & Not Pertinent, Arthritis, DM, Hypertension, Other - 

Sickle cell trait both parents and asthma


Patient has suicidal ideation: No


Patient has homicidal ideation: No


Pulmonary Medical History: Reports: Hx Asthma, Hx Pneumonia


Musculoskeletal Medical History: Reports Hx Musculoskeletal Deformity - Pain to 

multiple areas mostly right knee due to sickle cell anemia


Past Surgical History: Reports: Hx Abdominal Surgery - Gallstones removal, Hx 

Cholecystectomy, Hx Orthopedic Surgery - Fluid drained from right foot, Hx 

Vascular Surgery - Port placementx2 (failed)





- Immunizations


Immunizations up to date: Yes


Hx Diphtheria, Pertussis, Tetanus Vaccination: Yes


Hx Pneumococcal Vaccination: 13





Review of Systems





- Review of Systems


Constitutional: No symptoms reported


EENT: No symptoms reported


Cardiovascular: No symptoms reported


Respiratory: No symptoms reported


Gastrointestinal: No symptoms reported


Genitourinary: No symptoms reported


Male Genitourinary: No symptoms reported


Musculoskeletal: See HPI, Joint pain - right knee pain


Skin: No symptoms reported


Hematologic/Lymphatic: No symptoms reported


Neurological/Psychological: No symptoms reported


-: Yes All other systems reviewed and negative





Physical Exam





- Vital signs


Vitals: 


                                        











Temp Pulse Resp BP Pulse Ox


 


 98.5 F   120 H  18   136/75 H  100 


 


 20 21:59  20 21:59  20 21:59  20 21:59  20 21:59











Interpretation: Normal





- General


General appearance: Appears well, Alert





- HEENT


Head: Normocephalic, Atraumatic


Eyes: Normal


Pupils: PERRL





- Respiratory


Respiratory status: No respiratory distress


Chest status: Nontender


Breath sounds: Normal


Chest palpation: Normal





- Cardiovascular


Rhythm: Regular


Heart sounds: Normal auscultation


Murmur: No





- Abdominal


Inspection: Normal


Distension: No distension


Bowel sounds: Normal


Tenderness: Nontender


Organomegaly: No organomegaly





- Back


Back: Normal, Nontender





- Extremities


General upper extremity: Normal inspection, Nontender, Normal color, Normal ROM,

Normal temperature


General lower extremity: Normal inspection, Nontender, Normal color, Normal ROM,

Normal temperature, Normal weight bearing.  No: Shira's sign





- Neurological


Neuro grossly intact: Yes


Cognition: Normal


Orientation: AAOx4


Dhaval Coma Scale Eye Opening: Spontaneous


Dhaval Coma Scale Verbal: Oriented


Dhaval Coma Scale Motor: Obeys Commands


[place] Coma Scale Total: 15


Speech: Normal


Motor strength normal: LUE, RUE, LLE, RLE


Sensory: Normal





- Psychological


Associated symptoms: Normal affect, Normal mood





- Skin


Skin Temperature: Warm


Skin Moisture: Dry


Skin Color: Normal





Course





- Vital Signs


Vital signs: 


                                        











Temp Pulse Resp BP Pulse Ox


 


 98.5 F   120 H  18   136/75 H  100 


 


 20 21:59  20 21:59  20 21:59  20 21:59  20 21:59














- Laboratory


Result Diagrams: 


                                 20 23:05





                                 20 23:05


Laboratory results interpreted by me: 


                                        











  20





  23:05 23:05


 


RBC  3.91 L 


 


Hgb  13.3 L D 


 


MCV  99 H 


 


MCH  34.0 H 


 


RDW  16.7 H 


 


Chloride   108 H














Discharge





- Discharge


Clinical Impression: 


Right knee pain


Qualifiers:


 Chronicity: unspecified Qualified Code(s): M25.561 - Pain in right knee





Condition: Good


Disposition: HOME, SELF-CARE


Additional Instructions: 


Return to the Emergency Department without delay if any worse.











HOME CARE INSTRUCTIONS & INFORMATION:  Thank you for choosing us for your 

medical needs. We hope you're satisfied with the care you received.  After you 

leave, you must properly care for your problem and, at the same time, observe 

its progress.  Any condition can change.  Some illnesses can change rapidly over

hours or days.  If your condition worsens, return to the Emergency Department or

see your physician promptly.





ABOUT YOUR X-RAYS AND EKG'S:   If you had an EKG or X-rays taken, they have been

read by the Emergency Physician. The X-rays and EKG's will also be read by a 

Radiologist or Cardiologist within 24 hours.  If discrepancies are noted, you 

will be notified by telephone.  Please be certain the ED has a correct telephone

number & address where you can be reached.  Also, realize that some fractures or

abnormalities do not show up on initial X-rays.  If your symptoms continue, see 

your physician.





ABOUT YOUR LABORATORY TEST:   If you had laboratory tests, the results have been

reviewed by the Emergency Physician.  Some test results (for example cultures) 

may not be available for several days.  You will be contacted if any test result

shows you need additional treatment.  Please be certain the ED has a correct 

telephone number and address where you can be reached.





ABOUT YOUR MEDICATIONS:  You will receive instructions on how to take your 

medicine on the prescription label you receive.  Additional information may be 

provided by the Pharmacy.  If you have questions afterwards, call the ED for 

clarification or further instructions.  Some prescribed medications may cause 

drowsiness.  Do not perform tasks such as driving a car or operating machinery 

without consulting your Pharmacist.  If you feel you need a refill of pain 

medication, your condition will need re-evaluation.  Please do not call for a 

refill of any medication.





ABOUT YOUR SIGNATURE:   Signature of this document acknowledges to followin. Understanding that you received emergency treatment and that you may be 

   released before al medical problems are known or treated. Please be certain  

   the ED has a correct phone number & address where you can be reached.


   2. Acknowledgement that you will arrange for follow-up care as recommended.


   3. Authorization for the Emergency Physician to provide information to your 

follow-up Physician in order to maximize your care.





AT ANY TIME, IF YOUR SYMPTOMS CHANGE SIGNIFICANTLY OR WORSEN OR YOU DEVELOP NEW 

SYMPTOMS, RETURN TO THE EMERGENCY DEPARTMENT IMMEDIATELY FOR RE-EVALUATION.





OUR GOAL IS TO PROVIDE EXCELLENT MEDICAL CARE!





WE HOPE THAT WE HAVE MET YOUR EXPECTATIONS DURING YOUR EMERGENCY DEPARTMENT 

VISIT AND THAT YOU FEEL YOU HAVE RECEIVED EXCELLENT CARE!











Referrals: 


DIYA PHILIPPE MD [Primary Care Provider] - Follow up as needed


SURAJ WILKINS MD [ACTIVE STAFF] - 20 (CALL FOR APPOINTMENT)





I personally performed the services described in the documentation, reviewed and

edited the documentation which was dictated to the scribe in my presence, and it

accurately records my words and actions.

## 2020-01-14 ENCOUNTER — HOSPITAL ENCOUNTER (EMERGENCY)
Dept: HOSPITAL 62 - ER | Age: 25
LOS: 1 days | Discharge: HOME | End: 2020-01-15
Payer: MEDICAID

## 2020-01-14 DIAGNOSIS — D57.1: Primary | ICD-10-CM

## 2020-01-14 DIAGNOSIS — M25.561: ICD-10-CM

## 2020-01-14 DIAGNOSIS — Z91.018: ICD-10-CM

## 2020-01-14 DIAGNOSIS — M54.9: ICD-10-CM

## 2020-01-14 DIAGNOSIS — J45.909: ICD-10-CM

## 2020-01-14 DIAGNOSIS — Z79.899: ICD-10-CM

## 2020-01-14 DIAGNOSIS — R60.0: ICD-10-CM

## 2020-01-14 DIAGNOSIS — I10: ICD-10-CM

## 2020-01-14 DIAGNOSIS — R51: ICD-10-CM

## 2020-01-14 DIAGNOSIS — Z88.8: ICD-10-CM

## 2020-01-14 DIAGNOSIS — G89.29: ICD-10-CM

## 2020-01-14 DIAGNOSIS — Z98.890: ICD-10-CM

## 2020-01-14 LAB
ABSOLUTE LYMPHOCYTES# (MANUAL): 0.9 10^3/UL (ref 0.5–4.7)
ABSOLUTE MONOCYTES # (MANUAL): 1.4 10^3/UL (ref 0.1–1.4)
ABSOLUTE RETICS #: 0.05 10^6/UL (ref 0.03–0.12)
ADD MANUAL DIFF: YES
ALBUMIN SERPL-MCNC: 4.5 G/DL (ref 3.5–5)
ALP SERPL-CCNC: 105 U/L (ref 38–126)
ANION GAP SERPL CALC-SCNC: 11 MMOL/L (ref 5–19)
ANISOCYTOSIS BLD QL SMEAR: (no result)
APPEARANCE UR: CLEAR
APTT PPP: YELLOW S
AST SERPL-CCNC: 35 U/L (ref 17–59)
BASOPHILS NFR BLD MANUAL: 0 % (ref 0–2)
BILIRUB DIRECT SERPL-MCNC: 0.3 MG/DL (ref 0–0.4)
BILIRUB SERPL-MCNC: 0.5 MG/DL (ref 0.2–1.3)
BILIRUB UR QL STRIP: NEGATIVE
BUN SERPL-MCNC: 13 MG/DL (ref 7–20)
CALCIUM: 9.4 MG/DL (ref 8.4–10.2)
CHLORIDE SERPL-SCNC: 104 MMOL/L (ref 98–107)
CO2 SERPL-SCNC: 25 MMOL/L (ref 22–30)
EOSINOPHIL NFR BLD MANUAL: 3 % (ref 0–6)
ERYTHROCYTE [DISTWIDTH] IN BLOOD BY AUTOMATED COUNT: 16.7 % (ref 11.5–14)
GLUCOSE SERPL-MCNC: 82 MG/DL (ref 75–110)
GLUCOSE UR STRIP-MCNC: NEGATIVE MG/DL
HCT VFR BLD CALC: 39.8 % (ref 37.9–51)
HGB BLD-MCNC: 13.8 G/DL (ref 13.5–17)
KETONES UR STRIP-MCNC: NEGATIVE MG/DL
MCH RBC QN AUTO: 34.7 PG (ref 27–33.4)
MCHC RBC AUTO-ENTMCNC: 34.6 G/DL (ref 32–36)
MCV RBC AUTO: 100 FL (ref 80–97)
MONOCYTES % (MANUAL): 15 % (ref 3–13)
NEUTS BAND NFR BLD MANUAL: 1 % (ref 3–5)
PH UR STRIP: 6 [PH] (ref 5–9)
PLATELET # BLD: 239 10^3/UL (ref 150–450)
PLATELET COMMENT: ADEQUATE
POTASSIUM SERPL-SCNC: 3.4 MMOL/L (ref 3.6–5)
PROT SERPL-MCNC: 7.3 G/DL (ref 6.3–8.2)
PROT UR STRIP-MCNC: NEGATIVE MG/DL
RBC # BLD AUTO: 3.97 10^6/UL (ref 4.35–5.55)
RETICULOCYTE COUNT (AUTO): 1.32 % (ref 0.66–2.85)
SEGMENTED NEUTROPHILS % (MAN): 71 % (ref 42–78)
SP GR UR STRIP: 1.01
TARGETS BLD QL SMEAR: SLIGHT
TOTAL CELLS COUNTED BLD: 100
UROBILINOGEN UR-MCNC: NEGATIVE MG/DL (ref ?–2)
VARIANT LYMPHS NFR BLD MANUAL: 10 % (ref 13–45)
WBC # BLD AUTO: 9 10^3/UL (ref 4–10.5)

## 2020-01-14 PROCEDURE — 81001 URINALYSIS AUTO W/SCOPE: CPT

## 2020-01-14 PROCEDURE — 99284 EMERGENCY DEPT VISIT MOD MDM: CPT

## 2020-01-14 PROCEDURE — 71046 X-RAY EXAM CHEST 2 VIEWS: CPT

## 2020-01-14 PROCEDURE — 96372 THER/PROPH/DIAG INJ SC/IM: CPT

## 2020-01-14 PROCEDURE — 36415 COLL VENOUS BLD VENIPUNCTURE: CPT

## 2020-01-14 PROCEDURE — 85025 COMPLETE CBC W/AUTO DIFF WBC: CPT

## 2020-01-14 PROCEDURE — 85045 AUTOMATED RETICULOCYTE COUNT: CPT

## 2020-01-14 PROCEDURE — 80053 COMPREHEN METABOLIC PANEL: CPT

## 2020-01-14 NOTE — ER DOCUMENT REPORT
ED Medical Screen (RME)





- General


Chief Complaint: Back Pain


Stated Complaint: PER PT WIFE UNABLE TO LINK THOUGHTS/BACK PAIN


Time Seen by Provider: 01/14/20 20:42


Primary Care Provider: 


DIYA PHILIPPE MD [Primary Care Provider] - Follow up as needed


TRAVEL OUTSIDE OF THE [place] IN LAST 30 DAYS: No





- HPI


Notes: 





01/14/20 20:45


Patient is a 24-year-old male well-known emergency department complaining of 

right knee and back pain consistent with sickle cell crisis in the past.  

Patient states that this is not as severe as it has been.  Wife states that he 

has been up for 24 hours packing and helping with the baby and this evening at 

dinner started making some comments that were not on topic to the conversation 

and got her worried so she brought him here.  Patient states he does have a 

little headache on his right side which she has had before.  Denies any fever, 

head injury, neck pain, changes in vision/speech/hearing, URI, sore throat, 

chest pain, palpitations, syncope, cough, shortness of breath, wheeze, dyspnea, 

abdominal pain, nausea/vomiting/diarrhea, urinary retention, dysuria, hematuria,

loss of control of bowel or bladder, numbness/tingling, saddle anesthesia, 

muscle paralysis/weakness, or rash.





I have treated and performed a rapid initial assessment of this patient.  A 

comprehensive ED assessment and evaluation of the patient, analysis of test 

results and completion of medical decision making process will be conducted by 

additional ED providers.





PHYSICAL EXAMINATION:





GENERAL: Well-appearing, well-nourished and in no acute distress.  A&Ox4.  Answ

ers questions appropriately.  No obvious confusion noted.


Neuro: NIH 0, GCS 15, cranial nerves grossly intact. 





- Related Data


Allergies/Adverse Reactions: 


                                        





Coconut * [Coconut] Allergy (Mild, Verified 01/14/20 20:41)


   


apixaban [From Eliquis] Allergy (Verified 01/14/20 20:41)


   


rivaroxaban [From Xarelto] Allergy (Verified 01/14/20 20:41)


   


transpore tape Allergy (Mild, Uncoded 12/28/19 17:07)


   Hives











Past Medical History





- Social History


Family history: None


Pulmonary Medical History: Reports: Hx Asthma, Hx Pneumonia


Renal/ Medical History: Denies: Hx Peritoneal Dialysis


Musculoskeltal Medical History: Reports Hx Musculoskeletal Deformity - Pain to 

multiple areas mostly right knee due to sickle cell anemia


Past Surgical History: Reports: Hx Abdominal Surgery - Gallstones removal, Hx 

Cholecystectomy, Hx Orthopedic Surgery - Fluid drained from right foot, Hx 

Vascular Surgery - Port placementx2 (failed)





- Immunizations


Immunizations up to date: Yes


Hx Diphtheria, Pertussis, Tetanus Vaccination: Yes





Physical Exam





- Vital signs


Vitals: 





                                        











Temp Pulse Resp BP Pulse Ox


 


 98.6 F   107 H  20   137/97 H  100 


 


 01/14/20 20:22  01/14/20 20:22  01/14/20 20:22  01/14/20 20:22  01/14/20 20:22














Course





- Vital Signs


Vital signs: 





                                        











Temp Pulse Resp BP Pulse Ox


 


 98.6 F   107 H  20   137/97 H  100 


 


 01/14/20 20:22  01/14/20 20:22  01/14/20 20:22  01/14/20 20:22  01/14/20 20:22














Doctor's Discharge





- Discharge


Referrals: 


DIYA PHILIPPE MD [Primary Care Provider] - Follow up as needed

## 2020-01-15 VITALS — SYSTOLIC BLOOD PRESSURE: 131 MMHG | DIASTOLIC BLOOD PRESSURE: 89 MMHG

## 2020-01-15 NOTE — RADIOLOGY REPORT (SQ)
EXAM DESCRIPTION: 



XR CHEST 2 VIEWS



COMPLETED DATE/TME:  01/15/2020 03:33



CLINICAL HISTORY: 



24 years, Male, pedal edema



COMPARISON:

December 16, 2019



NUMBER OF VIEWS:

2



TECHNIQUE:

PA and lateral



LIMITATIONS:

None.



FINDINGS:



Cardiomediastinal silhouette is of normal size. Interstitial

prominence, similar to prior. Patchy sclerosis particularly

within the humeral heads, similar to prior.



IMPRESSION:



No interval change from prior

 



copyright 2011 [employer]- All Rights Reserved

## 2020-01-15 NOTE — ER DOCUMENT REPORT
ED General





- General


Chief Complaint: Back Pain


Stated Complaint: PER PT WIFE UNABLE TO LINK THOUGHTS/BACK PAIN


Time Seen by Provider: 01/14/20 20:42


Primary Care Provider: 


DIYA PHILIPPE MD [Primary Care Provider] - Follow up tomorrow


Mode of Arrival: Ambulatory


Information source: Patient, Relative


Notes: 





24-year-old male presented to ED for complaint of right knee and back pain.  

This is his normal sickle cell pain.  He states is not as severe as it has been 

in the past.  Patient states that he has been up however with the baby and 

packing for the last 24 hours and at nighttime around dinnertime he did become a

little confused.  He states he was having a headache on the right side of his 

head at that time.  He states he is not had any injuries.  Wife states he did 

recently have a lumbar puncture and had to have patch for leakage fluids.  She 

states she was in [place] about a week or so ago for this.  Patient was to get IV 

fluids and IV pain medicine when he first came in.  He has not had an IV started

and has not gotten his IV pain medicine or fluids.  He is speaking in full 

sentences and his reticulocyte cell count is normal.  We will give the Dilaudid 

IM given his Benadryl p.o. that he takes with pain medicine and get a chest x-

ray for his pedal edema.  Patient's blood pressure is elevated at this time I 

will give him a dose of his blood pressure medicine that he supposed to take in 

the morning carvedilol 5 mg p.o.


TRAVEL OUTSIDE OF THE [place] IN LAST 30 DAYS: No





- HPI


Onset: This afternoon


Onset/Duration: Intermittent


Quality of pain: Achy


Severity: Moderate


Pain Level: 3


Associated symptoms: Other - Back and knee pain this is his normal sickle cell 

pain


Exacerbated by: Movement


Relieved by: Denies


Similar symptoms previously: Yes


Recently seen / treated by doctor: Yes





- Related Data


Allergies/Adverse Reactions: 


                                        





Coconut * [Coconut] Allergy (Mild, Verified 01/14/20 20:41)


   


apixaban [From Eliquis] Allergy (Verified 01/14/20 20:41)


   


rivaroxaban [From Xarelto] Allergy (Verified 01/14/20 20:41)


   


transpore tape Allergy (Mild, Uncoded 12/28/19 17:07)


   Hives








Home Medications: ACYCLOVIR.  CARVIDILOL.  ABLODIPINE.  VIT-D.  GABAPENTIN.  

CYMBALTA.  BONE MARROW TRANSPLANT MED





Past Medical History





- General


Information source: Patient, Relative





- Social History


Smoking Status: Never Smoker


Frequency of alcohol use: None


Drug Abuse: None


Lives with: Family


Family History: Reviewed & Not Pertinent, Arthritis, DM, Hypertension, Other - 

Sickle cell trait both parents and asthma


Patient has suicidal ideation: No


Patient has homicidal ideation: No





- Medical History


Medical History: Other - Sickle cell





- Past Medical History


Cardiac Medical History: Reports: None


Pulmonary Medical History: Reports: Hx Asthma, Hx Pneumonia


EENT Medical History: Reports: None


Neurological Medical History: Reports: None


Endocrine Medical History: Reports: None


Renal/ Medical History: Reports: None


Malignancy Medical History: Reports None


GI Medical History: Reports: None


Musculoskeletal Medical History: Reports Hx Musculoskeletal Deformity - Pain to 

multiple areas mostly right knee due to sickle cell anemia, Reports Other - 

Sickle cell


Skin Medical History: Reports None


Psychiatric Medical History: Reports: None


Traumatic Medical History: Reports: None


Past Surgical History: Reports: Hx Abdominal Surgery - Gallstones removal, Hx 

Cholecystectomy, Hx Orthopedic Surgery - Fluid drained from right foot, Hx 

Vascular Surgery - Port placementx2 (failed)





- Immunizations


Immunizations up to date: Yes


Hx Diphtheria, Pertussis, Tetanus Vaccination: Yes


Hx Pneumococcal Vaccination: 05/16/13





Review of Systems





- Review of Systems


Constitutional: No symptoms reported


EENT: No symptoms reported


Cardiovascular: No symptoms reported


Respiratory: No symptoms reported


Gastrointestinal: No symptoms reported


Genitourinary: No symptoms reported


Male Genitourinary: No symptoms reported


Musculoskeletal: No symptoms reported, Back pain, Joint pain - Right knee pain. 

denies: Joint swelling, Muscle pain


Skin: No symptoms reported


Hematologic/Lymphatic: No symptoms reported


Neurological/Psychological: Confusion - Wife states he had some confusion earlie

r he is alert and oriented now, Headaches


-: Yes All other systems reviewed and negative





Physical Exam





- Vital signs


Vitals: 


                                        











Temp Pulse Resp BP Pulse Ox


 


 98.6 F   107 H  20   137/97 H  100 


 


 01/14/20 20:22  01/14/20 20:22  01/14/20 20:22  01/14/20 20:22  01/14/20 20:22











Interpretation: Normal





- General


General appearance: Appears well, Alert





- HEENT


Head: Normocephalic, Atraumatic


Eyes: Normal


Pupils: PERRL





- Respiratory


Respiratory status: No respiratory distress


Chest status: Nontender


Breath sounds: Normal


Chest palpation: Normal





- Cardiovascular


Rhythm: Regular


Heart sounds: Normal auscultation


Murmur: No





- Abdominal


Inspection: Normal


Distension: No distension


Bowel sounds: Normal


Tenderness: Nontender


Organomegaly: No organomegaly





- Back


Back: Normal, Nontender





- Extremities


General upper extremity: Normal inspection, Nontender, Normal color, Normal ROM,

Normal temperature


General lower extremity: Normal color, Normal ROM, Normal temperature, Normal 

weight bearing.  No: Shira's sign


Knee: Tender, Pain with ROM, Patellar tendon intact.  No: Abrasion, Deformity, 

Dislocation, Drawer's test instability, Ecchymosis, Instability, Joint effusion,

Laxity with valgus stress, Laxity with varus stress, Popliteal fossa tender, 

Tender joint line


Foot: Edema





- Neurological


Neuro grossly intact: Yes


Cognition: Normal


Orientation: AAOx4


[place] Coma Scale Eye Opening: Spontaneous


[place] Coma Scale Verbal: Oriented


Dhaval Coma Scale Motor: Obeys Commands


Dhaval Coma Scale Total: 15


Speech: Normal


Motor strength normal: LUE, RUE, LLE, RLE


Sensory: Normal





- Psychological


Associated symptoms: Normal affect, Normal mood





- Skin


Skin Temperature: Warm


Skin Moisture: Dry


Skin Color: Normal





Course





- Re-evaluation


Re-evalutation: 





01/15/20 09:49


Patient was treated with IM Dilaudid and was able to take p.o. fluids with no 

difficulty.  His reticulocyte count was negative.  He was alert oriented 

respirations regular nonlabored speaking in full sentences.  After he got relief

from his pain he was discharged home.  He was given 1 dose of his blood pressure

medication due to an elevated blood pressure.





- Vital Signs


Vital signs: 


                                        











Temp Pulse Resp BP Pulse Ox


 


 98.7 F   107 H  18   131/89 H  99 


 


 01/15/20 05:30  01/14/20 20:22  01/15/20 05:01  01/15/20 05:01  01/15/20 05:01














- Laboratory


Result Diagrams: 


                                 01/14/20 21:05





                                 01/14/20 21:05


Laboratory results interpreted by me: 


                                        











  01/14/20 01/14/20





  21:05 21:05


 


RBC  3.97 L 


 


MCV  100 H 


 


MCH  34.7 H 


 


RDW  16.7 H 


 


Band Neutrophils %  1 L 


 


Lymphocytes % (Manual)  10 L 


 


Monocytes % (Manual)  15 H 


 


Potassium   3.4 L














- Diagnostic Test


Radiology reviewed: Image reviewed, Reports reviewed





Discharge





- Discharge


Clinical Impression: 


Back pain


Qualifiers:


 Back pain location: low back pain Chronicity: chronic Back pain laterality: 

right Sciatica presence: without sciatica Qualified Code(s): M54.5 - Low back 

pain





Right knee pain


Qualifiers:


 Chronicity: chronic Qualified Code(s): M25.561 - Pain in right knee





Sickle cell anemia


Qualifiers:


 Sickle-cell associated disorders: without crisis Qualified Code(s): D57.1 - 

Sickle-cell disease without crisis





Condition: Stable


Disposition: HOME, SELF-CARE


Additional Instructions: 


You were seen today for pain to your right low back and right knee which is your

normal sickle cell pain.





You were treated the pain  with Dilaudid 1 mg IM and Benadryl and the pain was 

improved.  You stated the Dilaudid always makes her itch without the Benadryl.








You state you have your pain medicine at home that you can take...








Blood pressure was also elevated and a dose of carvedilol and your blood 

pressure has come down.








Please follow-up with your primary care and your sickle cell provider for this 

episode.  Your reticular cell count was not elevated today.








Please continue your present treatment until you follow-up with your primary 

doctor.





FOLLOW-UP CARE:


If you have been referred to a physician for follow-up care, call the 

physicians office for an appointment as you were instructed or within the next 

two days.  If you experience worsening or a significant change in your symptoms,

notify the physician immediately or return to the Emergency Department at any 

time for re-evaluation.


Referrals: 


DIYA PHILIPPE MD [Primary Care Provider] - Follow up tomorrow

## 2020-02-19 ENCOUNTER — HOSPITAL ENCOUNTER (EMERGENCY)
Dept: HOSPITAL 62 - ER | Age: 25
Discharge: HOME | End: 2020-02-19
Payer: MEDICAID

## 2020-02-19 VITALS — DIASTOLIC BLOOD PRESSURE: 88 MMHG | SYSTOLIC BLOOD PRESSURE: 133 MMHG

## 2020-02-19 DIAGNOSIS — S00.12XA: ICD-10-CM

## 2020-02-19 DIAGNOSIS — J45.909: ICD-10-CM

## 2020-02-19 DIAGNOSIS — Z88.8: ICD-10-CM

## 2020-02-19 DIAGNOSIS — J06.9: ICD-10-CM

## 2020-02-19 DIAGNOSIS — S61.452A: Primary | ICD-10-CM
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
## 2020-02-19 DIAGNOSIS — Z91.018: ICD-10-CM

## 2020-02-19 DIAGNOSIS — S60.475A: ICD-10-CM

## 2020-02-19 DIAGNOSIS — S60.473A: ICD-10-CM

## 2020-02-19 DIAGNOSIS — R05: ICD-10-CM
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
## 2020-02-19 LAB
ABSOLUTE RETICS #: 0.04 10^6/UL (ref 0.03–0.12)
ADD MANUAL DIFF: NO
ALBUMIN SERPL-MCNC: 4.1 G/DL (ref 3.5–5)
ALP SERPL-CCNC: 136 U/L (ref 38–126)
ANION GAP SERPL CALC-SCNC: 7 MMOL/L (ref 5–19)
APPEARANCE UR: CLEAR
APTT PPP: (no result) S
AST SERPL-CCNC: 40 U/L (ref 17–59)
BASOPHILS # BLD AUTO: 0.1 10^3/UL (ref 0–0.2)
BASOPHILS NFR BLD AUTO: 1.3 % (ref 0–2)
BILIRUB DIRECT SERPL-MCNC: 0 MG/DL (ref 0–0.4)
BILIRUB SERPL-MCNC: 0.3 MG/DL (ref 0.2–1.3)
BILIRUB UR QL STRIP: NEGATIVE
BUN SERPL-MCNC: 14 MG/DL (ref 7–20)
CALCIUM: 8.7 MG/DL (ref 8.4–10.2)
CHLORIDE SERPL-SCNC: 108 MMOL/L (ref 98–107)
CO2 SERPL-SCNC: 25 MMOL/L (ref 22–30)
EOSINOPHIL # BLD AUTO: 0.3 10^3/UL (ref 0–0.6)
EOSINOPHIL NFR BLD AUTO: 6.5 % (ref 0–6)
ERYTHROCYTE [DISTWIDTH] IN BLOOD BY AUTOMATED COUNT: 17.1 % (ref 11.5–14)
GLUCOSE SERPL-MCNC: 99 MG/DL (ref 75–110)
GLUCOSE UR STRIP-MCNC: NEGATIVE MG/DL
HCT VFR BLD CALC: 42.4 % (ref 37.9–51)
HGB BLD-MCNC: 14.5 G/DL (ref 13.5–17)
KETONES UR STRIP-MCNC: NEGATIVE MG/DL
LYMPHOCYTES # BLD AUTO: 1.5 10^3/UL (ref 0.5–4.7)
LYMPHOCYTES NFR BLD AUTO: 32.2 % (ref 13–45)
MCH RBC QN AUTO: 34.6 PG (ref 27–33.4)
MCHC RBC AUTO-ENTMCNC: 34.2 G/DL (ref 32–36)
MCV RBC AUTO: 101 FL (ref 80–97)
MONOCYTES # BLD AUTO: 0.9 10^3/UL (ref 0.1–1.4)
MONOCYTES NFR BLD AUTO: 18.8 % (ref 3–13)
NEUTROPHILS # BLD AUTO: 1.9 10^3/UL (ref 1.7–8.2)
NEUTS SEG NFR BLD AUTO: 41.2 % (ref 42–78)
NITRITE UR QL STRIP: NEGATIVE
PH UR STRIP: 7 [PH] (ref 5–9)
PLATELET # BLD: 204 10^3/UL (ref 150–450)
POTASSIUM SERPL-SCNC: 4.4 MMOL/L (ref 3.6–5)
PROT SERPL-MCNC: 6.6 G/DL (ref 6.3–8.2)
PROT UR STRIP-MCNC: NEGATIVE MG/DL
RBC # BLD AUTO: 4.19 10^6/UL (ref 4.35–5.55)
RETICULOCYTE COUNT (AUTO): 0.84 % (ref 0.66–2.85)
SP GR UR STRIP: 1
TOTAL CELLS COUNTED % (AUTO): 100 %
UROBILINOGEN UR-MCNC: NEGATIVE MG/DL (ref ?–2)
WBC # BLD AUTO: 4.7 10^3/UL (ref 4–10.5)

## 2020-02-19 PROCEDURE — 80053 COMPREHEN METABOLIC PANEL: CPT

## 2020-02-19 PROCEDURE — 85045 AUTOMATED RETICULOCYTE COUNT: CPT

## 2020-02-19 PROCEDURE — 71046 X-RAY EXAM CHEST 2 VIEWS: CPT

## 2020-02-19 PROCEDURE — 96375 TX/PRO/DX INJ NEW DRUG ADDON: CPT

## 2020-02-19 PROCEDURE — 73130 X-RAY EXAM OF HAND: CPT

## 2020-02-19 PROCEDURE — 96374 THER/PROPH/DIAG INJ IV PUSH: CPT

## 2020-02-19 PROCEDURE — 90715 TDAP VACCINE 7 YRS/> IM: CPT

## 2020-02-19 PROCEDURE — 87205 SMEAR GRAM STAIN: CPT

## 2020-02-19 PROCEDURE — 99284 EMERGENCY DEPT VISIT MOD MDM: CPT

## 2020-02-19 PROCEDURE — 87040 BLOOD CULTURE FOR BACTERIA: CPT

## 2020-02-19 PROCEDURE — 70486 CT MAXILLOFACIAL W/O DYE: CPT

## 2020-02-19 PROCEDURE — 87070 CULTURE OTHR SPECIMN AEROBIC: CPT

## 2020-02-19 PROCEDURE — 85025 COMPLETE CBC W/AUTO DIFF WBC: CPT

## 2020-02-19 PROCEDURE — 36415 COLL VENOUS BLD VENIPUNCTURE: CPT

## 2020-02-19 PROCEDURE — 81001 URINALYSIS AUTO W/SCOPE: CPT
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
## 2020-03-11 ENCOUNTER — HOSPITAL ENCOUNTER (EMERGENCY)
Dept: HOSPITAL 62 - ER | Age: 25
LOS: 1 days | Discharge: HOME | End: 2020-03-12
Payer: MEDICAID

## 2020-03-11 DIAGNOSIS — Z90.49: ICD-10-CM

## 2020-03-11 DIAGNOSIS — M54.9: ICD-10-CM

## 2020-03-11 DIAGNOSIS — D57.00: Primary | ICD-10-CM

## 2020-03-11 DIAGNOSIS — R07.9: ICD-10-CM

## 2020-03-11 PROCEDURE — 99284 EMERGENCY DEPT VISIT MOD MDM: CPT

## 2020-03-11 PROCEDURE — 80053 COMPREHEN METABOLIC PANEL: CPT

## 2020-03-11 PROCEDURE — 87205 SMEAR GRAM STAIN: CPT

## 2020-03-11 PROCEDURE — 96361 HYDRATE IV INFUSION ADD-ON: CPT

## 2020-03-11 PROCEDURE — 85025 COMPLETE CBC W/AUTO DIFF WBC: CPT

## 2020-03-11 PROCEDURE — 87040 BLOOD CULTURE FOR BACTERIA: CPT

## 2020-03-11 PROCEDURE — 96374 THER/PROPH/DIAG INJ IV PUSH: CPT

## 2020-03-11 PROCEDURE — 96375 TX/PRO/DX INJ NEW DRUG ADDON: CPT

## 2020-03-11 PROCEDURE — 85045 AUTOMATED RETICULOCYTE COUNT: CPT

## 2020-03-11 PROCEDURE — 93005 ELECTROCARDIOGRAM TRACING: CPT

## 2020-03-11 PROCEDURE — 93010 ELECTROCARDIOGRAM REPORT: CPT

## 2020-03-11 PROCEDURE — 71046 X-RAY EXAM CHEST 2 VIEWS: CPT

## 2020-03-11 PROCEDURE — 36415 COLL VENOUS BLD VENIPUNCTURE: CPT

## 2020-03-11 NOTE — ER DOCUMENT REPORT
ED Medical Screen (RME)





- General


Chief Complaint: Sickle Cell Crisis


Stated Complaint: BACK PAIN/SICKLE CELL CRISIS


Time Seen by Provider: 03/11/20 20:45


Primary Care Provider: 


DIYA PHILIPPE MD [Primary Care Provider] - Follow up as needed


Mode of Arrival: Ambulatory


Information source: Patient


Notes: 





24-year-old male presented to ED for sickle cell pain.  He states the pain is in

his back and legs as it usually is when he has a sickle cell crisis.  He is 

alert oriented respirations regular nonlabored speaking in full sentences.  He 

states he is taking his pain medicine that he has at home for this crisis.  

















I have greeted and performed a rapid initial assessment of this patient.  A 

comprehensive ED assessment and evaluation of the patient, analysis of test 

results and completion of medical decision making process will be conducted by 

an additional ED providers.


TRAVEL OUTSIDE OF THE [place] IN LAST 30 DAYS: No





- Related Data


Allergies/Adverse Reactions: 


                                        





Coconut * [Coconut] Allergy (Mild, Verified 03/11/20 20:44)


   


apixaban [From Eliquis] Allergy (Verified 03/11/20 20:44)


   


rivaroxaban [From Xarelto] Allergy (Verified 03/11/20 20:44)


   


transpore tape Allergy (Mild, Uncoded 03/11/20 20:44)


   Hives











Past Medical History





- Social History


Family history: None


Pulmonary Medical History: Reports: Hx Asthma, Hx Pneumonia


Renal/ Medical History: Denies: Hx Peritoneal Dialysis


Musculoskeltal Medical History: Reports Hx Musculoskeletal Deformity - Pain to 

multiple areas mostly right knee due to sickle cell anemia


Past Surgical History: Reports: Hx Abdominal Surgery - Gallstones removal, Hx 

Cholecystectomy, Hx Orthopedic Surgery - Fluid drained from right foot, Hx 

Vascular Surgery - Port placementx2 (failed)





- Immunizations


Immunizations up to date: Yes


Hx Diphtheria, Pertussis, Tetanus Vaccination: Yes





Physical Exam





- Vital signs


Vitals: 





                                        











Temp Pulse Resp BP Pulse Ox


 


 98.8 F   114 H  17   116/79   95 


 


 03/11/20 20:35  03/11/20 20:35  03/11/20 20:35  03/11/20 20:35  03/11/20 20:35














Course





- Vital Signs


Vital signs: 





                                        











Temp Pulse Resp BP Pulse Ox


 


 98.8 F   114 H  17   116/79   95 


 


 03/11/20 20:35  03/11/20 20:35  03/11/20 20:35  03/11/20 20:35  03/11/20 20:35














Doctor's Discharge





- Discharge


Referrals: 


DIYA PHILIPPE MD [Primary Care Provider] - Follow up as needed

## 2020-03-12 VITALS — DIASTOLIC BLOOD PRESSURE: 99 MMHG | SYSTOLIC BLOOD PRESSURE: 130 MMHG

## 2020-03-12 LAB
ABSOLUTE RETICS #: 0.04 10^6/UL (ref 0.03–0.12)
ADD MANUAL DIFF: NO
ALBUMIN SERPL-MCNC: 4.9 G/DL (ref 3.5–5)
ALP SERPL-CCNC: 230 U/L (ref 38–126)
ANION GAP SERPL CALC-SCNC: 12 MMOL/L (ref 5–19)
AST SERPL-CCNC: 44 U/L (ref 17–59)
BASOPHILS # BLD AUTO: 0 10^3/UL (ref 0–0.2)
BASOPHILS NFR BLD AUTO: 0.4 % (ref 0–2)
BILIRUB DIRECT SERPL-MCNC: 0.1 MG/DL (ref 0–0.4)
BILIRUB SERPL-MCNC: 0.6 MG/DL (ref 0.2–1.3)
BUN SERPL-MCNC: 15 MG/DL (ref 7–20)
CALCIUM: 9.9 MG/DL (ref 8.4–10.2)
CHLORIDE SERPL-SCNC: 100 MMOL/L (ref 98–107)
CO2 SERPL-SCNC: 29 MMOL/L (ref 22–30)
EOSINOPHIL # BLD AUTO: 0.6 10^3/UL (ref 0–0.6)
EOSINOPHIL NFR BLD AUTO: 6.2 % (ref 0–6)
ERYTHROCYTE [DISTWIDTH] IN BLOOD BY AUTOMATED COUNT: 16.3 % (ref 11.5–14)
GLUCOSE SERPL-MCNC: 117 MG/DL (ref 75–110)
HCT VFR BLD CALC: 48.8 % (ref 37.9–51)
HGB BLD-MCNC: 16.9 G/DL (ref 13.5–17)
LYMPHOCYTES # BLD AUTO: 1.5 10^3/UL (ref 0.5–4.7)
LYMPHOCYTES NFR BLD AUTO: 15.4 % (ref 13–45)
MCH RBC QN AUTO: 35 PG (ref 27–33.4)
MCHC RBC AUTO-ENTMCNC: 34.7 G/DL (ref 32–36)
MCV RBC AUTO: 101 FL (ref 80–97)
MONOCYTES # BLD AUTO: 0.8 10^3/UL (ref 0.1–1.4)
MONOCYTES NFR BLD AUTO: 8.8 % (ref 3–13)
NEUTROPHILS # BLD AUTO: 6.7 10^3/UL (ref 1.7–8.2)
NEUTS SEG NFR BLD AUTO: 69.2 % (ref 42–78)
PLATELET # BLD: 313 10^3/UL (ref 150–450)
POTASSIUM SERPL-SCNC: 5.3 MMOL/L (ref 3.6–5)
PROT SERPL-MCNC: 8.4 G/DL (ref 6.3–8.2)
RBC # BLD AUTO: 4.84 10^6/UL (ref 4.35–5.55)
RETICULOCYTE COUNT (AUTO): 0.82 % (ref 0.66–2.85)
TOTAL CELLS COUNTED % (AUTO): 100 %
WBC # BLD AUTO: 9.6 10^3/UL (ref 4–10.5)

## 2020-03-12 RX ADMIN — SODIUM CHLORIDE PRN MLS/HR: 9 INJECTION, SOLUTION INTRAVENOUS at 02:18

## 2020-03-12 RX ADMIN — SODIUM CHLORIDE PRN MLS/HR: 9 INJECTION, SOLUTION INTRAVENOUS at 03:30

## 2020-03-12 NOTE — RADIOLOGY REPORT (SQ)
EXAM DESCRIPTION: 



XR CHEST 2 VIEWS



COMPLETED DATE/TME:  03/12/2020 01:32



CLINICAL HISTORY: 



24 years, Male, chest pain, sickle cell pain



COMPARISON:

November 19, 2019



NUMBER OF VIEWS:

2



TECHNIQUE:





LIMITATIONS:

None.



FINDINGS:



Cardiomediastinal silhouette is normal. Diffuse interstitial

prominence, perhaps mildly progressive.



Sclerosis of both humeral heads. Is there a history of sickle

cell disease?



IMPRESSION:



Interstitial process perhaps mildly progressive from prior.



Sclerosis of the humeral heads most prominently. Is there a

history of sickle cell disease?

 



copyright 2011 [employer]- All Rights Reserved

## 2020-03-12 NOTE — ER DOCUMENT REPORT
ED General





- General


Chief Complaint: Sickle Cell Crisis


Stated Complaint: BACK PAIN/SICKLE CELL CRISIS


Time Seen by Provider: 03/11/20 20:45


Primary Care Provider: 


DIYA PHILIPPE MD [Primary Care Provider] - Follow up as needed


Mode of Arrival: Ambulatory


Notes: 





24-year-old male with history of sickle cell presents with chest pain and back 

pain that started 2 days ago.  Patient states he usually gets back pain with the

sickle cell pain and sometimes will get chest pain with that as well however 

states he has had acute chest syndrome in the past.  Patient denies shortness of

breath, coughing, leg pain, nausea/vomiting.  Patient states the highest that 

his temperature got was 99.  Patient states he tried to take his home meds with 

little relief.


TRAVEL OUTSIDE OF THE [place] IN LAST 30 DAYS: No





- Related Data


Allergies/Adverse Reactions: 


                                        





Coconut * [Coconut] Allergy (Mild, Verified 03/11/20 20:46)


   


apixaban [From Eliquis] Allergy (Verified 03/11/20 20:46)


   


rivaroxaban [From Xarelto] Allergy (Verified 03/11/20 20:46)


   


transpore tape Allergy (Mild, Uncoded 03/11/20 20:46)


   Hives








Home Medications: folic acid, hyroxyurea





Past Medical History





- General


Information source: Patient





- Social History


Smoking Status: Never Smoker


Chew tobacco use (# tins/day): No


Frequency of alcohol use: None


Drug Abuse: None


Family History: Reviewed & Not Pertinent, Arthritis, DM, Hypertension, Other - 

Sickle cell trait both parents and asthma


Patient has suicidal ideation: No


Patient has homicidal ideation: No


Pulmonary Medical History: Reports: Hx Asthma, Hx Pneumonia


Renal/ Medical History: Denies: Hx Peritoneal Dialysis


Musculoskeletal Medical History: Reports Hx Musculoskeletal Deformity - Pain to 

multiple areas mostly right knee due to sickle cell anemia


Past Surgical History: Reports: Hx Abdominal Surgery - Gallstones removal, Hx 

Cholecystectomy, Hx Orthopedic Surgery - Fluid drained from right foot, Hx 

Vascular Surgery - Port placementx2 (failed)





- Immunizations


Immunizations up to date: Yes


Hx Diphtheria, Pertussis, Tetanus Vaccination: Yes


Hx Pneumococcal Vaccination: 05/16/13





Review of Systems





- Review of Systems


Notes: 





Constitutional: Negative for fever.


HENT: Negative for sore throat.


Eyes: Negative for visual changes.


Cardiovascular: Positive for chest pain.


Respiratory: Negative for shortness of breath.


Gastrointestinal: Negative for abdominal pain, vomiting or diarrhea.


Genitourinary: Negative for dysuria.


Musculoskeletal: Positive for back pain.


Skin: Negative for rash.


Neurological: Negative for headaches, weakness or numbness.





10 point ROS negative except as marked above and in HPI.





Physical Exam





- Vital signs


Vitals: 


                                        











Temp Pulse Resp BP Pulse Ox


 


 98.8 F   114 H  17   116/79   95 


 


 03/11/20 20:35  03/11/20 20:35  03/11/20 20:35  03/11/20 20:35  03/11/20 20:35














- Notes


Notes: 





GENERAL: Well-appearing, well-nourished and uncomfortable.


HEAD: Atraumatic, normocephalic.


EYES: Extraocular movements intact, sclera anicteric, conjunctiva are normal.


NECK: Normal range of motion, supple without lymphadenopathy or JVD.


LUNGS: Breath sounds clear to auscultation bilaterally and equal.  No wheezes 

rales or rhonchi.


HEART: Mildly tachycardic without murmurs, rubs or gallops.


EXTREMITIES: Normal range of motion, no pitting or edema.  No clubbing or 

cyanosis.


NEUROLOGICAL: Cranial nerves II through XII grossly intact.  Normal speech, 

normal gait.


PSYCH: Normal mood, normal affect.


SKIN: Warm, Dry, normal turgor, no rashes or lesions noted.





Course





- Re-evaluation


Re-evalutation: 





03/12/20 24-year-old male with history of sickle cell presents with chest pain 

and back pain for the past 2 to 3 days.  Patient states back pain is usually 

worse sickle cell pain is and sometimes he will have chest pain with it.  

History of acute chest syndrome in the past.  Up-to-date was consulted and they 

recommend EKG, CBC, reticulocyte count, chest x-ray with AP and lateral, and 

blood and sputum cultures.  Morphine 4 mg IV was ordered along with 2 L of 

normal saline.





03/12/20 03:01 CXR shows no radiographic evidence of consolidation (a new 

segmental radiographic pulmonary infiltrate) which is diagnostic of acute chest 

syndrome. Retic count is negative. Labwork is unremarkable.





03/12/20 03:07 Pt asking for more pain medications per RN. Dilaudid ordered.





03/12/20 05:25 Pt was feeling better. Pt given follow up with hematologist, Dr. Wilkins. Pt is not acute crisis. Pt given strict return precautions. All 

questions/concerns addressed prior to discharge.








- Vital Signs


Vital signs: 


                                        











Temp Pulse Resp BP Pulse Ox


 


 98.5 F   109 H  19   124/86 H  98 


 


 03/12/20 04:48  03/11/20 22:06  03/12/20 02:09  03/12/20 02:09  03/12/20 02:09














- Laboratory


Result Diagrams: 


                                 03/12/20 02:00





                                 03/12/20 02:00


Laboratory results interpreted by me: 


                                        











  03/12/20 03/12/20





  02:00 02:00


 


MCV  101 H 


 


MCH  35.0 H 


 


RDW  16.3 H 


 


Eos % (Auto)  6.2 H 


 


Potassium   5.3 H


 


Glucose   117 H


 


Alkaline Phosphatase   230 H


 


Total Protein   8.4 H














Discharge





- Discharge


Clinical Impression: 


Back pain


Qualifiers:


 Back pain location: back pain in unspecified location Chronicity: unspecified 

Back pain laterality: unspecified Qualified Code(s): M54.9 - Dorsalgia, 

unspecified





Chest pain


Qualifiers:


 Chest pain type: unspecified Qualified Code(s): R07.9 - Chest pain, unspecified





Condition: Stable


Disposition: HOME, SELF-CARE


Additional Instructions: 


Your retic count was normal. Your chest x-ray was normal. Your workup does not 

indicate that you are in acute chest syndrome. Please take your home narcotic 

medications and follow up with hematologist, Dr. Wilkins and your primary care 

doctor in 2-3 days. Return to ER for any worsening symptoms, including chest 

pain, fever, shortness of breath, cough, abdominal pain, visual changes, neck 

pain/stiffness, right upper quadrant pain, painful erection, hip/shoulder pain, 

or any other symptoms that are concerning to you.


Forms:  Return to Work


Referrals: 


DIYA PHILIPPE MD [Primary Care Provider] - Follow up in 3-5 days


SURAJ WILKINS MD [ACTIVE STAFF] - Follow up in 3-5 days

## 2020-06-22 NOTE — PDOC PROGRESS REPORT
June 22, 2020       Gloria Green MD  [address]  [address]  [facility] [address]  VIA In Basket      Patient: Elan Rdz   YOB: 1943   Date of Visit: 6/22/2020       Dear Dr. Green:    Thank you for referring Elan Rdz to me for evaluation. Below are my notes for this visit with him.    If you have questions, please do not hesitate to call me. I look forward to following your patient along with you.      Sincerely,        Ramu Poon MD        CC: No Recipients  Speedy Wooten  6/22/2020  6:03 PM  Sign when Signing Visit    CONSULTATION NOTE - CARDIOLOGY    Chief Complaint   Patient presents with   Coronary artery disease  Hx of Transient Ischemic Attack and Stroke  Hypertension  Hyperlipidemia     Referring physician: Gloria Green MD    HISTORY OF PRESENT ILLNESS  Mr. Rdz is a 77 year old male with a past history of coronary artery disease, stroke, and possible recurrent transient ischemic attacks. His other medical problems include hypertension and hyperlipidemia.     In March 2019, the patient had right arm weakness, jumbled speech, and difficulty walking. MRI showed evidence of prior stroke in the left frontal area, but no recent stroke. There were multiple lacunar infarcts indicating small vessel disease. Some cerebral atrophy. No acute intracranial abnormality.       A follow up 30 day event monitor was negative for atrial fibrillation or any other dysrhythmia. He has reportedly remained on dual antiplatelet therapy because of these episodes as per his primary care physician. A  patent foramen ovale was detected during his evaluation in the hospital. No additional intervention was recommended for the patent foramen ovale since it was deemed incidental.     In July 2019, his main complaint was neck, back, and hip pain. He requested cardiac clearance for steroid injections. Denied any recurrences of transient ischemic attack since March 2019. Home blood pressure was well  Subjective


Progress Note for:: 08/05/17


Subjective:: 


Patient was admitted because of bone pain crisis





Physical Exam


Vital Signs: 


 











Temp Pulse Resp BP Pulse Ox


 


 98.8 F   86   16   109/54 L  97 


 


 08/05/17 15:29  08/05/17 15:29  08/05/17 15:29  08/05/17 15:29  08/05/17 15:29








 Intake & Output











 08/04/17 08/05/17 08/06/17





 06:59 06:59 06:59


 


Intake Total 4546 4440 


 


Output Total 4825 3550 


 


Balance -279 890 


 


Weight 65.4 kg  











General appearance: PRESENT: well-developed


Head exam: PRESENT: atraumatic, normocephalic


Eye exam: PRESENT: PERRLA


Respiratory exam: PRESENT: clear to auscultation brenda


Cardiovascular exam: PRESENT: RRR, +S1, +S2


Vascular exam: PRESENT: normal capillary refill


GI/Abdominal exam: PRESENT: normal bowel sounds, soft


Rectal exam: PRESENT: deferred


Neurological exam: PRESENT: alert, awake, oriented to person, oriented to place

, oriented to time, oriented to situation, CN II-XII grossly intact


Psychiatric exam: PRESENT: appropriate affect, normal mood


Skin exam: PRESENT: dry, intact, warm





Results


Laboratory Results: 


 





 08/05/17 06:30 





 08/05/17 06:30 





 











  08/05/17 08/05/17





  06:30 06:30


 


WBC  4.7 


 


RBC  2.27 L 


 


Hgb  9.7 L 


 


Hct  27.9 L 


 


MCV  123 H D 


 


MCH  42.9 H 


 


MCHC  34.9 


 


RDW  25.0 H 


 


Plt Count  187 


 


Seg Neutrophils %  Not Reportable 


 


Lymphocytes %  Not Reportable 


 


Monocytes %  Not Reportable 


 


Eosinophils %  Not Reportable 


 


Basophils %  Not Reportable 


 


Absolute Neutrophils  Not Reportable 


 


Absolute Lymphocytes  Not Reportable 


 


Absolute Monocytes  Not Reportable 


 


Absolute Eosinophils  Not Reportable 


 


Absolute Basophils  Not Reportable 


 


Sodium   142.8


 


Potassium   4.1


 


Chloride   105


 


Carbon Dioxide   29


 


Anion Gap   9


 


BUN   11


 


Creatinine   0.76


 


Est GFR ( Amer)   > 60


 


Est GFR (Non-Af Amer)   > 60


 


Glucose   96


 


Calcium   8.8


 


Total Bilirubin   1.8 H


 


AST   38


 


ALT   31


 


Alkaline Phosphatase   101


 


Total Protein   7.5


 


Albumin   3.9











Impressions: 


 





Chest X-Ray  08/02/17 21:53


IMPRESSION:  No acute pulmonary disease.


 














Assessment & Plan





- Diagnosis


(1) Abnormal urine finding


Is this a current diagnosis for this admission?: Yes








(3) Knee pain, left


Qualifiers: 


     Chronicity: acute        Qualified Code(s): M25.562 - Pain in left knee  





(4) Sickle cell anemia


Qualifiers: 


     Sickle-cell associated disorders: with unspecified crisis        Qualified 

Code(s): D57.00 - Hb-SS disease with crisis, unspecified; D57.0 - Hb-SS disease 

with crisis  





(5) Sickle cell crisis


Is this a current diagnosis for this admission?: Yes





(6) Sickle cell disease with crisis


Is this a current diagnosis for this admission?: Yes





(7) Asthma


Qualifiers: 


     Asthma severity: mild intermittent     Asthma complication type: 

uncomplicated        Qualified Code(s): J45.20 - Mild intermittent asthma, 

uncomplicated  


Is this a current diagnosis for this admission?: Yes controlled. He walks around the block with his dogs and completes yard/household work without significant difficulty.     In August 2019, he had a small bowel obstruction and underwent exploratory laparotomy and adhesiolysis.    MEDICATIONS:  Current Outpatient Medications   Medication Sig Dispense Refill   • HYDROcodone-acetaminophen (NORCO)  MG per tablet Take 1 tablet by mouth 4 times daily. Do not start before June 7, 2020. 120 tablet 0   • oxyCODONE SR (OXYCONTIN) 20 MG 12 hr tablet Take 1 tablet by mouth 3 times daily. Do not start before June 7, 2020. 90 tablet 0   • amLODIPine (NORVASC) 5 MG tablet TAKE ONE TABLET BY MOUTH EVERY DAY  90 tablet 0   • enalapril (VASOTEC) 20 MG tablet TAKE ONE TABLET BY MOUTH EVERY DAY  90 tablet 0   • clopidogrel (PLAVIX) 75 MG tablet TAKE ONE TABLET BY MOUTH EVERY DAY  90 tablet 1   • polyethylene glycol (GLYCOLAX, MIRALAX) packet Take 17 g by mouth daily. Indications: use as needed.     • oxygen (O2) gas Inhale 2 L/min into the lungs continuous.     • rosuvastatin (CRESTOR) 20 MG tablet Take 20 mg by mouth daily.     • hydrochlorothiazide (HYDRODIURIL) 25 MG tablet Take 25 mg by mouth daily.     • aspirin 81 MG tablet Take 81 mg by mouth daily.     • atorvastatin (LIPITOR) 80 MG tablet Take 80 mg by mouth daily.     • hydroCORTisone (ANUSOL-HC) 25 MG suppository UNWRAP AND INSERT 1 SUPPOSITORY EVERY 12 HOURS AS NEEDED FOR HEMORRHOID FLARE UP       No current facility-administered medications for this visit.      Long-Term Medications   Medication Sig Dispense Refill   • HYDROcodone-acetaminophen (NORCO)  MG per tablet Take 1 tablet by mouth 4 times daily. Do not start before June 7, 2020. 120 tablet 0   • oxyCODONE SR (OXYCONTIN) 20 MG 12 hr tablet Take 1 tablet by mouth 3 times daily. Do not start before June 7, 2020. 90 tablet 0   • amLODIPine (NORVASC) 5 MG tablet TAKE ONE TABLET BY MOUTH EVERY DAY  90 tablet 0   • enalapril (VASOTEC) 20 MG tablet TAKE ONE  TABLET BY MOUTH EVERY DAY  90 tablet 0     ALLERGIES:  ALLERGIES:  No Known Allergies     Past Medical History:  Past Medical History:   Diagnosis Date   • Coronary artery disease     with prior coronary intervention done about 7 years ago. Ejection fraction normal..   • H/O pneumonectomy       Partial pneumonectomy for lung nodules .   • Hyperlipidemia LDL goal <70    • Hypertension    • Lung cancer (CMS/HCC)     s/p partial pneumonectomy (L) , no recurrence    • Multiple injuries      skeletal, over the course of his career as a    • TIA (transient ischemic attack)      Past surgical history:  Past Surgical History:   Procedure Laterality Date   • Lung removal, partial      due to lung nodules      Family History:  Family History   Problem Relation Age of Onset   • Stroke Mother    • Cancer Father      Social History:  Social History     Tobacco Use   • Smoking status: Former Smoker     Last attempt to quit: 2009     Years since quittin.4   • Smokeless tobacco: Never Used   Substance Use Topics   • Alcohol use: No     Frequency: Never   • Drug use: No      SUBJECTIVE  Review of Systems   Constitution: Negative for decreased appetite, fever, weight gain and weight loss.   HENT: Negative for hoarse voice and nosebleeds.    Eyes: Negative for blurred vision and visual disturbance.   Cardiovascular: Negative for chest pain, dyspnea on exertion, leg swelling, near-syncope, orthopnea, palpitations and paroxysmal nocturnal dyspnea.   Respiratory: Negative for cough, hemoptysis and shortness of breath.    Endocrine: Negative for cold intolerance and heat intolerance.   Hematologic/Lymphatic: Negative for adenopathy.  Skin: Negative for rash and suspicious lesions.   Musculoskeletal: neck, back, and hip pain  Gastrointestinal: Negative for abdominal pain, dysphagia, heartburn, nausea and vomiting.   Genitourinary: Negative for frequency and nocturia.   Neurological: Negative for dizziness,  focal weakness, headaches, light-headedness, loss of balance and vertigo.   Psychiatric/Behavioral: The patient does not have insomnia.    All other systems reviewed and are negative.    OBJECTIVE   Physical Exam   Visit Vitals  /79 (BP Location: RUE - Right upper extremity)   Pulse 64   Temp 98.3 °F (36.8 °C) (Temporal)   Resp 16   Ht 5' 9\" (1.753 m)   Wt 72.6 kg (160 lb)   BMI 23.63 kg/m²     Constitutional: He appears healthy.   HENT: Normocephalic, atraumatic  Eyes: Conjunctivae are normal.   Neck: Thyroid normal. No JVD present. No neck adenopathy.   Pulmonary/Chest: Breath sounds normal. No rales. No tenderness.   Cardiac: Normal first and second heart sounds. No murmurs.  No rubs or gallops.   Peripheral vessels: Normal upper extremity pulses.  Femoral and popliteal pulses palpable.  No femoral bruits.  Normal pedal pulses.    Abdominal: Soft. No distension and no mass. There is no splenomegaly or hepatomegaly. There is no tenderness.   Musculoskeletal: Normal range of motion.   Neurological:  Alert and oriented to person, place, and time. Intact cranial nerves.   Skin: Skin is warm.     CARDIAC TESTING/IMAGING  I have reviewed the pertinent imaging study reports. These are the pertinent findings:    CHEMISTRY  CREATININE     Date Value Reference Range   08/19/2019 0.79 0.67 - 1.30 mg/dL     POTASSIUM      Date Value Reference Range   08/19/2019 3.7 3.4 - 5.1 mmol/L     HEMOGLOBIN     Date Value Reference Range   08/19/2019 10.7 (L) 13.5 - 17.5 g/dL     LIPID PANEL  CHOLESTEROL     Date Value Reference Range   03/27/2019 168 <200 mg/dL     LDL     Date Value Reference Range   03/27/2019 101 (H) <100 mg/dL     HDL     Date Value Reference Range   03/27/2019 50 > 39 mg/dL     TRIGLYCERIDES     Date Value Reference Range   03/27/2019 83 <150 mg/dL     PROCEDURES  • CT Abdomen and Pelvis with contrast  - 08/13/2019: \"Atherosclerotic vascular disease of the aorta and iliac arteries\"     • Ultrasound Carotids  Duplex Scan Extracran Art - 03/27/2019: \"50-69% stenosis in the proximal right internal carotid artery.  No significant stenosis on the left\"     · 30 Day Event Monitor - 03/28/2019 - 04/26/2019 No evidence of atrial fibrillation or other significant tachycardia or bradyarrhythmias.     · TransTHORACIC Echocardiogram - 03/27/2018: Normal systolic left ventricular function.  Mild diastolic dysfunction.  Mild left atrial enlargement. Normal right-sided chambers. Normal right ventricular systolic pressure. Mild aortic valve sclerosis and trace insufficiency. Mitral annular calcification with minimal mitral regurgitation. On injection of agitated saline contrast into a peripheral vein a very small number of microbubbles were  then recorded in the left ventricle. This finding is suggestive a small intracardiac shunt most likely of a PFO.    ASSESSMENT/PLAN  Coronary artery disease  History of prior coronary stenting over seven years ago at an outside hospital. Stable. No symptoms suggestive of angina/ischemia. No further indication for testing at this time. Continue current medications as outlined below. Discussed importance of at least 150 minutes of exercise per week at moderate exertion. Ordered a BMP, CBC, TSH, and lipid panel. Risk factors are addressed individually below.     Hypertension  Blood pressure is slightly elevated but otherwise well controlled per home readings. Continue current medications and monitor at home with goal <130/80.    Hyperlipidemia LDL goal <70  LDL at 101 mg/dL in March 2019. Advised repeat lipid profile.     TIA (transient ischemic attack)  Histoy of stroke in 2011 and recurrent TIAs, most recently in   March 2019. Possible etiologies were discussed most likely related to small vessel disease. He remains on DAPT due to hx of TIA and stroke. At one point, he was instructed to stop clopidogrel many years after prior coronary stent and developed some transient neurologic symptoms, and  decided he wanted to stay on clopidogrel. 30-day event monitor following the most recent event was negative for atrial fibrillation.        Summary of your Discharge Medications          Accurate as of June 22, 2020  6:03 PM. Always use your most recent med list.            Take these Medications      Details   amLODIPine 5 MG tablet  Commonly known as:  NORVASC   TAKE ONE TABLET BY MOUTH EVERY DAY     aspirin 81 MG tablet   Take 81 mg by mouth daily.     atorvastatin 80 MG tablet  Commonly known as:  LIPITOR   Take 80 mg by mouth daily.     clopidogrel 75 MG tablet  Commonly known as:  PLAVIX   TAKE ONE TABLET BY MOUTH EVERY DAY     enalapril 20 MG tablet  Commonly known as:  VASOTEC   TAKE ONE TABLET BY MOUTH EVERY DAY     hydrochlorothiazide 25 MG tablet  Commonly known as:  HYDRODIURIL   Take 25 mg by mouth daily.     HYDROcodone-acetaminophen  MG per tablet  Commonly known as:  NORCO   Take 1 tablet by mouth 4 times daily. Do not start before June 7, 2020.     hydroCORTisone 25 MG suppository  Commonly known as:  ANUSOL-HC   UNWRAP AND INSERT 1 SUPPOSITORY EVERY 12 HOURS AS NEEDED FOR HEMORRHOID FLARE UP     oxyCODONE SR 20 MG 12 hr tablet  Commonly known as:  OxyCONTIN   Take 1 tablet by mouth 3 times daily. Do not start before June 7, 2020.     oxygen gas  Commonly known as:  O2   Inhale 2 L/min into the lungs continuous.     polyethylene glycol 17 g packet  Commonly known as:  MIRALAX   Take 17 g by mouth daily. Indications: use as needed.     rosuvastatin 20 MG tablet  Commonly known as:  CRESTOR   Take 20 mg by mouth daily.          Elan was seen today for follow-up.    Diagnoses and all orders for this visit:    Coronary artery disease involving native coronary artery of native heart without angina pectoris  -     BASIC METABOLIC PANEL  -     CBC WITH DIFFERENTIAL  -     THYROID STIMULATING HORMONE  -     LIPID PANEL WITH REFLEX    Hyperlipidemia LDL goal <70    TIA (transient ischemic  attack)      Plan of care discussed with the patient.  All questions were answered.  Patient verbalized understanding and agrees with the plan. Primary care issues are being followed by the primary care physician. A letter has been sent to the referring physician.    Return in about 6 months (around 12/22/2020).    On 06/22/2020, Speedy LINK scribed the services personally performed by Dr. Poon.      I have reviewed and revised the note above. The documentation recorded by the scribe accurately reflects history obtained, actions preformed and decisions made by me.     Ramu Poon MD  [facility]  [facility]

## 2020-07-18 ENCOUNTER — HOSPITAL ENCOUNTER (EMERGENCY)
Dept: HOSPITAL 62 - ER | Age: 25
Discharge: HOME | End: 2020-07-18
Payer: MEDICAID

## 2020-07-18 VITALS — DIASTOLIC BLOOD PRESSURE: 93 MMHG | SYSTOLIC BLOOD PRESSURE: 124 MMHG

## 2020-07-18 DIAGNOSIS — Z88.8: ICD-10-CM

## 2020-07-18 DIAGNOSIS — Z91.018: ICD-10-CM

## 2020-07-18 DIAGNOSIS — J45.909: ICD-10-CM

## 2020-07-18 DIAGNOSIS — D57.00: Primary | ICD-10-CM

## 2020-07-18 DIAGNOSIS — M25.561: ICD-10-CM

## 2020-07-18 DIAGNOSIS — Z94.81: ICD-10-CM

## 2020-07-18 DIAGNOSIS — R00.0: ICD-10-CM

## 2020-07-18 DIAGNOSIS — R07.9: ICD-10-CM

## 2020-07-18 LAB
ABSOLUTE RETICS #: 0.03 10^6/UL (ref 0.03–0.12)
ADD MANUAL DIFF: NO
ALBUMIN SERPL-MCNC: 4.9 G/DL (ref 3.5–5)
ALP SERPL-CCNC: 118 U/L (ref 38–126)
ANION GAP SERPL CALC-SCNC: 11 MMOL/L (ref 5–19)
AST SERPL-CCNC: 48 U/L (ref 17–59)
BASOPHILS # BLD AUTO: 0.1 10^3/UL (ref 0–0.2)
BASOPHILS NFR BLD AUTO: 0.6 % (ref 0–2)
BILIRUB DIRECT SERPL-MCNC: 0.1 MG/DL (ref 0–0.4)
BILIRUB SERPL-MCNC: 0.8 MG/DL (ref 0.2–1.3)
BUN SERPL-MCNC: 25 MG/DL (ref 7–20)
CALCIUM: 9.6 MG/DL (ref 8.4–10.2)
CHLORIDE SERPL-SCNC: 105 MMOL/L (ref 98–107)
CO2 SERPL-SCNC: 23 MMOL/L (ref 22–30)
EOSINOPHIL # BLD AUTO: 0.2 10^3/UL (ref 0–0.6)
EOSINOPHIL NFR BLD AUTO: 1.7 % (ref 0–6)
ERYTHROCYTE [DISTWIDTH] IN BLOOD BY AUTOMATED COUNT: 15.1 % (ref 11.5–14)
GLUCOSE SERPL-MCNC: 105 MG/DL (ref 75–110)
HCT VFR BLD CALC: 55.4 % (ref 37.9–51)
HGB BLD-MCNC: 19 G/DL (ref 13.5–17)
LYMPHOCYTES # BLD AUTO: 2.6 10^3/UL (ref 0.5–4.7)
LYMPHOCYTES NFR BLD AUTO: 24.3 % (ref 13–45)
MCH RBC QN AUTO: 33.4 PG (ref 27–33.4)
MCHC RBC AUTO-ENTMCNC: 34.3 G/DL (ref 32–36)
MCV RBC AUTO: 97 FL (ref 80–97)
MONOCYTES # BLD AUTO: 0.9 10^3/UL (ref 0.1–1.4)
MONOCYTES NFR BLD AUTO: 8.6 % (ref 3–13)
NEUTROPHILS # BLD AUTO: 7 10^3/UL (ref 1.7–8.2)
NEUTS SEG NFR BLD AUTO: 64.8 % (ref 42–78)
PLATELET # BLD: 272 10^3/UL (ref 150–450)
POTASSIUM SERPL-SCNC: 4.3 MMOL/L (ref 3.6–5)
PROT SERPL-MCNC: 8 G/DL (ref 6.3–8.2)
RBC # BLD AUTO: 5.69 10^6/UL (ref 4.35–5.55)
RETICULOCYTE COUNT (AUTO): 0.52 % (ref 0.66–2.85)
TOTAL CELLS COUNTED % (AUTO): 100 %
WBC # BLD AUTO: 10.9 10^3/UL (ref 4–10.5)

## 2020-07-18 PROCEDURE — 96361 HYDRATE IV INFUSION ADD-ON: CPT

## 2020-07-18 PROCEDURE — 85045 AUTOMATED RETICULOCYTE COUNT: CPT

## 2020-07-18 PROCEDURE — 85025 COMPLETE CBC W/AUTO DIFF WBC: CPT

## 2020-07-18 PROCEDURE — 99284 EMERGENCY DEPT VISIT MOD MDM: CPT

## 2020-07-18 PROCEDURE — 96375 TX/PRO/DX INJ NEW DRUG ADDON: CPT

## 2020-07-18 PROCEDURE — 93005 ELECTROCARDIOGRAM TRACING: CPT

## 2020-07-18 PROCEDURE — 96374 THER/PROPH/DIAG INJ IV PUSH: CPT

## 2020-07-18 PROCEDURE — 36415 COLL VENOUS BLD VENIPUNCTURE: CPT

## 2020-07-18 PROCEDURE — 96376 TX/PRO/DX INJ SAME DRUG ADON: CPT

## 2020-07-18 PROCEDURE — 93010 ELECTROCARDIOGRAM REPORT: CPT

## 2020-07-18 PROCEDURE — 80053 COMPREHEN METABOLIC PANEL: CPT

## 2020-07-18 PROCEDURE — 71046 X-RAY EXAM CHEST 2 VIEWS: CPT

## 2020-07-18 RX ADMIN — SODIUM CHLORIDE PRN MLS/HR: 9 INJECTION, SOLUTION INTRAVENOUS at 22:22

## 2020-07-18 RX ADMIN — SODIUM CHLORIDE PRN MLS/HR: 9 INJECTION, SOLUTION INTRAVENOUS at 20:15

## 2020-07-18 NOTE — ER DOCUMENT REPORT
ED Medical Screen (RME)





- General


Chief Complaint: Sickle Cell Crisis


Stated Complaint: SICKLE CELL CRISIS


Time Seen by Provider: 07/18/20 19:10


Primary Care Provider: 


DIYA PHILIPPE MD [Primary Care Provider] - Follow up as needed


Mode of Arrival: Ambulatory


Information source: Patient


Notes: 





24-year-old male patient with history of sickle cell presenting to the emergency

department chief complaint of right knee pain over the last 3 days and chest 

pain started this morning.  Patient reports these are the areas that he usually 

has pain with a sickle cell crisis.  He states his last sickle cell crisis was 3

months ago.  He denies any other symptoms today





Lung sounds clear and equal bilaterally.  No acute distress noted.





I have greeted and performed a rapid initial assessment of this patient.  A 

comprehensive ED assessment and evaluation of the patient, analysis of test 

results and completion of the medical decision making process will be conducted 

by additional ED providers.  I have specifically instructed the patient or 

family members with the patient to immediately return to any nursing staff 

should anything change in the patient's condition or with their chief complaint.





TRAVEL OUTSIDE OF THE [place] IN LAST 30 DAYS: No





- Related Data


Allergies/Adverse Reactions: 


                                        





Coconut * [Coconut] Allergy (Mild, Verified 03/11/20 20:46)


   


apixaban [From Eliquis] Allergy (Verified 03/11/20 20:46)


   


rivaroxaban [From Xarelto] Allergy (Verified 03/11/20 20:46)


   


transpore tape Allergy (Mild, Uncoded 03/11/20 20:46)


   Hives











Past Medical History





- Social History


Family history: None


Pulmonary Medical History: Reports: Hx Asthma, Hx Pneumonia


Renal/ Medical History: Denies: Hx Peritoneal Dialysis


Musculoskeltal Medical History: Reports Hx Musculoskeletal Deformity - Pain to 

multiple areas mostly right knee due to sickle cell anemia


Past Surgical History: Reports: Hx Abdominal Surgery - Gallstones removal, Hx 

Cholecystectomy, Hx Orthopedic Surgery - Fluid drained from right foot, Hx 

Vascular Surgery - Port placementx2 (failed)





- Immunizations


Immunizations up to date: Yes


Hx Diphtheria, Pertussis, Tetanus Vaccination: Yes





Physical Exam





- Vital signs


Vitals: 





                                        











Temp Pulse Resp BP Pulse Ox


 


 99.0 F   106 H  18   130/87 H  97 


 


 07/18/20 19:01  07/18/20 19:01  07/18/20 19:01  07/18/20 19:01  07/18/20 19:01














Course





- Vital Signs


Vital signs: 





                                        











Temp Pulse Resp BP Pulse Ox


 


 99.0 F   106 H  18   130/87 H  97 


 


 07/18/20 19:01  07/18/20 19:01  07/18/20 19:01  07/18/20 19:01  07/18/20 19:01














Doctor's Discharge





- Discharge


Referrals: 


DIYA PHILIPPE MD [Primary Care Provider] - Follow up as needed

## 2020-07-18 NOTE — ER DOCUMENT REPORT
Entered by KING ANG SCRIBE  20 





Acting as scribe for:BENSON RILEY IV, MD





ED General Pain





- General


Chief Complaint: Sickle Cell Crisis


Stated Complaint: SICKLE CELL CRISIS


Time Seen by Provider: 20 19:10


Primary Care Provider: 


DIYA PHILIPPE MD [Primary Care Provider] - Follow up as needed


Mode of Arrival: Ambulatory


Information source: Patient


Notes: 





This 24 year old male patient with a history of sickle cell disease presents to 

the ED today with complaints of right knee pain for the past x3 days and chest 

pain that started this morning. Pain is a 3/5 in severity at this time. Patient 

reports that these are his typical symptoms when he has a flare-up. He states 

that his last sickle cell crisis was approximately x3 months ago and that he has

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 He is followed

by hematology at [facility]. Denies fever or chills.


TRAVEL OUTSIDE OF THE [place] IN LAST 30 DAYS: No





- Related Data


Allergies/Adverse Reactions: 


                                        





Coconut * [Coconut] Allergy (Mild, Verified 20 19:14)


   


apixaban [From Eliquis] Allergy (Verified 20 19:14)


   


rivaroxaban [From Xarelto] Allergy (Verified 20 19:14)


   


transpore tape Allergy (Mild, Uncoded 20 19:14)


   Hives











Past Medical History





- General


Information source: Patient





- Social History


Smoking Status: Never Smoker


Cigarette use (# per day): No


Chew tobacco use (# tins/day): No


Smoking Education Provided: No


Frequency of alcohol use: Rare


Drug Abuse: None


Family History: Reviewed & Not Pertinent, Arthritis, DM, Hypertension, Other - 

Sickle cell trait both parents and asthma


Patient has suicidal ideation: No


Patient has homicidal ideation: No





- Medical History


Medical History: Other - Hx Sickle Cell Disease


Pulmonary Medical History: Reports: Hx Asthma, Hx Pneumonia


Musculoskeletal Medical History: Reports Hx Musculoskeletal Deformity - Pain to 

multiple areas mostly right knee due to sickle cell anemia


Past Surgical History: Reports: Hx Abdominal Surgery - Gallstones removal, Hx 

Cholecystectomy, Hx Orthopedic Surgery - Fluid drained from right foot, Hx 

Vascular Surgery - Port placementx2 (failed)





- Immunizations


Immunizations up to date: Yes


Hx Diphtheria, Pertussis, Tetanus Vaccination: Yes


Hx Pneumococcal Vaccination: 13





Review of Systems





- Review of Systems


Constitutional: See HPI.  denies: Chills, Fever


EENT: No symptoms reported


Cardiovascular: See HPI, Chest pain


Respiratory: No symptoms reported


Gastrointestinal: No symptoms reported


Genitourinary: No symptoms reported


Male Genitourinary: No symptoms reported


Musculoskeletal: See HPI, Joint pain


Skin: No symptoms reported


Hematologic/Lymphatic: No symptoms reported


Neurological/Psychological: No symptoms reported


-: Yes All other systems reviewed and negative





Physical Exam





- Vital signs


Vitals: 


                                        











Temp Pulse Resp BP Pulse Ox


 


 99.0 F   106 H  18   130/87 H  97 


 


 20 19:01  20 19:01  20 19:01  20 19:01  20 19:01














- General


General appearance: Appears well, Alert


In distress: None





- HEENT


Head: Normocephalic, Atraumatic


Eyes: Normal


Pupils: PERRL





- Respiratory


Respiratory status: No respiratory distress


Chest status: Nontender


Breath sounds: Normal


Chest palpation: Normal





- Cardiovascular


Rhythm: Regular


Heart sounds: Normal auscultation


Murmur: No


Friction rub: No


Gallop: None auscultated





- Abdominal


Inspection: Normal


Distension: No distension


Bowel sounds: Normal


Tenderness: Nontender - Abdomen soft


Organomegaly: No organomegaly





- Back


Back: Normal, Nontender





- Extremities


General upper extremity: Normal inspection


General lower extremity: Normal inspection





- Neurological


Neuro grossly intact: Yes


Orientation: AAOx4


[place] Coma Scale Eye Opening: Spontaneous


[place] Coma Scale Verbal: Oriented


[place] Coma Scale Motor: Obeys Commands


[place] Coma Scale Total: 15





- Psychological


Associated symptoms: Normal affect, Normal mood





- Skin


Skin Temperature: Warm


Skin Moisture: Dry


Skin Color: Normal





Course





- Re-evaluation


Re-evalutation: 





20 22:40


Patient states he is feeling better at this time.  Results of ED MSE were 

discussed with patient and patient's mother.  All questions were answered prior 

to discharge.  Emergency signs and symptoms, reasons to return to the emergency 

department discussed with patient and patient's mother.





- Vital Signs


Vital signs: 


                                        











Temp Pulse Resp BP Pulse Ox


 


 99 F   106 H  22 H  124/93 H  100 


 


 20 19:10  20 19:01  20 22:26  20 22:26  20 22:26














- Laboratory


Result Diagrams: 


                                 20 19:34





                                 20 21:01


Laboratory results interpreted by me: 


                                        











  20





  19:34 21:01


 


WBC  10.9 H 


 


RBC  5.69 H 


 


Hgb  19.0 H 


 


Hct  55.4 H 


 


RDW  15.1 H 


 


Retic Count (auto)  0.52 L 


 


BUN   25 H














- Diagnostic Test


Radiology reviewed: Reports reviewed





- EKG Interpretation by Me


Additional EKG results interpreted by me: 





20 22:41


EKG obtained on 2020 at 1904 hrs. was interpreted by this MD.  Findings: 

Sinus tachycardia, rate 104, normal axis, P waves preceding QRS complexes, QRS 

complexes appear narrow, there are no obvious patterns of ST segment elevation 

or depression present to suggest acute myocardial ischemia or infarction.  

Impression normal sinus rhythm with nonspecific ST segments.





Discharge





- Discharge


Clinical Impression: 


 Sickle cell pain crisis





Condition: Stable


Disposition: HOME, SELF-CARE


Additional Instructions: 


Return to the Emergency Department without delay if any worse.











HOME CARE INSTRUCTIONS & INFORMATION:  Thank you for choosing us for your 

medical needs. We hope you're satisfied with the care you received.  After you 

leave, you must properly care for your problem and, at the same time, observe 

its progress.  Any condition can change.  Some illnesses can change rapidly over

hours or days.  If your condition worsens, return to the Emergency Department or

see your physician promptly.





ABOUT YOUR X-RAYS AND EKG'S:   If you had an EKG or X-rays taken, they have been

read by the Emergency Physician. The X-rays and EKG's will also be read by a 

Radiologist or Cardiologist within 24 hours.  If discrepancies are noted, you 

will be notified by telephone.  Please be certain the ED has a correct telephone

number & address where you can be reached.  Also, realize that some fractures or

abnormalities do not show up on initial X-rays.  If your symptoms continue, see 

your physician.





ABOUT YOUR LABORATORY TEST:   If you had laboratory tests, the results have been

reviewed by the Emergency Physician.  Some test results (for example cultures) 

may not be available for several days.  You will be contacted if any test result

shows you need additional treatment.  Please be certain the ED has a correct 

telephone number and address where you can be reached.





ABOUT YOUR MEDICATIONS:  You will receive instructions on how to take your 

medicine on the prescription label you receive.  Additional information may be 

provided by the Pharmacy.  If you have questions afterwards, call the ED for 

clarification or further instructions.  Some prescribed medications may cause 

drowsiness.  Do not perform tasks such as driving a car or operating machinery 

without consulting your Pharmacist.  If you feel you need a refill of pain 

medication, your condition will need re-evaluation.  Please do not call for a r

efill of any medication.





ABOUT YOUR SIGNATURE:   Signature of this document acknowledges to followin. Understanding that you received emergency treatment and that you may be 

   released before al medical problems are known or treated. Please be certain  

   the ED has a correct phone number & address where you can be reached.


   2. Acknowledgement that you will arrange for follow-up care as recommended.


   3. Authorization for the Emergency Physician to provide information to your 

follow-up Physician in order to maximize your care.





AT ANY TIME, IF YOUR SYMPTOMS CHANGE SIGNIFICANTLY OR WORSEN OR YOU DEVELOP NEW 

SYMPTOMS, RETURN TO THE EMERGENCY DEPARTMENT IMMEDIATELY FOR RE-EVALUATION.





OUR GOAL IS TO PROVIDE EXCELLENT MEDICAL CARE!





WE HOPE THAT WE HAVE MET YOUR EXPECTATIONS DURING YOUR EMERGENCY DEPARTMENT 

VISIT AND THAT YOU FEEL YOU HAVE RECEIVED EXCELLENT CARE!





Sickle Cell Crisis





     You have "sickle cell crisis."  Sickle cell disease is caused by abnormal 

hemoglobin.  This hemoglobin can deform red blood cells into a sickle shape.  

These abnormal blood cells can block blood vessels. This causes the pain of sic

kle cell crisis.


     Sickle cell crisis can occur any time.  But attacks are more likely with 

acute infection, dehydration, or altitude change.  A crisis usually causes pain 

in the legs, back, abdomen, and chest.  Sometimes the pain may ease and return 

later.


     The usual treatment is oxygen, pain medication, IV fluids, and treatment of

infection.  Attacks may take a couple of days to resolve.


     Return if the pain becomes more severe, or if there are new symptoms.








Referrals: 


DIYA PHILIPPE MD [Primary Care Provider] - Follow up as needed





I personally performed the services described in the documentation, reviewed and

edited the documentation which was dictated to the scribe in my presence, and it

accurately records my words and actions.

## 2020-07-18 NOTE — RADIOLOGY REPORT (SQ)
EXAM DESCRIPTION:  CHEST 2 VIEWS



IMAGES COMPLETED DATE/TIME:  7/18/2020 7:22 pm



REASON FOR STUDY:  CHEST PAIN/SICKLE CELL



COMPARISON:  Chest x-ray 3/12/2020, 2/19/2020.



EXAM PARAMETERS:  NUMBER OF VIEWS: two views

TECHNIQUE: Digital Frontal and Lateral radiographic views of the chest acquired.

RADIATION DOSE: NA

LIMITATIONS: none



FINDINGS:  LUNGS AND PLEURA: No consolidation, pneumothorax or pleural effusion.  Prominent interstit
ial markings are probably representing chronic changes of sickle cell disease.

MEDIASTINUM AND HILAR STRUCTURES: No masses or contour abnormalities.

HEART AND VASCULAR STRUCTURES: Heart normal size.  No evidence for failure.

BONES: Chronic changes with H-shaped vertebral bodies.

HARDWARE: None in the chest.

OTHER: Surgical clips at the right upper quadrant.



IMPRESSION:  NO ACUTE RADIOGRAPHIC FINDING IN THE CHEST.



TECHNICAL DOCUMENTATION:  JOB ID:  [id number]

[id number]

 2011 [employer]- All Rights Reserved



Reading location - IP/workstation name: [email or web address]

## 2020-07-19 NOTE — EKG REPORT
SEVERITY:- BORDERLINE ECG -

SINUS TACHYCARDIA

PROBABLE LEFT ATRIAL ABNORMALITY

:

Confirmed by: Randell Cameron MD 19-Jul-2020 08:40:09

## 2020-08-19 ENCOUNTER — HOSPITAL ENCOUNTER (OUTPATIENT)
Dept: HOSPITAL 62 - OD | Age: 25
End: 2020-08-19
Attending: FAMILY MEDICINE
Payer: MEDICAID

## 2020-08-19 DIAGNOSIS — M25.561: Primary | ICD-10-CM

## 2020-08-19 DIAGNOSIS — M25.551: ICD-10-CM

## 2020-08-20 NOTE — RADIOLOGY REPORT (SQ)
EXAM DESCRIPTION:  HIP RIGHT AP/LATERAL



IMAGES COMPLETED DATE/TIME:  8/19/2020 4:19 pm



REASON FOR STUDY:  PAIN IN RIGHT HIP M25.561  PAIN IN RIGHT KNEE M25.551  PAIN IN RIGHT HIP



COMPARISON:  None.



NUMBER OF VIEWS:  Two views.



TECHNIQUE:  AP pelvis and additional frog-leg view of the right hip.



LIMITATIONS:  None.



FINDINGS:  MINERALIZATION: Heterogeneous mineralization in this patient with known sickle cell diseas
e.  No evidence of avascular necrosis.  Relative lucency seen of the bilateral superolateral femoral 
head/neck junctions may be related to femoroacetabular impingement.  No significant CAM deformities.

RIGHT HIP: No fracture or dislocation.  No worrisome bone lesions.  No evidence of hemarthrosis.

LEFT HIP: No fracture or dislocation.  No worrisome bone lesions.  No evidence of hemarthrosis.  Mild
 acetabular crossover.

PUBIS AND ISCHIUM: No fracture.

PELVIS: No fracture.

SACRUM: No fracture or dislocation. No worrisome bone lesions.

LOWER LUMBAR SPINE: No fracture or dislocation. No worrisome bone lesions.  No significant disc disea
se.

SOFT TISSUES: No findings.

OTHER: No other significant finding.



IMPRESSION:  Heterogeneous mineralization consistent with patient's history of sickle cell disease.  
No evidence of avascular necrosis.  Subtle findings suggest an element of femoroacetabular impinggracen
tKd



TECHNICAL DOCUMENTATION:  JOB ID:  [id number]

 2011 [employer]- All Rights Reserved



Reading location - IP/workstation name: [email or web address]

## 2020-08-20 NOTE — RADIOLOGY REPORT (SQ)
EXAM DESCRIPTION:  KNEE RIGHT 3 VIEWS



IMAGES COMPLETED DATE/TIME:  8/19/2020 4:19 pm



REASON FOR STUDY:  PAIN IN RIGHT KNEE M25.561  PAIN IN RIGHT KNEE M25.551  PAIN IN RIGHT HIP



COMPARISON:  11/3/2019



NUMBER OF VIEWS:  Three views.



TECHNIQUE:  AP, lateral, and sunrise patella radiographic images acquired of the right knee.



LIMITATIONS:  None.



FINDINGS:  MINERALIZATION: Re- demonstration of heterogeneous marrow in this patient with known sickl
e cell disease.  No acute fracture or dislocation.  No evidence of avascular necrosis.

BONES: No acute fracture or dislocation.  No worrisome bone lesions.

JOINT: No effusion.

SOFT TISSUES: No soft tissue swelling.  No radio-opaque foreign body.

OTHER: No other significant finding.



IMPRESSION:  No acute osseous abnormality.  Re- demonstration of heterogeneous mineralization consist
ent with patient's history of sickle cell disease.



TECHNICAL DOCUMENTATION:  JOB ID:  [id number]

 2011 [employer]- All Rights Reserved



Reading location - IP/workstation name: [email or web address]

## 2020-09-01 ENCOUNTER — HOSPITAL ENCOUNTER (EMERGENCY)
Dept: HOSPITAL 62 - ER | Age: 25
Discharge: HOME | End: 2020-09-01
Payer: MEDICAID

## 2020-09-01 VITALS — DIASTOLIC BLOOD PRESSURE: 73 MMHG | SYSTOLIC BLOOD PRESSURE: 121 MMHG

## 2020-09-01 DIAGNOSIS — R07.89: Primary | ICD-10-CM

## 2020-09-01 DIAGNOSIS — Z91.018: ICD-10-CM

## 2020-09-01 DIAGNOSIS — L29.9: ICD-10-CM

## 2020-09-01 DIAGNOSIS — M25.569: ICD-10-CM

## 2020-09-01 DIAGNOSIS — Z79.891: ICD-10-CM

## 2020-09-01 DIAGNOSIS — Z79.899: ICD-10-CM

## 2020-09-01 DIAGNOSIS — M79.606: ICD-10-CM

## 2020-09-01 DIAGNOSIS — J45.909: ICD-10-CM

## 2020-09-01 DIAGNOSIS — Z98.890: ICD-10-CM

## 2020-09-01 DIAGNOSIS — Z88.8: ICD-10-CM

## 2020-09-01 LAB
ABSOLUTE RETICS #: 0.06 10^6/UL (ref 0.03–0.12)
ADD MANUAL DIFF: NO
ALBUMIN SERPL-MCNC: 5 G/DL (ref 3.5–5)
ALP SERPL-CCNC: 112 U/L (ref 38–126)
ANION GAP SERPL CALC-SCNC: 9 MMOL/L (ref 5–19)
AST SERPL-CCNC: 41 U/L (ref 17–59)
BASOPHILS # BLD AUTO: 0.1 10^3/UL (ref 0–0.2)
BASOPHILS NFR BLD AUTO: 0.7 % (ref 0–2)
BILIRUB DIRECT SERPL-MCNC: 0.3 MG/DL (ref 0–0.4)
BILIRUB SERPL-MCNC: 1 MG/DL (ref 0.2–1.3)
BUN SERPL-MCNC: 23 MG/DL (ref 7–20)
CALCIUM: 9.6 MG/DL (ref 8.4–10.2)
CHLORIDE SERPL-SCNC: 108 MMOL/L (ref 98–107)
CK SERPL-CCNC: 73 U/L (ref 55–170)
CO2 SERPL-SCNC: 23 MMOL/L (ref 22–30)
EOSINOPHIL # BLD AUTO: 0.2 10^3/UL (ref 0–0.6)
EOSINOPHIL NFR BLD AUTO: 2.2 % (ref 0–6)
ERYTHROCYTE [DISTWIDTH] IN BLOOD BY AUTOMATED COUNT: 14.7 % (ref 11.5–14)
GLUCOSE SERPL-MCNC: 99 MG/DL (ref 75–110)
HCT VFR BLD CALC: 53.9 % (ref 37.9–51)
HGB BLD-MCNC: 18.6 G/DL (ref 13.5–17)
LYMPHOCYTES # BLD AUTO: 3.3 10^3/UL (ref 0.5–4.7)
LYMPHOCYTES NFR BLD AUTO: 36.6 % (ref 13–45)
MCH RBC QN AUTO: 33.1 PG (ref 27–33.4)
MCHC RBC AUTO-ENTMCNC: 34.5 G/DL (ref 32–36)
MCV RBC AUTO: 96 FL (ref 80–97)
MONOCYTES # BLD AUTO: 1.1 10^3/UL (ref 0.1–1.4)
MONOCYTES NFR BLD AUTO: 12.3 % (ref 3–13)
NEUTROPHILS # BLD AUTO: 4.3 10^3/UL (ref 1.7–8.2)
NEUTS SEG NFR BLD AUTO: 48.2 % (ref 42–78)
PLATELET # BLD: 273 10^3/UL (ref 150–450)
POTASSIUM SERPL-SCNC: 4.4 MMOL/L (ref 3.6–5)
PROT SERPL-MCNC: 7.9 G/DL (ref 6.3–8.2)
RBC # BLD AUTO: 5.62 10^6/UL (ref 4.35–5.55)
RETICULOCYTE COUNT (AUTO): 1.1 % (ref 0.66–2.85)
TOTAL CELLS COUNTED % (AUTO): 100 %
WBC # BLD AUTO: 8.9 10^3/UL (ref 4–10.5)

## 2020-09-01 PROCEDURE — 83880 ASSAY OF NATRIURETIC PEPTIDE: CPT

## 2020-09-01 PROCEDURE — 85025 COMPLETE CBC W/AUTO DIFF WBC: CPT

## 2020-09-01 PROCEDURE — 93010 ELECTROCARDIOGRAM REPORT: CPT

## 2020-09-01 PROCEDURE — 36415 COLL VENOUS BLD VENIPUNCTURE: CPT

## 2020-09-01 PROCEDURE — 80053 COMPREHEN METABOLIC PANEL: CPT

## 2020-09-01 PROCEDURE — 96361 HYDRATE IV INFUSION ADD-ON: CPT

## 2020-09-01 PROCEDURE — 99285 EMERGENCY DEPT VISIT HI MDM: CPT

## 2020-09-01 PROCEDURE — 82550 ASSAY OF CK (CPK): CPT

## 2020-09-01 PROCEDURE — 85045 AUTOMATED RETICULOCYTE COUNT: CPT

## 2020-09-01 PROCEDURE — 83735 ASSAY OF MAGNESIUM: CPT

## 2020-09-01 PROCEDURE — 96374 THER/PROPH/DIAG INJ IV PUSH: CPT

## 2020-09-01 PROCEDURE — 93005 ELECTROCARDIOGRAM TRACING: CPT

## 2020-09-01 PROCEDURE — 71046 X-RAY EXAM CHEST 2 VIEWS: CPT

## 2020-09-01 PROCEDURE — 84484 ASSAY OF TROPONIN QUANT: CPT

## 2020-09-01 PROCEDURE — 96375 TX/PRO/DX INJ NEW DRUG ADDON: CPT

## 2020-09-01 PROCEDURE — 96376 TX/PRO/DX INJ SAME DRUG ADON: CPT

## 2020-09-01 NOTE — ER DOCUMENT REPORT
ED General Pain





- General


Chief Complaint: Sickle Cell Crisis


Stated Complaint: CHEST PAIN


Time Seen by Provider: 09/01/20 15:37


Primary Care Provider: 


CINDY VIVEROS NP-C [Primary Care Provider] - Follow up as needed


Notes: 





Patient is a 24-year-old male who presents the emergency department with a chief

complaint of chest wall pain and knee pain.  Patient is a sickle cell patient.  

Patient states this feels like a normal sickle cell crisis.  Patient normally 

takes 10 mg of oxycodone at home.  Last dose was at 1200.  Patient states that 

he has had a bone marrow transplant, which was a year ago.


TRAVEL OUTSIDE OF THE [place] IN LAST 30 DAYS: No





- Related Data


Allergies/Adverse Reactions: 


                                        





Coconut * [Coconut] Allergy (Mild, Verified 07/18/20 19:14)


   


apixaban [From Eliquis] Allergy (Verified 07/18/20 19:14)


   


rivaroxaban [From Xarelto] Allergy (Verified 07/18/20 19:14)


   


transpore tape Allergy (Mild, Uncoded 07/18/20 19:14)


   Hives








Home Medications: acyclovir, cymbalta, carvedilol, amlodipine, hydroxyurea, 

folic acid, oxycodone





Past Medical History





- Social History


Smoking Status: Never Smoker


Chew tobacco use (# tins/day): No


Frequency of alcohol use: Rare


Drug Abuse: None


Family History: Reviewed & Not Pertinent, Arthritis, DM, Hypertension, Other - 

Sickle cell trait both parents and asthma


Pulmonary Medical History: Reports: Hx Asthma, Hx Pneumonia


Renal/ Medical History: Denies: Hx Peritoneal Dialysis


Musculoskeletal Medical History: Reports Hx Musculoskeletal Deformity - Pain to 

multiple areas mostly right knee due to sickle cell anemia


Past Surgical History: Reports: Hx Abdominal Surgery - Gallstones removal, Hx 

Cholecystectomy, Hx Orthopedic Surgery - Fluid drained from right foot, Hx 

Vascular Surgery - Port placementx2 (failed)





- Immunizations


Immunizations up to date: Yes


Hx Diphtheria, Pertussis, Tetanus Vaccination: Yes


Hx Pneumococcal Vaccination: 05/16/13





Review of Systems





- Review of Systems


Notes: 





REVIEW OF SYSTEMS:





CONSTITUTIONAL :    Denies recent illness.  Denies recent unintentional weight 

loss.  Denies fever,  chills, or sweats. 


EENT: Denies eye, ear, throat, or mouth pain, discharge, or symptoms.  Denies 

nasal or sinus congestion.


CARDIOVASCULAR: See HPI.


RESPIRATORY: Denies shortness of breath, cough, congestion, difficulty 

breathing, or wheezing. 


GASTROINTESTINAL: Denies nausea, vomiting, and diarrhea.  Denies abdominal pain.

 Denies constipation. 


GENITOURINARY:  Denies difficulty urinating, burning, blood in urine, urgency or

frequency.


MUSCULOSKELETAL:  Denies neck and back pain.  See HPI.


SKIN:   Denies rash, itchiness, or lesions


HEMATOLOGIC :   Denies easy bruising or bleeding.


LYMPHATIC:  Denies swollen, painful, enlarged glands.


NEUROLOGICAL: Denies no numbness or tingling denies weakness.  Denies headache. 

Denies altered mental status.  Denies alteration in speech.


PSYCHIATRIC:  Denies stress, anxiety, alteration in sleep patterns, or 

depression.





All other systems reviewed and negative.





Physical Exam





- Vital signs


Vitals: 


                                        











Temp Pulse Resp BP Pulse Ox


 


 98.4 F   96   16   132/97 H  95 


 


 09/01/20 14:04  09/01/20 14:04  09/01/20 14:04  09/01/20 14:04  09/01/20 14:04














- Notes


Notes: 





PHYSICAL EXAMINATION:





GENERAL: Appears well, healthy, well-nourished, no acute distress. 





HEAD:  Normocephalic, atraumatic.





EYES:  PERRL, conjunctiva normal, all extraocular movements intact, sclera 

nonicteric





ENT:  Moist mucous membranes. 





NECK: Supple, no noticeable swelling, redness, rash.  Normal range of motion.





LUNGS: Equal breath sounds bilaterally and clear to auscultation.  No wheezes 

rales or rhonchi.





CARDIOVASCULAR: S1-S2, regular rate, regular rhythm.  Radial pulses 2+, normal.





ABDOMEN: Normoactive bowel sounds.  Soft, nontender,  no guarding, no rebound 

tenderness, and no masses palpated.





EXTREMITIES: Normal strength and range of motion, no pitting or edema.  No 

cyanosis. 





NEUROLOGICAL: Moves all extremities upon command.  Strength 5/5 in all 

extremities. 





PSYCH: Normal mood, normal affect.





SKIN: Warm, dry.  No rash, lesions, ulcerations noted.  Normal skin turgor.





Course





- Re-evaluation


Re-evalutation: 





09/01/20 20:51


Nursing staff was having a hard time placing an IV.  I went to bedside and 

placed an IV in the patient's left foot.  Patient will receive his medications 

and IV fluids.  Chest x-ray is unremarkable.


09/01/20 21:41


Patient states that he is feeling a little bit better, but still has pain.  We 

will give him another dose of Dilaudid and will reevaluate.  Hematology shows a 

hemoglobin of 18.6 and hematocrit of 53.9.  The patient and his visitor states 

that this is actually normal for him due to his bone marrow transplant 

medications.  Chemistries are unremarkable.  Troponin and BNP are also 

unremarkable.  Reticulocyte count and reticulocyte number are within normal 

limits.


09/01/20 22:40


Patient was reevaluated and he states that he is pain-free, but states that he 

is slightly itchy.  We will give him another dose of Benadryl.  Patient is now 

receiving his second liter of IV fluids.  Patient is nontoxic in appearance.  

Acute chest syndrome has been ruled out.  Follow-up precautions were given.  

Verbal discharge instructions were given to the patient.  They verbalized 

understanding.  They are stable for discharge.











- Vital Signs


Vital signs: 


                                        











Temp Pulse Resp BP Pulse Ox


 


 98.2 F   96   18   121/73   98 


 


 09/01/20 23:32  09/01/20 14:04  09/01/20 23:32  09/01/20 23:32  09/01/20 23:32














- Laboratory


Result Diagrams: 


                                 09/01/20 21:00





                                 09/01/20 21:00


Laboratory results interpreted by me: 


                                        











  09/01/20 09/01/20





  21:00 21:00


 


RBC  5.62 H 


 


Hgb  18.6 H 


 


Hct  53.9 H 


 


RDW  14.7 H 


 


Chloride   108 H


 


BUN   23 H














- EKG Interpretation by Me


Additional EKG results interpreted by me: 





09/01/20 19:53


Sinus rhythm.  Rate 93.  ; QRS 86; ; QTc 423.  No ST elevations or 

depressions noted.





Discharge





- Discharge


Clinical Impression: 


 Sickle cell crisis





Chest pain


Qualifiers:


 Chest pain type: unspecified Qualified Code(s): R07.9 - Chest pain, unspecified





Leg pain


Qualifiers:


 Laterality: unspecified laterality Qualified Code(s): M79.606 - Pain in leg, 

unspecified





Disposition: HOME, SELF-CARE


Additional Instructions: 


You were seen today for sickle cell pain crisis.  Please follow-up with your 

hematologist.  Returning to the ED if you have worsening pain, fever greater 

than 100.4, shortness of breath, persistent vomiting, or any other symptoms that

are concerning to you.


Referrals: 


CINDY VIVEROS NP-C [Primary Care Provider] - Follow up as needed
ED Medical Screen (RME)





- General


Chief Complaint: Sickle Cell Crisis


Stated Complaint: CHEST PAIN


Time Seen by Provider: 09/01/20 15:37


Primary Care Provider: 


CINDY VIVEROS NP-C [Primary Care Provider] - Follow up as needed


Notes: 





Patient is a 24-year-old male who presents the emergency department with a chief

complaint of chest wall pain and knee pain.  Patient is a sickle cell patient.  

Patient states this feels like a normal sickle cell crisis.  Patient normally 

takes 10 mg of oxycodone at home.  Last dose was at 1200.





Exam: S1, S2.  Lung sounds clear to auscultation.





I have greeted and performed a rapid initial assessment of this patient.  A 

comprehensive ED assessment and evaluation of the patient, analysis of test 

results and completion of medical decision making process will be conducted by 

an additional ED providers.


TRAVEL OUTSIDE OF THE [place] IN LAST 30 DAYS: No





- Related Data


Allergies/Adverse Reactions: 


                                        





Coconut * [Coconut] Allergy (Mild, Verified 07/18/20 19:14)


   


apixaban [From Eliquis] Allergy (Verified 07/18/20 19:14)


   


rivaroxaban [From Xarelto] Allergy (Verified 07/18/20 19:14)


   


transpore tape Allergy (Mild, Uncoded 07/18/20 19:14)


   Hives








Home Medications: acyclovir, cymbalta, carvedilol, amlodipine, hydroxyurea, 

folic acid, oxycodone





Past Medical History





- Social History


Chew tobacco use (# tins/day): No


Frequency of alcohol use: Rare


Drug Abuse: None


Family history: None


Pulmonary Medical History: Reports: Hx Asthma, Hx Pneumonia


Renal/ Medical History: Denies: Hx Peritoneal Dialysis


Musculoskeltal Medical History: Reports Hx Musculoskeletal Deformity - Pain to 

multiple areas mostly right knee due to sickle cell anemia


Past Surgical History: Reports: Hx Abdominal Surgery - Gallstones removal, Hx 

Cholecystectomy, Hx Orthopedic Surgery - Fluid drained from right foot, Hx 

Vascular Surgery - Port placementx2 (failed)





- Immunizations


Immunizations up to date: Yes


Hx Diphtheria, Pertussis, Tetanus Vaccination: Yes





Physical Exam





- Vital signs


Vitals: 





                                        











Temp Pulse Resp BP Pulse Ox


 


 98.4 F   96   16   132/97 H  95 


 


 09/01/20 14:04  09/01/20 14:04  09/01/20 14:04  09/01/20 14:04  09/01/20 14:04














Course





- Vital Signs


Vital signs: 





                                        











Temp Pulse Resp BP Pulse Ox


 


 98.4 F   96   16   132/97 H  95 


 


 09/01/20 15:37  09/01/20 14:04  09/01/20 14:04  09/01/20 14:04  09/01/20 14:04














Doctor's Discharge





- Discharge


Referrals: 


CINDY VIVEROS NP-C [Primary Care Provider] - Follow up as needed
30-Mar-2020 16:58

## 2020-09-01 NOTE — RADIOLOGY REPORT (SQ)
EXAM DESCRIPTION:  CHEST 2 VIEWS



IMAGES COMPLETED DATE/TIME:  9/1/2020 4:01 pm



REASON FOR STUDY:  chest pain; Sickle cell crisis



COMPARISON:  7/18/2020.



EXAM PARAMETERS:  NUMBER OF VIEWS: two views

TECHNIQUE: Digital Frontal and Lateral radiographic views of the chest acquired.

RADIATION DOSE: NA

LIMITATIONS: none



FINDINGS:  LUNGS AND PLEURA: No opacities, masses or pneumothorax. No pleural effusion.

MEDIASTINUM AND HILAR STRUCTURES: No masses or contour abnormalities.

HEART AND VASCULAR STRUCTURES: Heart normal size.  No evidence for failure.

BONES: No acute findings.  Diffuse chronic bony sclerosis consistent with sickle cell disease.

HARDWARE: None in the chest.

OTHER: No other significant finding.



IMPRESSION:  NO ACUTE RADIOGRAPHIC FINDING IN THE CHEST.



TECHNICAL DOCUMENTATION:  JOB ID:  [id number]

 2011 [employer]- All Rights Reserved



Reading location - IP/workstation name: [email or web address]

## 2020-12-12 ENCOUNTER — HOSPITAL ENCOUNTER (EMERGENCY)
Dept: HOSPITAL 62 - ER | Age: 25
LOS: 1 days | Discharge: HOME | End: 2020-12-13
Payer: MEDICAID

## 2020-12-12 DIAGNOSIS — Z88.8: ICD-10-CM

## 2020-12-12 DIAGNOSIS — Z79.891: ICD-10-CM

## 2020-12-12 DIAGNOSIS — R51.9: ICD-10-CM

## 2020-12-12 DIAGNOSIS — Z91.018: ICD-10-CM

## 2020-12-12 DIAGNOSIS — H53.8: ICD-10-CM

## 2020-12-12 DIAGNOSIS — M25.521: Primary | ICD-10-CM

## 2020-12-12 DIAGNOSIS — J45.909: ICD-10-CM

## 2020-12-12 DIAGNOSIS — D57.1: ICD-10-CM

## 2020-12-12 DIAGNOSIS — Z94.81: ICD-10-CM

## 2020-12-12 LAB
ABSOLUTE RETICS #: 0.06 10^6/UL (ref 0.03–0.12)
ADD MANUAL DIFF: NO
ALBUMIN SERPL-MCNC: 4.8 G/DL (ref 3.5–5)
ALP SERPL-CCNC: 103 U/L (ref 38–126)
ANION GAP SERPL CALC-SCNC: 12 MMOL/L (ref 5–19)
AST SERPL-CCNC: 38 U/L (ref 17–59)
BASOPHILS # BLD AUTO: 0.1 10^3/UL (ref 0–0.2)
BASOPHILS NFR BLD AUTO: 1.2 % (ref 0–2)
BILIRUB DIRECT SERPL-MCNC: 0.3 MG/DL (ref 0–0.4)
BILIRUB SERPL-MCNC: 0.7 MG/DL (ref 0.2–1.3)
BUN SERPL-MCNC: 29 MG/DL (ref 7–20)
CALCIUM: 10.1 MG/DL (ref 8.4–10.2)
CHLORIDE SERPL-SCNC: 103 MMOL/L (ref 98–107)
CO2 SERPL-SCNC: 22 MMOL/L (ref 22–30)
EOSINOPHIL # BLD AUTO: 0.3 10^3/UL (ref 0–0.6)
EOSINOPHIL NFR BLD AUTO: 3.3 % (ref 0–6)
ERYTHROCYTE [DISTWIDTH] IN BLOOD BY AUTOMATED COUNT: 15.5 % (ref 11.5–14)
GLUCOSE SERPL-MCNC: 92 MG/DL (ref 75–110)
HCT VFR BLD CALC: 51 % (ref 37.9–51)
HGB BLD-MCNC: 17.5 G/DL (ref 13.5–17)
LYMPHOCYTES # BLD AUTO: 2.6 10^3/UL (ref 0.5–4.7)
LYMPHOCYTES NFR BLD AUTO: 29.9 % (ref 13–45)
MCH RBC QN AUTO: 34 PG (ref 27–33.4)
MCHC RBC AUTO-ENTMCNC: 34.2 G/DL (ref 32–36)
MCV RBC AUTO: 99 FL (ref 80–97)
MONOCYTES # BLD AUTO: 1.1 10^3/UL (ref 0.1–1.4)
MONOCYTES NFR BLD AUTO: 12.7 % (ref 3–13)
NEUTROPHILS # BLD AUTO: 4.6 10^3/UL (ref 1.7–8.2)
NEUTS SEG NFR BLD AUTO: 52.9 % (ref 42–78)
PLATELET # BLD: 246 10^3/UL (ref 150–450)
POTASSIUM SERPL-SCNC: 4.2 MMOL/L (ref 3.6–5)
PROT SERPL-MCNC: 7.9 G/DL (ref 6.3–8.2)
RBC # BLD AUTO: 5.13 10^6/UL (ref 4.35–5.55)
RETICULOCYTE COUNT (AUTO): 1.23 % (ref 0.66–2.85)
TOTAL CELLS COUNTED % (AUTO): 100 %
WBC # BLD AUTO: 8.7 10^3/UL (ref 4–10.5)

## 2020-12-12 PROCEDURE — 70450 CT HEAD/BRAIN W/O DYE: CPT

## 2020-12-12 PROCEDURE — 96375 TX/PRO/DX INJ NEW DRUG ADDON: CPT

## 2020-12-12 PROCEDURE — 99285 EMERGENCY DEPT VISIT HI MDM: CPT

## 2020-12-12 PROCEDURE — 96374 THER/PROPH/DIAG INJ IV PUSH: CPT

## 2020-12-12 PROCEDURE — 93010 ELECTROCARDIOGRAM REPORT: CPT

## 2020-12-12 PROCEDURE — 93005 ELECTROCARDIOGRAM TRACING: CPT

## 2020-12-12 PROCEDURE — 80053 COMPREHEN METABOLIC PANEL: CPT

## 2020-12-12 PROCEDURE — 85045 AUTOMATED RETICULOCYTE COUNT: CPT

## 2020-12-12 PROCEDURE — 96376 TX/PRO/DX INJ SAME DRUG ADON: CPT

## 2020-12-12 PROCEDURE — 96361 HYDRATE IV INFUSION ADD-ON: CPT

## 2020-12-12 PROCEDURE — 36415 COLL VENOUS BLD VENIPUNCTURE: CPT

## 2020-12-12 PROCEDURE — 85025 COMPLETE CBC W/AUTO DIFF WBC: CPT

## 2020-12-12 NOTE — ER DOCUMENT REPORT
ED General





- General


Chief Complaint: Sickle Cell Crisis


Stated Complaint: RIGHT ARM PAIN


Time Seen by Provider: 12/12/20 16:49


Primary Care Provider: 


CINDY IVVEROS NP-C [NO LOCAL MD] - Follow up as needed


Notes: 


Patient is a 25-year-old male with a history of sickle cell disease who comes 

emergency department for chief complaint of joint pain.  Patient states that he 

has taken his home medication including oxycodone but his pain continued to 

worsen so he came in for evaluation and treatment.  He states that earlier today

when he bent over he briefly had blurred vision, he states that he did this a 

couple of times and it happened.  However he denies current blurred vision, he 

reports a vague headache, he denies focal numbness or weakness.  He denies 

fever, head injury, dizziness, passing out, shortness of breath.  He states his 

joint pain and pain flareup is similar to his sickle cell crisis in the past alt

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

much less frequently now.  He is on prednisone following with the transplant 

team.








TRAVEL OUTSIDE OF THE [place] IN LAST 30 DAYS: No





- Related Data


Allergies/Adverse Reactions: 


                                        





Coconut * [Coconut] Allergy (Mild, Verified 07/18/20 19:14)


   


apixaban [From Eliquis] Allergy (Verified 07/18/20 19:14)


   


rivaroxaban [From Xarelto] Allergy (Verified 07/18/20 19:14)


   


transpore tape Allergy (Mild, Uncoded 07/18/20 19:14)


   Hives











Past Medical History





- General


Information source: Patient





- Social History


Smoking Status: Never Smoker


Chew tobacco use (# tins/day): No


Frequency of alcohol use: None


Drug Abuse: None


Lives with: Family


Family History: Reviewed & Not Pertinent, Arthritis, DM, Hypertension, Other - 

Sickle cell trait both parents and asthma


Pulmonary Medical History: Reports: Hx Asthma, Hx Pneumonia


Renal/ Medical History: Denies: Hx Peritoneal Dialysis


Musculoskeletal Medical History: Reports Hx Musculoskeletal Deformity - Pain to 

multiple areas mostly right knee due to sickle cell anemia


Past Surgical History: Reports: Hx Abdominal Surgery - Gallstones removal, Hx 

Cholecystectomy, Hx Orthopedic Surgery - Fluid drained from right foot, Hx 

Vascular Surgery - Port placementx2 (failed)





- Immunizations


Immunizations up to date: Yes


Hx Diphtheria, Pertussis, Tetanus Vaccination: Yes


Hx Pneumococcal Vaccination: 05/16/13





Review of Systems





- Review of Systems


Constitutional: See HPI


EENT: No symptoms reported


Cardiovascular: See HPI


Respiratory: No symptoms reported


Gastrointestinal: No symptoms reported


Genitourinary: No symptoms reported


Male Genitourinary: No symptoms reported


Musculoskeletal: See HPI


Skin: No symptoms reported


Hematologic/Lymphatic: No symptoms reported


Neurological/Psychological: See HPI





Physical Exam





- Vital signs


Vitals: 


                                        











Temp Pulse Resp BP Pulse Ox


 


 98.1 F   97   16   131/93 H  95 


 


 12/12/20 16:46  12/12/20 16:46  12/12/20 16:46  12/12/20 16:46  12/12/20 16:46














- Notes


Notes: 





GENERAL: Alert, interacts well. No acute distress.


HEAD: Normocephalic, atraumatic.


EYES: Pupils equal, round, and reactive to light. Extraocular movements intact.


ENT: Oral mucosa moist, tongue midline. Oropharynx unremarkable. Airway patent. 


NECK: Full range of motion. Supple. Trachea midline. No lymphadenopathy.


LUNGS: Clear to auscultation bilaterally, no wheezes, rales, or rhonchi. No 

respiratory distress. Non-tender chest wall. 


HEART: Regular rate and rhythm. No murmur


ABDOMEN: Soft, non-tender. Non-distended. 


EXTREMITIES: Moves all 4 extremities spontaneously. No edema, normal radial and 

dorsalis pedis pulses bilaterally. No cyanosis.


BACK: no cervical, thoracic, lumbar midline tenderness. No saddle anesthesia, 

normal distal neurovascular exam. Moves all extremities in full range of motion.


NEUROLOGICAL: Alert and oriented x3. Normal speech. Cranial nerves II through 

XII grossly intact. Strength 5/5 in all extremities. 


PSYCH: Normal affect, normal mood.


SKIN: Warm, dry, normal turgor. No rashes or lesions noted.





Course





- Re-evaluation


Re-evalutation: 


Patient is well-appearing, does not appear to be in severe distress, vital signs

unremarkable.  CBC, reticulocyte count, chemistry unremarkable.  Patient with no

shortness of breath, chest pain, or fever.  Patient did state he had blurred 

vision when he bent over but not since, EKG unremarkable, neurological exam 

unremarkable, no headache.  Low suspicion of emergent intracranial or 

intrathoracic etiology.





I reevaluated patient after IV fluids and medications for pain, patient standing

up in the room, states he feels much better, states he is ready to go home.  He 

states he has a close follow-up with his appointment for imaging of the hips 

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  He states he 

wants to go home before that.  I discussed work-up, expectations, return 

precautions.  Patient states appreciation and agreement.  Stable and well-

appearing at time of discharge.





- Vital Signs


Vital signs: 


                                        











Temp Pulse Resp BP Pulse Ox


 


 98.6 F   105 H  16   118/98 H  95 


 


 12/13/20 03:05  12/13/20 03:05  12/13/20 03:05  12/13/20 03:05  12/13/20 03:05














- Laboratory Results


Result Diagrams: 


                                 12/12/20 17:11





                                 12/12/20 18:57


Laboratory Results Interpreted: 


                                        











  12/12/20 12/12/20





  17:11 18:57


 


Hgb  17.5 H 


 


MCV  99 H 


 


MCH  34.0 H 


 


RDW  15.5 H 


 


BUN   29 H











Critical Laboratory Results Reviewed: No Critical Results





- Radiology Results


Critical Radiology Results Reviewed: No Critical Results





- EKG Interpretation by Me


Additional EKG results interpreted by me: 


EKG shows sinus rhythm at a rate of 88, QTc 431, normal axis, no T wave 

inversions or ST segment changes in consecutive leads





Discharge





- Discharge


Clinical Impression: 


 Body aches, Blurred vision





Joint pain


Qualifiers:


 Joint pain location: elbow Laterality: right Qualified Code(s): M25.521 - Pain 

in right elbow





Condition: Stable


Disposition: HOME, SELF-CARE


Additional Instructions: 


Your evaluation does not show any concerning findings.


Follow-up with your primary care for additional management and evaluation.


Return if you worsen including severe worsening pain, fever, difficulty 

breathing, severe headache, passing out, or any other concerning symptoms.


Referrals: 


CINDY VIVEROS NP-C [NO LOCAL MD] - Follow up as needed

## 2020-12-12 NOTE — ER DOCUMENT REPORT
ED Medical Screen (RME)





- General


Chief Complaint: Sickle Cell Crisis


Stated Complaint: RIGHT ARM PAIN


Time Seen by Provider: 12/12/20 16:49


Primary Care Provider: 


CINDY VIVEROS NP-C [Primary Care Provider] - Follow up as needed


TRAVEL OUTSIDE OF THE [place] IN LAST 30 DAYS: No





- HPI


Notes: 





12/12/20 16:57


25-year-old male with history of sickle cell disease presents to ED for eval

uation of visual disturbances as well as joint pain.  Patient reports that he 

has been taking his home medications without improvement.  Reports he was 

leaning forward earlier today and started having static symptoms to his vision 

as well as blurred vision.  Notes that this is atypical for his presentations.  

Denies chest pain or shortness of breath.  Reports no episode of syncope.  

Denies any recent cough or cold symptoms.  That his joint pain is similar to 

what he has experienced in the past.  Denies other complaints. .





- Related Data


Allergies/Adverse Reactions: 


                                        





Coconut * [Coconut] Allergy (Mild, Verified 07/18/20 19:14)


   


apixaban [From Eliquis] Allergy (Verified 07/18/20 19:14)


   


rivaroxaban [From Xarelto] Allergy (Verified 07/18/20 19:14)


   


transpore tape Allergy (Mild, Uncoded 07/18/20 19:14)


   Hives











Past Medical History





- Social History


Chew tobacco use (# tins/day): No


Frequency of alcohol use: None


Drug Abuse: None


Family history: None


Pulmonary Medical History: Reports: Hx Asthma, Hx Pneumonia


Renal/ Medical History: Denies: Hx Peritoneal Dialysis


Musculoskeltal Medical History: Reports Hx Musculoskeletal Deformity - Pain to 

multiple areas mostly right knee due to sickle cell anemia


Past Surgical History: Reports: Hx Abdominal Surgery - Gallstones removal, Hx 

Cholecystectomy, Hx Orthopedic Surgery - Fluid drained from right foot, Hx 

Vascular Surgery - Port placementx2 (failed)





- Immunizations


Immunizations up to date: Yes


Hx Diphtheria, Pertussis, Tetanus Vaccination: Yes





Physical Exam





- Vital signs


Vitals: 





                                        











Temp Pulse Resp BP Pulse Ox


 


 98.1 F   97   16   131/93 H  95 


 


 12/12/20 16:46  12/12/20 16:46  12/12/20 16:46  12/12/20 16:46  12/12/20 16:46








General: No acute distress. Alert and oriented x3. Sitting comfortably in a 

stretcher.


Skin: No jaundice, pallor, petechiae, or rashes. Warm and dry.


HEENT: Normocephalic, atraumatic. Pupils are equal round reactive to light and 

accommodation.


Extraocular movements are intact.  Fatigable bilateral horizontal nystagmus TMs 

without erythema or bulging. Canals are clear. Nares patent


without any discharge. Teeth in good condition. Pharynx without erythema, edema,

or exudates.


Mucous membranes moist. No tonsillar enlargement. Uvula is midline. Airway is 

patent.


Neck: Supple with no lymphadenopathy. Full range of motion.


Heart: Regular rate and rhythm. S1,S2. No murmurs, rubs, or gallops.


Lungs: Clear to auscultation bilaterally. No wheezes, rhonchi, rales. Equal 

chest expansion. No


retractions.


Abdomen: Soft, nontender to palpation, nondistended. Positive bowel sounds in 

all 4 quadrants. No


masses. No CVA tenderness bilaterally.


Back: No midline spinal TTP. No paraspinous muscular TTP.


Neuro: GCS 15. Moving all extremities without discomfort.


Psych: Mood and affect appropriate.





Course





- Vital Signs


Vital signs: 





                                        











Temp Pulse Resp BP Pulse Ox


 


 98.1 F   97   16   131/93 H  95 


 


 12/12/20 16:46  12/12/20 16:46  12/12/20 16:46  12/12/20 16:46  12/12/20 16:46














Doctor's Discharge





- Discharge


Referrals: 


CINDY VIVEROS NP-C [Primary Care Provider] - Follow up as needed

## 2020-12-12 NOTE — RADIOLOGY REPORT (SQ)
EXAM DESCRIPTION:  CT HEAD WITHOUT



IMAGES COMPLETED DATE/TIME:  12/12/2020 5:17 pm



REASON FOR STUDY:  dizziness



COMPARISON:  CT head 12/26/2019



TECHNIQUE:  Axial images acquired through the brain without intravenous contrast.  Images reviewed wi
th bone, brain and subdural windows.  Additional sagittal and coronal reconstructions were generated.
 Images stored on PACS.

All CT scanners at this facility use dose modulation, iterative reconstruction, and/or weight based d
osing when appropriate to reduce radiation dose to as low as reasonably achievable (ALARA).

[facility]: Dose Right  [facility]: CareDose    [facility]: Dose Right    [facility]: Teradose 4D    [facility]: Smart [facility]



RADIATION DOSE:  CT Rad equipment meets quality standard of care and radiation dose reduction techniq
ues were employed. CTDIvol: 53.2 mGy. DLP: 1017 mGy-cm. mGy.



LIMITATIONS:  None.



FINDINGS:  VENTRICLES: Normal size and contour.

CEREBRUM: No masses.  No hemorrhage.  No midline shift.  No evidence for acute infarction. Normal gra
y/white matter differentiation. No areas of low density in the white matter.

CEREBELLUM: No masses.  No hemorrhage.  No alteration of density.  No evidence for acute infarction.

EXTRAAXIAL SPACES: No fluid collections.  No masses.

ORBITS AND GLOBE: No intra- or extraconal masses.  Normal contour of globe without masses.

CALVARIUM: No fracture.

PARANASAL SINUSES: No fluid or mucosal thickening.

SOFT TISSUES: No mass or hematoma.

OTHER: No other significant finding.



IMPRESSION:  NO ACUTE INTRACRANIAL IMAGING FINDINGS.

EVIDENCE OF ACUTE STROKE: NO.



COMMENT:  Quality ID # [id number]: Final reports with documentation of one or more dose reduction techniques
 (e.g., Automated exposure control, adjustment of the mA and/or kV according to patient size, use of 
iterative reconstruction technique)



TECHNICAL DOCUMENTATION:  JOB ID:  [id number]

 2011 [employer]- All Rights Reserved



Reading location - IP/workstation name: [email or web address]

## 2020-12-13 VITALS — SYSTOLIC BLOOD PRESSURE: 118 MMHG | DIASTOLIC BLOOD PRESSURE: 98 MMHG

## 2023-09-25 NOTE — PDOC PROGRESS REPORT
Subjective


Progress Note for:: 02/25/18


Subjective:: 





He has a thrombosed right subclavian vein at the site of the Port-A-Cath, 

started on Xarelto, consultation obtained from surgery to decide if there is 

indication for removal of the device


Reason For Visit: 


SICKLE CELL CRISIS








Physical Exam


Vital Signs: 


 











Temp Pulse Resp BP Pulse Ox


 


 98.6 F   87   16   129/73 H  98 


 


 02/25/18 11:32  02/25/18 11:32  02/25/18 11:32  02/25/18 11:32  02/25/18 11:32








 Intake & Output











 02/24/18 02/25/18 02/26/18





 06:59 06:59 06:59


 


Intake Total 572 4490 400


 


Output Total 900 4250 1000


 


Balance -328 240 -600


 


Weight 59.5 kg 59.4 kg 











General appearance: PRESENT: no acute distress


Eye exam: PRESENT: PERRLA


Respiratory exam: PRESENT: clear to auscultation brenda


Cardiovascular exam: PRESENT: +S1, +S2


GI/Abdominal exam: PRESENT: soft


Neurological exam: PRESENT: alert, CN II-XII grossly intact





Results


Laboratory Results: 


 





 02/24/18 06:15 





 02/24/18 06:15 








Impressions: 


 





Venous Doppler Study  02/24/18 02:14


IMPRESSION:  Nonocclusive thrombus in the right subclavian vein that is 

adherent to the port catheter


 














Assessment & Plan





- Diagnosis


(1) Right subclavian vein thrombosis


Is this a current diagnosis for this admission?: Yes   





(2) Sickle cell crisis


Is this a current diagnosis for this admission?: Yes   





- Plan Summary


Plan Summary: 





Continue Xarelto
Subjective


Progress Note for:: 02/26/18


Subjective:: 


Patient reported improvement in his right arm pain. No swelling. he reported 

flushing of port-a-cath every 2 weeks. No chest pain or difficulty with 

breathing. No nausea, vomiting or abdominal pain.


Reason For Visit: 


SICKLE CELL CRISIS








Physical Exam


Vital Signs: 


 











Temp Pulse Resp BP Pulse Ox


 


 98.1 F   82   12   115/53 L  100 


 


 02/26/18 15:12  02/26/18 15:12  02/26/18 15:12  02/26/18 15:12  02/26/18 15:12








 Intake & Output











 02/25/18 02/26/18 02/27/18





 06:59 06:59 06:59


 


Intake Total 4490 4495 1642


 


Output Total 4250 3175 1050


 


Balance 240 1320 592


 


Weight 59.4 kg  











General appearance: PRESENT: mild distress - from multiple joint sites SSD 

related pain crisis


Head exam: PRESENT: atraumatic, normocephalic


Eye exam: PRESENT: conjunctiva pink, EOMI, PERRLA.  ABSENT: scleral icterus


Mouth exam: PRESENT: moist


Respiratory exam: PRESENT: clear to auscultation brenda


Cardiovascular exam: PRESENT: RRR.  ABSENT: diastolic murmur, rubs, systolic 

murmur


GI/Abdominal exam: PRESENT: normal bowel sounds, soft.  ABSENT: distended, 

guarding, mass, organolmegaly, rebound, tenderness


Extremities exam: PRESENT: tenderness - multiple sites.  ABSENT: joint swelling

, pedal edema


Neurological exam: PRESENT: alert, awake, oriented to person, oriented to place

, oriented to time, oriented to situation, CN II-XII grossly intact.  ABSENT: 

motor sensory deficit


Psychiatric exam: PRESENT: appropriate affect, normal mood.  ABSENT: homicidal 

ideation, suicidal ideation


Skin exam: PRESENT: dry, intact, warm.  ABSENT: cyanosis, rash





Results


Laboratory Results: 


 





 02/24/18 06:15 





 02/24/18 06:15 








Impressions: 


 





Venous Doppler Study  02/24/18 02:14


IMPRESSION:  Nonocclusive thrombus in the right subclavian vein that is 

adherent to the port catheter


 














Assessment & Plan





- Diagnosis


(1) Right subclavian vein thrombosis


Is this a current diagnosis for this admission?: Yes   


Plan: 


See attending physician orders.








(2) Sickle cell disease with crisis


Is this a current diagnosis for this admission?: Yes   


Plan: 


See attending physician orders.








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


Is this a current diagnosis for this admission?: Yes   


Plan: 


See attending physician orders.








- Time


Time Spent with patient: 25-34 minutes


Smoking Cessation Education: 3 to 10 minutes


Anticipated discharge: Home


Within: Other





- Inpatient Certification


Medical Necessity: Need Close Monitoring Due to Risk of Patient Decompensation, 

Need For IV Fluids, Need For Continuous Telemetry Monitoring, Need for Pain 

Control, Risk of Complication if Not Cared For in Hospital


Post Hospital Care: D/C Planner Documentation





- Plan Summary


Plan Summary: 


See attending physician orders. I had extensive discussion with patient 

regarding need for anticoagulation for next 6-8 weeks. Thereafter he will need 

weekly heparin flush of his port-a-cath device. Continue all current medication 

management.
Subjective


Progress Note for:: 02/27/18


Subjective:: 





Patient reported some improvement in his right arm pain. He denied any 

significant swelling. No chest pain or difficulty with breathing. No nausea, 

vomiting, abdominal pain or constipation.


Reason For Visit: 


SICKLE CELL CRISIS








Physical Exam


Vital Signs: 


 











Temp Pulse Resp BP Pulse Ox


 


 98.1 F   75   16   121/73   99 


 


 02/27/18 11:01  02/27/18 11:01  02/27/18 11:01  02/27/18 11:01  02/27/18 11:20








 Intake & Output











 02/26/18 02/27/18 02/28/18





 06:59 06:59 06:59


 


Intake Total 4495 3242 1022


 


Output Total 3175 2050 500


 


Balance 1320 1192 522











Physical Exam: 


General appearance: PRESENT: mild distress - from multiple joint sites SSD 

related pain crisis


Head exam: PRESENT: atraumatic, normocephalic


Eye exam: PRESENT: conjunctiva pink, EOMI, PERRLA.  ABSENT: scleral icterus


Mouth exam: PRESENT: moist


Respiratory exam: PRESENT: clear to auscultation brenda


Cardiovascular exam: PRESENT: RRR.  ABSENT: diastolic murmur, rubs, systolic 

murmur


GI/Abdominal exam: PRESENT: normal bowel sounds, soft.  ABSENT: distended, 

guarding, mass, organolmegaly, rebound, tenderness


Extremities exam: PRESENT: tenderness - multiple sites.  ABSENT: joint swelling

, pedal edema


Neurological exam: PRESENT: alert, awake, oriented to person, oriented to place

, oriented to time, oriented to situation, CN II-XII grossly intact.  ABSENT: 

motor sensory deficit


Psychiatric exam: PRESENT: appropriate affect, normal mood.  ABSENT: homicidal 

ideation, suicidal ideation


Skin exam: PRESENT: dry, intact, warm.  ABSENT: cyanosis, rash





Results


Laboratory Results: 


 





 02/27/18 05:55 





 02/24/18 06:15 





 











  02/27/18





  05:55


 


WBC  6.6


 


RBC  2.78 L


 


Hgb  10.0 L


 


Hct  28.4 L


 


MCV  102 H


 


MCH  35.9 H


 


MCHC  35.1


 


RDW  25.7 H


 


Plt Count  386


 


Seg Neutrophils %  Not Reportable


 


Lymphocytes %  Not Reportable


 


Monocytes %  Not Reportable


 


Eosinophils %  Not Reportable


 


Basophils %  Not Reportable


 


Absolute Neutrophils  Not Reportable


 


Absolute Lymphocytes  Not Reportable


 


Absolute Monocytes  Not Reportable


 


Absolute Eosinophils  Not Reportable


 


Absolute Basophils  Not Reportable











Impressions: 


 





Venous Doppler Study  02/24/18 02:14


IMPRESSION:  Nonocclusive thrombus in the right subclavian vein that is 

adherent to the port catheter


 














Assessment & Plan





- Diagnosis


(1) Right subclavian vein thrombosis


Is this a current diagnosis for this admission?: Yes   





(2) Sickle cell disease with crisis


Is this a current diagnosis for this admission?: Yes   





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


Is this a current diagnosis for this admission?: Yes   





- Time


Time Spent with patient: 25-34 minutes


Medications reviewed and adjusted accordingly: Yes


Anticipated discharge: Home


Within: Other





- Inpatient Certification


Based on my medical assessment, after consideration of the patient's 

comorbidities, presenting symptoms, or acuity I expect that the services needed 

warrant INPATIENT care.: Yes


I certify that my determination is in accordance with my understanding of 

Medicare's requirements for reasonable and necessary INPATIENT services [42 CFR 

412.3e].: Yes


Medical Necessity: Need Close Monitoring Due to Risk of Patient Decompensation, 

Need For IV Fluids, Need for Pain Control, Risk of Complication if Not Cared 

For in Hospital


Post Hospital Care: D/C Planner Documentation





- Plan Summary


Plan Summary: 


Continue current medication management. Possible discharge home in next 24-48 

hours discussed with patient during this bedside visit.
Subjective


Progress Note for:: 02/28/18


Subjective:: 





Patient reported some more swelling in his right arm pain. He denied chest pain 

or difficulty with breathing. No nausea, vomiting, abdominal pain or 

constipation. No fever or chills.


Reason For Visit: 


SICKLE CELL CRISIS








Physical Exam


Vital Signs: 


 











Temp Pulse Resp BP Pulse Ox


 


 98.5 F   82   17   122/68   96 


 


 02/28/18 03:28  02/28/18 07:00  02/28/18 03:28  02/28/18 03:28  02/28/18 03:28








 Intake & Output











 02/27/18 02/28/18 03/01/18





 06:59 06:59 06:59


 


Intake Total 3242 4251 


 


Output Total 2050 2800 


 


Balance 1192 1451 











Physical Exam: 


General appearance: PRESENT: mild distress - from multiple joint sites SSD 

related pain crisis


Head exam: PRESENT: atraumatic, normocephalic


Eye exam: PRESENT: conjunctiva pink, EOMI, PERRLA.  ABSENT: scleral icterus


Mouth exam: PRESENT: moist


Respiratory exam: PRESENT: clear to auscultation brenda


Cardiovascular exam: PRESENT: RRR.  ABSENT: diastolic murmur, rubs, systolic 

murmur


GI/Abdominal exam: PRESENT: normal bowel sounds, soft.  ABSENT: distended, 

guarding, mass, organomegaly, rebound, tenderness


Extremities exam: PRESENT: tenderness - multiple sites.  ABSENT: joint swelling

, pedal edema


Neurological exam: PRESENT: alert, awake, oriented to person, oriented to place

, oriented to time, oriented to situation, CN II-XII grossly intact.  ABSENT: 

motor sensory deficit


Psychiatric exam: PRESENT: appropriate affect, normal mood.  ABSENT: homicidal 

ideation, suicidal ideation


Skin exam: PRESENT: dry, intact, warm.  ABSENT: cyanosis, rash








Results


Laboratory Results: 


 





 02/27/18 05:55 





 02/24/18 06:15 








Impressions: 


 





Venous Doppler Study  02/24/18 02:14


IMPRESSION:  Nonocclusive thrombus in the right subclavian vein that is 

adherent to the port catheter


 














Assessment & Plan





- Diagnosis


(1) Right subclavian vein thrombosis


Is this a current diagnosis for this admission?: Yes   





(2) Sickle cell disease with crisis


Is this a current diagnosis for this admission?: Yes   





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


Is this a current diagnosis for this admission?: Yes   





- Time


Time Spent with patient: 25-34 minutes


Medications reviewed and adjusted accordingly: Yes


Anticipated discharge: Home


Within: within 48 hours





- Inpatient Certification


Based on my medical assessment, after consideration of the patient's 

comorbidities, presenting symptoms, or acuity I expect that the services needed 

warrant INPATIENT care.: Yes


I certify that my determination is in accordance with my understanding of 

Medicare's requirements for reasonable and necessary INPATIENT services [42 CFR 

412.3e].: Yes


Medical Necessity: Need Close Monitoring Due to Risk of Patient Decompensation, 

Need For IV Fluids, Need for Pain Control, Risk of Complication if Not Cared 

For in Hospital


Post Hospital Care: D/C Planner Documentation





- Plan Summary


Plan Summary: 





See attending physician orders.
Subjective


Progress Note for:: 03/01/18


Subjective:: 





Patient reported some relief of his right arm swelling and pain with local 

application of heat pad. No chest pain or difficulty with breathing. No nausea, 

vomiting, abdominal pain or constipation. No fever or chills.


Reason For Visit: 


SICKLE CELL CRISIS








Physical Exam


Vital Signs: 


 











Temp Pulse Resp BP Pulse Ox


 


 98.8 F   107 H  16   123/74   93 


 


 03/01/18 15:28  03/01/18 15:28  03/01/18 15:28  03/01/18 15:28  03/01/18 15:28








 Intake & Output











 02/28/18 03/01/18 03/02/18





 06:59 06:59 06:59


 


Intake Total 4251 4019 


 


Output Total 2800 2000 


 


Balance 1451 2019 


 


Weight  53.8 kg 











Physical Exam: 


General appearance: PRESENT: mild distress - from multiple joint sites SSD 

related pain crisis


Head exam: PRESENT: atraumatic, normocephalic


Eye exam: PRESENT: conjunctiva pink, EOMI, PERRLA.  ABSENT: scleral icterus


Mouth exam: PRESENT: moist


Respiratory exam: PRESENT: clear to auscultation brenda


Cardiovascular exam: PRESENT: RRR.  ABSENT: diastolic murmur, rubs, systolic 

murmur


GI/Abdominal exam: PRESENT: normal bowel sounds, soft.  ABSENT: distended, 

guarding, mass, organomegaly, rebound, tenderness


Extremities exam: PRESENT: improving tenderness - multiple sites related to his 

SSDx.  ABSENT: joint swelling, pedal edema


Neurological exam: PRESENT: alert, awake, oriented to person, oriented to place

, oriented to time, oriented to situation, CN II-XII grossly intact.  ABSENT: 

motor sensory deficit


Psychiatric exam: PRESENT: appropriate affect, normal mood.  ABSENT: homicidal 

ideation, suicidal ideation


Skin exam: PRESENT: dry, intact, warm.  ABSENT: cyanosis, rash





Results


Laboratory Results: 


 





 02/27/18 05:55 





 02/24/18 06:15 








Impressions: 


 





Venous Doppler Study  02/24/18 02:14


IMPRESSION:  Nonocclusive thrombus in the right subclavian vein that is 

adherent to the port catheter


 














Assessment & Plan





- Diagnosis


(1) Right subclavian vein thrombosis


Is this a current diagnosis for this admission?: Yes   





(2) Sickle cell disease with crisis


Is this a current diagnosis for this admission?: Yes   





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


Is this a current diagnosis for this admission?: Yes   





- Time


Time Spent with patient: 25-34 minutes


Medications reviewed and adjusted accordingly: Yes


Anticipated discharge: Home


Within: within 24 hours





- Inpatient Certification


Based on my medical assessment, after consideration of the patient's 

comorbidities, presenting symptoms, or acuity I expect that the services needed 

warrant INPATIENT care.: Yes


I certify that my determination is in accordance with my understanding of 

Medicare's requirements for reasonable and necessary INPATIENT services [42 CFR 

412.3e].: Yes


Medical Necessity: Need Close Monitoring Due to Risk of Patient Decompensation, 

Need For IV Fluids, Need for Pain Control, Risk of Complication if Not Cared 

For in Hospital


Post Hospital Care: D/C Planner Documentation





- Plan Summary


Plan Summary: 


Restart on his preadmission home medication except the breakthrough oxycondone. 

Possible discharge in next 24 hours. Patient is aware of care plan and in 

agreement.
Patient unable to complete

## 2024-03-13 NOTE — EKG REPORT
SEVERITY:- NORMAL ECG -

SINUS RHYTHM

:

Confirmed by: Storm Bosch MD 21-May-2018 06:49:10
wheelchair

## 2024-03-30 NOTE — ER DOCUMENT REPORT
ED General





- General


Chief Complaint: Headache


Stated Complaint: HEADACHE


Time Seen by Provider: 12/26/19 20:04


Primary Care Provider: 


JOSAFAT YBARRA MD [Primary Care Provider] - Follow up as needed


Mode of Arrival: Medic


Information source: Patient


TRAVEL OUTSIDE OF THE [place] IN LAST 30 DAYS: No





- HPI


Onset: Just prior to arrival


Onset/Duration: Gradual, Persistent


Quality of pain: Achy, Sharp


Severity: Moderate


Pain Level: 4 - 24-year-old -American male with history of sickle cell 

disease who has been to the emergency room several visits in the last 1 to 2 

weeks.  Patient complained of a headache while shopping and [employer] today.  

Patient said he felt weak and actually had a syncopal spell where he fell to the

ground.  Patient has chronic low back pain with a sickle cell crisis and in more

recent weeks to months is noted a headache that seems to be associated with.  

Denies any fever chills loss of vision loss of speech denies injuring his head 

when he fell or his neck or his back.  Patient brought into the EMS by EMS to 

the ED.  Patient reports that his low back due to his sickle cell crisis pain is

dominating more than his headache in terms of his complaint at this time.


Similar symptoms previously: Yes


Recently seen / treated by doctor: Yes





- Related Data


Allergies/Adverse Reactions: 


                                        





Coconut * [Coconut] Allergy (Mild, Verified 12/26/19 19:53)


   


apixaban [From Eliquis] Allergy (Verified 12/26/19 19:53)


   


rivaroxaban [From Xarelto] Allergy (Verified 12/26/19 19:53)


   


transpore tape Allergy (Mild, Uncoded 12/10/19 16:40)


   Hives











Past Medical History





- General


Information source: Patient





- Social History


Smoking Status: Never Smoker


Frequency of alcohol use: None


Lives with: Family


Family History: Reviewed & Not Pertinent, Arthritis, DM, Hypertension, Other - 

Sickle cell trait both parents and asthma


Patient has suicidal ideation: No


Patient has homicidal ideation: No





- Medical History


Medical History: Other - 090 That would be a waste that Dr. Juárez sickle cell 

anemia sure


Pulmonary Medical History: Reports: Hx Asthma, Hx Pneumonia


Renal/ Medical History: Denies: Hx Peritoneal Dialysis


Musculoskeletal Medical History: Reports Hx Musculoskeletal Deformity - Pain to 

multiple areas mostly right knee due to sickle cell anemia


Past Surgical History: Reports: Hx Abdominal Surgery - Gallstones removal, Hx 

Cholecystectomy, Hx Orthopedic Surgery - Fluid drained from right foot, Hx 

Vascular Surgery - Port placementx2 (failed)





- Immunizations


Immunizations up to date: Yes


Hx Diphtheria, Pertussis, Tetanus Vaccination: Yes


Hx Pneumococcal Vaccination: 05/16/13





Review of Systems





- Review of Systems


Musculoskeletal: See HPI


Neurological/Psychological: See HPI





Physical Exam





- Vital signs


Vitals: 


                                        











Temp Pulse Resp BP Pulse Ox


 


 98.1 F   80   16   172/108 H  97 


 


 12/26/19 18:59  12/26/19 18:59  12/26/19 18:59  12/26/19 18:59  12/26/19 18:59











Interpretation: Normal





- Notes


Notes: 





Appears to be in distress with low back pain and complaints of headache





- General


General appearance: Appears well, Alert





- HEENT


Head: Normocephalic, Atraumatic


Eyes: Normal


Pupils: PERRL





- Respiratory


Respiratory status: No respiratory distress


Chest status: Nontender


Breath sounds: Normal


Chest palpation: Normal





- Cardiovascular


Rhythm: Regular


Heart sounds: Normal auscultation


Murmur: No





- Abdominal


Inspection: Normal


Distension: No distension


Bowel sounds: Normal


Tenderness: Nontender


Organomegaly: No organomegaly





- Back


Back: Normal, Nontender





- Extremities


General upper extremity: Normal inspection, Nontender, Normal color, Normal ROM,

Normal temperature


General lower extremity: Normal inspection, Nontender, Normal color, Normal ROM,

Normal temperature, Normal weight bearing.  No: Shira's sign





- Neurological


Neuro grossly intact: Yes


Cognition: Normal


Orientation: AAOx4


Eleele Coma Scale Eye Opening: Spontaneous


Eleele Coma Scale Verbal: Oriented


Eleele Coma Scale Motor: Obeys Commands


Eleele Coma Scale Total: 15


Speech: Normal


Motor strength normal: LUE, RUE, LLE, RLE


Sensory: Normal





- Psychological


Associated symptoms: Normal affect, Normal mood





- Skin


Skin Temperature: Warm


Skin Moisture: Dry


Skin Color: Normal





Course





- Vital Signs


Vital signs: 


                                        











Temp Pulse Resp BP Pulse Ox


 


 98.1 F   80   16   148/95 H  96 


 


 12/26/19 18:59  12/26/19 18:59  12/27/19 04:14  12/27/19 04:14  12/27/19 04:14














- Laboratory


Result Diagrams: 


                                 12/26/19 20:46





                                 12/26/19 20:46


Laboratory results interpreted by me: 


                                        











  12/26/19 12/26/19





  20:46 20:46


 


MCV  101 H 


 


MCH  34.4 H 


 


RDW  17.2 H 


 


Plt Count  148 L 


 


Glucose   111 H














Discharge





- Discharge


Clinical Impression: 


Sickle cell disease


Qualifiers:


 Sickle-cell associated disorders: with unspecified crisis Qualified Code(s): 

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





Acute low back pain


Qualifiers:


 Back pain laterality: bilateral Sciatica presence: unspecified whether sciatica

present Qualified Code(s): M54.5 - Low back pain





Headache


Qualifiers:


 Headache type: cluster Headache chronicity pattern: unspecified pattern 





Accidental fall


Qualifiers:


 Encounter type: subsequent encounter Qualified Code(s): W19.XXXD - Unspecified 

fall, subsequent encounter





Condition: Stable


Disposition: HOME, SELF-CARE


Instructions:  Headache ([facility]), Oral Narcotic Medication ([facility])


Additional Instructions: 


Sickle Cell Crisis





     You have "sickle cell crisis."  Sickle cell disease is caused by abnormal 

hemoglobin.  This hemoglobin can deform red blood cells into a sickle shape.  

These abnormal blood cells can block blood vessels. This causes the pain of 

sickle cell crisis.


     Sickle cell crisis can occur any time.  But attacks are more likely with 

acute infection, dehydration, or altitude change.  A crisis usually causes pain 

in the legs, back, abdomen, and chest.  Sometimes the pain may ease and return 

later.


     The usual treatment is oxygen, pain medication, IV fluids, and treatment of

infection.  Attacks may take a couple of days to resolve.


     Return if the pain becomes more severe, or if there are new symptoms.


Patient states he has pain medications to take at home and therefore is not in 

need of any prescriptions at this time.





Referrals: 


JOSAFAT YBARRA MD [Primary Care Provider] - Follow up as needed
ED Medical Screen (RME)





- General


Chief Complaint: Headache


Stated Complaint: HEADACHE


Time Seen by Provider: 12/26/19 20:04


Primary Care Provider: 


JOSAFAT YBARRA MD [Primary Care Provider] - Follow up as needed


Mode of Arrival: Ambulatory


Information source: Patient


Notes: 





24-year-old male patient frequently known at our facility with sickle cell.  

Patient reports he passed out while at [facility] just prior to arrival and he has 

severe headache.  Patient has never had headache like this in the past.  He 

appears to be in acute distress.  He will be sent straight for a head CT.





Denies chest pain or shortness of breath.





I have greeted and performed a rapid initial assessment of this patient.  A 

comprehensive ED assessment and evaluation of the patient, analysis of test 

results and completion of the medical decision making process will be conducted 

by additional ED providers.  I have specifically instructed the patient or 

family members with the patient to immediately return to any nursing staff 

should anything change in the patient's condition or with their chief complaint.








TRAVEL OUTSIDE OF THE [place] IN LAST 30 DAYS: No





- Related Data


Allergies/Adverse Reactions: 


                                        





Coconut * [Coconut] Allergy (Mild, Verified 12/26/19 19:53)


   


apixaban [From Eliquis] Allergy (Verified 12/26/19 19:53)


   


rivaroxaban [From Xarelto] Allergy (Verified 12/26/19 19:53)


   


transpore tape Allergy (Mild, Uncoded 12/10/19 16:40)


   Hives











Past Medical History





- Social History


Family history: None


Pulmonary Medical History: Reports: Hx Asthma, Hx Pneumonia


Renal/ Medical History: Denies: Hx Peritoneal Dialysis


Musculoskeltal Medical History: Reports Hx Musculoskeletal Deformity - Pain to 

multiple areas mostly right knee due to sickle cell anemia


Past Surgical History: Reports: Hx Abdominal Surgery - Gallstones removal, Hx 

Cholecystectomy, Hx Orthopedic Surgery - Fluid drained from right foot, Hx 

Vascular Surgery - Port placementx2 (failed)





- Immunizations


Immunizations up to date: Yes


Hx Diphtheria, Pertussis, Tetanus Vaccination: Yes





Physical Exam





- Vital signs


Vitals: 





                                        











Temp Pulse Resp BP Pulse Ox


 


 98.1 F   80   16   172/108 H  97 


 


 12/26/19 18:59  12/26/19 18:59  12/26/19 18:59  12/26/19 18:59  12/26/19 18:59














Course





- Vital Signs


Vital signs: 





                                        











Temp Pulse Resp BP Pulse Ox


 


 98.1 F   80   16   172/108 H  97 


 


 12/26/19 18:59  12/26/19 18:59  12/26/19 18:59  12/26/19 18:59  12/26/19 18:59














Doctor's Discharge





- Discharge


Referrals: 


JOSAFAT YBARRA MD [Primary Care Provider] - Follow up as needed
Spine appears normal, range of motion is not limited, no muscle or joint tenderness

## 2025-01-27 NOTE — RADIOLOGY REPORT (SQ)
EXAM DESCRIPTION: CTA of the chest per PE protocol with contrast.



CLINICAL HISTORY: tachycardia, SOB



COMPARISON: None Available.



TECHNIQUE: CTA of the chest obtained following the uncomplicated

intravenous administration of 62 mL Isovue-370. 3-D/MIP

reformatted images of the chest available for evaluation.



FINDINGS: 



Chest: Mediastinal windows demonstrate an adequate contrast

bolus. No pulmonary embolus identified.  Enlargement of the main

pulmonary artery. This can be seen with pulmonary arterial

hypertension. Visualized thyroid gland is unremarkable.   Great

vessels have normal anatomic configuration. No cardiomegaly,

coronary artery atherosclerosis, or pericardial effusion.  No

abnormalities of the esophagus.   Scattered mediastinal lymph

nodes are not enlarged by CT criteria.



Lung windows demonstrate  medial lower lobe interstitial

opacities which are unchanged in configuration. No pneumothorax

or pleural effusion. Oo abnormalities of the visualized trachea

or airways. 



Limited images of the upper abdomen demonstrate no abnormalities

of the visualized liver, pancreas, adrenal glands, or kidneys.

Spleen is calcified and unchanged. Prior cholecystectomy.



No destructive osseous lesions. Diffuse generalized sclerosis is

unchanged.





DLP:  545.93 mGycm



IMPRESSION: 



1. No pulmonary embolus.



2. Bilateral lower lobe interstitial opacities are unchanged in

configuration suggesting chronic scarring.





This exam was performed according to our departmental

dose-optimization program, which includes automated exposure

control, adjustment of the mA and/or kV according to patient size

and/or use of iterative reconstruction technique. Imaging Studies/Medications

## 2025-05-10 NOTE — RADIOLOGY REPORT (SQ)
EXAM DESCRIPTION:  CHEST SINGLE VIEW



COMPLETED DATE/TIME:  2/18/2018 9:57 pm



REASON FOR STUDY:  fever, back pain



COMPARISON:  1/20/2018



NUMBER OF VIEWS:  One view.



TECHNIQUE:  Single frontal radiographic view of the chest acquired.



LIMITATIONS:  None.



FINDINGS:  LUNGS AND PLEURA: No opacities, masses or pneumothorax. No pleural effusion.

MEDIASTINUM AND HILAR STRUCTURES: No masses.  Contour normal.

HEART AND VASCULAR STRUCTURES: Heart normal in size.  Normal vasculature.

BONES: No acute findings.  Mild sclerosis in the humeral heads consistent with the history of sickle 
cell disease.

HARDWARE: Unchanged right PICC line.

OTHER: No other significant finding.



IMPRESSION:  NO SIGNIFICANT RADIOGRAPHIC FINDING IN THE CHEST.



TECHNICAL DOCUMENTATION:  JOB ID:  [id number]

 2011 [employer]- All Rights Reserved Interval History:  Seen and evaluated on step down unit  No acute events overnight    Clinical status stable, unchanged  No new complaints    Objective:     Vital Signs (Most Recent):  Temp: 97.5 °F (36.4 °C) (05/10/25 1119)  Pulse: 79 (05/10/25 1119)  Resp: 18 (05/10/25 1315)  BP: (!) 150/70 (05/10/25 1119)  SpO2: 97 % (05/10/25 1119) Vital Signs (24h Range):  Temp:  [97.5 °F (36.4 °C)-98.5 °F (36.9 °C)] 97.5 °F (36.4 °C)  Pulse:  [73-95] 79  Resp:  [16-20] 18  SpO2:  [96 %-100 %] 97 %  BP: (133-159)/(66-75) 150/70     Weight: 120 kg (264 lb 8.8 oz)  Body mass index is 33.07 kg/m².    Intake/Output Summary (Last 24 hours) at 5/10/2025 1426  Last data filed at 5/10/2025 1313  Gross per 24 hour   Intake 120 ml   Output 1525 ml   Net -1405 ml         Physical Exam  Cardiovascular:      Rate and Rhythm: Normal rate.      Pulses: Normal pulses.   Pulmonary:      Effort: No respiratory distress.      Breath sounds: Normal breath sounds. No wheezing.   Abdominal:      General: Bowel sounds are normal. There is no distension.      Palpations: Abdomen is soft.      Tenderness: There is no abdominal tenderness.   Skin:     Findings: Lesion (RLE) present.   Neurological:      Mental Status: He is alert and oriented to person, place, and time. Mental status is at baseline.               Significant Labs: All pertinent labs within the past 24 hours have been reviewed.    Significant Imaging: I have reviewed all pertinent imaging results/findings within the past 24 hours.